# Patient Record
Sex: MALE | Race: WHITE | NOT HISPANIC OR LATINO | Employment: OTHER | URBAN - METROPOLITAN AREA
[De-identification: names, ages, dates, MRNs, and addresses within clinical notes are randomized per-mention and may not be internally consistent; named-entity substitution may affect disease eponyms.]

---

## 2017-01-02 ENCOUNTER — PATIENT MESSAGE (OUTPATIENT)
Dept: FAMILY MEDICINE | Facility: CLINIC | Age: 77
End: 2017-01-02

## 2017-01-03 RX ORDER — NITROGLYCERIN 0.4 MG/1
0.4 TABLET SUBLINGUAL EVERY 5 MIN PRN
Qty: 25 TABLET | Refills: 3 | Status: SHIPPED | OUTPATIENT
Start: 2017-01-03 | End: 2018-10-30 | Stop reason: SDUPTHER

## 2017-01-16 ENCOUNTER — PATIENT MESSAGE (OUTPATIENT)
Dept: FAMILY MEDICINE | Facility: CLINIC | Age: 77
End: 2017-01-16

## 2017-01-19 ENCOUNTER — PATIENT MESSAGE (OUTPATIENT)
Dept: OPHTHALMOLOGY | Facility: CLINIC | Age: 77
End: 2017-01-19

## 2017-01-26 ENCOUNTER — OFFICE VISIT (OUTPATIENT)
Dept: OPHTHALMOLOGY | Facility: CLINIC | Age: 77
End: 2017-01-26
Payer: MEDICARE

## 2017-01-26 DIAGNOSIS — H25.13 NUCLEAR SCLEROSIS, BILATERAL: ICD-10-CM

## 2017-01-26 DIAGNOSIS — H40.1131 PRIMARY OPEN ANGLE GLAUCOMA OF BOTH EYES, MILD STAGE: Primary | ICD-10-CM

## 2017-01-26 DIAGNOSIS — H52.7 REFRACTIVE ERROR: ICD-10-CM

## 2017-01-26 DIAGNOSIS — H04.123 DRY EYES, BILATERAL: ICD-10-CM

## 2017-01-26 PROCEDURE — 99999 PR PBB SHADOW E&M-EST. PATIENT-LVL II: CPT | Mod: PBBFAC,,, | Performed by: OPHTHALMOLOGY

## 2017-01-26 PROCEDURE — 99499 UNLISTED E&M SERVICE: CPT | Mod: S$GLB,,, | Performed by: OPHTHALMOLOGY

## 2017-01-26 PROCEDURE — 92250 FUNDUS PHOTOGRAPHY W/I&R: CPT | Mod: S$GLB,,, | Performed by: OPHTHALMOLOGY

## 2017-01-26 PROCEDURE — 92014 COMPRE OPH EXAM EST PT 1/>: CPT | Mod: S$GLB,,, | Performed by: OPHTHALMOLOGY

## 2017-01-26 PROCEDURE — 92015 DETERMINE REFRACTIVE STATE: CPT | Mod: S$GLB,,, | Performed by: OPHTHALMOLOGY

## 2017-01-26 NOTE — PROGRESS NOTES
SUBJECTIVE:   Wilberto Hayes Jr. is a 77 y.o. male   Visual acuity was not recorded.   Chief Complaint   Patient presents with    Glaucoma     dilation and IOP check today        HPI:  HPI     Glaucoma    Additional comments: dilation and IOP check today           Comments   1. Mild COAG Goal =17-18  +Fhx Glaucoma -g-dad  SLT OD 11/09 (min response)  SLT OS 8/09 (22 to 16-17)  2. Mild NSC  3. Dry eyes  Gel Plugs OU  Thera tears  O3FO   Tried Restasis (burning and blurry vision)  4. Guttata     Latanoprost QHS OU         Last edited by CLYDE Meredith on 1/26/2017  9:42 AM. (History)        Assessment /Plan :  1. Primary open angle glaucoma of both eyes, mild stage Doing well, IOP within acceptable range relative to target IOP and no evidence of progression. Continue current treatment. Reviewed importance of continued compliance with treatment and follow up.   2. Nuclear sclerosis, bilateral monitor for now   3. Dry eyes, bilateral continue artificial tears prn OU   4.      Refractive Error Trifocal RX    Return to clinic in 3-4 months  or as needed.  With IOP Check

## 2017-01-26 NOTE — MR AVS SNAPSHOT
[facility] - Ophthalmology  [address] [place] [address]  Phone: [phone]  Fax: [phone]                  Wilberto Hayes Jr.   2017 9:15 AM   Office Visit    Description:  Male : 1940   Provider:  Reggie Molina MD   Department:  [facility] - Ophthalmology           Reason for Visit     Glaucoma           Diagnoses this Visit        Comments    Primary open angle glaucoma of both eyes, mild stage    -  Primary     Nuclear sclerosis, bilateral         Dry eyes, bilateral         Refractive error                To Do List           Future Appointments        Provider Department Dept Phone    2/3/2017 9:40 AM Allison Ortiz MD [facility] [phone]    2/15/2017 11:20 AM Jonel Valle MD [facility] [phone]    2017 10:45 AM Reggie Molina MD [facility] Op[facility] [phone]    2017 1:00 PM Shawanda Gray NP [facility] [phone]      Goals (5 Years of Data)     None      Follow-Up and Disposition     Return in about 3 months (around 2017).      [facility] On Call     [facility] On Call Nurse Care Line - 24/7 Assistance  Registered nurses in the [facility] On Call Center provide clinical advisement, health education, appointment booking, and other advisory services.  Call for this free service at [phone].             Medications           Message regarding Medications     Verify the changes and/or additions to your medication regime listed below are the same as discussed with your clinician today.  If any of these changes or additions are incorrect, please notify your healthcare provider.        STOP taking these medications     lifitegrast (XIIDRA) 5 % Dpet Place 1 drop into both eyes 2 (two) times daily.           Verify that the below list of medications is an accurate representation of the medications you are currently taking.  If none reported, the list may be blank. If incorrect,  please contact your healthcare provider. Carry this list with you in case of emergency.           Current Medications     aspirin 81 mg Tab Take 1 tablet by mouth Daily.    cyanocobalamin (VITAMIN B-12) 100 MCG tablet Take 1 tablet by mouth Daily.    hydrochlorothiazide (HYDRODIURIL) 25 MG tablet TAKE 1/2 TABLET ONE TIME DAILY    latanoprost 0.005 % ophthalmic solution Place 1 drop into both eyes every evening.    losartan (COZAAR) 50 MG tablet TAKE 1 TABLET TWICE DAILY    LUBRICANT EYE DROPS, GLYC-PG, 1-0.3 % Drop     nebivolol (BYSTOLIC) 5 MG Tab Take 1 tablet (5 mg total) by mouth once daily.    OCEAN NASAL 0.65 % nasal spray     omega-3 fatty acids-vitamin E (FISH OIL) 1,000 mg Cap Take 1 capsule by mouth Daily.    PROPYLENE GLYCOL/ (SYSTANE ULTRA OPHT) Place 1 drop into both eyes. PRN    saw palmetto 500 MG capsule Take by mouth 2 (two) times daily.     nitroGLYCERIN (NITROSTAT) 0.4 MG SL tablet Place 1 tablet (0.4 mg total) under the tongue every 5 (five) minutes as needed for Chest pain (if no relief seek er trmt).           Clinical Reference Information           Allergies as of 1/26/2017     Niacin Preparations    Codeine    Fosinopril    Pravastatin    Simvastatin    Sulfa (Sulfonamide Antibiotics)      Immunizations Administered on Date of Encounter - 1/26/2017     None      Orders Placed During Today's Visit      Normal Orders This Visit    Color Fundus Photography - OU - Both Eyes

## 2017-02-15 ENCOUNTER — OFFICE VISIT (OUTPATIENT)
Dept: CARDIOLOGY | Facility: CLINIC | Age: 77
End: 2017-02-15
Payer: MEDICARE

## 2017-02-15 VITALS
HEART RATE: 57 BPM | HEIGHT: 71 IN | BODY MASS INDEX: 31.68 KG/M2 | WEIGHT: 226.31 LBS | DIASTOLIC BLOOD PRESSURE: 70 MMHG | OXYGEN SATURATION: 99 % | SYSTOLIC BLOOD PRESSURE: 128 MMHG

## 2017-02-15 DIAGNOSIS — I10 ESSENTIAL HYPERTENSION: ICD-10-CM

## 2017-02-15 DIAGNOSIS — G47.33 OSA ON CPAP: Primary | Chronic | ICD-10-CM

## 2017-02-15 DIAGNOSIS — M79.604 PAIN IN BOTH LOWER EXTREMITIES: ICD-10-CM

## 2017-02-15 DIAGNOSIS — I25.10 CORONARY ARTERY DISEASE INVOLVING NATIVE CORONARY ARTERY OF NATIVE HEART WITHOUT ANGINA PECTORIS: Chronic | ICD-10-CM

## 2017-02-15 DIAGNOSIS — E78.00 PURE HYPERCHOLESTEROLEMIA: ICD-10-CM

## 2017-02-15 DIAGNOSIS — M79.605 PAIN IN BOTH LOWER EXTREMITIES: ICD-10-CM

## 2017-02-15 PROCEDURE — 1159F MED LIST DOCD IN RCRD: CPT | Mod: S$GLB,,, | Performed by: INTERNAL MEDICINE

## 2017-02-15 PROCEDURE — 3074F SYST BP LT 130 MM HG: CPT | Mod: S$GLB,,, | Performed by: INTERNAL MEDICINE

## 2017-02-15 PROCEDURE — 99214 OFFICE O/P EST MOD 30 MIN: CPT | Mod: S$GLB,,, | Performed by: INTERNAL MEDICINE

## 2017-02-15 PROCEDURE — 1157F ADVNC CARE PLAN IN RCRD: CPT | Mod: S$GLB,,, | Performed by: INTERNAL MEDICINE

## 2017-02-15 PROCEDURE — 3078F DIAST BP <80 MM HG: CPT | Mod: S$GLB,,, | Performed by: INTERNAL MEDICINE

## 2017-02-15 PROCEDURE — 99999 PR PBB SHADOW E&M-EST. PATIENT-LVL III: CPT | Mod: PBBFAC,,, | Performed by: INTERNAL MEDICINE

## 2017-02-15 PROCEDURE — 1160F RVW MEDS BY RX/DR IN RCRD: CPT | Mod: S$GLB,,, | Performed by: INTERNAL MEDICINE

## 2017-02-15 PROCEDURE — 1126F AMNT PAIN NOTED NONE PRSNT: CPT | Mod: S$GLB,,, | Performed by: INTERNAL MEDICINE

## 2017-02-15 PROCEDURE — 99499 UNLISTED E&M SERVICE: CPT | Mod: S$GLB,,, | Performed by: INTERNAL MEDICINE

## 2017-02-15 NOTE — PROGRESS NOTES
Subjective:   Patient ID:  Wilberto Hayes Jr. is a 77 y.o. male who presents for follow-up of CAD, HTN, HLP.  Patient denies CP, angina or anginal equivalent.    Hypertension   This is a chronic problem. The current episode started more than 1 year ago. The problem has been gradually improving since onset. The problem is controlled. Pertinent negatives include no chest pain, palpitations or shortness of breath. Past treatments include angiotensin blockers. The current treatment provides moderate improvement. Compliance problems include exercise.    Coronary Artery Disease   Presents for follow-up visit. Pertinent negatives include no chest pain, chest pressure, chest tightness, dizziness, leg swelling, muscle weakness, palpitations, shortness of breath or weight gain. Risk factors include hyperlipidemia. The symptoms have been stable. Compliance with diet is variable. Compliance with exercise is variable. Compliance with medications is good.   Hyperlipidemia   This is a chronic problem. The current episode started more than 1 year ago. The problem is controlled. Recent lipid tests were reviewed and are variable. Pertinent negatives include no chest pain or shortness of breath. He is currently on no antihyperlipidemic treatment. The current treatment provides mild improvement of lipids. Compliance problems include medication side effects.        Review of Systems   Constitution: Negative. Negative for weight gain.   HENT: Negative.    Eyes: Negative.    Cardiovascular: Negative.  Negative for chest pain, leg swelling and palpitations.   Respiratory: Negative.  Negative for chest tightness and shortness of breath.    Endocrine: Negative.    Hematologic/Lymphatic: Negative.    Skin: Negative.    Musculoskeletal: Negative for muscle weakness.   Gastrointestinal: Negative.    Genitourinary: Negative.    Neurological: Negative.  Negative for dizziness.   Psychiatric/Behavioral: Negative.    Allergic/Immunologic:  Negative.      Family History   Problem Relation Age of Onset    Blindness Paternal Grandfather     Glaucoma Paternal Grandfather     Alcohol abuse Paternal Grandfather     Kidney disease Paternal Grandfather     Cancer Mother      brain    COPD Mother     Alcohol abuse Mother     Hypertension Mother     Cancer Father      lung    COPD Father     Alcohol abuse Father     Hypertension Father     Kidney disease Maternal Grandmother     Alcohol abuse Maternal Grandfather     Kidney disease Maternal Grandfather     Cancer Paternal Grandmother      Gyn     Kidney disease Paternal Grandmother     Retinal detachment Neg Hx     Macular degeneration Neg Hx     Diabetes Neg Hx     Heart disease Neg Hx     Stroke Neg Hx     Mental illness Neg Hx     Mental retardation Neg Hx      Past Medical History   Diagnosis Date    ACE-inhibitor cough     Acute coronary syndrome     Angina pectoris     Cataract     Coronary artery disease     Degenerative disc disease, lumbar     Degenerative disc disease, lumbar     Glaucoma     Hyperlipidemia     Hypertension     Kidney stone     Myocardial infarction     Peripheral vascular disease     Polyneuropathy     Sleep apnea     Tobacco dependence      resolved quit 1980    Trouble in sleeping      Current Outpatient Prescriptions on File Prior to Visit   Medication Sig Dispense Refill    aspirin 81 mg Tab Take 1 tablet by mouth Daily.      cyanocobalamin (VITAMIN B-12) 100 MCG tablet Take 1 tablet by mouth Daily.      hydrochlorothiazide (HYDRODIURIL) 25 MG tablet TAKE 1/2 TABLET ONE TIME DAILY 45 tablet 3    latanoprost 0.005 % ophthalmic solution Place 1 drop into both eyes every evening. 2.5 mL 12    losartan (COZAAR) 50 MG tablet TAKE 1 TABLET TWICE DAILY 180 tablet 1    LUBRICANT EYE DROPS, GLYC-PG, 1-0.3 % Drop       nebivolol (BYSTOLIC) 5 MG Tab Take 1 tablet (5 mg total) by mouth once daily. 30 tablet 2    nitroGLYCERIN (NITROSTAT) 0.4  MG SL tablet Place 1 tablet (0.4 mg total) under the tongue every 5 (five) minutes as needed for Chest pain (if no relief seek er trmt). 25 tablet 3    OCEAN NASAL 0.65 % nasal spray       omega-3 fatty acids-vitamin E (FISH OIL) 1,000 mg Cap Take 1 capsule by mouth Daily.      PROPYLENE GLYCOL/ (SYSTANE ULTRA OPHT) Place 1 drop into both eyes. PRN      saw palmetto 500 MG capsule Take by mouth 2 (two) times daily.        No current facility-administered medications on file prior to visit.      Review of patient's allergies indicates:   Allergen Reactions    Niacin preparations Other (See Comments)     myalgia    Codeine Other (See Comments)     unknown    Fosinopril Other (See Comments)     cough    Pravastatin Other (See Comments)      Muscle pain    Simvastatin Other (See Comments)     Muscle pain    Sulfa (sulfonamide antibiotics) Rash       Objective:     Physical Exam   Constitutional: He is oriented to person, place, and time. He appears well-developed and well-nourished.   HENT:   Head: Normocephalic and atraumatic.   Eyes: Conjunctivae are normal. Pupils are equal, round, and reactive to light.   Neck: Normal range of motion. Neck supple.   Cardiovascular: Normal rate, regular rhythm, normal heart sounds and intact distal pulses.    Pulmonary/Chest: Effort normal and breath sounds normal.   Abdominal: Soft. Bowel sounds are normal.   Neurological: He is alert and oriented to person, place, and time.   Skin: Skin is warm and dry.   Psychiatric: He has a normal mood and affect.   Nursing note and vitals reviewed.      Assessment:     1. CHELO on CPAP    2. Essential hypertension    3. Coronary artery disease involving native coronary artery of native heart without angina pectoris    4. Pure hypercholesterolemia        Plan:     CHELO on CPAP    Essential hypertension    Coronary artery disease involving native coronary artery of native heart without angina pectoris    Pure  hypercholesterolemia      Continue asa- cad  Continue losartan, hctz, bystolic-htn  Vascular studies

## 2017-02-15 NOTE — MR AVS SNAPSHOT
[facility]  [address] [address]  Phone: [phone]  Fax: [phone]                  Wilberto Hayes Jr.   2/15/2017 11:20 AM   Office Visit    Description:  Male : 1940   Provider:  Jonel Valle MD   Department:  [facility] - [facility]           Diagnoses this Visit        Comments    CHELO on CPAP    -  Primary     Essential hypertension         Coronary artery disease involving native coronary artery of native heart without angina pectoris         Pure hypercholesterolemia         Pain in both lower extremities                To Do List           Future Appointments        Provider Department Dept Phone    2017 8:40 AM Allison Ortiz MD [facility] [phone]    2017 10:45 AM Reggie Molina MD [facility] - Ophthalmology [phone]    2017 1:00 PM Shawanda Gray NP [facility] Pulmonary Services [phone]      Goals (5 Years of Data)     None      Follow-Up and Disposition     Return in about 6 months (around 8/15/2017).      [facility] On Call     [facility] On Call Nurse [facility] Line -  Assistance  Registered nurses in the [facility] On Call Center provide clinical advisement, health education, appointment booking, and other advisory services.  Call for this free service at [phone].             Medications           Message regarding Medications     Verify the changes and/or additions to your medication regime listed below are the same as discussed with your clinician today.  If any of these changes or additions are incorrect, please notify your healthcare provider.             Verify that the below list of medications is an accurate representation of the medications you are currently taking.  If none reported, the list may be blank. If incorrect, please contact your healthcare provider. Carry this list with you in case of emergency.           Current Medications     aspirin 81 mg Tab Take 1  "tablet by mouth Daily.    cyanocobalamin (VITAMIN B-12) 100 MCG tablet Take 1 tablet by mouth Daily.    hydrochlorothiazide (HYDRODIURIL) 25 MG tablet TAKE 1/2 TABLET ONE TIME DAILY    latanoprost 0.005 % ophthalmic solution Place 1 drop into both eyes every evening.    losartan (COZAAR) 50 MG tablet TAKE 1 TABLET TWICE DAILY    LUBRICANT EYE DROPS, GLYC-PG, 1-0.3 % Drop     nebivolol (BYSTOLIC) 5 MG Tab Take 1 tablet (5 mg total) by mouth once daily.    nitroGLYCERIN (NITROSTAT) 0.4 MG SL tablet Place 1 tablet (0.4 mg total) under the tongue every 5 (five) minutes as needed for Chest pain (if no relief seek er trmt).    OCEAN NASAL 0.65 % nasal spray     omega-3 fatty acids-vitamin E (FISH OIL) 1,000 mg Cap Take 1 capsule by mouth Daily.    PROPYLENE GLYCOL/ (SYSTANE ULTRA OPHT) Place 1 drop into both eyes. PRN    saw palmetto 500 MG capsule Take by mouth 2 (two) times daily.            Clinical Reference Information           Your Vitals Were     BP Pulse Height Weight SpO2 BMI    128/70 (BP Location: Left arm, Patient Position: Sitting, BP Method: Manual) 57 5' 10.5" (1.791 m) 102.7 kg (226 lb 4.8 oz) 99% 32.01 kg/m2      Blood Pressure          Most Recent Value    BP  128/70      Allergies as of 2/15/2017     Niacin Preparations    Codeine    Fosinopril    Pravastatin    Simvastatin    Sulfa (Sulfonamide Antibiotics)      Immunizations Administered on Date of Encounter - 2/15/2017     None      Orders Placed During Today's Visit     Future Labs/Procedures Expected by Expires    Cardiology Lab CONSTANCE Resting, Lower Extremities  As directed 2/15/2018      Language Assistance Services     ATTENTION: Language assistance services are available, free of charge. Please call [phone].      ATENCIÓN: Si abrahamla shalom, tiene a knutson disposición servicios gratuitos de asistencia lingüística. Llame al [phone].     CHÚ Ý: N?u b?n nói Ti?ng Vi?t, có các d?ch v? h? tr? ngôn ng? mi?n phí dành cho b?n. G?i s? " [phone].         [facility] complies with applicable Federal civil rights laws and does not discriminate on the basis of race, color, national origin, age, disability, or sex.

## 2017-02-24 ENCOUNTER — LAB VISIT (OUTPATIENT)
Dept: LAB | Facility: HOSPITAL | Age: 77
End: 2017-02-24
Attending: FAMILY MEDICINE
Payer: MEDICARE

## 2017-02-24 ENCOUNTER — OFFICE VISIT (OUTPATIENT)
Dept: FAMILY MEDICINE | Facility: CLINIC | Age: 77
End: 2017-02-24
Payer: MEDICARE

## 2017-02-24 VITALS
DIASTOLIC BLOOD PRESSURE: 70 MMHG | BODY MASS INDEX: 31.39 KG/M2 | RESPIRATION RATE: 14 BRPM | TEMPERATURE: 97 F | HEART RATE: 56 BPM | WEIGHT: 224.19 LBS | OXYGEN SATURATION: 96 % | HEIGHT: 71 IN | SYSTOLIC BLOOD PRESSURE: 132 MMHG

## 2017-02-24 DIAGNOSIS — Z12.5 PROSTATE CANCER SCREENING: ICD-10-CM

## 2017-02-24 DIAGNOSIS — I70.219 ATHEROSCLEROSIS OF NATIVE ARTERY OF EXTREMITY WITH INTERMITTENT CLAUDICATION, UNSPECIFIED EXTREMITY: ICD-10-CM

## 2017-02-24 DIAGNOSIS — I25.10 CORONARY ARTERY DISEASE INVOLVING NATIVE CORONARY ARTERY, ANGINA PRESENCE UNSPECIFIED, UNSPECIFIED WHETHER NATIVE OR TRANSPLANTED HEART: Chronic | ICD-10-CM

## 2017-02-24 DIAGNOSIS — Z00.00 ANNUAL PHYSICAL EXAM: ICD-10-CM

## 2017-02-24 DIAGNOSIS — E66.9 OBESITY (BMI 30-39.9): ICD-10-CM

## 2017-02-24 DIAGNOSIS — E78.00 PURE HYPERCHOLESTEROLEMIA: ICD-10-CM

## 2017-02-24 DIAGNOSIS — Z00.00 ANNUAL PHYSICAL EXAM: Primary | ICD-10-CM

## 2017-02-24 DIAGNOSIS — I70.0 CALCIFICATION OF AORTA: ICD-10-CM

## 2017-02-24 DIAGNOSIS — I10 ESSENTIAL HYPERTENSION: ICD-10-CM

## 2017-02-24 DIAGNOSIS — H93.12 TINNITUS OF LEFT EAR: ICD-10-CM

## 2017-02-24 DIAGNOSIS — G47.33 OSA ON CPAP: ICD-10-CM

## 2017-02-24 DIAGNOSIS — I20.9 ANGINA PECTORIS: ICD-10-CM

## 2017-02-24 LAB
ALBUMIN SERPL BCP-MCNC: 4.2 G/DL
ALP SERPL-CCNC: 71 U/L
ALT SERPL W/O P-5'-P-CCNC: 11 U/L
ANION GAP SERPL CALC-SCNC: 9 MMOL/L
AST SERPL-CCNC: 22 U/L
BILIRUB SERPL-MCNC: 0.8 MG/DL
BUN SERPL-MCNC: 15 MG/DL
CALCIUM SERPL-MCNC: 9.9 MG/DL
CHLORIDE SERPL-SCNC: 104 MMOL/L
CHOLEST/HDLC SERPL: 4.3 {RATIO}
CO2 SERPL-SCNC: 28 MMOL/L
COMPLEXED PSA SERPL-MCNC: 0.73 NG/ML
CREAT SERPL-MCNC: 1.2 MG/DL
EST. GFR  (AFRICAN AMERICAN): >60 ML/MIN/1.73 M^2
EST. GFR  (NON AFRICAN AMERICAN): 58.8 ML/MIN/1.73 M^2
GLUCOSE SERPL-MCNC: 96 MG/DL
HDL/CHOLESTEROL RATIO: 23.1 %
HDLC SERPL-MCNC: 186 MG/DL
HDLC SERPL-MCNC: 43 MG/DL
LDLC SERPL CALC-MCNC: 116 MG/DL
NONHDLC SERPL-MCNC: 143 MG/DL
POTASSIUM SERPL-SCNC: 4.7 MMOL/L
PROT SERPL-MCNC: 7.6 G/DL
SODIUM SERPL-SCNC: 141 MMOL/L
TRIGL SERPL-MCNC: 135 MG/DL

## 2017-02-24 PROCEDURE — 3075F SYST BP GE 130 - 139MM HG: CPT | Mod: S$GLB,,, | Performed by: FAMILY MEDICINE

## 2017-02-24 PROCEDURE — 99499 UNLISTED E&M SERVICE: CPT | Mod: S$GLB,,, | Performed by: FAMILY MEDICINE

## 2017-02-24 PROCEDURE — 80061 LIPID PANEL: CPT

## 2017-02-24 PROCEDURE — 3078F DIAST BP <80 MM HG: CPT | Mod: S$GLB,,, | Performed by: FAMILY MEDICINE

## 2017-02-24 PROCEDURE — 99999 PR PBB SHADOW E&M-EST. PATIENT-LVL III: CPT | Mod: PBBFAC,,, | Performed by: FAMILY MEDICINE

## 2017-02-24 PROCEDURE — 84153 ASSAY OF PSA TOTAL: CPT

## 2017-02-24 PROCEDURE — 36415 COLL VENOUS BLD VENIPUNCTURE: CPT | Mod: PO

## 2017-02-24 PROCEDURE — 80053 COMPREHEN METABOLIC PANEL: CPT

## 2017-02-24 PROCEDURE — 99397 PER PM REEVAL EST PAT 65+ YR: CPT | Mod: S$GLB,,, | Performed by: FAMILY MEDICINE

## 2017-02-24 NOTE — PROGRESS NOTES
Wilberto DUNLAP Freddy Jr. 77 y.o. White male      HPI- The patient is presenting today for Annual Exam  78yo male presents today for routine annual wellness exam. No hearing concerns are reported. He has had some tinnitus to the left ear occurring over the past few months but this has not impacted hearing. Vision has been stable. He sees Ophthalmology and is followed for glaucoma. Diet has been variable. He admits to poor eating choices at times. He is not taking any RX measures for HLD apart from otc fish oil but reports having tried statins in the past with adverse effects. Last lipid monitoring was performed in January 2016. Sleep has been stable. He uses CPAP at night and report compliance with use. No report of anxiety or depression. There have been no ER visits or hospitalizations since last assessment. Updated health maintenance is needed at this time.    Past Medical History:   Diagnosis Date    ACE-inhibitor cough     Acute coronary syndrome     Angina pectoris     Cataract     Coronary artery disease     Degenerative disc disease, lumbar     Degenerative disc disease, lumbar     Glaucoma     Hyperlipidemia     Hypertension     Kidney stone     Myocardial infarction     Peripheral vascular disease     Polyneuropathy     Sleep apnea     Tobacco dependence     resolved quit 1980    Trouble in sleeping      Past Surgical History:   Procedure Laterality Date    ABCESS DRAINAGE Left 2003    inguinal abcess/cellulitis - MRSA- 60 days of IVAB and repacking w/ Home health     ARGON TRABECULOPLASTY - OU - BOTH EYES  2009    CORONARY ARTERY BYPASS GRAFT  2002    Two-vessel    cystoscopy and stone removal via scope  2003    several between age 23 and 53    EYE SURGERY      lthotripsy  1985    VASECTOMY  1975     Family History   Problem Relation Age of Onset    Blindness Paternal Grandfather     Glaucoma Paternal Grandfather     Alcohol abuse Paternal Grandfather     Kidney disease Paternal  Grandfather     Cancer Mother      brain    COPD Mother     Alcohol abuse Mother     Hypertension Mother     Cancer Father      lung    COPD Father     Alcohol abuse Father     Hypertension Father     Kidney disease Maternal Grandmother     Alcohol abuse Maternal Grandfather     Kidney disease Maternal Grandfather     Cancer Paternal Grandmother      Gyn     Kidney disease Paternal Grandmother     Retinal detachment Neg Hx     Macular degeneration Neg Hx     Diabetes Neg Hx     Heart disease Neg Hx     Stroke Neg Hx     Mental illness Neg Hx     Mental retardation Neg Hx        Current Outpatient Prescriptions   Medication Sig Dispense Refill    aspirin 81 mg Tab Take 1 tablet by mouth Daily.      cyanocobalamin (VITAMIN B-12) 100 MCG tablet Take 1 tablet by mouth Daily.      hydrochlorothiazide (HYDRODIURIL) 25 MG tablet TAKE 1/2 TABLET ONE TIME DAILY 45 tablet 3    latanoprost 0.005 % ophthalmic solution Place 1 drop into both eyes every evening. 2.5 mL 12    losartan (COZAAR) 50 MG tablet TAKE 1 TABLET TWICE DAILY 180 tablet 1    LUBRICANT EYE DROPS, GLYC-PG, 1-0.3 % Drop       nebivolol (BYSTOLIC) 5 MG Tab Take 1 tablet (5 mg total) by mouth once daily. (Patient taking differently: Take 5 mg by mouth once daily. Take 1/2 tablet daily) 30 tablet 2    nitroGLYCERIN (NITROSTAT) 0.4 MG SL tablet Place 1 tablet (0.4 mg total) under the tongue every 5 (five) minutes as needed for Chest pain (if no relief seek er trmt). 25 tablet 3    OCEAN NASAL 0.65 % nasal spray       omega-3 fatty acids-vitamin E (FISH OIL) 1,000 mg Cap Take 1 capsule by mouth Daily.      PROPYLENE GLYCOL/ (SYSTANE ULTRA OPHT) Place 1 drop into both eyes. PRN      saw palmetto 500 MG capsule Take by mouth 2 (two) times daily.        No current facility-administered medications for this visit.      Allergies-  Review of patient's allergies indicates:   Allergen Reactions    Niacin preparations Other (See  "Comments)     myalgia    Statins-hmg-coa reductase inhibitors      Other reaction(s): Other (See Comments)  Muscle weakness    Codeine Other (See Comments) and Swelling     unknown    Fosinopril Other (See Comments)     cough    Pravastatin Other (See Comments)      Muscle pain    Simvastatin Other (See Comments)     Muscle pain    Sulfa (sulfonamide antibiotics) Rash and Hives     ROS-   CONSTITUTIONAL- No fever, chills, fatigue or weight loss  HEENT- No vision changes, ear pain, hearing loss, +tinnitus to left ear  CHEST- No cough, shortness of breath  CV- No chest pain, palpitations, edema  Abdomen- No abdominal pain, change in bowel habits, blood in stool  - No change in urination, no dysuria  Neurologic- No headaches, speech changes, vertigo, gait changes  Musculoskeletal- + joint pain, no back pain, no myalgias  Endocrine- No polydypsia, polyphagia or polyuria, no heat or cold intolerance  Hematologic- No bruising or bleeding, no lymphadenopathy  Psych- No change in mood, no concentration issues, no trouble with sleep  Integument- No rashes, no skin changes    /70  Pulse (!) 56  Temp 96.5 °F (35.8 °C) (Temporal)   Resp 14  Ht 5' 11" (1.803 m)  Wt 101.7 kg (224 lb 3.3 oz)  SpO2 96%  BMI 31.27 kg/m2    PE-  CONSTITIONAL- Well developed, obese, no acute distress  HEENT- Normal cephalic, atraumatic, PERRLA, right ear external- no abnormality, left ear external- no abnormality, right TM- normal to visualization, left TM- normal to visualization, moist mucus membranes, no oropharyngeal abnormality visualized nasal turbinates are clear  Neck- Full range of motion, no thyromegaly, no cervical lymphadenopathy  CV- Normal rate and rhythm, heart sounds normal, no murmurs, rubs or gallops  Chest- Breath sounds are normal and equal bilaterally, normal inspiratory and expiratory efforts  Abdomen- Bowel sounds are equal in all four quadrants, non tender to palpation, soft, no distention  Musculoskeletal- " Normal ROM of the extremities, no tenderness, contracture to fingers of the right hand  Neurologic- Alert, orientated x 3, cranial nerves intact  Skin- Warm and dry to touch, no rashes, no visible lesions  Psych- Mood and affect normal, Behavior normal    Assessment and Plan-  1. Annual physical exam  General health screening recommendations were reviewed with the patient in office today. Emphasized healthy eating and regular physical activity. Anticipatory guidance has been provided with regard to age and informational handouts have been given. Next well check is advised in 1 year, rtc sooner for chronic care assessment.  - Comprehensive metabolic panel; Future    2. Essential hypertension  Stable and well controlled. Continuation of current treatment is advised.    3. Pure hypercholesterolemia  Will assess updated lipid levels. Not currently taking any measures. Emphasized dietary control.  - Lipid panel; Future    4. Atherosclerosis of native artery of extremity with intermittent claudication, unspecified extremity  Continuation of current treatment plan as per Cardiology is advised.    5. CHELO on CPAP  Compliance with CPAP use is advised.    6. Angina pectoris  Stable. No current symptoms reported. Continue with current treatment plan as per Cardiology.    7. Calcification of aorta  Recommend BP and lipid control.    8. Coronary artery disease involving native coronary artery, angina presence unspecified, unspecified whether native or transplanted heart  Stable. Continue with current treatment plan per Cardiology.    9.Tinnitus of left ear  No visible abnormality on exam today. No hearing loss reported. Offered Audiology eval but he has declined. He will report back with any change in symptoms.    10. Obesity (BMI 30-39.9)  Weight loss efforts have been encouraged through diet and lifestyle measures.    11. Prostate cancer screening  - PSA, Screening; Future    RTC for next well check in 1 year, sooner if  needed.

## 2017-02-24 NOTE — MR AVS SNAPSHOT
[facility]  [address]  [facility] [address]  Phone: [phone]  Fax: [phone]                  Wilberto Hayes Jr.   2017 8:40 AM   Office Visit    Description:  Male : 1940   Provider:  Allison Ortiz MD   [facility]           Reason for Visit     Annual Exam           Diagnoses this Visit        Comments    Annual physical exam    -  Primary     Essential hypertension         Pure hypercholesterolemia         Atherosclerosis of native artery of extremity with intermittent claudication, unspecified extremity         CHELO on CPAP         Angina pectoris         Calcification of aorta         Coronary artery disease involving native coronary artery, angina presence unspecified, unspecified whether native or transplanted heart         Obesity (BMI 30-39.9)         Prostate cancer screening                To Do List           Future Appointments        Provider Department Dept Phone    2017 9:30 AM LAB[facility]ATORY, [facility]     3/10/2017 10:30 AM CARDIOVASCULAR, [facility] [phone]    2017 10:45 AM MD [facility] Op[facility] [phone]    2017 1:00 PM Shawanda Gray NP [facility] [phone]      Goals (5 Years of Data)     None      [facility]     [facility] Care Line -  Assistance  Registered nurses in the [facility] provide clinical advisement, health education, appointment booking, and other advisory services.  Call for this free service at [phone].             Medications           Message regarding Medications     Verify the changes and/or additions to your medication regime listed below are the same as discussed with your clinician today.  If any of these changes or additions are incorrect, please notify your healthcare provider.             Verify that the  below list of medications is an accurate representation of the medications you are currently taking.  If none reported, the list may be blank. If incorrect, please contact your healthcare provider. Carry this list with you in case of emergency.           Current Medications     aspirin 81 mg Tab Take 1 tablet by mouth Daily.    cyanocobalamin (VITAMIN B-12) 100 MCG tablet Take 1 tablet by mouth Daily.    hydrochlorothiazide (HYDRODIURIL) 25 MG tablet TAKE 1/2 TABLET ONE TIME DAILY    latanoprost 0.005 % ophthalmic solution Place 1 drop into both eyes every evening.    losartan (COZAAR) 50 MG tablet TAKE 1 TABLET TWICE DAILY    LUBRICANT EYE DROPS, GLYC-PG, 1-0.3 % Drop     nebivolol (BYSTOLIC) 5 MG Tab Take 1 tablet (5 mg total) by mouth once daily.    nitroGLYCERIN (NITROSTAT) 0.4 MG SL tablet Place 1 tablet (0.4 mg total) under the tongue every 5 (five) minutes as needed for Chest pain (if no relief seek er trmt).    OCEAN NASAL 0.65 % nasal spray     omega-3 fatty acids-vitamin E (FISH OIL) 1,000 mg Cap Take 1 capsule by mouth Daily.    PROPYLENE GLYCOL/ (SYSTANE ULTRA OPHT) Place 1 drop into both eyes. PRN    saw palmetto 500 MG capsule Take by mouth 2 (two) times daily.            Clinical Reference Information           Your Vitals Were     BP                   132/70           Blood Pressure          Most Recent Value    BP  132/70      Allergies as of 2/24/2017     Niacin Preparations    Statins-hmg-coa Reductase Inhibitors    Codeine    Fosinopril    Pravastatin    Simvastatin    Sulfa (Sulfonamide Antibiotics)      Immunizations Administered on Date of Encounter - 2/24/2017     None      Orders Placed During Today's Visit     Future Labs/Procedures Expected by Expires    Comprehensive metabolic panel  2/24/2017 4/25/2018    Lipid panel  2/24/2017 4/25/2018    PSA, Screening  2/24/2017 4/25/2018      Language Assistance Services     ATTENTION: Language assistance services are available, free of  charge. Please call [phone].      ATENCIÓN: Si habla español, tiene a knutson disposición servicios gratuitos de asistencia lingüística. Llame al [phone].     CHÚ Ý: N?u b?n nói Ti?ng Vi?t, có các d?ch v? h? tr? ngôn ng? mi?n phí dành cho b?n. G?i s? [phone].         [facility] complies with applicable Federal civil rights laws and does not discriminate on the basis of race, color, national origin, age, disability, or sex.

## 2017-03-10 ENCOUNTER — CLINICAL SUPPORT (OUTPATIENT)
Dept: CARDIOLOGY | Facility: CLINIC | Age: 77
End: 2017-03-10
Payer: MEDICARE

## 2017-03-10 DIAGNOSIS — M79.605 PAIN IN BOTH LOWER EXTREMITIES: ICD-10-CM

## 2017-03-10 DIAGNOSIS — M79.604 PAIN IN BOTH LOWER EXTREMITIES: ICD-10-CM

## 2017-03-10 LAB — VASCULAR ANKLE BRACHIAL INDEX (ABI) LEFT: 1.06 (ref 0.9–1.2)

## 2017-03-10 PROCEDURE — 93922 UPR/L XTREMITY ART 2 LEVELS: CPT | Mod: S$GLB,,, | Performed by: INTERNAL MEDICINE

## 2017-03-27 RX ORDER — NEBIVOLOL HYDROCHLORIDE 5 MG/1
TABLET ORAL
Qty: 30 TABLET | Refills: 2 | Status: SHIPPED | OUTPATIENT
Start: 2017-03-27 | End: 2017-06-05 | Stop reason: SDUPTHER

## 2017-05-23 ENCOUNTER — OFFICE VISIT (OUTPATIENT)
Dept: OPHTHALMOLOGY | Facility: CLINIC | Age: 77
End: 2017-05-23
Payer: MEDICARE

## 2017-05-23 DIAGNOSIS — H40.1131 PRIMARY OPEN ANGLE GLAUCOMA OF BOTH EYES, MILD STAGE: Primary | ICD-10-CM

## 2017-05-23 DIAGNOSIS — H04.123 DRY EYES, BILATERAL: ICD-10-CM

## 2017-05-23 DIAGNOSIS — H25.13 NUCLEAR SCLEROSIS, BILATERAL: ICD-10-CM

## 2017-05-23 PROCEDURE — 99499 UNLISTED E&M SERVICE: CPT | Mod: S$GLB,,, | Performed by: OPHTHALMOLOGY

## 2017-05-23 PROCEDURE — 99999 PR PBB SHADOW E&M-EST. PATIENT-LVL II: CPT | Mod: PBBFAC,,, | Performed by: OPHTHALMOLOGY

## 2017-05-23 PROCEDURE — 92012 INTRM OPH EXAM EST PATIENT: CPT | Mod: S$GLB,,, | Performed by: OPHTHALMOLOGY

## 2017-05-23 NOTE — PROGRESS NOTES
"SUBJECTIVE:   Wilberto Hayes Jr. is a 77 y.o. male   Corrected distance visual acuity was 20/30 in the right eye and 20/25 -1 in the left eye.   Chief Complaint   Patient presents with    Glaucoma     4 month IOP chk        HPI:  HPI     Glaucoma    Additional comments: 4 month IOP chk           Comments   Pt states that he is still having problems with dryness. He says he uses   Similasan. Pt states that he has some distortions of vision. Mostly in the   left eye.  He saw it in his left eye most recently. He describes it like a   lightning bolt in his vision. Pt states that he is 100% compliant with   gtts.     1. Mild COAG Goal =17-18  +Fhx Glaucoma -g-dad  SLT OD 11/09 (min response)  SLT OS 8/09 (22 to 16-17)  2. Mild NSC  3. Dry eyes  Gel Plugs OU  Thera tears  O3FO   Didn't refill Xiidra due to cost  Tried Restasis (burning and blurry vision)  4. Guttata    Latanoprost QHS OU  Homeopathic AT"s (Similasan)       Last edited by Dashawn Kuhn, Patient Care Assistant on 5/23/2017 10:56   AM. (History)        Assessment /Plan :  1. Primary open angle glaucoma of both eyes, mild stage Doing well, IOP within acceptable range relative to target IOP and no evidence of progression. Continue current treatment. Reviewed importance of continued compliance with treatment and follow up.     2. Dry eyes, bilateral  Recommend Xiidra BID OU   3. Nuclear sclerosis, bilateral not visually impairing       Return to clinic in 4 months  or as needed.  With IOP Check            "

## 2017-06-05 ENCOUNTER — PATIENT MESSAGE (OUTPATIENT)
Dept: PULMONOLOGY | Facility: CLINIC | Age: 77
End: 2017-06-05

## 2017-06-06 RX ORDER — NEBIVOLOL 5 MG/1
TABLET ORAL
Qty: 90 TABLET | Refills: 2 | Status: SHIPPED | OUTPATIENT
Start: 2017-06-06 | End: 2018-07-30 | Stop reason: SDUPTHER

## 2017-08-14 ENCOUNTER — OFFICE VISIT (OUTPATIENT)
Dept: PULMONOLOGY | Facility: CLINIC | Age: 77
End: 2017-08-14
Payer: MEDICARE

## 2017-08-14 ENCOUNTER — OFFICE VISIT (OUTPATIENT)
Dept: CARDIOLOGY | Facility: CLINIC | Age: 77
End: 2017-08-14
Payer: MEDICARE

## 2017-08-14 ENCOUNTER — PATIENT MESSAGE (OUTPATIENT)
Dept: PULMONOLOGY | Facility: CLINIC | Age: 77
End: 2017-08-14

## 2017-08-14 VITALS
RESPIRATION RATE: 18 BRPM | WEIGHT: 218.25 LBS | SYSTOLIC BLOOD PRESSURE: 120 MMHG | HEART RATE: 54 BPM | BODY MASS INDEX: 30.56 KG/M2 | DIASTOLIC BLOOD PRESSURE: 72 MMHG | HEIGHT: 71 IN | OXYGEN SATURATION: 99 %

## 2017-08-14 VITALS
WEIGHT: 217.94 LBS | HEART RATE: 66 BPM | HEIGHT: 71 IN | DIASTOLIC BLOOD PRESSURE: 64 MMHG | SYSTOLIC BLOOD PRESSURE: 122 MMHG | BODY MASS INDEX: 30.51 KG/M2

## 2017-08-14 DIAGNOSIS — G47.33 OSA ON CPAP: Primary | Chronic | ICD-10-CM

## 2017-08-14 DIAGNOSIS — E66.9 OBESITY (BMI 30-39.9): ICD-10-CM

## 2017-08-14 DIAGNOSIS — I70.219 ATHEROSCLEROSIS OF NATIVE ARTERY OF EXTREMITY WITH INTERMITTENT CLAUDICATION, UNSPECIFIED EXTREMITY: Chronic | ICD-10-CM

## 2017-08-14 DIAGNOSIS — E78.00 PURE HYPERCHOLESTEROLEMIA: ICD-10-CM

## 2017-08-14 DIAGNOSIS — I25.10 CORONARY ARTERY DISEASE INVOLVING NATIVE CORONARY ARTERY, ANGINA PRESENCE UNSPECIFIED, UNSPECIFIED WHETHER NATIVE OR TRANSPLANTED HEART: Chronic | ICD-10-CM

## 2017-08-14 DIAGNOSIS — I10 ESSENTIAL HYPERTENSION: Primary | ICD-10-CM

## 2017-08-14 PROCEDURE — 3078F DIAST BP <80 MM HG: CPT | Mod: S$GLB,,, | Performed by: INTERNAL MEDICINE

## 2017-08-14 PROCEDURE — 3078F DIAST BP <80 MM HG: CPT | Mod: S$GLB,,, | Performed by: NURSE PRACTITIONER

## 2017-08-14 PROCEDURE — 3008F BODY MASS INDEX DOCD: CPT | Mod: S$GLB,,, | Performed by: INTERNAL MEDICINE

## 2017-08-14 PROCEDURE — 3074F SYST BP LT 130 MM HG: CPT | Mod: S$GLB,,, | Performed by: NURSE PRACTITIONER

## 2017-08-14 PROCEDURE — 3008F BODY MASS INDEX DOCD: CPT | Mod: S$GLB,,, | Performed by: NURSE PRACTITIONER

## 2017-08-14 PROCEDURE — 99499 UNLISTED E&M SERVICE: CPT | Mod: S$GLB,,, | Performed by: INTERNAL MEDICINE

## 2017-08-14 PROCEDURE — 99999 PR PBB SHADOW E&M-EST. PATIENT-LVL III: CPT | Mod: PBBFAC,,, | Performed by: INTERNAL MEDICINE

## 2017-08-14 PROCEDURE — 99213 OFFICE O/P EST LOW 20 MIN: CPT | Mod: S$GLB,,, | Performed by: NURSE PRACTITIONER

## 2017-08-14 PROCEDURE — 1159F MED LIST DOCD IN RCRD: CPT | Mod: S$GLB,,, | Performed by: INTERNAL MEDICINE

## 2017-08-14 PROCEDURE — 99999 PR PBB SHADOW E&M-EST. PATIENT-LVL IV: CPT | Mod: PBBFAC,,, | Performed by: NURSE PRACTITIONER

## 2017-08-14 PROCEDURE — 1159F MED LIST DOCD IN RCRD: CPT | Mod: S$GLB,,, | Performed by: NURSE PRACTITIONER

## 2017-08-14 PROCEDURE — 99214 OFFICE O/P EST MOD 30 MIN: CPT | Mod: S$GLB,,, | Performed by: INTERNAL MEDICINE

## 2017-08-14 PROCEDURE — 3074F SYST BP LT 130 MM HG: CPT | Mod: S$GLB,,, | Performed by: INTERNAL MEDICINE

## 2017-08-14 NOTE — ASSESSMENT & PLAN NOTE
Benefits and compliant with auto CPAP use.   AHI: 2.3   [place]: 0  Old equipment from 2009.  Order for new auto CPAP machine and supplies.  Follow up in 7 weeks for compliance download after receiving new CPAP machine.  Auto CPAP 6-20    DME: [facility]

## 2017-08-14 NOTE — PROGRESS NOTES
Subjective:      Patient ID: Wilberto Hayes Jr. is a 77 y.o. male.    Chief Complaint: Sleep Apnea (yearly compliance follow up)    HPI: Wilberto Hayes Jr. is  here for follow up for CHELO and CPAP complaince assessment.   He is on Auto CPAP , unable to determine settings with present download capability.    He is compliant with CPAP use. Complaince download today reveals 100% of days with greater than 4 hours of device use.   Patient reports benefit from CPAP use and denies snoring and excessive daytime sleepiness.    [place]: 0  No complaints with CPAP use.   His present machine is 8 years old obtained in 2009.   He is concerned mainly about possible failure of old machine and having to be without CPAP and of cleanliness of an old machine.   Order for new CPAP machine with supplies placed a this visit.     Previous Report Reviewed: office notes     The following portions of the patient's history were reviewed and updated as appropriate: allergies, current medications, past family history, past medical history, past social history, past surgical history and problem list.    Review of Systems   Constitutional: Negative for fever, chills, weight loss, weight gain, activity change, appetite change, fatigue and night sweats.   HENT: Negative for postnasal drip, rhinorrhea, sinus pressure, voice change and congestion.    Eyes: Negative for redness and itching.   Respiratory: Negative for snoring, cough, sputum production, chest tightness, shortness of breath, wheezing, orthopnea, asthma nighttime symptoms, dyspnea on extertion, use of rescue inhaler and somnolence.    Cardiovascular: Negative for chest pain, palpitations and leg swelling.   Genitourinary: Negative for difficulty urinating and hematuria.   Endocrine: Negative for polydipsia, polyphagia, cold intolerance, heat intolerance and polyuria.    Musculoskeletal: Negative for arthralgias, back pain, gait problem, joint swelling and myalgias.   Skin:  "Negative.    Gastrointestinal: Negative for nausea, vomiting, abdominal pain and acid reflux.   Neurological: Negative for dizziness, weakness, light-headedness and headaches.   Hematological: Negative for adenopathy. No excessive bruising.   Psychiatric/Behavioral: Negative for sleep disturbance. The patient is not nervous/anxious.    All other systems reviewed and are negative.     Objective:     Physical Exam   Constitutional: He is oriented to person, place, and time. Vital signs are normal. He appears well-developed and well-nourished. He is active.  Non-toxic appearance. He does not appear ill. No distress.   HENT:   Head: Normocephalic and atraumatic.   Eyes: Conjunctivae are normal.   Neck: Normal range of motion. Neck supple.   Cardiovascular: Normal rate, regular rhythm, normal heart sounds and intact distal pulses.    Pulmonary/Chest: Effort normal and breath sounds normal.   Abdominal: Soft. Bowel sounds are normal.   Musculoskeletal: Normal range of motion.   Neurological: He is alert and oriented to person, place, and time. He has normal reflexes.   Skin: Skin is warm and dry.   Psychiatric: He has a normal mood and affect. His behavior is normal. Judgment and thought content normal.   Vitals reviewed.    Vitals:    08/14/17 1250   BP: 120/72   Pulse: (!) 54   Resp: 18   SpO2: 99%   Weight: 99 kg (218 lb 4.1 oz)   Height: 5' 11" (1.803 m)     body mass index is 30.44 kg/m².    Personal Diagnostic Review  CPAP download  Auto CPAP   Compliance Summary  7/15/2017 - 8/13/2017 (30 days)  Days with Device Usage 30 days  Days without Device Usage 0 days  Percent Days with Device Usage 100.0%  Cumulative Usage 9 days 19 hrs. 41 mins. 32 secs.  Maximum Usage (1 Day) 8 hrs. 47 mins.  Average Usage (All Days) 7 hrs. 51 mins. 23 secs.  Average Usage (Days Used) 7 hrs. 51 mins. 23 secs.  Minimum Usage (1 Day) 6 hrs. 18 mins. 35 secs.  Percent of Days with Usage >= 4 Hours 100.0%  Percent of Days with Usage < 4 " "Hours 0.0%  Date Range  Average AHI 2.3  Auto CPAP Summary  Auto CPAP Mean Pressure 7.8 cmH2O  Auto CPAP Peak Average Pressure 9.4 cmH2O  Average Device Pressure <= 90% of Time 9.2 cmH2O  Average Time in Large Leak Per Day 2 mins. 26 secs.  Patient benefits and is compliant    Assessment:     1. CHELO on CPAP    2. Obesity (BMI 30-39.9)        Orders Placed This Encounter   Procedures    CPAP FOR HOME USE     Needs new CPAP equipment present equipment obtained in 2009.  Sleep reports available in epic legacy documents.  Benefits and compliant  Percent of Days with Usage >= 4 Hours 100.0%    DME: [facility]     Order Specific Question:   Type:     Answer:   Auto CPAP     Order Specific Question:   Auto CPAP pressure setting range (cmH20):     Answer:   6-20 cm     Order Specific Question:   Length of need (1-99 months):     Answer:   99     Order Specific Question:   Humidification:     Answer:   Heated     Order Specific Question:   Type of mask:     Answer:   Nasal     Order Specific Question:   Headgear?     Answer:   Yes     Order Specific Question:   Tubing?     Answer:   Yes     Order Specific Question:   Humidifier chamber?     Answer:   Yes     Order Specific Question:   Chin strap?     Answer:   Yes     Order Specific Question:   Filters?     Answer:   Yes    CPAP/BIPAP SUPPLIES     90 day supply. 4 refills.     Order Specific Question:   Height:     Answer:   5' 11" (1.803 m)     Order Specific Question:   Weight:     Answer:   99 kg (218 lb 4.1 oz)     Order Specific Question:   Does patient have medical equipment at home?     Answer:   CPAP     Order Specific Question:   Type of mask:     Answer:   FFM     Comments:   Patient preference     Order Specific Question:   Tubing?     Answer:   Yes     Order Specific Question:   Humidifier chamber?     Answer:   Yes     Order Specific Question:   Chin strap?     Answer:   Yes     Order Specific Question:   Filters?     Answer:   Yes     Order Specific Question:   " Length of need (1-99 months):     Answer:   99       Plan:     Problem List Items Addressed This Visit     Obesity (BMI 30-39.9)     Encouraged calorie reduction and 30 minutes of exercise daily. Discussed impact of obesity on general health and sleep apnea.             CHELO on CPAP - Primary (Chronic)     Benefits and compliant with auto CPAP use.   AHI: 2.3   [place]: 0  Old equipment from 2009.  Order for new auto CPAP machine and supplies.  Follow up in 7 weeks for compliance download after receiving new CPAP machine.  Auto CPAP 6-20    DME: [facility]         Relevant Orders    CPAP FOR HOME USE    CPAP/BIPAP SUPPLIES      Other Visit Diagnoses    None.         (DME - [facility]). Reviewed therapeutic goals for positive airway pressure therapy CPAP  Ideal is usage 100% of nights for 6 - 8 hours per night. Minimum usage is 70% of night for at least 4 hours per night used. Pateint expressed understanding.     Return in about 7 weeks (around 10/2/2017) for CPAP compliance follow up after receiving new cpap machine. .

## 2017-08-14 NOTE — PROGRESS NOTES
Subjective:   Patient ID:  Wilberto Hayes Jr. is a 77 y.o. male who presents for follow-up of Coronary Artery Disease  Pt with atypical CP at incisional site.Pt active without sx.Stress test 2012 normal.  .     Hypertension   This is a chronic problem. The current episode started more than 1 year ago. The problem has been gradually improving since onset. The problem is controlled. Pertinent negatives include no chest pain, palpitations or shortness of breath. Past treatments include beta blockers, angiotensin blockers and diuretics. There are no compliance problems.    Hyperlipidemia   This is a chronic problem. The current episode started more than 1 year ago. Recent lipid tests were reviewed and are variable. Pertinent negatives include no chest pain or shortness of breath. He is currently on no antihyperlipidemic treatment. Compliance problems include medication side effects.    Coronary Artery Disease   Presents for follow-up visit. Pertinent negatives include no chest pain, chest pressure, chest tightness, dizziness, leg swelling, muscle weakness, palpitations, shortness of breath or weight gain. Risk factors include hyperlipidemia. The symptoms have been stable. Compliance with diet is variable. Compliance with exercise is variable. Compliance with medications is good.       Review of Systems   Constitution: Negative. Negative for weight gain.   HENT: Negative.    Eyes: Negative.    Cardiovascular: Negative.  Negative for chest pain, leg swelling and palpitations.   Respiratory: Negative.  Negative for chest tightness and shortness of breath.    Endocrine: Negative.    Hematologic/Lymphatic: Negative.    Skin: Negative.    Musculoskeletal: Negative for muscle weakness.   Gastrointestinal: Negative.    Genitourinary: Negative.    Neurological: Negative.  Negative for dizziness.   Psychiatric/Behavioral: Negative.    Allergic/Immunologic: Negative.      Family History   Problem Relation Age of Onset     Blindness Paternal Grandfather     Glaucoma Paternal Grandfather     Alcohol abuse Paternal Grandfather     Kidney disease Paternal Grandfather     Cancer Mother      brain    COPD Mother     Alcohol abuse Mother     Hypertension Mother     Cancer Father      lung    COPD Father     Alcohol abuse Father     Hypertension Father     Kidney disease Maternal Grandmother     Alcohol abuse Maternal Grandfather     Kidney disease Maternal Grandfather     Cancer Paternal Grandmother      Gyn     Kidney disease Paternal Grandmother     Retinal detachment Neg Hx     Macular degeneration Neg Hx     Diabetes Neg Hx     Heart disease Neg Hx     Stroke Neg Hx     Mental illness Neg Hx     Mental retardation Neg Hx      Past Medical History:   Diagnosis Date    ACE-inhibitor cough     Acute coronary syndrome     Angina pectoris     Cataract     Coronary artery disease     Degenerative disc disease, lumbar     Degenerative disc disease, lumbar     Glaucoma     Hyperlipidemia     Hypertension     Kidney stone     Myocardial infarction     Peripheral vascular disease     Polyneuropathy     Sleep apnea     Tobacco dependence     resolved quit 1980    Trouble in sleeping      Current Outpatient Prescriptions on File Prior to Visit   Medication Sig Dispense Refill    aspirin 81 mg Tab Take 1 tablet by mouth Daily.      cyanocobalamin (VITAMIN B-12) 100 MCG tablet Take 1 tablet by mouth Daily.      hydrochlorothiazide (HYDRODIURIL) 25 MG tablet TAKE 1/2 TABLET ONE TIME DAILY 45 tablet 3    latanoprost 0.005 % ophthalmic solution Place 1 drop into both eyes every evening. 2.5 mL 12    lifitegrast (XIIDRA) 5 % Dpet Apply 1 drop to eye 2 (two) times daily. 60 each 12    losartan (COZAAR) 50 MG tablet TAKE 1 TABLET TWICE DAILY 180 tablet 1    LUBRICANT EYE DROPS, GLYC-PG, 1-0.3 % Drop       nebivolol (BYSTOLIC) 5 MG Tab TAKE 1 TABLET (5 MG TOTAL) BY MOUTH ONCE DAILY. 90 tablet 2     nitroGLYCERIN (NITROSTAT) 0.4 MG SL tablet Place 1 tablet (0.4 mg total) under the tongue every 5 (five) minutes as needed for Chest pain (if no relief seek er trmt). 25 tablet 3    OCEAN NASAL 0.65 % nasal spray       omega-3 fatty acids-vitamin E (FISH OIL) 1,000 mg Cap Take 1 capsule by mouth Daily.      saw palmetto 500 MG capsule Take by mouth 2 (two) times daily.       [DISCONTINUED] PROPYLENE GLYCOL/ (SYSTANE ULTRA OPHT) Place 1 drop into both eyes. PRN       No current facility-administered medications on file prior to visit.      Review of patient's allergies indicates:   Allergen Reactions    Niacin preparations Other (See Comments)     myalgia    Statins-hmg-coa reductase inhibitors      Other reaction(s): Other (See Comments)  Muscle weakness    Codeine Other (See Comments) and Swelling     unknown    Fosinopril Other (See Comments)     cough    Pravastatin Other (See Comments)      Muscle pain    Simvastatin Other (See Comments)     Muscle pain    Sulfa (sulfonamide antibiotics) Rash and Hives       Objective:     Physical Exam   Constitutional: He is oriented to person, place, and time. He appears well-developed and well-nourished.   HENT:   Head: Normocephalic and atraumatic.   Eyes: Conjunctivae are normal. Pupils are equal, round, and reactive to light.   Neck: Normal range of motion. Neck supple.   Cardiovascular: Normal rate, regular rhythm, normal heart sounds and intact distal pulses.    Pulmonary/Chest: Effort normal and breath sounds normal.   Abdominal: Soft. Bowel sounds are normal.   Neurological: He is alert and oriented to person, place, and time.   Skin: Skin is warm and dry.   Psychiatric: He has a normal mood and affect.   Nursing note and vitals reviewed.      Assessment:     1. Essential hypertension    2. Pure hypercholesterolemia    3. Coronary artery disease involving native coronary artery, angina presence unspecified, unspecified whether native or transplanted  heart    4. Atherosclerosis of native artery of extremity with intermittent claudication, unspecified extremity        Plan:     Essential hypertension    Pure hypercholesterolemia    Coronary artery disease involving native coronary artery, angina presence unspecified, unspecified whether native or transplanted heart    Atherosclerosis of native artery of extremity with intermittent claudication, unspecified extremity    continue bystolic, losartan, hctz -htn  Recommended zetia, pt does not want to take, Risks and benefits explained

## 2017-08-14 NOTE — ASSESSMENT & PLAN NOTE
Encouraged calorie reduction and 30 minutes of exercise daily. Discussed impact of obesity on general health and sleep apnea.

## 2017-09-07 DIAGNOSIS — I10 ESSENTIAL HYPERTENSION: ICD-10-CM

## 2017-09-08 RX ORDER — LOSARTAN POTASSIUM 50 MG/1
TABLET ORAL
Qty: 180 TABLET | Refills: 1 | Status: SHIPPED | OUTPATIENT
Start: 2017-09-08 | End: 2018-01-30 | Stop reason: SDUPTHER

## 2017-09-08 RX ORDER — HYDROCHLOROTHIAZIDE 25 MG/1
TABLET ORAL
Qty: 45 TABLET | Refills: 3 | Status: SHIPPED | OUTPATIENT
Start: 2017-09-08 | End: 2018-07-02 | Stop reason: SDUPTHER

## 2017-09-26 ENCOUNTER — OFFICE VISIT (OUTPATIENT)
Dept: OPHTHALMOLOGY | Facility: CLINIC | Age: 77
End: 2017-09-26
Payer: MEDICARE

## 2017-09-26 DIAGNOSIS — H25.13 NUCLEAR SCLEROSIS, BILATERAL: ICD-10-CM

## 2017-09-26 DIAGNOSIS — H04.123 DRY EYES, BILATERAL: ICD-10-CM

## 2017-09-26 DIAGNOSIS — H02.59 LAXITY OF EYELID: ICD-10-CM

## 2017-09-26 DIAGNOSIS — H40.1131 PRIMARY OPEN ANGLE GLAUCOMA OF BOTH EYES, MILD STAGE: Primary | ICD-10-CM

## 2017-09-26 PROCEDURE — 99499 UNLISTED E&M SERVICE: CPT | Mod: S$GLB,,, | Performed by: OPHTHALMOLOGY

## 2017-09-26 PROCEDURE — 99999 PR PBB SHADOW E&M-EST. PATIENT-LVL II: CPT | Mod: PBBFAC,,, | Performed by: OPHTHALMOLOGY

## 2017-09-26 PROCEDURE — 92012 INTRM OPH EXAM EST PATIENT: CPT | Mod: S$GLB,,, | Performed by: OPHTHALMOLOGY

## 2017-09-26 NOTE — PROGRESS NOTES
"SUBJECTIVE:   Wilberto Hayes Jr. is a 77 y.o. male   Corrected distance visual acuity was 20/25 in the right eye and 20/30 in the left eye.   Chief Complaint   Patient presents with    Glaucoma     pt here for 4 month iop check    Dry Eye     ou are still dry        HPI:  HPI     Glaucoma    Additional comments: pt here for 4 month iop check           Dry Eye    Additional comments: ou are still dry           Comments   1. Mild COAG Goal =17-18  +Fhx Glaucoma -g-dad  SLT OD 11/09 (min response)  SLT OS 8/09 (22 to 16-17)  2. Mild NSC  3. Dry eyes  Gel Plugs OU  Thera tears  O3FO   Didn't refill Xiidra due to cost  Tried Restasis (burning and blurry vision)  4. Guttata    Latanoprost QHS OU  Homeopathic AT"s (Similasan)       Last edited by Corina San MA on 9/26/2017 10:44 AM. (History)        Assessment /Plan :  1. Primary open angle glaucoma of both eyes, mild stage Doing well, IOP within acceptable range relative to target IOP and no evidence of progression. Continue current treatment. Reviewed importance of continued compliance with treatment and follow up.     2. Nuclear sclerosis, bilateral monitor for now   3. Dry eyes, bilateral recommend artificial tears prn OU   4.      Laxity of eyelid, bilateral recommend Artificial tear ointment qhs OU, discussed possible tarsal strip in the future    Return to clinic in 4 months  or as needed.  With 24-2 HVF, Dilation and SDP's                "

## 2017-09-28 ENCOUNTER — PATIENT MESSAGE (OUTPATIENT)
Dept: OPHTHALMOLOGY | Facility: CLINIC | Age: 77
End: 2017-09-28

## 2017-10-04 ENCOUNTER — OFFICE VISIT (OUTPATIENT)
Dept: INTERNAL MEDICINE | Facility: CLINIC | Age: 77
End: 2017-10-04
Payer: MEDICARE

## 2017-10-04 ENCOUNTER — OFFICE VISIT (OUTPATIENT)
Dept: PULMONOLOGY | Facility: CLINIC | Age: 77
End: 2017-10-04
Payer: MEDICARE

## 2017-10-04 VITALS
HEIGHT: 71 IN | OXYGEN SATURATION: 98 % | TEMPERATURE: 97 F | BODY MASS INDEX: 30.53 KG/M2 | DIASTOLIC BLOOD PRESSURE: 70 MMHG | HEART RATE: 65 BPM | WEIGHT: 218.06 LBS | SYSTOLIC BLOOD PRESSURE: 120 MMHG

## 2017-10-04 VITALS
DIASTOLIC BLOOD PRESSURE: 70 MMHG | WEIGHT: 218.06 LBS | SYSTOLIC BLOOD PRESSURE: 120 MMHG | RESPIRATION RATE: 20 BRPM | HEIGHT: 71 IN | BODY MASS INDEX: 30.53 KG/M2 | HEART RATE: 54 BPM | OXYGEN SATURATION: 98 %

## 2017-10-04 DIAGNOSIS — H25.13 NUCLEAR SCLEROSIS OF BOTH EYES: ICD-10-CM

## 2017-10-04 DIAGNOSIS — I70.219 ATHEROSCLEROSIS OF NATIVE ARTERY OF EXTREMITY WITH INTERMITTENT CLAUDICATION, UNSPECIFIED EXTREMITY: Chronic | ICD-10-CM

## 2017-10-04 DIAGNOSIS — E66.9 OBESITY (BMI 30-39.9): ICD-10-CM

## 2017-10-04 DIAGNOSIS — M51.36 LUMBAR DEGENERATIVE DISC DISEASE: Chronic | ICD-10-CM

## 2017-10-04 DIAGNOSIS — Z00.00 ENCOUNTER FOR PREVENTIVE HEALTH EXAMINATION: Primary | ICD-10-CM

## 2017-10-04 DIAGNOSIS — I70.0 CALCIFICATION OF AORTA: Chronic | ICD-10-CM

## 2017-10-04 DIAGNOSIS — I25.10 CORONARY ARTERY DISEASE INVOLVING NATIVE CORONARY ARTERY WITHOUT ANGINA PECTORIS, UNSPECIFIED WHETHER NATIVE OR TRANSPLANTED HEART: Chronic | ICD-10-CM

## 2017-10-04 DIAGNOSIS — H40.1131 PRIMARY OPEN ANGLE GLAUCOMA OF BOTH EYES, MILD STAGE: Chronic | ICD-10-CM

## 2017-10-04 DIAGNOSIS — G47.33 OSA ON CPAP: Primary | ICD-10-CM

## 2017-10-04 DIAGNOSIS — E78.00 PURE HYPERCHOLESTEROLEMIA: ICD-10-CM

## 2017-10-04 DIAGNOSIS — I10 ESSENTIAL HYPERTENSION: ICD-10-CM

## 2017-10-04 DIAGNOSIS — G47.33 OSA ON CPAP: Chronic | ICD-10-CM

## 2017-10-04 PROCEDURE — 99999 PR PBB SHADOW E&M-EST. PATIENT-LVL IV: CPT | Mod: PBBFAC,,, | Performed by: NURSE PRACTITIONER

## 2017-10-04 PROCEDURE — 99213 OFFICE O/P EST LOW 20 MIN: CPT | Mod: 25,S$GLB,, | Performed by: NURSE PRACTITIONER

## 2017-10-04 PROCEDURE — G0439 PPPS, SUBSEQ VISIT: HCPCS | Mod: S$GLB,,, | Performed by: INTERNAL MEDICINE

## 2017-10-04 PROCEDURE — G0008 ADMIN INFLUENZA VIRUS VAC: HCPCS | Mod: S$GLB,,, | Performed by: NURSE PRACTITIONER

## 2017-10-04 PROCEDURE — 90662 IIV NO PRSV INCREASED AG IM: CPT | Mod: S$GLB,,, | Performed by: NURSE PRACTITIONER

## 2017-10-04 PROCEDURE — 99999 PR PBB SHADOW E&M-EST. PATIENT-LVL III: CPT | Mod: PBBFAC,,, | Performed by: INTERNAL MEDICINE

## 2017-10-04 PROCEDURE — 99499 UNLISTED E&M SERVICE: CPT | Mod: S$GLB,,, | Performed by: INTERNAL MEDICINE

## 2017-10-04 RX ORDER — ZINC GLUCONATE 50 MG
50 TABLET ORAL DAILY
COMMUNITY

## 2017-10-04 RX ORDER — CHOLECALCIFEROL (VITAMIN D3) 25 MCG
1000 TABLET ORAL DAILY
COMMUNITY
End: 2018-11-13

## 2017-10-04 NOTE — ASSESSMENT & PLAN NOTE
Benefits and compliant on Auto CPAP 6-20 cm   AHI 1.8  [place] 4   90% of time airflow on 8 cm h20 pressure.  Changed to CPAP 8 cm   Remote download in 4 weeks   Follow up in one year for compliance download

## 2017-10-04 NOTE — PATIENT INSTRUCTIONS
Counseling and Referral of Other Preventative  (Italic type indicates deductible and co-insurance are waived)    Patient Name: Wilberto Hayes  Today's Date: 10/4/2017      SERVICE LIMITATIONS RECOMMENDATION    Vaccines    · Pneumococcal (once after 65)    · Influenza (annually)    · Hepatitis B (if medium/high risk)    · Prevnar 13      Hepatitis B medium/high risk factors:       - End-stage renal disease       - Hemophiliacs who received Factor VII or         IX concentrates       - Clients of institutions for the mentally             retarded       - Persons who live in the same house as          a HepB carrier       - Homosexual men       - Illicit injectable drug abusers     Pneumococcal: Done, no repeat necessary     Influenza: Done, repeat in one year     Hepatitis B: N/A     Prevnar 13: Done, no repeat necessary    Prostate cancer screening (annually to age 75)     Prostate specific antigen (PSA) Shared decision making with Provider. Sometimes a co-pay may be required if the patient decides to have this test. The USPSTF no longer recommends prostate cancer screening routinely in medicine: not to follow    Colorectal cancer screening (to age 75)    · Fecal occult blood test (annual)  · Flexible sigmoidoscopy (5y)  · Screening colonoscopy (10y)  · Barium enema   last done 11/5/2007- N/A du e to age    Diabetes self-management training (no USPSTF recommendations)  Requires referral by treating physician for patient with diabetes or renal disease. 10 hours of initial DSMT sessions of no less than 30 minutes each in a continuous 12-month period. 2 hours of follow-up DSMT in subsequent years.  N/A    Glaucoma screening (no USPSTF recommendation)  Diabetes mellitus, family history   , age 50 or over    American, age 65 or over  Done this year, known glaucoma , seen every 4 months.    Medical nutrition therapy for diabetes or renal disease (no recommended schedule)  Requires referral by  treating physician for patient with diabetes or renal disease or kidney transplant within the past 3 years.  Can be provided in same year as diabetes self-management training (DSMT), and CMS recommends medical nutrition therapy take place after DSMT. Up to 3 hours for initial year and 2 hours in subsequent years.  N/A    Cardiovascular screening blood tests (every 5 years)  · Fasting lipid panel  Order as a panel if possible  Done this year, repeat every year    Diabetes screening tests (at least every 3 years, Medicare covers annually or at 6-month intervals for prediabetic patients)  · Fasting blood sugar (FBS) or glucose tolerance test (GTT)  Patient must be diagnosed with one of the following:       - Hypertension       - Dyslipidemia       - Obesity (BMI 30kg/m2)       - Previous elevated impaired FBS or GTT       ... or any two of the following:       - Overweight (BMI 25 but <30)       - Family history of diabetes       - Age 65 or older       - History of gestational diabetes or birth of baby weighing more than 9 pounds  Done this year, repeat every year    Abdominal aortic aneurysm screening (once)  · Sonogram   Limited to patients who meet one of the following criteria:       - Men who are 65-75 years old and have smoked more than 100 cigarette in their lifetime       - Anyone with a family history of abdominal aortic aneurysm       - Anyone recommended for screening by the USPSTF  CT scan abd done  late 1990's for kidney stones, no aneurysm noted.    HIV screening (annually for increased risk patients)  · HIV-1 and HIV-2 by EIA, or VALERIA, rapid antibody test or oral mucosa transudate  Patients must be at increased risk for HIV infection per USPSTF guidelines or pregnant. Tests covered annually for patient at increased risk or as requested by the patient. Pregnant patients may receive up to 3 tests during pregnancy.  Risks discussed, screening is not recommended    Smoking cessation counseling (up to 8  sessions per year)  Patients must be asymptomatic of tobacco-related conditions to receive as a preventative service.  Non-smoker    Subsequent annual wellness visit  At least 12 months since last AWV  Return in one year     The following information is provided to all patients.  This information is to help you find resources for any of the problems found today that may be affecting your health:                Living healthy guide: www.[facility].[place].[facility]      Understanding Diabetes: www.diabetes.org      Eating healthy: www.cdc.gov/healthyweight      CDC home safety checklist: www.cdc.gov/steadi/patient.html      Agency on Aging: www.goea.[place].[facility]      Alcoholics anonymous (AA): www.aa.org      Physical Activity: www.mamta.nih.gov/ll5wzbv      Tobacco use: www.quitwithusla.org

## 2017-10-04 NOTE — PROGRESS NOTES
Subjective:      Patient ID: Wilberto Hayes Jr. is a 77 y.o. male.    Chief Complaint: Sleep Apnea      HPI: Wilberto Hayes Jr. is here for follow up for CHELO and CPAP initial complaince assessment after receiving new CPAP equipment.   He is on Auto CPAP  of 6-20 cmH2O pressure.  He is compliant with CPAP use. Complaince download today reveals 100% of days with greater than 4 hours of device use.   Patient reports benefit from CPAP use and denies snoring and excessive daytime sleepiness. [place] 4.   No complaints with CPAP use.    Influenza vaccination given at this visit.     Previous Report Reviewed: lab reports and office notes     The following portions of the patient's history were reviewed and updated as appropriate: allergies, current medications, past family history, past medical history, past social history, past surgical history and problem list.    Review of Systems   Constitutional: Negative for fever, chills, weight loss, weight gain, activity change, appetite change, fatigue and night sweats.   HENT: Negative for postnasal drip, rhinorrhea, sinus pressure, voice change and congestion.    Eyes: Negative for redness and itching.   Respiratory: Negative for snoring, cough, sputum production, chest tightness, shortness of breath, wheezing, orthopnea, asthma nighttime symptoms, dyspnea on extertion, use of rescue inhaler and somnolence.    Cardiovascular: Negative.  Negative for chest pain, palpitations and leg swelling.   Genitourinary: Negative for difficulty urinating and hematuria.   Endocrine: Negative for cold intolerance and heat intolerance.    Musculoskeletal: Negative for arthralgias, gait problem, joint swelling and myalgias.   Skin: Negative.    Gastrointestinal: Negative for nausea, vomiting, abdominal pain and acid reflux.   Neurological: Negative for dizziness, weakness, light-headedness and headaches.   Hematological: Negative for adenopathy. No excessive bruising.   All other  "systems reviewed and are negative.     Objective:     Physical Exam   Constitutional: He is oriented to person, place, and time. Vital signs are normal. He appears well-developed and well-nourished. He is active and cooperative.   HENT:   Head: Normocephalic and atraumatic.   Right Ear: External ear normal.   Left Ear: External ear normal.   Nose: Nose normal.   Mouth/Throat: Oropharynx is clear and moist. No oropharyngeal exudate.   Eyes: Conjunctivae are normal.   Neck: Normal range of motion. Neck supple.   Cardiovascular: Normal rate, regular rhythm, normal heart sounds and intact distal pulses.    Pulmonary/Chest: Effort normal and breath sounds normal.   Abdominal: Soft. Bowel sounds are normal.   Musculoskeletal: Normal range of motion.   Neurological: He is alert and oriented to person, place, and time. He has normal reflexes.   Skin: Skin is warm and dry.   Psychiatric: He has a normal mood and affect. His behavior is normal. Judgment and thought content normal.   Vitals reviewed.    Vitals:    10/04/17 0935   BP: 120/70   Pulse: (!) 54   Resp: 20   SpO2: 98%   Weight: 98.9 kg (218 lb 0.6 oz)   Height: 5' 10.98" (1.803 m)     body mass index is 30.42 kg/m².    Personal Diagnostic Review    CPAP download  Auto CPAP 6-20 cm  Compliance Summary  9/2/2017 - 10/1/2017 (30 days)  Days with Device Usage 30 days  Days without Device Usage 0 days  Percent Days with Device Usage 100.0%  Cumulative Usage 9 days 19 hrs. 21 mins. 12 secs.  Maximum Usage (1 Day) 8 hrs. 40 mins. 19 secs.  Average Usage (All Days) 7 hrs. 50 mins. 42 secs.  Average Usage (Days Used) 7 hrs. 50 mins. 42 secs.  Minimum Usage (1 Day) 7 hrs. 4 secs.  Percent of Days with Usage >= 4 Hours 100.0%  Percent of Days with Usage < 4 Hours 0.0%  Date Range  Total Blower Time 9 days 19 hrs. 21 mins. 21 secs.  Average AHI 1.8  Auto CPAP Summary  Auto CPAP Mean Pressure 6.9 cmH2O  Auto CPAP Peak Average Pressure 7.7 cmH2O  Average Device Pressure <= 90% of " "Time 8.3 cmH2O  Average Time in Large Leak Per Day 48 secs.    Patient benefits and is compliant    Assessment:     1. CHELO on CPAP    2. Obesity (BMI 30-39.9)        Orders Placed This Encounter   Procedures    HME - OTHER     Changed in office from auto CPAP to CPAP 8 cm     Shilpi to provided remote download in 4 weeks.     Order Specific Question:   Type of Equipment:     Answer:   cpap     Order Specific Question:   Height:     Answer:   5' 10.98" (1.803 m)     Order Specific Question:   Weight:     Answer:   98.9 kg (218 lb 0.6 oz)     Order Specific Question:   Does patient have medical equipment at home?     Answer:   CPAP    Influenza - High Dose (65+) (PF) (IM)     Plan:     Problem List Items Addressed This Visit     Obesity (BMI 30-39.9)     Encouraged calorie reduction and 30 minutes of exercise daily. Discussed impact of obesity on general health.             CHELO on CPAP - Primary (Chronic)     Benefits and compliant on Auto CPAP 6-20 cm   AHI 1.8  [place] 4   90% of time airflow on 8 cm h20 pressure.  Changed to CPAP 8 cm   Remote download in 4 weeks   Follow up in one year for compliance download           Relevant Orders    HME - OTHER      Other Visit Diagnoses    None.          (DME -[facility]). Reviewed therapeutic goals for positive airway pressure therapy Auto CPAP  Ideal is usage 100% of nights for 6 - 8 hours per night. Minimum usage is 70% of night for at least 4 hours per night used. Pateint expressed understanding.     Return in about 11 months (around 9/4/2018) for CPAP compliance yearly follow up.     "

## 2017-10-05 NOTE — PROGRESS NOTES
"Wilberto Hayes presented for a  Medicare AWV and comprehensive Health Risk Assessment today. The following components were reviewed and updated:    · Medical history  · Family History  · Social history  · Allergies and Current Medications  · Health Risk Assessment  · Health Maintenance  · Care Team     ** See Completed Assessments for Annual Wellness Visit within the encounter summary.**       The following assessments were completed:  · Living Situation  · CAGE  · Depression Screening  · Timed Get Up and Go  · Whisper Test  · Cognitive Function Screening  · Nutrition Screening  · ADL Screening  · PAQ Screening    Physical Exam    PE:   /70 (BP Location: Right arm, Patient Position: Sitting, BP Method: Medium (Manual))   Pulse 65   Temp 96.6 °F (35.9 °C) (Tympanic)   Ht 5' 11" (1.803 m)   Wt 98.9 kg (218 lb 0.6 oz)   SpO2 98%   BMI 30.41 kg/m²     APPEARANCE: Patient is a 77 y.o.male /White . Awake, alert, in no distress, good historian.   HEENT: PERRLA, EOMI. No facial asymmetry.Tongue midline.   NECK: Supple. Carotids: 2+, no bruits. No thyromegaly. No cervical adenopathy.   HEART: Regular rate and rhythm with  No murmur , rubs or gallops.   CHEST: Lungs clear to auscultation.   BACK:  No spinous tenderness. No flank tenderness.   ABDOMEN: Bowel sounds normal. Not distended. Soft.   EXTREMITIES: No clubbing, cyanosis or edema. Peripheral pulses intact.Moderate varicosities.Right 5th finger chronic contracture.   NEURO:  Hearing intact.Motor: Symmetric  grasp, flexion and extension of feet. Toes downgoing. Sensory intact to monofilament testing, symmetric. Finger to nose is intact with no tremor. No spasticity or muscle fasciculations. Gait: No ataxia.   SKIN:  No suspicious lesions or ulcerations. Left great toenail fungus.      Diagnoses and health risks identified today and associated recommendations/orders:    1. Encounter for preventive health examination  Completed.    2. Coronary " artery disease involving native coronary artery without angina pectoris, unspecified whether native or transplanted heart  Stable and controlled. No angina. Continue current treatment plan as previously prescribed with your cardiologist, Dr Valle.     3. Essential hypertension  Stable and controlled. Continue current treatment plan as previously prescribed with your PCP.     4. Pure hypercholesterolemia  Stable and controlled. Continue current treatment plan as previously prescribed with your PCP.   Component      Latest Ref Rng & Units 2/24/2017   Cholesterol      120 - 199 mg/dL 186   Triglycerides      30 - 150 mg/dL 135   HDL      40 - 75 mg/dL 43   LDL Cholesterol      63.0 - 159.0 mg/dL 116.0     5. CHELO on CPAP  Stable and controlled with CPAP, just got a new machine - AutoPAP using nasal pillows. . Continue current treatment plan as previously prescribed with your pulmonologist.     6. Primary open angle glaucoma of both eyes, mild stage  Stable and controlled. Continue current treatment plan as previously prescribed with your ophthalmologist, Dr Molina.     7. Lumbar degenerative disc disease  Stable and controlled. Dr devendra ruized CT L spine as part of evaluation of leg complaints. 7/26/16: Mild multilevel lumbar degenerative disc disease with mild multilevel hypertrophic facet arthritis.  Variable central and foraminal stenosis ...Findings consistent with small right L3-L4 foraminal disc protrusion.Large inferior L1 endplate Schmorl's node. He ordered Physical Medicine for the patient. Patient decided since no significant pain or deficit  - did not need to see the consultant. Continue current treatment plan as previously prescribed with your physicians.     8. Atherosclerosis of native artery of extremity with intermittent claudication, unspecified extremity  Stable and controlled. 6/19/14 LE arterial US: scaterred plaques bilaterally with tripahsic waveforms and no significant stenosis.  Continue  current treatment plan as previously prescribed with Dr Valle.    9. Calcification of aorta  Stable and controlled. Noted on 7/26/16 CT Lumbar spine: The abdominal aorta reveals atherosclerotic calcification as does the splenic artery. Not an emergent problem. Discussed need to control BP, Lipids, glucose and not smoke- to avoid further arterial wall buildup.   Same treatments as for Coronary Artery Disease. Continue current treatment plan as previously prescribed with your physicians.     10. Obesity (BMI 30-39.9)  This problem is currently not controlled. Discussed food choices, portion size and regular exercise.   Please follow up with your PCP as planned to discuss adjustments to your treatment plan.     11. Nuclear sclerosis of both eyes  Stable , not severe enough for surgical treatment. Continue current treatment plan as previously prescribed with Dr Molina..       Provided Wilberto Freddy  with a 5-10 year written screening schedule and personal prevention plan. Recommendations were developed using the USPSTF age appropriate recommendations. Education, counseling, and referrals were provided as needed. After Visit Summary printed and given to patient which includes a list of additional screenings\tests needed.    Return in about 1 year (around 10/4/2018) for annual  HRA.    Katty Deshpande MD

## 2017-11-10 ENCOUNTER — PATIENT MESSAGE (OUTPATIENT)
Dept: PULMONOLOGY | Facility: CLINIC | Age: 77
End: 2017-11-10

## 2018-01-12 ENCOUNTER — PATIENT MESSAGE (OUTPATIENT)
Dept: CARDIOLOGY | Facility: CLINIC | Age: 78
End: 2018-01-12

## 2018-01-15 RX ORDER — LATANOPROST 50 UG/ML
1 SOLUTION/ DROPS OPHTHALMIC NIGHTLY
Qty: 2.5 ML | Refills: 12 | Status: SHIPPED | OUTPATIENT
Start: 2018-01-15 | End: 2019-02-14 | Stop reason: SDUPTHER

## 2018-01-20 ENCOUNTER — PATIENT OUTREACH (OUTPATIENT)
Dept: ADMINISTRATIVE | Facility: HOSPITAL | Age: 78
End: 2018-01-20

## 2018-01-29 ENCOUNTER — OFFICE VISIT (OUTPATIENT)
Dept: FAMILY MEDICINE | Facility: CLINIC | Age: 78
End: 2018-01-29
Payer: MEDICARE

## 2018-01-29 ENCOUNTER — LAB VISIT (OUTPATIENT)
Dept: LAB | Facility: HOSPITAL | Age: 78
End: 2018-01-29
Attending: FAMILY MEDICINE
Payer: MEDICARE

## 2018-01-29 VITALS
HEIGHT: 71 IN | SYSTOLIC BLOOD PRESSURE: 120 MMHG | HEART RATE: 62 BPM | WEIGHT: 222.56 LBS | DIASTOLIC BLOOD PRESSURE: 60 MMHG | RESPIRATION RATE: 15 BRPM | OXYGEN SATURATION: 97 % | BODY MASS INDEX: 31.16 KG/M2 | TEMPERATURE: 98 F

## 2018-01-29 DIAGNOSIS — I10 ESSENTIAL HYPERTENSION: Primary | ICD-10-CM

## 2018-01-29 DIAGNOSIS — I10 ESSENTIAL HYPERTENSION: ICD-10-CM

## 2018-01-29 DIAGNOSIS — E78.00 PURE HYPERCHOLESTEROLEMIA: ICD-10-CM

## 2018-01-29 DIAGNOSIS — E53.8 B12 DEFICIENCY: ICD-10-CM

## 2018-01-29 DIAGNOSIS — E66.9 OBESITY (BMI 30-39.9): ICD-10-CM

## 2018-01-29 DIAGNOSIS — I70.219 ATHEROSCLEROSIS OF NATIVE ARTERY OF EXTREMITY WITH INTERMITTENT CLAUDICATION, UNSPECIFIED EXTREMITY: ICD-10-CM

## 2018-01-29 DIAGNOSIS — Z12.5 SCREENING FOR PROSTATE CANCER: ICD-10-CM

## 2018-01-29 DIAGNOSIS — I70.0 CALCIFICATION OF AORTA: ICD-10-CM

## 2018-01-29 DIAGNOSIS — R53.83 FATIGUE, UNSPECIFIED TYPE: ICD-10-CM

## 2018-01-29 DIAGNOSIS — M51.36 DDD (DEGENERATIVE DISC DISEASE), LUMBAR: ICD-10-CM

## 2018-01-29 DIAGNOSIS — H40.1131 PRIMARY OPEN ANGLE GLAUCOMA OF BOTH EYES, MILD STAGE: ICD-10-CM

## 2018-01-29 DIAGNOSIS — I25.10 CORONARY ARTERY DISEASE INVOLVING NATIVE CORONARY ARTERY, ANGINA PRESENCE UNSPECIFIED, UNSPECIFIED WHETHER NATIVE OR TRANSPLANTED HEART: ICD-10-CM

## 2018-01-29 DIAGNOSIS — G47.33 OSA ON CPAP: ICD-10-CM

## 2018-01-29 LAB
ALBUMIN SERPL BCP-MCNC: 4.1 G/DL
ALP SERPL-CCNC: 85 U/L
ALT SERPL W/O P-5'-P-CCNC: 7 U/L
ANION GAP SERPL CALC-SCNC: 9 MMOL/L
AST SERPL-CCNC: 18 U/L
BASOPHILS # BLD AUTO: 0.07 K/UL
BASOPHILS NFR BLD: 1.2 %
BILIRUB SERPL-MCNC: 1.3 MG/DL
BUN SERPL-MCNC: 16 MG/DL
CALCIUM SERPL-MCNC: 9.9 MG/DL
CHLORIDE SERPL-SCNC: 104 MMOL/L
CHOLEST SERPL-MCNC: 171 MG/DL
CHOLEST/HDLC SERPL: 4.1 {RATIO}
CO2 SERPL-SCNC: 27 MMOL/L
CREAT SERPL-MCNC: 1.1 MG/DL
DIFFERENTIAL METHOD: ABNORMAL
EOSINOPHIL # BLD AUTO: 0.2 K/UL
EOSINOPHIL NFR BLD: 2.6 %
ERYTHROCYTE [DISTWIDTH] IN BLOOD BY AUTOMATED COUNT: 12.7 %
EST. GFR  (AFRICAN AMERICAN): >60 ML/MIN/1.73 M^2
EST. GFR  (NON AFRICAN AMERICAN): >60 ML/MIN/1.73 M^2
GLUCOSE SERPL-MCNC: 96 MG/DL
HCT VFR BLD AUTO: 44.3 %
HDLC SERPL-MCNC: 42 MG/DL
HDLC SERPL: 24.6 %
HGB BLD-MCNC: 15.3 G/DL
IMM GRANULOCYTES # BLD AUTO: 0.01 K/UL
IMM GRANULOCYTES NFR BLD AUTO: 0.2 %
LDLC SERPL CALC-MCNC: 101.2 MG/DL
LYMPHOCYTES # BLD AUTO: 1.1 K/UL
LYMPHOCYTES NFR BLD: 19.4 %
MCH RBC QN AUTO: 31.7 PG
MCHC RBC AUTO-ENTMCNC: 34.5 G/DL
MCV RBC AUTO: 92 FL
MONOCYTES # BLD AUTO: 0.6 K/UL
MONOCYTES NFR BLD: 10.5 %
NEUTROPHILS # BLD AUTO: 3.9 K/UL
NEUTROPHILS NFR BLD: 66.1 %
NONHDLC SERPL-MCNC: 129 MG/DL
NRBC BLD-RTO: 0 /100 WBC
PLATELET # BLD AUTO: 194 K/UL
PMV BLD AUTO: 11 FL
POTASSIUM SERPL-SCNC: 4.6 MMOL/L
PROT SERPL-MCNC: 7.7 G/DL
RBC # BLD AUTO: 4.83 M/UL
SODIUM SERPL-SCNC: 140 MMOL/L
TRIGL SERPL-MCNC: 139 MG/DL
TSH SERPL DL<=0.005 MIU/L-ACNC: 2 UIU/ML
WBC # BLD AUTO: 5.82 K/UL

## 2018-01-29 PROCEDURE — 82607 VITAMIN B-12: CPT

## 2018-01-29 PROCEDURE — 80053 COMPREHEN METABOLIC PANEL: CPT

## 2018-01-29 PROCEDURE — 84443 ASSAY THYROID STIM HORMONE: CPT

## 2018-01-29 PROCEDURE — 85025 COMPLETE CBC W/AUTO DIFF WBC: CPT

## 2018-01-29 PROCEDURE — 36415 COLL VENOUS BLD VENIPUNCTURE: CPT | Mod: PO

## 2018-01-29 PROCEDURE — 99999 PR PBB SHADOW E&M-EST. PATIENT-LVL III: CPT | Mod: PBBFAC,,, | Performed by: FAMILY MEDICINE

## 2018-01-29 PROCEDURE — 80061 LIPID PANEL: CPT

## 2018-01-29 PROCEDURE — 99214 OFFICE O/P EST MOD 30 MIN: CPT | Mod: S$GLB,,, | Performed by: FAMILY MEDICINE

## 2018-01-29 PROCEDURE — 99499 UNLISTED E&M SERVICE: CPT | Mod: S$GLB,,, | Performed by: FAMILY MEDICINE

## 2018-01-29 PROCEDURE — 84153 ASSAY OF PSA TOTAL: CPT

## 2018-01-29 NOTE — PROGRESS NOTES
Subjective:       Patient ID: Wilberto Hayes Jr. is a 78 y.o. male.    Chief Complaint: Chronic Care    HPI   Chronic Care  77yo male presents today for chronic care assessment. He is reporting no acute concerns. His blood pressure has been well controlled with the combination of HCTZ, Loisartan and Bystolic. There is no report of HA, CP or dizziness. Updated lipid assessment is needed. At his last check with Cardiology the addition of Zetia was advised but he did not pursue treatment- today he indicates he is not interested. He notes having some joint pain attributed to arthritis. No worsening of symptoms is reported. He has known DDD of the L spine. He does modify his activities but notes that he has been able to carry out his usual activity with intermittent rest. He is not interested in pursuing any PT or medication management. For sleep he alternates between Tylenol PM and Advil PM. He has been found to have mild decrease in GFR but does not have a diagnosis of CKD. He reports compliance with CPAP use and has been sleeping well.     Review of Systems   Constitutional: Negative for activity change and unexpected weight change.   HENT: Negative for congestion, hearing loss, rhinorrhea and trouble swallowing.    Eyes: Negative for discharge and visual disturbance.        +dry eyes   Respiratory: Negative for chest tightness, shortness of breath and wheezing.    Cardiovascular: Positive for leg swelling (intermittent- alleviated with HCTZ). Negative for chest pain and palpitations.   Gastrointestinal: Negative for blood in stool, constipation, diarrhea and vomiting.   Endocrine: Negative for polydipsia and polyuria.   Genitourinary: Negative for difficulty urinating, hematuria and urgency.   Musculoskeletal: Positive for arthralgias. Negative for joint swelling and neck pain.   Skin: Negative for rash.   Neurological: Positive for weakness. Negative for dizziness and headaches.   Psychiatric/Behavioral:  "Negative for confusion, dysphoric mood and sleep disturbance. The patient is not nervous/anxious.        Objective:   /60   Pulse 62   Temp 97.6 °F (36.4 °C) (Temporal)   Resp 15   Ht 5' 11" (1.803 m)   Wt 101 kg (222 lb 8.9 oz)   SpO2 97%   BMI 31.04 kg/m²   Physical Exam   Constitutional: He is oriented to person, place, and time. He appears well-developed and well-nourished. No distress.   HENT:   Head: Normocephalic and atraumatic.   Right Ear: Tympanic membrane, external ear and ear canal normal.   Left Ear: Tympanic membrane, external ear and ear canal normal.   Nose: Nose normal.   Mouth/Throat: Oropharynx is clear and moist.   Eyes: Conjunctivae and EOM are normal. Pupils are equal, round, and reactive to light.   Neck: Normal range of motion. Neck supple.   Cardiovascular: Normal rate, regular rhythm and normal heart sounds.    Pulmonary/Chest: Effort normal and breath sounds normal.   Abdominal: Soft. Bowel sounds are normal.   Musculoskeletal: He exhibits no edema.   Neurological: He is alert and oriented to person, place, and time.   Skin: Skin is warm and dry. No rash noted. He is not diaphoretic.   Psychiatric: He has a normal mood and affect. His behavior is normal.       Assessment:       1. Essential hypertension    2. Pure hypercholesterolemia    3. Calcification of aorta    4. Atherosclerosis of native artery of extremity with intermittent claudication, unspecified extremity    5. Coronary artery disease involving native coronary artery, angina presence unspecified, unspecified whether native or transplanted heart    6. CHELO on CPAP    7. DDD (degenerative disc disease), lumbar    8. Primary open angle glaucoma of both eyes, mild stage    9. Fatigue, unspecified type    10. B12 deficiency    11. Obesity (BMI 30-39.9)    12. Screening for prostate cancer        Plan:      Essential hypertension  Stable. Continue with current treatment. Target BP goal remains<140/90. Heart healthy diet " is advised. Intermittent home monitoring has been discussed.  -     Comprehensive metabolic panel; Future; Expected date: 01/29/2018    Pure hypercholesterolemia  Will proceed with lipid assessment. Discussed dietary control measures.   -     Lipid panel; Future; Expected date: 01/29/2018    Calcification of aorta  BP and lipid control remains advised.    Atherosclerosis of native artery of extremity with intermittent claudication, unspecified extremity  As above. Continue with current measures for BP.     Coronary artery disease involving native coronary artery, angina presence unspecified, unspecified whether native or transplanted heart  Stable. Keep pending assessment with Cardiology.    CHELO on CPAP  Advised compliance with CPAP use. Reviewed sleep hygiene. Current use of Advil PM is concerning- discussed implications with regard to worsening GFR status and he has acknowledged understanding. Awaiting labs.    DDD (degenerative disc disease), lumbar  Discussed longstanding back issues. Offered PT for strengthening and gait training but he has declined referral orders. He will continue with activity modification.    Primary open angle glaucoma of both eyes, mild stage  Continue with current treatment and follow-up recommendations as per Ophthalmology.    Fatigue, unspecified type  Likely multifactorial. Will assess labs as detailed below.  -     CBC auto differential; Future; Expected date: 01/29/2018  -     TSH; Future; Expected date: 01/29/2018    B12 deficiency  Taking oral B12 supplements currently- will assess levels.  -     Vitamin B12; Future; Expected date: 01/29/2018    Obesity (BMI 30-39.9)  Weight loss efforts remain encouraged through diet and lifestyle measures.    Screening for prostate cancer  -     PSA, Screening; Future; Expected date: 01/29/2018    Anticipate 6 month evaluation.

## 2018-01-30 ENCOUNTER — PATIENT MESSAGE (OUTPATIENT)
Dept: FAMILY MEDICINE | Facility: CLINIC | Age: 78
End: 2018-01-30

## 2018-01-30 LAB
COMPLEXED PSA SERPL-MCNC: 0.8 NG/ML
VIT B12 SERPL-MCNC: 541 PG/ML

## 2018-01-31 RX ORDER — LOSARTAN POTASSIUM 50 MG/1
TABLET ORAL
Qty: 180 TABLET | Refills: 1 | Status: SHIPPED | OUTPATIENT
Start: 2018-01-31 | End: 2018-07-02 | Stop reason: SDUPTHER

## 2018-02-19 ENCOUNTER — PES CALL (OUTPATIENT)
Dept: ADMINISTRATIVE | Facility: CLINIC | Age: 78
End: 2018-02-19

## 2018-02-20 ENCOUNTER — OFFICE VISIT (OUTPATIENT)
Dept: FAMILY MEDICINE | Facility: CLINIC | Age: 78
End: 2018-02-20
Payer: MEDICARE

## 2018-02-20 VITALS
TEMPERATURE: 97 F | SYSTOLIC BLOOD PRESSURE: 140 MMHG | WEIGHT: 221.31 LBS | HEART RATE: 72 BPM | BODY MASS INDEX: 30.98 KG/M2 | HEIGHT: 71 IN | OXYGEN SATURATION: 96 % | DIASTOLIC BLOOD PRESSURE: 78 MMHG

## 2018-02-20 DIAGNOSIS — I25.10 ASCVD (ARTERIOSCLEROTIC CARDIOVASCULAR DISEASE): ICD-10-CM

## 2018-02-20 DIAGNOSIS — I10 ESSENTIAL HYPERTENSION: ICD-10-CM

## 2018-02-20 DIAGNOSIS — R10.9 ABDOMINAL WALL PAIN: Primary | ICD-10-CM

## 2018-02-20 PROCEDURE — 1125F AMNT PAIN NOTED PAIN PRSNT: CPT | Mod: S$GLB,,, | Performed by: FAMILY MEDICINE

## 2018-02-20 PROCEDURE — 1159F MED LIST DOCD IN RCRD: CPT | Mod: S$GLB,,, | Performed by: FAMILY MEDICINE

## 2018-02-20 PROCEDURE — 99214 OFFICE O/P EST MOD 30 MIN: CPT | Mod: S$GLB,,, | Performed by: FAMILY MEDICINE

## 2018-02-20 PROCEDURE — 3008F BODY MASS INDEX DOCD: CPT | Mod: S$GLB,,, | Performed by: FAMILY MEDICINE

## 2018-02-20 PROCEDURE — 99499 UNLISTED E&M SERVICE: CPT | Mod: S$GLB,,, | Performed by: FAMILY MEDICINE

## 2018-02-20 PROCEDURE — 99999 PR PBB SHADOW E&M-EST. PATIENT-LVL III: CPT | Mod: PBBFAC,,, | Performed by: FAMILY MEDICINE

## 2018-02-20 NOTE — PROGRESS NOTES
"Subjective:       Patient ID: Wilberto Hayes Jr. is a 78 y.o. male.    Chief Complaint: Stomach Sensitivity (left side)      HPI Comments:       Current Outpatient Prescriptions:     aspirin 81 mg Tab, Take 1 tablet by mouth Daily., Disp: , Rfl:     cyanocobalamin (VITAMIN B-12) 100 MCG tablet, Take 1 tablet by mouth Daily., Disp: , Rfl:     hydrochlorothiazide (HYDRODIURIL) 25 MG tablet, TAKE 1/2 TABLET ONE TIME DAILY, Disp: 45 tablet, Rfl: 3    latanoprost 0.005 % ophthalmic solution, Place 1 drop into both eyes every evening., Disp: 2.5 mL, Rfl: 12    lifitegrast (XIIDRA) 5 % Dpet, Apply 1 drop to eye 2 (two) times daily., Disp: 60 each, Rfl: 12    losartan (COZAAR) 50 MG tablet, TAKE 1 TABLET TWICE DAILY, Disp: 180 tablet, Rfl: 1    LUBRICANT EYE DROPS, GLYC-PG, 1-0.3 % Drop, , Disp: , Rfl:     nebivolol (BYSTOLIC) 5 MG Tab, TAKE 1 TABLET (5 MG TOTAL) BY MOUTH ONCE DAILY. (Patient taking differently: 2.5 mg. TAKE 1 TABLET (5 MG TOTAL) BY MOUTH ONCE DAILY.), Disp: 90 tablet, Rfl: 2    nitroGLYCERIN (NITROSTAT) 0.4 MG SL tablet, Place 1 tablet (0.4 mg total) under the tongue every 5 (five) minutes as needed for Chest pain (if no relief seek er trmt)., Disp: 25 tablet, Rfl: 3    OCEAN NASAL 0.65 % nasal spray, , Disp: , Rfl:     omega-3 fatty acids-vitamin E (FISH OIL) 1,000 mg Cap, Take 1 capsule by mouth Daily., Disp: , Rfl:     saw palmetto 500 MG capsule, Take by mouth 2 (two) times daily. , Disp: , Rfl:     vitamin D 1000 units Tab, Take 1,000 Units by mouth once daily., Disp: , Rfl:     zinc gluconate 50 mg tablet, Take 50 mg by mouth once daily., Disp: , Rfl:     Is my first time seeing this gentleman who has a history of CABG, calcification of aorta found on a CT scan 2 years ago, who presents with concerns about some abdominal wall discomfort he's having: For about for 5 days he's had a small patch of "hypersensitivity" on the left side of his abdomen, approximately a few inches in " "diameter.  Locations always the same and there is no internal abdominal pain associated with it. He describes it as a very sensitive spot that is only triggered by light touch.  When he pushes harder there is no pain.      Also over approximately the same time he's had increasing gas: Belching as well as flatus.  He's been eating more beans and usual lately.  He denies any chest pain or shortness of breath.  Symptoms are relieved by Gas-X    Part of the reason for making the appointment was concern he and his wife had while watching a doctor show on TV and seeing a discussion of AAA dissection as a "silent killer", and they were concerned that his abdominal pain could be related to that.  He is a ex-smoker, and never had a AAA ultrasound, but during and lumbar scan 2 years ago for low back pain, which I reviewed with the patient and his wife, no mention was made of an aneurysm, only that there was atherosclerotic change of the aorta.  He denies any "searing" or "tearing" pain in his abdomen or back.      Review of Systems   Constitutional: Negative for activity change, appetite change and fever.   HENT: Negative for sore throat.    Respiratory: Negative for cough and shortness of breath.    Cardiovascular: Negative for chest pain.   Gastrointestinal: Negative for abdominal distention, blood in stool, constipation, nausea and vomiting.   Genitourinary: Negative for difficulty urinating.   Musculoskeletal: Negative for arthralgias and myalgias.   Neurological: Negative for dizziness and headaches.       Objective:      Vitals:    02/20/18 1423   BP: (!) 140/78   Pulse: 72   Temp: 97 °F (36.1 °C)   TempSrc: Tympanic   SpO2: 96%   Weight: 100.4 kg (221 lb 5.5 oz)   Height: 5' 11" (1.803 m)   PainSc:   2     Physical Exam   Constitutional: He is oriented to person, place, and time. He appears well-developed and well-nourished. No distress.   HENT:   Head: Normocephalic.   Mouth/Throat: No oropharyngeal exudate.   Neck: " Neck supple. No thyromegaly present.   Cardiovascular: Normal rate, regular rhythm and normal heart sounds.    No murmur heard.  Pulmonary/Chest: Effort normal and breath sounds normal. He has no wheezes. He has no rales.   Abdominal: Soft. He exhibits no distension. There is no hepatosplenomegaly. There is no tenderness. There is no rigidity, no guarding and negative Patton's sign.       Musculoskeletal: He exhibits no edema.   Lymphadenopathy:     He has no cervical adenopathy.   Neurological: He is alert and oriented to person, place, and time.   Skin: Skin is warm and dry. He is not diaphoretic.   Psychiatric: He has a normal mood and affect. His behavior is normal. Judgment and thought content normal.   Nursing note and vitals reviewed.      Assessment:       1. Abdominal wall pain    2. ASCVD (arteriosclerotic cardiovascular disease)    3. Essential hypertension        Plan:   Abdominal wall pain  Comments:  Circumscribed, hypersensitive area.  Reassured. follow-up if persists.    ASCVD (arteriosclerotic cardiovascular disease)  Comments:  No aneurysm found on CT scan from 2 years ago.  Reassured.  No further studies indicated    Essential hypertension  Comments:  Borderline high today.  Follow-up with PCP

## 2018-03-19 ENCOUNTER — OFFICE VISIT (OUTPATIENT)
Dept: OPHTHALMOLOGY | Facility: CLINIC | Age: 78
End: 2018-03-19
Payer: MEDICARE

## 2018-03-19 DIAGNOSIS — H25.13 NUCLEAR SCLEROSIS OF BOTH EYES: ICD-10-CM

## 2018-03-19 DIAGNOSIS — H04.129 DRY EYE: ICD-10-CM

## 2018-03-19 DIAGNOSIS — H10.13 ALLERGIC CONJUNCTIVITIS OF BOTH EYES: ICD-10-CM

## 2018-03-19 DIAGNOSIS — H40.1131 PRIMARY OPEN ANGLE GLAUCOMA OF BOTH EYES, MILD STAGE: Primary | ICD-10-CM

## 2018-03-19 PROCEDURE — 92014 COMPRE OPH EXAM EST PT 1/>: CPT | Mod: S$GLB,,, | Performed by: OPHTHALMOLOGY

## 2018-03-19 PROCEDURE — 92083 EXTENDED VISUAL FIELD XM: CPT | Mod: S$GLB,,, | Performed by: OPHTHALMOLOGY

## 2018-03-19 PROCEDURE — 99499 UNLISTED E&M SERVICE: CPT | Mod: S$GLB,,, | Performed by: OPHTHALMOLOGY

## 2018-03-19 PROCEDURE — 99999 PR PBB SHADOW E&M-EST. PATIENT-LVL II: CPT | Mod: PBBFAC,,, | Performed by: OPHTHALMOLOGY

## 2018-03-19 PROCEDURE — 92250 FUNDUS PHOTOGRAPHY W/I&R: CPT | Mod: S$GLB,,, | Performed by: OPHTHALMOLOGY

## 2018-03-19 NOTE — PROGRESS NOTES
"SUBJECTIVE:   Wilberto Hayes Jr. is a 78 y.o. male   Corrected distance visual acuity was 20/40 -2 in the right eye and 20/40 in the left eye.   Chief Complaint   Patient presents with    Glaucoma     4 mth hvf, dilation, sdp's. happy with current glasses.     Dry Eye     having issues with dryness        HPI:  HPI     Glaucoma    Additional comments: 4 mth hvf, dilation, sdp's. happy with current   glasses.            Dry Eye    Additional comments: having issues with dryness           Comments   1. Mild COAG Goal =17-18  +Fhx Glaucoma -g-dad  SLT OD 11/09 (min response)  SLT OS 8/09 (22 to 16-17)  2. Mild NSC  3. Dry eyes  Gel Plugs OU  Thera tears  O3FO   Didn't refill Xiidra due to cost  Tried Restasis (burning and blurry vision)  4. Guttata    Latanoprost QHS OU  Homeopathic AT"s (Similasan)       Last edited by Alise Vargas MA on 3/19/2018  1:13 PM. (History)        Assessment /Plan :  1. Primary open angle glaucoma of both eyes, mild stage Doing well, IOP within acceptable range relative to target IOP and no evidence of progression. Continue current treatment. Reviewed importance of continued compliance with treatment and follow up.     2. Nuclear sclerosis of both eyes monitor for now   3.      Dry Eyes continue artificial tears prn OU  4.      Allergic conjunctivitis, bilateral recommend Zaditor OU BID prn      Return to clinic in 4 months  or as needed.  With IOP Check              "

## 2018-05-14 ENCOUNTER — OFFICE VISIT (OUTPATIENT)
Dept: CARDIOLOGY | Facility: CLINIC | Age: 78
End: 2018-05-14
Payer: MEDICARE

## 2018-05-14 VITALS
DIASTOLIC BLOOD PRESSURE: 64 MMHG | WEIGHT: 222 LBS | BODY MASS INDEX: 31.08 KG/M2 | SYSTOLIC BLOOD PRESSURE: 128 MMHG | HEIGHT: 71 IN | HEART RATE: 60 BPM

## 2018-05-14 DIAGNOSIS — G47.33 OSA ON CPAP: Chronic | ICD-10-CM

## 2018-05-14 DIAGNOSIS — Z91.89 AT RISK FOR CORONARY ARTERY DISEASE: ICD-10-CM

## 2018-05-14 DIAGNOSIS — I25.10 CORONARY ARTERY DISEASE INVOLVING NATIVE CORONARY ARTERY OF NATIVE HEART WITHOUT ANGINA PECTORIS: Chronic | ICD-10-CM

## 2018-05-14 DIAGNOSIS — E78.00 PURE HYPERCHOLESTEROLEMIA: ICD-10-CM

## 2018-05-14 DIAGNOSIS — I10 ESSENTIAL HYPERTENSION: Primary | ICD-10-CM

## 2018-05-14 PROCEDURE — 99999 PR PBB SHADOW E&M-EST. PATIENT-LVL III: CPT | Mod: PBBFAC,,, | Performed by: INTERNAL MEDICINE

## 2018-05-14 PROCEDURE — 99499 UNLISTED E&M SERVICE: CPT | Mod: S$GLB,,, | Performed by: INTERNAL MEDICINE

## 2018-05-14 PROCEDURE — 99215 OFFICE O/P EST HI 40 MIN: CPT | Mod: S$GLB,,, | Performed by: INTERNAL MEDICINE

## 2018-05-14 PROCEDURE — 3074F SYST BP LT 130 MM HG: CPT | Mod: CPTII,S$GLB,, | Performed by: INTERNAL MEDICINE

## 2018-05-14 PROCEDURE — 3078F DIAST BP <80 MM HG: CPT | Mod: CPTII,S$GLB,, | Performed by: INTERNAL MEDICINE

## 2018-05-14 NOTE — PROGRESS NOTES
Subjective:   Patient ID:  Wilberto Hayes Jr. is a 78 y.o. male who presents for follow-up of Follow-up  Patient denies CP, angina or anginal equivalent.Pt with c/o of fatigue.  2V CABG 2002.  Hypertension   This is a chronic problem. The current episode started more than 1 year ago. The problem has been gradually improving since onset. The problem is controlled. Pertinent negatives include no chest pain, palpitations or shortness of breath. Past treatments include angiotensin blockers, beta blockers and diuretics. The current treatment provides moderate improvement. Compliance problems include exercise.    Hyperlipidemia   This is a chronic problem. The current episode started more than 1 year ago. Recent lipid tests were reviewed and are variable. Pertinent negatives include no chest pain or shortness of breath. He is currently on no antihyperlipidemic treatment. The current treatment provides mild improvement of lipids. Compliance problems include medication side effects.    Coronary Artery Disease   Presents for follow-up visit. Pertinent negatives include no chest pain, chest pressure, chest tightness, dizziness, leg swelling, muscle weakness, palpitations, shortness of breath or weight gain. Risk factors include hyperlipidemia. The symptoms have been stable. Compliance with diet is variable. Compliance with exercise is variable.       Review of Systems   Constitution: Negative. Negative for weight gain.   HENT: Negative.    Eyes: Negative.    Cardiovascular: Negative.  Negative for chest pain, leg swelling and palpitations.   Respiratory: Negative.  Negative for chest tightness and shortness of breath.    Endocrine: Negative.    Hematologic/Lymphatic: Negative.    Skin: Negative.    Musculoskeletal: Negative for muscle weakness.   Gastrointestinal: Negative.    Genitourinary: Negative.    Neurological: Negative.  Negative for dizziness.   Psychiatric/Behavioral: Negative.    Allergic/Immunologic: Negative.       Family History   Problem Relation Age of Onset    Blindness Paternal Grandfather     Glaucoma Paternal Grandfather     Alcohol abuse Paternal Grandfather     Kidney disease Paternal Grandfather     Cancer Mother         brain    COPD Mother     Alcohol abuse Mother     Hypertension Mother     Cancer Father         lung    COPD Father     Alcohol abuse Father     Hypertension Father     Kidney disease Maternal Grandmother     Alcohol abuse Maternal Grandfather     Kidney disease Maternal Grandfather     Cancer Paternal Grandmother         Gyn     Kidney disease Paternal Grandmother     Retinal detachment Neg Hx     Macular degeneration Neg Hx     Diabetes Neg Hx     Heart disease Neg Hx     Stroke Neg Hx     Mental illness Neg Hx     Mental retardation Neg Hx      Past Medical History:   Diagnosis Date    ACE-inhibitor cough     Acute coronary syndrome     Angina pectoris     Cataract     Coronary artery disease     Degenerative disc disease, lumbar     Degenerative disc disease, lumbar     Glaucoma     Hyperlipidemia     cholesterol    Hypertension     Kidney stone     last in 2001    Myocardial infarction     Obesity     Peripheral vascular disease     Polyneuropathy     Sleep apnea     Tobacco dependence     resolved quit 1980    Trouble in sleeping      Current Outpatient Prescriptions on File Prior to Visit   Medication Sig Dispense Refill    aspirin 81 mg Tab Take 1 tablet by mouth Daily.      cyanocobalamin (VITAMIN B-12) 100 MCG tablet Take 1 tablet by mouth Daily.      hydrochlorothiazide (HYDRODIURIL) 25 MG tablet TAKE 1/2 TABLET ONE TIME DAILY 45 tablet 3    latanoprost 0.005 % ophthalmic solution Place 1 drop into both eyes every evening. 2.5 mL 12    losartan (COZAAR) 50 MG tablet TAKE 1 TABLET TWICE DAILY 180 tablet 1    LUBRICANT EYE DROPS, GLYC-PG, 1-0.3 % Drop       nebivolol (BYSTOLIC) 5 MG Tab TAKE 1 TABLET (5 MG TOTAL) BY MOUTH ONCE DAILY.  (Patient taking differently: 2.5 mg. TAKE 1 TABLET (5 MG TOTAL) BY MOUTH ONCE DAILY.) 90 tablet 2    OCEAN NASAL 0.65 % nasal spray       omega-3 fatty acids-vitamin E (FISH OIL) 1,000 mg Cap Take 1 capsule by mouth Daily.      saw palmetto 500 MG capsule Take by mouth 2 (two) times daily.       vitamin D 1000 units Tab Take 1,000 Units by mouth once daily.      zinc gluconate 50 mg tablet Take 50 mg by mouth once daily.      nitroGLYCERIN (NITROSTAT) 0.4 MG SL tablet Place 1 tablet (0.4 mg total) under the tongue every 5 (five) minutes as needed for Chest pain (if no relief seek er trmt). 25 tablet 3     No current facility-administered medications on file prior to visit.      Review of patient's allergies indicates:   Allergen Reactions    Niacin preparations Other (See Comments)     myalgia    Statins-hmg-coa reductase inhibitors      Muscle weakness    Codeine Other (See Comments) and Swelling     unknown    Fosinopril Other (See Comments)     cough    Pravastatin Other (See Comments)      Muscle pain    Simvastatin Other (See Comments)     Muscle pain    Sulfa (sulfonamide antibiotics) Rash and Hives       Objective:     Physical Exam   Constitutional: He is oriented to person, place, and time. He appears well-developed and well-nourished.   HENT:   Head: Normocephalic and atraumatic.   Eyes: Conjunctivae are normal. Pupils are equal, round, and reactive to light.   Neck: Normal range of motion. Neck supple.   Cardiovascular: Normal rate, regular rhythm, normal heart sounds and intact distal pulses.    Pulmonary/Chest: Effort normal and breath sounds normal.   Abdominal: Soft. Bowel sounds are normal.   Neurological: He is alert and oriented to person, place, and time.   Skin: Skin is warm and dry.   Psychiatric: He has a normal mood and affect.   Nursing note and vitals reviewed.      Assessment:     1. Essential hypertension    2. Pure hypercholesterolemia    3. Coronary artery disease involving  native coronary artery of native heart without angina pectoris    4. CHELO on CPAP        Plan:     Essential hypertension    Pure hypercholesterolemia    Coronary artery disease involving native coronary artery of native heart without angina pectoris    CHELO on CPAP    echo and stress test  continue bystolic, losartan, hctz -htn  Continue asa- cad

## 2018-05-28 ENCOUNTER — CLINICAL SUPPORT (OUTPATIENT)
Dept: CARDIOLOGY | Facility: CLINIC | Age: 78
End: 2018-05-28
Attending: INTERNAL MEDICINE
Payer: MEDICARE

## 2018-05-28 ENCOUNTER — HOSPITAL ENCOUNTER (OUTPATIENT)
Dept: RADIOLOGY | Facility: HOSPITAL | Age: 78
Discharge: HOME | End: 2018-05-28
Attending: INTERNAL MEDICINE
Payer: MEDICARE

## 2018-05-28 ENCOUNTER — PES CALL (OUTPATIENT)
Dept: ADMINISTRATIVE | Facility: CLINIC | Age: 78
End: 2018-05-28

## 2018-05-28 DIAGNOSIS — Z91.89 AT RISK FOR CORONARY ARTERY DISEASE: ICD-10-CM

## 2018-05-28 DIAGNOSIS — I25.10 CORONARY ARTERY DISEASE INVOLVING NATIVE CORONARY ARTERY OF NATIVE HEART WITHOUT ANGINA PECTORIS: Chronic | ICD-10-CM

## 2018-05-28 LAB — DIASTOLIC DYSFUNCTION: NO

## 2018-05-28 PROCEDURE — 78452 HT MUSCLE IMAGE SPECT MULT: CPT | Mod: 26,,, | Performed by: NUCLEAR MEDICINE

## 2018-05-28 PROCEDURE — 93015 CV STRESS TEST SUPVJ I&R: CPT | Mod: S$GLB,,, | Performed by: NUCLEAR MEDICINE

## 2018-05-28 PROCEDURE — A9502 TC99M TETROFOSMIN: HCPCS | Mod: PO

## 2018-05-29 ENCOUNTER — TELEPHONE (OUTPATIENT)
Dept: CARDIOLOGY | Facility: CLINIC | Age: 78
End: 2018-05-29

## 2018-05-29 NOTE — TELEPHONE ENCOUNTER
Pt called and notified of test results and to cont with other test, then follow up as scheduled. cm

## 2018-05-29 NOTE — TELEPHONE ENCOUNTER
----- Message from Jonel Valle MD sent at 5/29/2018  5:06 AM CDT -----  Please tell pt stress test is normal

## 2018-05-29 NOTE — TELEPHONE ENCOUNTER
Called and left v/m that I was calling to give him good test results. I will call back at another time. adrianna

## 2018-06-04 ENCOUNTER — CLINICAL SUPPORT (OUTPATIENT)
Dept: CARDIOLOGY | Facility: CLINIC | Age: 78
End: 2018-06-04
Attending: INTERNAL MEDICINE
Payer: MEDICARE

## 2018-06-04 LAB
DIASTOLIC DYSFUNCTION: NO
ESTIMATED PA SYSTOLIC PRESSURE: 27.46
MITRAL VALVE REGURGITATION: NORMAL
RETIRED EF AND QEF - SEE NOTES: 60 (ref 55–65)

## 2018-06-04 PROCEDURE — 93306 TTE W/DOPPLER COMPLETE: CPT | Mod: S$GLB,,, | Performed by: INTERNAL MEDICINE

## 2018-06-05 ENCOUNTER — TELEPHONE (OUTPATIENT)
Dept: CARDIOLOGY | Facility: CLINIC | Age: 78
End: 2018-06-05

## 2018-06-06 ENCOUNTER — TELEPHONE (OUTPATIENT)
Dept: CARDIOLOGY | Facility: CLINIC | Age: 78
End: 2018-06-06

## 2018-06-06 NOTE — TELEPHONE ENCOUNTER
Phoned patient as per Dr. Valle's instructions to let patient know that his echocardiogram was within normal limits.  Patient has a follow up appointment with Dr. Valle on 10/3/18 in our [place] office.  Patient verbalizes understanding.

## 2018-07-02 DIAGNOSIS — I10 ESSENTIAL HYPERTENSION: ICD-10-CM

## 2018-07-08 RX ORDER — LOSARTAN POTASSIUM 50 MG/1
TABLET ORAL
Qty: 180 TABLET | Refills: 1 | Status: SHIPPED | OUTPATIENT
Start: 2018-07-08 | End: 2019-01-07 | Stop reason: SDUPTHER

## 2018-07-08 RX ORDER — HYDROCHLOROTHIAZIDE 25 MG/1
TABLET ORAL
Qty: 45 TABLET | Refills: 3 | Status: SHIPPED | OUTPATIENT
Start: 2018-07-08 | End: 2020-03-16 | Stop reason: SDUPTHER

## 2018-07-20 ENCOUNTER — OFFICE VISIT (OUTPATIENT)
Dept: FAMILY MEDICINE | Facility: CLINIC | Age: 78
End: 2018-07-20
Payer: MEDICARE

## 2018-07-20 ENCOUNTER — LAB VISIT (OUTPATIENT)
Dept: LAB | Facility: HOSPITAL | Age: 78
End: 2018-07-20
Attending: FAMILY MEDICINE
Payer: MEDICARE

## 2018-07-20 ENCOUNTER — PATIENT OUTREACH (OUTPATIENT)
Dept: FAMILY MEDICINE | Facility: CLINIC | Age: 78
End: 2018-07-20

## 2018-07-20 VITALS
HEIGHT: 71 IN | DIASTOLIC BLOOD PRESSURE: 70 MMHG | RESPIRATION RATE: 16 BRPM | WEIGHT: 218.94 LBS | BODY MASS INDEX: 30.65 KG/M2 | SYSTOLIC BLOOD PRESSURE: 120 MMHG | TEMPERATURE: 97 F | OXYGEN SATURATION: 98 % | HEART RATE: 62 BPM

## 2018-07-20 DIAGNOSIS — G47.33 OSA ON CPAP: ICD-10-CM

## 2018-07-20 DIAGNOSIS — I70.219 ATHEROSCLEROSIS OF NATIVE ARTERY OF EXTREMITY WITH INTERMITTENT CLAUDICATION, UNSPECIFIED EXTREMITY: ICD-10-CM

## 2018-07-20 DIAGNOSIS — I10 ESSENTIAL HYPERTENSION: ICD-10-CM

## 2018-07-20 DIAGNOSIS — I25.10 CORONARY ARTERY DISEASE INVOLVING NATIVE CORONARY ARTERY, ANGINA PRESENCE UNSPECIFIED, UNSPECIFIED WHETHER NATIVE OR TRANSPLANTED HEART: ICD-10-CM

## 2018-07-20 DIAGNOSIS — M51.36 DDD (DEGENERATIVE DISC DISEASE), LUMBAR: ICD-10-CM

## 2018-07-20 DIAGNOSIS — H25.13 NUCLEAR SCLEROSIS OF BOTH EYES: ICD-10-CM

## 2018-07-20 DIAGNOSIS — M24.541 CONTRACTURE OF JOINT OF RIGHT HAND: ICD-10-CM

## 2018-07-20 DIAGNOSIS — Z00.00 ANNUAL PHYSICAL EXAM: Primary | ICD-10-CM

## 2018-07-20 DIAGNOSIS — Z78.9 STATIN INTOLERANCE: ICD-10-CM

## 2018-07-20 DIAGNOSIS — E53.8 B12 DEFICIENCY: ICD-10-CM

## 2018-07-20 DIAGNOSIS — Z12.5 SCREENING FOR PROSTATE CANCER: ICD-10-CM

## 2018-07-20 DIAGNOSIS — M51.27 PROTRUSION OF INTERVERTEBRAL DISC OF LUMBOSACRAL REGION: ICD-10-CM

## 2018-07-20 DIAGNOSIS — I70.0 CALCIFICATION OF AORTA: ICD-10-CM

## 2018-07-20 DIAGNOSIS — E78.00 PURE HYPERCHOLESTEROLEMIA: ICD-10-CM

## 2018-07-20 DIAGNOSIS — M47.816 FACET ARTHROPATHY, LUMBAR: ICD-10-CM

## 2018-07-20 DIAGNOSIS — E66.9 OBESITY (BMI 30-39.9): ICD-10-CM

## 2018-07-20 DIAGNOSIS — H40.1131 PRIMARY OPEN ANGLE GLAUCOMA OF BOTH EYES, MILD STAGE: ICD-10-CM

## 2018-07-20 LAB
ALBUMIN SERPL BCP-MCNC: 4.1 G/DL
ALP SERPL-CCNC: 79 U/L
ALT SERPL W/O P-5'-P-CCNC: 8 U/L
ANION GAP SERPL CALC-SCNC: 7 MMOL/L
AST SERPL-CCNC: 20 U/L
BILIRUB SERPL-MCNC: 0.8 MG/DL
BUN SERPL-MCNC: 14 MG/DL
CALCIUM SERPL-MCNC: 10.3 MG/DL
CHLORIDE SERPL-SCNC: 104 MMOL/L
CO2 SERPL-SCNC: 28 MMOL/L
CREAT SERPL-MCNC: 1.2 MG/DL
EST. GFR  (AFRICAN AMERICAN): >60 ML/MIN/1.73 M^2
EST. GFR  (NON AFRICAN AMERICAN): 58.4 ML/MIN/1.73 M^2
GLUCOSE SERPL-MCNC: 89 MG/DL
POTASSIUM SERPL-SCNC: 5.2 MMOL/L
PROT SERPL-MCNC: 7.5 G/DL
SODIUM SERPL-SCNC: 139 MMOL/L

## 2018-07-20 PROCEDURE — 36415 COLL VENOUS BLD VENIPUNCTURE: CPT | Mod: PO

## 2018-07-20 PROCEDURE — 99999 PR PBB SHADOW E&M-EST. PATIENT-LVL III: CPT | Mod: PBBFAC,,, | Performed by: FAMILY MEDICINE

## 2018-07-20 PROCEDURE — 3074F SYST BP LT 130 MM HG: CPT | Mod: CPTII,S$GLB,, | Performed by: FAMILY MEDICINE

## 2018-07-20 PROCEDURE — 3078F DIAST BP <80 MM HG: CPT | Mod: CPTII,S$GLB,, | Performed by: FAMILY MEDICINE

## 2018-07-20 PROCEDURE — 99397 PER PM REEVAL EST PAT 65+ YR: CPT | Mod: S$GLB,,, | Performed by: FAMILY MEDICINE

## 2018-07-20 PROCEDURE — 80053 COMPREHEN METABOLIC PANEL: CPT

## 2018-07-20 PROCEDURE — 99499 UNLISTED E&M SERVICE: CPT | Mod: S$GLB,,, | Performed by: FAMILY MEDICINE
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
## 2018-07-20 NOTE — PATIENT INSTRUCTIONS
Prevention Guidelines, Men Ages 65 and Older  Screening tests and vaccines are an important part of managing your health. Health counseling is essential, too. Below are guidelines for these, for men ages 65 and older. Talk with your healthcare provider to make sure youre up-to-date on what you need.  Screening Who needs it How often   Abdominal aortic aneurysm Men ages 65 to 75 who have ever smoked 1 ultrasound   Alcohol misuse All men in this age group At routine exams   Blood pressure All men in this age group Every 2 years if your blood pressure is less than 120/80 mm Hg; yearly if your systolic blood pressure is 120 to 139 mm Hg, or your diastolic blood pressure reading is 80 to 89 mm Hg   Colorectal cancer All men in this age group Flexible sigmoidoscopy every 5 years, or colonoscopy every 10 years, or double-contrast barium enema every 5 years; yearly fecal occult blood test or fecal immunochemical test; or a stool DNA test as often as your healthcare provider advises; talk with your healthcare provider about which tests are best for you and when you no longer need colonoscopies (generally after age 75)   Depression All men in this age group At routine exams   Type 2 diabetes or prediabetes All adults beginning at age 45 and adults without symptoms at any age who are overweight or obese and have 1 or more other risk factors for diabetes At least every 3 years (yearly if your blood sugar has already begun to rise)   Hepatitis C Men at increased risk for infection - talk with your healthcare provider At routine exams   High cholesterol or triglycerides All men in this age group At least every 5 years   HIV Men at increased risk for infection - talk with your healthcare provider At routine exams   Lung cancer Adults ages 55 to 80 who have smoked Yearly screening in smokers with 30 pack-year history of smoking or who quit within 15 years   Obesity All men in this age group At routine exams   Prostate cancer All  men in this age group, talk to healthcare provider about risks and benefits of digital rectal exam (CONRADO) and prostate-specific antigen (PSA) screening1 At routine exams   Syphilis Men at increased risk for infection - talk with your healthcare provider At routine exams   Tuberculosis Men at increased risk for infection - talk with your healthcare provider Ask your healthcare provider   Vision All men in this age group Every 1 to 2 years; if you have a chronic health condition, ask your healthcare provider if you needs exams more often   Vaccine Who needs it How often   Chickenpox (varicella) All men in this age group who have no record of this infection or vaccine 2 doses; second dose should be given at least 4 weeks after the first dose   Hepatitis A Men at increased risk for infection - talk with your healthcare provider 2 doses given at least 6 months apart   Hepatitis B Men at increased risk for infection - talk with your healthcare provider 3 doses over 6 months; second dose should be given 1 month after the first dose; the third dose should be given at least 2 months after the second dose and at least 4 months after the first dose   Haemophilus influenzae Type B (HIB) Men at increased risk for infection - talk with your healthcare provider 1 to 3 doses   Influenza (flu) All men in this age group  Once a year   Meningococcal Men at increased risk for infection - talk with your healthcare provider 1 or more doses   Pneumococcal conjugate vaccine (PCV13) and pneumococcal polysaccharide vaccine (PPSV23) All men in this age group 1 dose of each vaccine   Tetanus/diphtheria/  pertussis (Td/Tdap) booster All men in this age group Td every 10 years, or Tdap if you will have contact with a child younger than 12 months old   Zoster All men in this age group 1 dose   Counseling Who needs it How often   Diet and exercise Men who are overweight or obese When diagnosed, and then at routine exams   Fall prevention (exercise,  vitamin D supplements) All men in this age group At routine exams   Sexually transmitted infection Men at increased risk for infection - talk with your healthcare provider At routine exams   Use of daily aspirin Men ages 45 to 79 at risk for cardiovascular health problems At routine exams   Use of tobacco and the health effects it can cause All men in this age group Every visit   [address] [place] [address] [facility]   Date Last Reviewed: 2/1/2017  © 7674-8993 [employer]. [address] [place] [address], [place] [address]. All rights reserved. This information is not intended as a substitute for professional medical care. Always follow your healthcare professional's instructions.

## 2018-07-24 ENCOUNTER — PATIENT MESSAGE (OUTPATIENT)
Dept: FAMILY MEDICINE | Facility: CLINIC | Age: 78
End: 2018-07-24

## 2018-07-24 ENCOUNTER — OFFICE VISIT (OUTPATIENT)
Dept: OPHTHALMOLOGY | Facility: CLINIC | Age: 78
End: 2018-07-24
Payer: MEDICARE

## 2018-07-24 DIAGNOSIS — H40.1131 PRIMARY OPEN ANGLE GLAUCOMA OF BOTH EYES, MILD STAGE: Primary | ICD-10-CM

## 2018-07-24 DIAGNOSIS — E87.5 SERUM POTASSIUM ELEVATED: Primary | ICD-10-CM

## 2018-07-24 DIAGNOSIS — H04.129 DRY EYE: ICD-10-CM

## 2018-07-24 DIAGNOSIS — H25.13 NUCLEAR SCLEROSIS OF BOTH EYES: ICD-10-CM

## 2018-07-24 PROCEDURE — 92012 INTRM OPH EXAM EST PATIENT: CPT | Mod: S$GLB,,, | Performed by: OPHTHALMOLOGY

## 2018-07-24 PROCEDURE — 99999 PR PBB SHADOW E&M-EST. PATIENT-LVL II: CPT | Mod: PBBFAC,,, | Performed by: OPHTHALMOLOGY

## 2018-07-24 NOTE — PROGRESS NOTES
"SUBJECTIVE:   Wilberto Hayes Jr. is a 78 y.o. male   Corrected distance visual acuity was 20/40 in the right eye and 20/30 in the left eye.   Chief Complaint   Patient presents with    Glaucoma     pt here for 4 month iop check doing well with drops         HPI:  HPI     Glaucoma    Additional comments: pt here for 4 month iop check doing well with drops              Comments   1. Mild COAG Goal =17-18  +Fhx Glaucoma -g-dad  SLT OD 11/09 (min response)  SLT OS 8/09 (22 to 16-17)  2. Mild NSC  3. Dry eyes  Gel Plugs OU  Didn't refill Xiidra due to cost  Tried Restasis (burning and blurry vision)  4. Guttata    Latanoprost QHS OU  Homeopathic AT"s (Similasan)  Thera tears  O3FO       Last edited by Corina San MA on 7/24/2018  8:50 AM. (History)        Assessment /Plan :  1. Primary open angle glaucoma of both eyes, mild stage Doing well, IOP within acceptable range relative to target IOP and no evidence of progression. Continue current treatment. Reviewed importance of continued compliance with treatment and follow up.     2. Nuclear sclerosis of both eyes    3. Dry eye      Return to clinic in 4 months  or as needed.  With IOP Check              "

## 2018-07-30 RX ORDER — NEBIVOLOL 5 MG/1
TABLET ORAL
Qty: 30 TABLET | Refills: 11 | Status: SHIPPED | OUTPATIENT
Start: 2018-07-30 | End: 2019-08-06 | Stop reason: SDUPTHER

## 2018-08-14 ENCOUNTER — PATIENT MESSAGE (OUTPATIENT)
Dept: CARDIOLOGY | Facility: CLINIC | Age: 78
End: 2018-08-14

## 2018-08-24 ENCOUNTER — LAB VISIT (OUTPATIENT)
Dept: LAB | Facility: HOSPITAL | Age: 78
End: 2018-08-24
Attending: FAMILY MEDICINE
Payer: MEDICARE

## 2018-08-24 DIAGNOSIS — E87.5 SERUM POTASSIUM ELEVATED: ICD-10-CM

## 2018-08-24 LAB — POTASSIUM SERPL-SCNC: 4.2 MMOL/L

## 2018-08-24 PROCEDURE — 84132 ASSAY OF SERUM POTASSIUM: CPT

## 2018-08-24 PROCEDURE — 36415 COLL VENOUS BLD VENIPUNCTURE: CPT | Mod: PO

## 2018-08-28 ENCOUNTER — PES CALL (OUTPATIENT)
Dept: ADMINISTRATIVE | Facility: CLINIC | Age: 78
End: 2018-08-28

## 2018-10-02 DIAGNOSIS — I25.10 CORONARY ARTERY DISEASE INVOLVING NATIVE CORONARY ARTERY OF NATIVE HEART WITHOUT ANGINA PECTORIS: Primary | ICD-10-CM

## 2018-10-03 ENCOUNTER — CLINICAL SUPPORT (OUTPATIENT)
Dept: CARDIOLOGY | Facility: CLINIC | Age: 78
End: 2018-10-03
Payer: MEDICARE

## 2018-10-03 ENCOUNTER — OFFICE VISIT (OUTPATIENT)
Dept: CARDIOLOGY | Facility: CLINIC | Age: 78
End: 2018-10-03
Payer: MEDICARE

## 2018-10-03 VITALS
WEIGHT: 218.25 LBS | HEIGHT: 71 IN | HEART RATE: 57 BPM | BODY MASS INDEX: 30.56 KG/M2 | SYSTOLIC BLOOD PRESSURE: 130 MMHG | DIASTOLIC BLOOD PRESSURE: 68 MMHG

## 2018-10-03 DIAGNOSIS — I10 ESSENTIAL HYPERTENSION: Primary | ICD-10-CM

## 2018-10-03 DIAGNOSIS — I70.211 ATHEROSCLEROSIS OF NATIVE ARTERY OF RIGHT LOWER EXTREMITY WITH INTERMITTENT CLAUDICATION: Chronic | ICD-10-CM

## 2018-10-03 DIAGNOSIS — R53.83 OTHER FATIGUE: ICD-10-CM

## 2018-10-03 DIAGNOSIS — I70.0 CALCIFICATION OF AORTA: Chronic | ICD-10-CM

## 2018-10-03 DIAGNOSIS — E78.00 PURE HYPERCHOLESTEROLEMIA: ICD-10-CM

## 2018-10-03 DIAGNOSIS — I25.10 CORONARY ARTERY DISEASE INVOLVING NATIVE CORONARY ARTERY OF NATIVE HEART WITHOUT ANGINA PECTORIS: Chronic | ICD-10-CM

## 2018-10-03 DIAGNOSIS — I25.10 CORONARY ARTERY DISEASE INVOLVING NATIVE CORONARY ARTERY OF NATIVE HEART WITHOUT ANGINA PECTORIS: ICD-10-CM

## 2018-10-03 DIAGNOSIS — G47.33 OSA ON CPAP: Chronic | ICD-10-CM

## 2018-10-03 PROCEDURE — 99214 OFFICE O/P EST MOD 30 MIN: CPT | Mod: S$PBB,,, | Performed by: INTERNAL MEDICINE

## 2018-10-03 PROCEDURE — 99213 OFFICE O/P EST LOW 20 MIN: CPT | Mod: PBBFAC | Performed by: INTERNAL MEDICINE

## 2018-10-03 PROCEDURE — 3075F SYST BP GE 130 - 139MM HG: CPT | Mod: CPTII,,, | Performed by: INTERNAL MEDICINE

## 2018-10-03 PROCEDURE — 3078F DIAST BP <80 MM HG: CPT | Mod: CPTII,,, | Performed by: INTERNAL MEDICINE

## 2018-10-03 PROCEDURE — 99999 PR PBB SHADOW E&M-EST. PATIENT-LVL III: CPT | Mod: PBBFAC,,, | Performed by: INTERNAL MEDICINE

## 2018-10-03 PROCEDURE — 1101F PT FALLS ASSESS-DOCD LE1/YR: CPT | Mod: CPTII,,, | Performed by: INTERNAL MEDICINE

## 2018-10-03 PROCEDURE — 93005 ELECTROCARDIOGRAM TRACING: CPT | Mod: PBBFAC | Performed by: INTERNAL MEDICINE

## 2018-10-03 PROCEDURE — 93010 ELECTROCARDIOGRAM REPORT: CPT | Mod: S$PBB,,, | Performed by: INTERNAL MEDICINE

## 2018-10-03 NOTE — PROGRESS NOTES
Subjective:   Patient ID:  Wilberto Hayes Jr. is a 78 y.o. male who presents for follow-up of Results   Stress test and echo normal. EKG today is unremarkable. Pts sx is fatigue.    Hypertension   This is a chronic problem. The current episode started more than 1 year ago. The problem has been gradually improving since onset. The problem is controlled. Pertinent negatives include no chest pain, palpitations or shortness of breath. Past treatments include beta blockers and angiotensin blockers. The current treatment provides moderate improvement. Compliance problems include medication side effects.    Coronary Artery Disease   Presents for follow-up visit. Pertinent negatives include no chest pain, chest pressure, chest tightness, dizziness, leg swelling, muscle weakness, palpitations, shortness of breath or weight gain. The symptoms have been stable. Compliance with diet is variable. Compliance with exercise is variable. Compliance with medications is good.       Review of Systems   Constitution: Negative. Negative for weight gain.   HENT: Negative.    Eyes: Negative.    Cardiovascular: Negative.  Negative for chest pain, leg swelling and palpitations.   Respiratory: Negative.  Negative for chest tightness and shortness of breath.    Endocrine: Negative.    Hematologic/Lymphatic: Negative.    Skin: Negative.    Musculoskeletal: Negative for muscle weakness.   Gastrointestinal: Negative.    Genitourinary: Negative.    Neurological: Negative.  Negative for dizziness.   Psychiatric/Behavioral: Negative.    Allergic/Immunologic: Negative.      Family History   Problem Relation Age of Onset    Blindness Paternal Grandfather     Glaucoma Paternal Grandfather     Alcohol abuse Paternal Grandfather     Kidney disease Paternal Grandfather     Cancer Mother         brain    COPD Mother     Alcohol abuse Mother     Hypertension Mother     Cancer Father         lung    COPD Father     Alcohol abuse Father      Hypertension Father     Kidney disease Maternal Grandmother     Alcohol abuse Maternal Grandfather     Kidney disease Maternal Grandfather     Cancer Paternal Grandmother         Gyn     Kidney disease Paternal Grandmother     Retinal detachment Neg Hx     Macular degeneration Neg Hx     Diabetes Neg Hx     Heart disease Neg Hx     Stroke Neg Hx     Mental illness Neg Hx     Mental retardation Neg Hx      Past Medical History:   Diagnosis Date    ACE-inhibitor cough     Acute coronary syndrome     Angina pectoris     Cataract     Coronary artery disease     Degenerative disc disease, lumbar     Degenerative disc disease, lumbar     Glaucoma     Hyperlipidemia     cholesterol    Hypertension     Kidney stone     last in 2001    Myocardial infarction     Obesity     Peripheral vascular disease     Polyneuropathy     Sleep apnea     Tobacco dependence     resolved quit 1980    Trouble in sleeping      Current Outpatient Medications on File Prior to Visit   Medication Sig Dispense Refill    aspirin 81 mg Tab Take 1 tablet by mouth Daily.      cyanocobalamin (VITAMIN B-12) 100 MCG tablet Take 1 tablet by mouth Daily.      hydroCHLOROthiazide (HYDRODIURIL) 25 MG tablet TAKE 1/2 TABLET ONE TIME DAILY 45 tablet 3    latanoprost 0.005 % ophthalmic solution Place 1 drop into both eyes every evening. 2.5 mL 12    losartan (COZAAR) 50 MG tablet TAKE 1 TABLET TWICE DAILY 180 tablet 1    LUBRICANT EYE DROPS, GLYC-PG, 1-0.3 % Drop       nebivolol (BYSTOLIC) 5 MG Tab TAKE 1 TABLET (5 MG TOTAL) BY MOUTH ONCE DAILY. (Patient taking differently: Take 2.5 mg by mouth once daily. TAKE 1 TABLET (5 MG TOTAL) BY MOUTH ONCE DAILY.) 30 tablet 11    nitroGLYCERIN (NITROSTAT) 0.4 MG SL tablet Place 1 tablet (0.4 mg total) under the tongue every 5 (five) minutes as needed for Chest pain (if no relief seek er trmt). 25 tablet 3    OCEAN NASAL 0.65 % nasal spray       omega-3 fatty acids-vitamin E (FISH  OIL) 1,000 mg Cap Take 1 capsule by mouth Daily.      saw palmetto 500 MG capsule Take by mouth 2 (two) times daily.       zinc gluconate 50 mg tablet Take 50 mg by mouth once daily.      vitamin D 1000 units Tab Take 1,000 Units by mouth once daily.       No current facility-administered medications on file prior to visit.      Review of patient's allergies indicates:   Allergen Reactions    Niacin preparations Other (See Comments)     myalgia    Statins-hmg-coa reductase inhibitors      Muscle weakness    Codeine Other (See Comments) and Swelling     unknown    Fosinopril Other (See Comments)     cough    Pravastatin Other (See Comments)      Muscle pain    Simvastatin Other (See Comments)     Muscle pain    Sulfa (sulfonamide antibiotics) Rash and Hives       Objective:     Physical Exam   Constitutional: He is oriented to person, place, and time. He appears well-developed and well-nourished.   HENT:   Head: Normocephalic and atraumatic.   Eyes: Conjunctivae are normal. Pupils are equal, round, and reactive to light.   Neck: Normal range of motion. Neck supple.   Cardiovascular: Normal rate, regular rhythm, normal heart sounds and intact distal pulses.   Pulmonary/Chest: Effort normal and breath sounds normal.   Abdominal: Soft. Bowel sounds are normal.   Neurological: He is alert and oriented to person, place, and time.   Skin: Skin is warm and dry.   Psychiatric: He has a normal mood and affect.   Nursing note and vitals reviewed.      Assessment:     1. Essential hypertension    2. Pure hypercholesterolemia    3. Atherosclerosis of native artery of right lower extremity with intermittent claudication    4. CHELO on CPAP    5. Coronary artery disease involving native coronary artery of native heart without angina pectoris    6. Calcification of aorta    7. Fatigue    Plan:     Essential hypertension    Pure hypercholesterolemia    Atherosclerosis of native artery of right lower extremity with  intermittent claudication    CHELO on CPAP    Coronary artery disease involving native coronary artery of native heart without angina pectoris    Calcification of aorta    continue bystolic, losartan, hctz -htn  Continue asa- cad

## 2018-10-10 ENCOUNTER — PATIENT MESSAGE (OUTPATIENT)
Dept: PULMONOLOGY | Facility: CLINIC | Age: 78
End: 2018-10-10

## 2018-10-17 ENCOUNTER — PES CALL (OUTPATIENT)
Dept: ADMINISTRATIVE | Facility: CLINIC | Age: 78
End: 2018-10-17

## 2018-10-18 ENCOUNTER — OFFICE VISIT (OUTPATIENT)
Dept: PULMONOLOGY | Facility: CLINIC | Age: 78
End: 2018-10-18
Payer: MEDICARE

## 2018-10-18 VITALS
OXYGEN SATURATION: 97 % | RESPIRATION RATE: 18 BRPM | BODY MASS INDEX: 30.63 KG/M2 | HEIGHT: 71 IN | SYSTOLIC BLOOD PRESSURE: 120 MMHG | WEIGHT: 218.81 LBS | HEART RATE: 56 BPM | DIASTOLIC BLOOD PRESSURE: 74 MMHG

## 2018-10-18 DIAGNOSIS — G47.33 OSA ON CPAP: Primary | ICD-10-CM

## 2018-10-18 DIAGNOSIS — E66.9 OBESITY (BMI 30.0-34.9): Chronic | ICD-10-CM

## 2018-10-18 PROBLEM — E66.811 OBESITY (BMI 30.0-34.9): Chronic | Status: ACTIVE | Noted: 2017-08-14

## 2018-10-18 PROCEDURE — 3078F DIAST BP <80 MM HG: CPT | Mod: CPTII,,, | Performed by: NURSE PRACTITIONER

## 2018-10-18 PROCEDURE — 1101F PT FALLS ASSESS-DOCD LE1/YR: CPT | Mod: CPTII,,, | Performed by: NURSE PRACTITIONER

## 2018-10-18 PROCEDURE — 90662 IIV NO PRSV INCREASED AG IM: CPT | Mod: PBBFAC

## 2018-10-18 PROCEDURE — 99214 OFFICE O/P EST MOD 30 MIN: CPT | Mod: PBBFAC | Performed by: NURSE PRACTITIONER

## 2018-10-18 PROCEDURE — 99213 OFFICE O/P EST LOW 20 MIN: CPT | Mod: S$PBB,,, | Performed by: NURSE PRACTITIONER

## 2018-10-18 PROCEDURE — 99999 PR PBB SHADOW E&M-EST. PATIENT-LVL IV: CPT | Mod: PBBFAC,,, | Performed by: NURSE PRACTITIONER

## 2018-10-18 PROCEDURE — 3074F SYST BP LT 130 MM HG: CPT | Mod: CPTII,,, | Performed by: NURSE PRACTITIONER

## 2018-10-18 NOTE — PROGRESS NOTES
Subjective:      Patient ID: Wilberto Hayes Jr. is a 78 y.o. male.    Chief Complaint: Sleep Apnea    HPI: Wilberto Hayes Jr. is here for follow up for CHELO and CPAP complaince assessment.   He is on CPAP of 8 cmH2O pressure.  He is compliant with CPAP use. Complaince download today reveals 100% of days with greater than 4 hours of device use.   Patient reports benefit from CPAP use and denies snoring and excessive daytime sleepiness.  Patient reports no complaints    [place] 1    Previous Report Reviewed: lab reports and office notes     Past Medical History: The following portions of the patient's history were reviewed and updated as appropriate:   He  has a past surgical history that includes Coronary artery bypass graft (2002); Argon Trabeculoplasty - OU - Both Eyes (2009); Eye surgery; Vasectomy (1975); lthotripsy (1985); cystoscopy and stone removal via scope (2003); and Abscess drainage (Left, 2003).  His family history includes Alcohol abuse in his father, maternal grandfather, mother, and paternal grandfather; Blindness in his paternal grandfather; COPD in his father and mother; Cancer in his father, mother, and paternal grandmother; Glaucoma in his paternal grandfather; Hypertension in his father and mother; Kidney disease in his maternal grandfather, maternal grandmother, paternal grandfather, and paternal grandmother.  He  reports that he quit smoking about 38 years ago. He has a 10.00 pack-year smoking history. he has never used smokeless tobacco. He reports that he drinks about 4.2 oz of alcohol per week. He reports that he does not use drugs.  He has a current medication list which includes the following prescription(s): aspirin, cyanocobalamin, hydrochlorothiazide, latanoprost, losartan, lubricant (p-glycol-glycerin), nebivolol, nitroglycerin, ocean nasal, omega-3 fatty acids-vitamin e, saw palmetto, vitamin d, and zinc gluconate.  He is allergic to niacin preparations; statins-hmg-coa  "reductase inhibitors; codeine; fosinopril; pravastatin; simvastatin; and sulfa (sulfonamide antibiotics)..    The following portions of the patient's history were reviewed and updated as appropriate: allergies, current medications, past family history, past medical history, past social history, past surgical history and problem list.    Review of Systems   Constitutional: Negative for fever, chills, weight loss, weight gain, activity change, appetite change, fatigue and night sweats.   HENT: Negative for postnasal drip, rhinorrhea, sinus pressure, voice change and congestion.    Eyes: Negative for redness and itching.   Respiratory: Negative for snoring, cough, sputum production, chest tightness, shortness of breath, wheezing, orthopnea, asthma nighttime symptoms, dyspnea on extertion, use of rescue inhaler and somnolence.    Cardiovascular: Negative.  Negative for chest pain, palpitations and leg swelling.   Genitourinary: Negative for difficulty urinating and hematuria.   Endocrine: Negative for cold intolerance and heat intolerance.    Musculoskeletal: Negative for arthralgias, gait problem, joint swelling and myalgias.   Skin: Negative.    Gastrointestinal: Negative for nausea, vomiting, abdominal pain and acid reflux.   Neurological: Negative for dizziness, weakness, light-headedness and headaches.   Hematological: Negative for adenopathy. No excessive bruising.   All other systems reviewed and are negative.     Objective:   /74   Pulse (!) 56   Resp 18   Ht 5' 11" (1.803 m)   Wt 99.2 kg (218 lb 12.9 oz)   SpO2 97%   BMI 30.52 kg/m²   Physical Exam   Constitutional: He is oriented to person, place, and time. He appears well-developed and well-nourished. He is active and cooperative.  Non-toxic appearance. He does not appear ill. No distress.   HENT:   Head: Normocephalic and atraumatic.   Right Ear: External ear normal.   Left Ear: External ear normal.   Nose: Nose normal.   Mouth/Throat: Oropharynx " is clear and moist. No oropharyngeal exudate.   Eyes: Conjunctivae are normal.   Neck: Normal range of motion. Neck supple.   Cardiovascular: Normal rate, regular rhythm, normal heart sounds and intact distal pulses.   Pulmonary/Chest: Effort normal and breath sounds normal.   Abdominal: Soft.   Musculoskeletal: He exhibits no edema.   Neurological: He is alert and oriented to person, place, and time.   Skin: Skin is warm and dry.   Psychiatric: He has a normal mood and affect. His behavior is normal. Judgment and thought content normal.   Vitals reviewed.    Personal Diagnostic Review  CPAP download  CPAP 8 cm  Compliance Summary  9/16/2018 - 10/15/2018 (30 days)  Days with Device Usage 30 days  Days without Device Usage 0 days  Percent Days with Device Usage 100.0%  Cumulative Usage 9 days 15 hrs. 58 mins. 27 secs.  Maximum Usage (1 Day) 9 hrs. 10 mins. 13 secs.  Average Usage (All Days) 7 hrs. 43 mins. 56 secs.  Average Usage (Days Used) 7 hrs. 43 mins. 56 secs.  Minimum Usage (1 Day) 6 hrs. 17 secs.  Percent of Days with Usage >= 4 Hours 100.0%  Percent of Days with Usage < 4 Hours 0.0%  Date Range  Total Blower Time 9 days 15 hrs. 58 mins. 36 secs.  CPAP Summary  Average Time in Large Leak Per Day 0 secs.  Average AHI 2.0  CPAP 8.0 cmH2O    Assessment:     1. CHELO on CPAP    2. Obesity (BMI 30.0-34.9)      Orders Placed This Encounter   Procedures    Influenza - High Dose (65+) (PF) (IM)     Plan:     Problem List Items Addressed This Visit     Obesity (BMI 30.0-34.9) (Chronic)     Continue to encourage exercise and dietary modification to obtain normal weight.            CHELO on CPAP - Primary (Chronic)     Benefits and compliant on CPAP 8 cm   AHI 2.0   [place] 1  Yearly supply order Nasal pillows mask   HME: [facility]   Follow up in one year for compliance download and supply order               (DME) - [facility]  Reviewed therapeutic goals for positive airway pressure therapy CPAP  Ideal is usage 100% of nights  for 6 - 8 hours per night. Minimum usage is 70% of night for at least 4 hours per night used. Pateint expressed understanding.     Follow-up in about 1 year (around 10/18/2019) for CPAP 1 year compliance download.

## 2018-10-18 NOTE — ASSESSMENT & PLAN NOTE
Benefits and compliant on CPAP 8 cm   AHI 2.0   [place] 1  Yearly supply order Nasal pillows mask   HME: [facility]   Follow up in one year for compliance download and supply order

## 2018-10-30 RX ORDER — NITROGLYCERIN 0.4 MG/1
0.4 TABLET SUBLINGUAL EVERY 5 MIN PRN
Qty: 25 TABLET | Refills: 3 | Status: SHIPPED | OUTPATIENT
Start: 2018-10-30 | End: 2020-01-27 | Stop reason: SDUPTHER

## 2018-11-13 ENCOUNTER — OFFICE VISIT (OUTPATIENT)
Dept: OPHTHALMOLOGY | Facility: CLINIC | Age: 78
End: 2018-11-13
Payer: MEDICARE

## 2018-11-13 ENCOUNTER — OFFICE VISIT (OUTPATIENT)
Dept: INTERNAL MEDICINE | Facility: CLINIC | Age: 78
End: 2018-11-13
Payer: MEDICARE

## 2018-11-13 VITALS
HEART RATE: 56 BPM | WEIGHT: 220.88 LBS | DIASTOLIC BLOOD PRESSURE: 70 MMHG | TEMPERATURE: 98 F | OXYGEN SATURATION: 97 % | SYSTOLIC BLOOD PRESSURE: 134 MMHG | BODY MASS INDEX: 30.92 KG/M2 | HEIGHT: 71 IN

## 2018-11-13 DIAGNOSIS — H04.123 DRY EYES, BILATERAL: ICD-10-CM

## 2018-11-13 DIAGNOSIS — H40.1131 PRIMARY OPEN ANGLE GLAUCOMA OF BOTH EYES, MILD STAGE: Chronic | ICD-10-CM

## 2018-11-13 DIAGNOSIS — I25.10 CORONARY ARTERY DISEASE INVOLVING NATIVE CORONARY ARTERY OF NATIVE HEART WITHOUT ANGINA PECTORIS: Chronic | ICD-10-CM

## 2018-11-13 DIAGNOSIS — G47.33 OSA ON CPAP: Chronic | ICD-10-CM

## 2018-11-13 DIAGNOSIS — H40.1131 PRIMARY OPEN ANGLE GLAUCOMA OF BOTH EYES, MILD STAGE: Primary | ICD-10-CM

## 2018-11-13 DIAGNOSIS — E78.00 PURE HYPERCHOLESTEROLEMIA: ICD-10-CM

## 2018-11-13 DIAGNOSIS — I70.0 CALCIFICATION OF AORTA: Chronic | ICD-10-CM

## 2018-11-13 DIAGNOSIS — Z00.00 ENCOUNTER FOR PREVENTIVE HEALTH EXAMINATION: Primary | ICD-10-CM

## 2018-11-13 DIAGNOSIS — E66.9 OBESITY (BMI 30.0-34.9): Chronic | ICD-10-CM

## 2018-11-13 DIAGNOSIS — I10 ESSENTIAL HYPERTENSION: ICD-10-CM

## 2018-11-13 DIAGNOSIS — I70.211 ATHEROSCLEROSIS OF NATIVE ARTERY OF RIGHT LOWER EXTREMITY WITH INTERMITTENT CLAUDICATION: Chronic | ICD-10-CM

## 2018-11-13 DIAGNOSIS — H25.13 NUCLEAR SCLEROSIS OF BOTH EYES: ICD-10-CM

## 2018-11-13 PROCEDURE — 92012 INTRM OPH EXAM EST PATIENT: CPT | Mod: HCNC,S$GLB,, | Performed by: OPHTHALMOLOGY

## 2018-11-13 PROCEDURE — 3075F SYST BP GE 130 - 139MM HG: CPT | Mod: CPTII,HCNC,S$GLB, | Performed by: NURSE PRACTITIONER

## 2018-11-13 PROCEDURE — 99999 PR PBB SHADOW E&M-EST. PATIENT-LVL II: CPT | Mod: PBBFAC,HCNC,, | Performed by: OPHTHALMOLOGY

## 2018-11-13 PROCEDURE — G0439 PPPS, SUBSEQ VISIT: HCPCS | Mod: HCNC,S$GLB,, | Performed by: NURSE PRACTITIONER

## 2018-11-13 PROCEDURE — 3078F DIAST BP <80 MM HG: CPT | Mod: CPTII,HCNC,S$GLB, | Performed by: NURSE PRACTITIONER

## 2018-11-13 PROCEDURE — 99999 PR PBB SHADOW E&M-EST. PATIENT-LVL IV: CPT | Mod: PBBFAC,HCNC,, | Performed by: NURSE PRACTITIONER

## 2018-11-13 NOTE — Clinical Note
Your patient was seen today for a HRA visit. He reported that his father had breast cancer with mastectomy. He states he has never told any provider before. Just wanted you to be aware I have included a copy of my visit note, please review the note and feel free to contact me with any questions. Thank you for allowing me to participate in the care of your patients. Bakari Dumont, APRILC

## 2018-11-13 NOTE — ASSESSMENT & PLAN NOTE
Followed by Dr. Valle Q6 months. Denies chest pain or SOB. Continue current treatment plan as previously prescribed with your PCP and cardiologist.

## 2018-11-13 NOTE — ASSESSMENT & PLAN NOTE
Lab Results   Component Value Date    CHOL 171 01/29/2018    CHOL 186 02/24/2017    CHOL 181 01/29/2016     Lab Results   Component Value Date    HDL 42 01/29/2018    HDL 43 02/24/2017    HDL 49 01/29/2016     Lab Results   Component Value Date    LDLCALC 101.2 01/29/2018    LDLCALC 116.0 02/24/2017    LDLCALC 110.6 01/29/2016     Lab Results   Component Value Date    TRIG 139 01/29/2018    TRIG 135 02/24/2017    TRIG 107 01/29/2016     Lab Results   Component Value Date    CHOLHDL 24.6 01/29/2018    CHOLHDL 23.1 02/24/2017    CHOLHDL 27.1 01/29/2016     Controlled. Allergy to statin. Continue current treatment plan as previously prescribed with your PCP and cardiologist.

## 2018-11-13 NOTE — PROGRESS NOTES
"Wilberto Hayes presented for a  Medicare AWV and comprehensive Health Risk Assessment today. The following components were reviewed and updated:    · Medical history  · Family History  · Social history  · Allergies and Current Medications  · Health Risk Assessment  · Health Maintenance  · Care Team     ** See Completed Assessments for Annual Wellness Visit within the encounter summary.**       The following assessments were completed:  · Living Situation  · CAGE  · Depression Screening  · Timed Get Up and Go  · Whisper Test  · Cognitive Function Screening  · Nutrition Screening  · ADL Screening  · PAQ Screening    Vitals:    11/13/18 1006   BP: 134/70   BP Location: Right arm   Pulse: (!) 56   Temp: 97.5 °F (36.4 °C)   TempSrc: Tympanic   SpO2: 97%   Weight: 100.2 kg (220 lb 14.4 oz)   Height: 5' 11" (1.803 m)     Body mass index is 30.81 kg/m².  Physical Exam   Constitutional: He is oriented to person, place, and time. He appears well-developed and well-nourished. No distress.   HENT:   Head: Normocephalic and atraumatic.   Eyes: Conjunctivae and EOM are normal. Pupils are equal, round, and reactive to light.   Cardiovascular: Normal rate and regular rhythm. Exam reveals no gallop and no friction rub.   No murmur heard.  Pulmonary/Chest: Effort normal and breath sounds normal.   Abdominal: Soft. Bowel sounds are normal. He exhibits no distension.   Neurological: He is alert and oriented to person, place, and time.   Skin: Skin is warm and dry.   Psychiatric: He has a normal mood and affect.   Vitals reviewed.        Diagnoses and health risks identified today and associated recommendations/orders:    1. Encounter for preventive health examination  Reviewed , patient up to date. He reports his father was diagnosed with breast cancer and had left breast mastectomy. He states he is unsure when his father was diagnosed, but realizes he has never told his doctor before.     Essential hypertension  Stable and " controlled  On HCTZ and losartan. Continue current treatment plan as previously prescribed with your PCP.       Calcification of aorta  7/26/16 CT Lumbar spine: The abdominal aorta reveals atherosclerotic calcification as does the splenic artery.  Stable and controlled. Continue current treatment plan as previously prescribed with your PCP.         Atherosclerosis of native artery of extremity with intermittent claudication  6/19/14 LE arterial US: scaterred plaques bilaterally with tripahsic waveforms and no significant stenosis.  Stable and controlled. Denies claudication. Continue current treatment plan as previously prescribed with your PCP and cardiologist.       CAD; hx MI; s/p CABG 2v  [id number]  Followed by Dr. Valle Q6 months. Denies chest pain or SOB. Continue current treatment plan as previously prescribed with your PCP and cardiologist.    Pure hypercholesterolemia  Lab Results   Component Value Date    CHOL 171 01/29/2018    CHOL 186 02/24/2017    CHOL 181 01/29/2016     Lab Results   Component Value Date    HDL 42 01/29/2018    HDL 43 02/24/2017    HDL 49 01/29/2016     Lab Results   Component Value Date    LDLCALC 101.2 01/29/2018    LDLCALC 116.0 02/24/2017    LDLCALC 110.6 01/29/2016     Lab Results   Component Value Date    TRIG 139 01/29/2018    TRIG 135 02/24/2017    TRIG 107 01/29/2016     Lab Results   Component Value Date    CHOLHDL 24.6 01/29/2018    CHOLHDL 23.1 02/24/2017    CHOLHDL 27.1 01/29/2016     Controlled. Allergy to statin. Continue current treatment plan as previously prescribed with your PCP and cardiologist.    Primary open angle glaucoma of both eyes, mild stage  Stable and controlled. Continue current treatment plan as previously prescribed with your Dr. Molina.     Obesity (BMI 30.0-34.9)  Stable but not controlled, diet and exercise discussed. Continue current treatment plan as previously prescribed with your PCP.      CHELO on CPAP  1/13/2009 Sleep study Moderate  CHELO  Benefits and compliant CPAP 8 cm with AHI 2.0.   Compliant with with CPAP nightly. Continue current treatment plan as previously prescribed with your pulmonologist.           Provided Wilberto with a 5-10 year written screening schedule and personal prevention plan. Recommendations were developed using the USPSTF age appropriate recommendations. Education, counseling, and referrals were provided as needed. After Visit Summary printed and given to patient which includes a list of additional screenings\tests needed.    Follow-up in about 1 year (around 11/13/2019) for annual HRA.    Tiara Dumont NP

## 2018-11-13 NOTE — PATIENT INSTRUCTIONS
Counseling and Referral of Other Preventative  (Italic type indicates deductible and co-insurance are waived)    Patient Name: Wilberto Hayes  Today's Date: 11/13/2018    Health Maintenance       Date Due Completion Date    Lipid Panel 01/29/2019 1/29/2018    TETANUS VACCINE 02/12/2025 2/12/2015        No orders of the defined types were placed in this encounter.    The following information is provided to all patients.  This information is to help you find resources for any of the problems found today that may be affecting your health:                Living healthy guide: www.[facility].[place].[facility]      Understanding Diabetes: www.diabetes.org      Eating healthy: www.cdc.gov/healthyweight      CDC home safety checklist: www.cdc.gov/steadi/patient.html      Agency on Aging: www.goea.[place].gov      Alcoholics anonymous (AA): www.aa.org      Physical Activity: www.mamta.nih.gov/wh6grji      Tobacco use: www.quitwithusla.org

## 2018-11-13 NOTE — ASSESSMENT & PLAN NOTE
Stable and controlled. Continue current treatment plan as previously prescribed with your Dr. Molina.

## 2018-11-13 NOTE — ASSESSMENT & PLAN NOTE
Stable but not controlled, diet and exercise discussed. Continue current treatment plan as previously prescribed with your PCP.

## 2018-11-13 NOTE — ASSESSMENT & PLAN NOTE
7/26/16 CT Lumbar spine: The abdominal aorta reveals atherosclerotic calcification as does the splenic artery.  Stable and controlled. Continue current treatment plan as previously prescribed with your PCP.

## 2018-11-13 NOTE — ASSESSMENT & PLAN NOTE
Stable and controlled  On HCTZ and losartan. Continue current treatment plan as previously prescribed with your PCP.

## 2018-11-13 NOTE — PROGRESS NOTES
"SUBJECTIVE:   Wilberto Hayes Jr. is a 78 y.o. male   Corrected distance visual acuity was 20/30 -2 in the right eye and 20/30 -1 in the left eye.   Chief Complaint   Patient presents with    Glaucoma     4 month IOP check, Latanoprost QHS OU        HPI:  HPI     Glaucoma      Additional comments: 4 month IOP check, Latanoprost QHS OU              Comments     Pt states he's still having dryness in the eyes but no pain or   irritation. Using his drops as directed. Vision is about the same.     1. Mild COAG Goal =17-18  +Fhx Glaucoma -g-dad  SLT OD 11/09 (min response)  SLT OS 8/09 (22 to 16-17)  2. Mild NSC  3. Dry eyes  Gel Plugs OU  Didn't refill Xiidra due to cost  Tried Restasis (burning and blurry vision)  4. Guttata    Latanoprost QHS OU  Homeopathic AT"s (Similasan)  Thera tears  O3FO          Last edited by Goyo Moreno on 11/13/2018  9:06 AM. (History)        Assessment /Plan :  1. Primary open angle glaucoma of both eyes, mild stage Doing well, IOP within acceptable range relative to target IOP and no evidence of progression. Continue current treatment. Reviewed importance of continued compliance with treatment and follow up.     2. Nuclear sclerosis of both eyes monitor for now   3. Dry eyes, bilateral  -- Condition stable, no therapeutic change required. Monitoring routinely.         Return to clinic in 3-4 months  or as needed.  With Dilation, HVF 24-2 and SDP            "

## 2018-11-13 NOTE — ASSESSMENT & PLAN NOTE
1/13/2009 Sleep study Moderate CHELO  Benefits and compliant CPAP 8 cm with AHI 2.0.   Compliant with with CPAP nightly. Continue current treatment plan as previously prescribed with your pulmonologist.

## 2018-11-13 NOTE — ASSESSMENT & PLAN NOTE
6/19/14 LE arterial US: scaterred plaques bilaterally with tripahsic waveforms and no significant stenosis.  Stable and controlled. Denies claudication. Continue current treatment plan as previously prescribed with your PCP and cardiologist.

## 2019-01-06 ENCOUNTER — PATIENT MESSAGE (OUTPATIENT)
Dept: CARDIOLOGY | Facility: CLINIC | Age: 79
End: 2019-01-06

## 2019-01-07 RX ORDER — LOSARTAN POTASSIUM 50 MG/1
50 TABLET ORAL 2 TIMES DAILY
Qty: 180 TABLET | Refills: 3 | Status: SHIPPED | OUTPATIENT
Start: 2019-01-07 | End: 2019-12-11 | Stop reason: SDUPTHER

## 2019-01-07 NOTE — TELEPHONE ENCOUNTER
I called and he did check his med lorsartan is on recall only if he uses [employer]. He has changed ins to [employer] and would like 90 supply [facility]. Rx sent for signature. adrianna

## 2019-01-14 ENCOUNTER — PATIENT MESSAGE (OUTPATIENT)
Dept: FAMILY MEDICINE | Facility: CLINIC | Age: 79
End: 2019-01-14

## 2019-01-18 ENCOUNTER — LAB VISIT (OUTPATIENT)
Dept: LAB | Facility: HOSPITAL | Age: 79
End: 2019-01-18
Attending: FAMILY MEDICINE
Payer: MEDICARE

## 2019-01-18 DIAGNOSIS — E78.00 PURE HYPERCHOLESTEROLEMIA: ICD-10-CM

## 2019-01-18 DIAGNOSIS — Z12.5 SCREENING FOR PROSTATE CANCER: ICD-10-CM

## 2019-01-18 DIAGNOSIS — Z00.00 ANNUAL PHYSICAL EXAM: ICD-10-CM

## 2019-01-18 DIAGNOSIS — I10 ESSENTIAL HYPERTENSION: ICD-10-CM

## 2019-01-18 DIAGNOSIS — E53.8 B12 DEFICIENCY: ICD-10-CM

## 2019-01-18 LAB
ALBUMIN SERPL BCP-MCNC: 4.1 G/DL
ALP SERPL-CCNC: 73 U/L
ALT SERPL W/O P-5'-P-CCNC: 13 U/L
ANION GAP SERPL CALC-SCNC: 7 MMOL/L
AST SERPL-CCNC: 27 U/L
BASOPHILS # BLD AUTO: 0.04 K/UL
BASOPHILS NFR BLD: 0.8 %
BILIRUB SERPL-MCNC: 0.8 MG/DL
BUN SERPL-MCNC: 12 MG/DL
CALCIUM SERPL-MCNC: 9.9 MG/DL
CHLORIDE SERPL-SCNC: 104 MMOL/L
CHOLEST SERPL-MCNC: 183 MG/DL
CHOLEST/HDLC SERPL: 3.9 {RATIO}
CO2 SERPL-SCNC: 30 MMOL/L
COMPLEXED PSA SERPL-MCNC: 0.75 NG/ML
CREAT SERPL-MCNC: 1.3 MG/DL
DIFFERENTIAL METHOD: ABNORMAL
EOSINOPHIL # BLD AUTO: 0.1 K/UL
EOSINOPHIL NFR BLD: 2.5 %
ERYTHROCYTE [DISTWIDTH] IN BLOOD BY AUTOMATED COUNT: 13.2 %
EST. GFR  (AFRICAN AMERICAN): >60 ML/MIN/1.73 M^2
EST. GFR  (NON AFRICAN AMERICAN): 52.6 ML/MIN/1.73 M^2
GLUCOSE SERPL-MCNC: 102 MG/DL
HCT VFR BLD AUTO: 45.2 %
HDLC SERPL-MCNC: 47 MG/DL
HDLC SERPL: 25.7 %
HGB BLD-MCNC: 15.4 G/DL
IMM GRANULOCYTES # BLD AUTO: 0.01 K/UL
IMM GRANULOCYTES NFR BLD AUTO: 0.2 %
LDLC SERPL CALC-MCNC: 110.2 MG/DL
LYMPHOCYTES # BLD AUTO: 1.3 K/UL
LYMPHOCYTES NFR BLD: 26.8 %
MCH RBC QN AUTO: 31.8 PG
MCHC RBC AUTO-ENTMCNC: 34.1 G/DL
MCV RBC AUTO: 93 FL
MONOCYTES # BLD AUTO: 0.6 K/UL
MONOCYTES NFR BLD: 11.4 %
NEUTROPHILS # BLD AUTO: 2.8 K/UL
NEUTROPHILS NFR BLD: 58.3 %
NONHDLC SERPL-MCNC: 136 MG/DL
NRBC BLD-RTO: 0 /100 WBC
PLATELET # BLD AUTO: 214 K/UL
PMV BLD AUTO: 11 FL
POTASSIUM SERPL-SCNC: 4.4 MMOL/L
PROT SERPL-MCNC: 7.6 G/DL
RBC # BLD AUTO: 4.85 M/UL
SODIUM SERPL-SCNC: 141 MMOL/L
TRIGL SERPL-MCNC: 129 MG/DL
TSH SERPL DL<=0.005 MIU/L-ACNC: 3.82 UIU/ML
VIT B12 SERPL-MCNC: 741 PG/ML
WBC # BLD AUTO: 4.82 K/UL

## 2019-01-18 PROCEDURE — 84153 ASSAY OF PSA TOTAL: CPT

## 2019-01-18 PROCEDURE — 84443 ASSAY THYROID STIM HORMONE: CPT

## 2019-01-18 PROCEDURE — 85025 COMPLETE CBC W/AUTO DIFF WBC: CPT

## 2019-01-18 PROCEDURE — 80061 LIPID PANEL: CPT

## 2019-01-18 PROCEDURE — 36415 COLL VENOUS BLD VENIPUNCTURE: CPT | Mod: PO

## 2019-01-18 PROCEDURE — 82607 VITAMIN B-12: CPT

## 2019-01-18 PROCEDURE — 80053 COMPREHEN METABOLIC PANEL: CPT

## 2019-01-28 ENCOUNTER — OFFICE VISIT (OUTPATIENT)
Dept: FAMILY MEDICINE | Facility: CLINIC | Age: 79
End: 2019-01-28
Payer: MEDICARE

## 2019-01-28 VITALS
BODY MASS INDEX: 31.65 KG/M2 | DIASTOLIC BLOOD PRESSURE: 74 MMHG | OXYGEN SATURATION: 97 % | SYSTOLIC BLOOD PRESSURE: 132 MMHG | HEART RATE: 70 BPM | WEIGHT: 226.06 LBS | TEMPERATURE: 98 F | HEIGHT: 71 IN

## 2019-01-28 DIAGNOSIS — H25.13 NUCLEAR SCLEROSIS OF BOTH EYES: ICD-10-CM

## 2019-01-28 DIAGNOSIS — M15.9 GENERALIZED OA: ICD-10-CM

## 2019-01-28 DIAGNOSIS — I10 ESSENTIAL HYPERTENSION: Primary | ICD-10-CM

## 2019-01-28 DIAGNOSIS — E78.00 PURE HYPERCHOLESTEROLEMIA: ICD-10-CM

## 2019-01-28 DIAGNOSIS — I70.219 ATHEROSCLEROSIS OF NATIVE ARTERY OF EXTREMITY WITH INTERMITTENT CLAUDICATION, UNSPECIFIED EXTREMITY: ICD-10-CM

## 2019-01-28 DIAGNOSIS — E66.9 OBESITY (BMI 30-39.9): ICD-10-CM

## 2019-01-28 DIAGNOSIS — H93.13 TINNITUS, BILATERAL: ICD-10-CM

## 2019-01-28 DIAGNOSIS — E53.8 B12 DEFICIENCY: ICD-10-CM

## 2019-01-28 DIAGNOSIS — H40.1130 PRIMARY OPEN ANGLE GLAUCOMA OF BOTH EYES, UNSPECIFIED GLAUCOMA STAGE: ICD-10-CM

## 2019-01-28 DIAGNOSIS — H04.123 DRY EYES, BILATERAL: ICD-10-CM

## 2019-01-28 DIAGNOSIS — I70.0 CALCIFICATION OF AORTA: ICD-10-CM

## 2019-01-28 DIAGNOSIS — G47.33 OSA ON CPAP: ICD-10-CM

## 2019-01-28 PROCEDURE — 3078F DIAST BP <80 MM HG: CPT | Mod: CPTII,S$GLB,, | Performed by: FAMILY MEDICINE

## 2019-01-28 PROCEDURE — 99999 PR PBB SHADOW E&M-EST. PATIENT-LVL III: ICD-10-PCS | Mod: PBBFAC,,, | Performed by: FAMILY MEDICINE

## 2019-01-28 PROCEDURE — 1101F PT FALLS ASSESS-DOCD LE1/YR: CPT | Mod: CPTII,S$GLB,, | Performed by: FAMILY MEDICINE

## 2019-01-28 PROCEDURE — 3078F PR MOST RECENT DIASTOLIC BLOOD PRESSURE < 80 MM HG: ICD-10-PCS | Mod: CPTII,S$GLB,, | Performed by: FAMILY MEDICINE

## 2019-01-28 PROCEDURE — 99214 PR OFFICE/OUTPT VISIT, EST, LEVL IV, 30-39 MIN: ICD-10-PCS | Mod: S$GLB,,, | Performed by: FAMILY MEDICINE

## 2019-01-28 PROCEDURE — 1101F PR PT FALLS ASSESS DOC 0-1 FALLS W/OUT INJ PAST YR: ICD-10-PCS | Mod: CPTII,S$GLB,, | Performed by: FAMILY MEDICINE

## 2019-01-28 PROCEDURE — 99499 UNLISTED E&M SERVICE: CPT | Mod: S$GLB,,, | Performed by: FAMILY MEDICINE

## 2019-01-28 PROCEDURE — 99214 OFFICE O/P EST MOD 30 MIN: CPT | Mod: S$GLB,,, | Performed by: FAMILY MEDICINE

## 2019-01-28 PROCEDURE — 3075F SYST BP GE 130 - 139MM HG: CPT | Mod: CPTII,S$GLB,, | Performed by: FAMILY MEDICINE

## 2019-01-28 PROCEDURE — 3075F PR MOST RECENT SYSTOLIC BLOOD PRESS GE 130-139MM HG: ICD-10-PCS | Mod: CPTII,S$GLB,, | Performed by: FAMILY MEDICINE

## 2019-01-28 PROCEDURE — 99499 RISK ADDL DX/OHS AUDIT: ICD-10-PCS | Mod: S$GLB,,, | Performed by: FAMILY MEDICINE

## 2019-01-28 PROCEDURE — 99999 PR PBB SHADOW E&M-EST. PATIENT-LVL III: CPT | Mod: PBBFAC,,, | Performed by: FAMILY MEDICINE

## 2019-01-28 NOTE — PROGRESS NOTES
Subjective:       Patient ID: Wilberto Hayes Jr. is a 79 y.o. male.    Chief Complaint: Chronic Care    HPI   Chronic Care  78yo male presents today for chronic care assessment. He has has recent lab update and is here for review of results. BP is well controlled with Bystolic, Losartan and HCTZ. He denies any CP, HA or palpitations. He has known statin intolerance. Current lipid levels are measured as follows: TC- 183, TG-129, HDL-47 and LDL-110.2. He reports maintaining a heart healthy diet. Notes joint pain persists. He is now taking Turmeric and takes Tylenol as well for pain relief. Pain control is stable. He denies any joint redness and no change in range of motion. He has had lab monitoring with stable B12 levels. PSA is also within stable range- no change in urinary habits is reported. He is being followed by Ophthalmology for glaucoma and has concomitant dry eyes. Also experiencing tinnitus but denies any hearing loss. He does not desire hearing evaluation. There have been no recent ER visits or hospitalizations.     Review of Systems   Constitutional: Negative for activity change and unexpected weight change.   HENT: Positive for tinnitus. Negative for hearing loss, rhinorrhea and trouble swallowing.    Eyes: Negative for discharge and visual disturbance.   Respiratory: Negative for chest tightness and wheezing.    Cardiovascular: Negative for chest pain and palpitations.   Gastrointestinal: Negative for blood in stool, constipation, diarrhea and vomiting.   Endocrine: Negative for polydipsia and polyuria.   Genitourinary: Negative for difficulty urinating, hematuria and urgency.   Musculoskeletal: Positive for arthralgias. Negative for joint swelling and neck pain.   Skin: Negative for rash.   Neurological: Negative for weakness and headaches.   Psychiatric/Behavioral: Negative for confusion, dysphoric mood and sleep disturbance.       Objective:   /74 (BP Location: Right arm, Patient Position:  "Sitting, BP Method: Large (Manual))   Pulse 70   Temp 97.6 °F (36.4 °C) (Tympanic)   Ht 5' 11" (1.803 m)   Wt 102.6 kg (226 lb 1.3 oz)   SpO2 97%   BMI 31.53 kg/m²   Physical Exam   Constitutional: He is oriented to person, place, and time. He appears well-developed and well-nourished. No distress.   HENT:   Head: Normocephalic and atraumatic.   Right Ear: External ear normal.   Left Ear: External ear normal.   Nose: Nose normal.   Mouth/Throat: Oropharynx is clear and moist.   Eyes: Conjunctivae and EOM are normal. Pupils are equal, round, and reactive to light.   Neck: Normal range of motion. Neck supple.   Cardiovascular: Normal rate, regular rhythm and normal heart sounds.   Pulmonary/Chest: Effort normal and breath sounds normal.   Abdominal: Soft. Bowel sounds are normal.   Musculoskeletal: He exhibits no edema.   Neurological: He is alert and oriented to person, place, and time.   Skin: Skin is warm and dry. He is not diaphoretic.   Psychiatric: He has a normal mood and affect. His behavior is normal.       Assessment:       1. Essential hypertension    2. Pure hypercholesterolemia    3. Calcification of aorta    4. Atherosclerosis of native artery of extremity with intermittent claudication, unspecified extremity    5. Generalized OA    6. CHELO on CPAP    7. B12 deficiency    8. Primary open angle glaucoma of both eyes, unspecified glaucoma stage    9. Nuclear sclerosis of both eyes    10. Dry eyes, bilateral    11. Tinnitus, bilateral    12. Obesity (BMI 30-39.9)        Plan:      Essential hypertension  Stable. Continue with current treatment. Target BP goal remains<140/90. Heart healthy diet is advised. Intermittent home monitoring has been discussed.  -     Comprehensive metabolic panel; Future; Expected date: 01/28/2019    Pure hypercholesterolemia  Have advised maintaining heart healthy diet. Continue with fish oil supplementation.   -     Comprehensive metabolic panel; Future; Expected date: " 01/28/2019    Calcification of aorta  BP and lipid control remain advised.    Atherosclerosis of native artery of extremity with intermittent claudication, unspecified extremity  BP and lipid control remain advised.    Generalized OA  Continue with current combination of Turmeric and Tylenol.     CHELO on CPAP  CPAP compliance remains advised.    B12 deficiency  Continue with supplementation- target 1000mg once daily.    Primary open angle glaucoma of both eyes, unspecified glaucoma stage  As per Ophthalmology. Advised compliance with treatment and follow-up recommendations.    Nuclear sclerosis of both eyes  As per Ophthalmology.    Dry eyes, bilateral  Continue as per Ophthalmology.    Tinnitus, bilateral  Offered Audiology evaluation- he has declined.    Obesity (BMI 30-39.9)  Weight loss efforts are encouraged through diet and lifestyle measures.      Total visit time of 25 minutes with greater than half the visit dedicated to counseling and coordinating care.

## 2019-02-01 ENCOUNTER — TELEPHONE (OUTPATIENT)
Dept: PULMONOLOGY | Facility: CLINIC | Age: 79
End: 2019-02-01

## 2019-02-01 DIAGNOSIS — G47.33 OSA ON CPAP: Primary | Chronic | ICD-10-CM

## 2019-02-01 NOTE — TELEPHONE ENCOUNTER
----- Message from Siri Elizabeth LPN sent at 1/31/2019  1:31 PM CST -----  Contact: Scarlet with [facility]   Patient's LV was 10/18/18 for CPAP comp. No orders for cpap were written at this time. [facility] is requesting a new Rx at this time.   ----- Message -----  From: Dara Melgar  Sent: 1/31/2019   1:16 PM  To: Isaac Woodard called and stated that the pt needs a new prescription for CPAP supplies.     She can be reached at [phone] fax [phone].    Thanks,  Tf

## 2019-02-01 NOTE — TELEPHONE ENCOUNTER
Orders Placed This Encounter   Procedures    CPAP/BIPAP SUPPLIES     Benefits and compliant  Yearly supply order  Nasal pillows mask  90 day supply. 4 refills.  HME: [facility]     Order Specific Question:   Type of mask:     Answer:   Nasal     Order Specific Question:   Headgear?     Answer:   Yes     Order Specific Question:   Tubing?     Answer:   Yes     Order Specific Question:   Humidifier chamber?     Answer:   Yes     Order Specific Question:   Chin strap?     Answer:   Yes     Order Specific Question:   Filters?     Answer:   Yes     Order Specific Question:   Length of need (1-99 months):     Answer:   99     1. CHELO on CPAP  CPAP/BIPAP SUPPLIES

## 2019-02-15 RX ORDER — LATANOPROST 50 UG/ML
1 SOLUTION/ DROPS OPHTHALMIC NIGHTLY
Qty: 2.5 ML | Refills: 10 | Status: SHIPPED | OUTPATIENT
Start: 2019-02-15 | End: 2020-01-20

## 2019-02-18 ENCOUNTER — PATIENT MESSAGE (OUTPATIENT)
Dept: CARDIOLOGY | Facility: CLINIC | Age: 79
End: 2019-02-18

## 2019-02-19 ENCOUNTER — TELEPHONE (OUTPATIENT)
Dept: CARDIOLOGY | Facility: CLINIC | Age: 79
End: 2019-02-19

## 2019-02-19 ENCOUNTER — PATIENT MESSAGE (OUTPATIENT)
Dept: CARDIOLOGY | Facility: CLINIC | Age: 79
End: 2019-02-19

## 2019-02-19 RX ORDER — CARVEDILOL 3.12 MG/1
3.12 TABLET ORAL 2 TIMES DAILY WITH MEALS
Qty: 60 TABLET | Refills: 5 | Status: SHIPPED | OUTPATIENT
Start: 2019-02-19 | End: 2019-04-01

## 2019-02-19 NOTE — TELEPHONE ENCOUNTER
02/20 Pt wants generic coreg. I sent him a message to call the pharmacy and see the cost before going  to get the medication. To call me if a problem . Cm     02/19 I called and left v/m that I have a med Dr Valle wants to switch to , need o talk to him first. I will call when I get back to [place] later today. ( need to know how much med he has left, do I need to do 2 rx's one local and one mail order) cm----- Message from Jonel Valle MD sent at 2/18/2019 10:08 PM CST -----  Coreg 3.25 bid  ----- Message -----  From: Mei Jenkins LPN  Sent: 2/18/2019  11:43 AM  To: Jonel Valle MD    Hi Dr. Valle, as I now am insured with [employer], my prescription for Bystolic 5 mg is $80, 30-day supply  for a tier 4 drug.  That's a little too expensive.  What would you recommend I take that is comparable to Bystolic but cheaper?  I will need a new Rx in a few weeks but please give me your recommendations first before sending in a new Rx.  Thank you.     Wilberto Hayes

## 2019-02-20 NOTE — TELEPHONE ENCOUNTER
Sent message to pt to call if there is a problem with his coreg, generic , rx sent to his local pharmacy. adrianna

## 2019-04-01 ENCOUNTER — TELEPHONE (OUTPATIENT)
Dept: CARDIOLOGY | Facility: CLINIC | Age: 79
End: 2019-04-01

## 2019-04-01 RX ORDER — NEBIVOLOL 5 MG/1
5 TABLET ORAL DAILY
COMMUNITY
End: 2019-05-27 | Stop reason: SDUPTHER

## 2019-04-01 NOTE — TELEPHONE ENCOUNTER
I called him early this am to reschedule appt for today, dr barker. He tells me he could not tolerate the coreg and is going back on the bystolic. He will just pay the 80 dollars for this rx. He will call the pharmacy and if refills are needed , then the pharmacy will contact us. He agreed to be seen in may, not having any problems. adrianna

## 2019-04-17 ENCOUNTER — PATIENT MESSAGE (OUTPATIENT)
Dept: OPHTHALMOLOGY | Facility: CLINIC | Age: 79
End: 2019-04-17

## 2019-05-15 ENCOUNTER — TELEPHONE (OUTPATIENT)
Dept: FAMILY MEDICINE | Facility: CLINIC | Age: 79
End: 2019-05-15

## 2019-05-21 ENCOUNTER — OFFICE VISIT (OUTPATIENT)
Dept: OPHTHALMOLOGY | Facility: CLINIC | Age: 79
End: 2019-05-21
Payer: MEDICARE

## 2019-05-21 DIAGNOSIS — H40.1131 PRIMARY OPEN ANGLE GLAUCOMA OF BOTH EYES, MILD STAGE: Primary | ICD-10-CM

## 2019-05-21 DIAGNOSIS — H04.123 DRY EYES, BILATERAL: ICD-10-CM

## 2019-05-21 DIAGNOSIS — H02.401 PTOSIS OF EYELID, RIGHT: ICD-10-CM

## 2019-05-21 DIAGNOSIS — H25.13 NUCLEAR SCLEROSIS OF BOTH EYES: ICD-10-CM

## 2019-05-21 PROCEDURE — 92250 FUNDUS PHOTOGRAPHY W/I&R: CPT | Mod: S$GLB,,, | Performed by: OPHTHALMOLOGY

## 2019-05-21 PROCEDURE — 92250 COLOR FUNDUS PHOTOGRAPHY - OU - BOTH EYES: ICD-10-PCS | Mod: S$GLB,,, | Performed by: OPHTHALMOLOGY

## 2019-05-21 PROCEDURE — 92014 COMPRE OPH EXAM EST PT 1/>: CPT | Mod: S$GLB,,, | Performed by: OPHTHALMOLOGY

## 2019-05-21 PROCEDURE — 92083 HUMPHREY VISUAL FIELD - OU - BOTH EYES: ICD-10-PCS | Mod: S$GLB,,, | Performed by: OPHTHALMOLOGY

## 2019-05-21 PROCEDURE — 99999 PR PBB SHADOW E&M-EST. PATIENT-LVL II: ICD-10-PCS | Mod: PBBFAC,,, | Performed by: OPHTHALMOLOGY

## 2019-05-21 PROCEDURE — 92083 EXTENDED VISUAL FIELD XM: CPT | Mod: S$GLB,,, | Performed by: OPHTHALMOLOGY

## 2019-05-21 PROCEDURE — 92014 PR EYE EXAM, EST PATIENT,COMPREHESV: ICD-10-PCS | Mod: S$GLB,,, | Performed by: OPHTHALMOLOGY

## 2019-05-21 PROCEDURE — 99999 PR PBB SHADOW E&M-EST. PATIENT-LVL II: CPT | Mod: PBBFAC,,, | Performed by: OPHTHALMOLOGY

## 2019-05-22 ENCOUNTER — LAB VISIT (OUTPATIENT)
Dept: LAB | Facility: HOSPITAL | Age: 79
End: 2019-05-22
Attending: FAMILY MEDICINE
Payer: MEDICARE

## 2019-05-22 ENCOUNTER — OFFICE VISIT (OUTPATIENT)
Dept: FAMILY MEDICINE | Facility: CLINIC | Age: 79
End: 2019-05-22
Payer: MEDICARE

## 2019-05-22 VITALS
BODY MASS INDEX: 31.18 KG/M2 | OXYGEN SATURATION: 98 % | SYSTOLIC BLOOD PRESSURE: 132 MMHG | HEIGHT: 71 IN | TEMPERATURE: 97 F | WEIGHT: 222.69 LBS | RESPIRATION RATE: 16 BRPM | DIASTOLIC BLOOD PRESSURE: 80 MMHG | HEART RATE: 66 BPM

## 2019-05-22 DIAGNOSIS — R53.83 FATIGUE, UNSPECIFIED TYPE: ICD-10-CM

## 2019-05-22 DIAGNOSIS — R73.9 HYPERGLYCEMIA: ICD-10-CM

## 2019-05-22 DIAGNOSIS — R73.9 HYPERGLYCEMIA: Primary | ICD-10-CM

## 2019-05-22 DIAGNOSIS — I10 ESSENTIAL HYPERTENSION: ICD-10-CM

## 2019-05-22 DIAGNOSIS — I70.0 CALCIFICATION OF AORTA: ICD-10-CM

## 2019-05-22 DIAGNOSIS — E53.8 B12 DEFICIENCY: ICD-10-CM

## 2019-05-22 DIAGNOSIS — M51.36 DDD (DEGENERATIVE DISC DISEASE), LUMBAR: ICD-10-CM

## 2019-05-22 DIAGNOSIS — E66.9 OBESITY (BMI 30-39.9): ICD-10-CM

## 2019-05-22 DIAGNOSIS — M25.552 BILATERAL HIP PAIN: ICD-10-CM

## 2019-05-22 DIAGNOSIS — E78.00 PURE HYPERCHOLESTEROLEMIA: ICD-10-CM

## 2019-05-22 DIAGNOSIS — M25.551 BILATERAL HIP PAIN: ICD-10-CM

## 2019-05-22 DIAGNOSIS — I70.219 ATHEROSCLEROSIS OF NATIVE ARTERY OF EXTREMITY WITH INTERMITTENT CLAUDICATION, UNSPECIFIED EXTREMITY: ICD-10-CM

## 2019-05-22 DIAGNOSIS — G47.33 OSA ON CPAP: ICD-10-CM

## 2019-05-22 LAB
ANION GAP SERPL CALC-SCNC: 11 MMOL/L (ref 8–16)
BASOPHILS # BLD AUTO: 0.04 K/UL (ref 0–0.2)
BASOPHILS NFR BLD: 0.8 % (ref 0–1.9)
BILIRUB SERPL-MCNC: NORMAL MG/DL
BLOOD URINE, POC: NORMAL
BUN SERPL-MCNC: 14 MG/DL (ref 8–23)
CALCIUM SERPL-MCNC: 10.3 MG/DL (ref 8.7–10.5)
CHLORIDE SERPL-SCNC: 105 MMOL/L (ref 95–110)
CO2 SERPL-SCNC: 23 MMOL/L (ref 23–29)
COLOR, POC UA: NORMAL
CREAT SERPL-MCNC: 1.2 MG/DL (ref 0.5–1.4)
DIFFERENTIAL METHOD: ABNORMAL
EOSINOPHIL # BLD AUTO: 0.1 K/UL (ref 0–0.5)
EOSINOPHIL NFR BLD: 1.6 % (ref 0–8)
ERYTHROCYTE [DISTWIDTH] IN BLOOD BY AUTOMATED COUNT: 13.2 % (ref 11.5–14.5)
EST. GFR  (AFRICAN AMERICAN): >60 ML/MIN/1.73 M^2
EST. GFR  (NON AFRICAN AMERICAN): 58 ML/MIN/1.73 M^2
GLUCOSE SERPL-MCNC: 101 MG/DL (ref 70–110)
GLUCOSE SERPL-MCNC: 93 MG/DL (ref 70–110)
GLUCOSE UR QL STRIP: NORMAL
HCT VFR BLD AUTO: 46.3 % (ref 40–54)
HGB BLD-MCNC: 15.6 G/DL (ref 14–18)
IMM GRANULOCYTES # BLD AUTO: 0.01 K/UL (ref 0–0.04)
IMM GRANULOCYTES NFR BLD AUTO: 0.2 % (ref 0–0.5)
KETONES UR QL STRIP: NORMAL
LEUKOCYTE ESTERASE URINE, POC: NORMAL
LYMPHOCYTES # BLD AUTO: 1.3 K/UL (ref 1–4.8)
LYMPHOCYTES NFR BLD: 25.2 % (ref 18–48)
MCH RBC QN AUTO: 32.2 PG (ref 27–31)
MCHC RBC AUTO-ENTMCNC: 33.7 G/DL (ref 32–36)
MCV RBC AUTO: 96 FL (ref 82–98)
MONOCYTES # BLD AUTO: 0.5 K/UL (ref 0.3–1)
MONOCYTES NFR BLD: 10.7 % (ref 4–15)
NEUTROPHILS # BLD AUTO: 3.1 K/UL (ref 1.8–7.7)
NEUTROPHILS NFR BLD: 61.5 % (ref 38–73)
NITRITE, POC UA: NORMAL
NRBC BLD-RTO: 0 /100 WBC
PH, POC UA: 7
PLATELET # BLD AUTO: 198 K/UL (ref 150–350)
PMV BLD AUTO: 10.9 FL (ref 9.2–12.9)
POTASSIUM SERPL-SCNC: 4.4 MMOL/L (ref 3.5–5.1)
PROTEIN, POC: NORMAL
RBC # BLD AUTO: 4.85 M/UL (ref 4.6–6.2)
SODIUM SERPL-SCNC: 139 MMOL/L (ref 136–145)
SPECIFIC GRAVITY, POC UA: 1
TSH SERPL DL<=0.005 MIU/L-ACNC: 2.44 UIU/ML (ref 0.4–4)
UROBILINOGEN, POC UA: NORMAL
WBC # BLD AUTO: 5.04 K/UL (ref 3.9–12.7)

## 2019-05-22 PROCEDURE — 99999 PR PBB SHADOW E&M-EST. PATIENT-LVL III: CPT | Mod: PBBFAC,,, | Performed by: FAMILY MEDICINE

## 2019-05-22 PROCEDURE — 82962 POCT GLUCOSE, HAND-HELD DEVICE: ICD-10-PCS | Mod: S$GLB,,, | Performed by: FAMILY MEDICINE

## 2019-05-22 PROCEDURE — 99214 OFFICE O/P EST MOD 30 MIN: CPT | Mod: 25,S$GLB,, | Performed by: FAMILY MEDICINE

## 2019-05-22 PROCEDURE — 3075F SYST BP GE 130 - 139MM HG: CPT | Mod: CPTII,S$GLB,, | Performed by: FAMILY MEDICINE

## 2019-05-22 PROCEDURE — 3079F PR MOST RECENT DIASTOLIC BLOOD PRESSURE 80-89 MM HG: ICD-10-PCS | Mod: CPTII,S$GLB,, | Performed by: FAMILY MEDICINE

## 2019-05-22 PROCEDURE — 99499 UNLISTED E&M SERVICE: CPT | Mod: S$GLB,,, | Performed by: FAMILY MEDICINE

## 2019-05-22 PROCEDURE — 99499 RISK ADDL DX/OHS AUDIT: ICD-10-PCS | Mod: S$GLB,,, | Performed by: FAMILY MEDICINE

## 2019-05-22 PROCEDURE — 80048 BASIC METABOLIC PNL TOTAL CA: CPT

## 2019-05-22 PROCEDURE — 99999 PR PBB SHADOW E&M-EST. PATIENT-LVL III: ICD-10-PCS | Mod: PBBFAC,,, | Performed by: FAMILY MEDICINE

## 2019-05-22 PROCEDURE — 3079F DIAST BP 80-89 MM HG: CPT | Mod: CPTII,S$GLB,, | Performed by: FAMILY MEDICINE

## 2019-05-22 PROCEDURE — 82962 GLUCOSE BLOOD TEST: CPT | Mod: S$GLB,,, | Performed by: FAMILY MEDICINE

## 2019-05-22 PROCEDURE — 3075F PR MOST RECENT SYSTOLIC BLOOD PRESS GE 130-139MM HG: ICD-10-PCS | Mod: CPTII,S$GLB,, | Performed by: FAMILY MEDICINE

## 2019-05-22 PROCEDURE — 1101F PR PT FALLS ASSESS DOC 0-1 FALLS W/OUT INJ PAST YR: ICD-10-PCS | Mod: CPTII,S$GLB,, | Performed by: FAMILY MEDICINE

## 2019-05-22 PROCEDURE — 85025 COMPLETE CBC W/AUTO DIFF WBC: CPT

## 2019-05-22 PROCEDURE — 83036 HEMOGLOBIN GLYCOSYLATED A1C: CPT

## 2019-05-22 PROCEDURE — 99214 PR OFFICE/OUTPT VISIT, EST, LEVL IV, 30-39 MIN: ICD-10-PCS | Mod: 25,S$GLB,, | Performed by: FAMILY MEDICINE

## 2019-05-22 PROCEDURE — 81002 URINALYSIS NONAUTO W/O SCOPE: CPT | Mod: S$GLB,,, | Performed by: FAMILY MEDICINE

## 2019-05-22 PROCEDURE — 36415 COLL VENOUS BLD VENIPUNCTURE: CPT | Mod: PO

## 2019-05-22 PROCEDURE — 84443 ASSAY THYROID STIM HORMONE: CPT

## 2019-05-22 PROCEDURE — 1101F PT FALLS ASSESS-DOCD LE1/YR: CPT | Mod: CPTII,S$GLB,, | Performed by: FAMILY MEDICINE

## 2019-05-22 PROCEDURE — 81002 POCT URINE DIPSTICK WITHOUT MICROSCOPE: ICD-10-PCS | Mod: S$GLB,,, | Performed by: FAMILY MEDICINE

## 2019-05-22 NOTE — PROGRESS NOTES
"Subjective:       Patient ID: Wilberto Hayes Jr. is a 79 y.o. male.    Chief Complaint: Fatigue    HPI   Fatigue  78yo male presents today with concern for diabetes. Reports he has been experiencing symptoms of fatigue intermittently for the past 2 1/2 months. Symptoms generally occur in the afternoon or evening. States he tries to go outside for activity but "I get so tired that I quit". He discussed his concerns with an elderly neighbor who has diabetes and he recommended the patient fast overnight and have his glucose checked in the AM. He did this last week and his fasting level was 246. There is no previous known abnormality with regard to blood glucose. His blood pressure is well controlled. There is no report of CP or palpitations. He will see Cardiology next week to discuss his current medication regimen. He has CHELO and maintains compliance with CPAP use. Last assessment with Pulmonology was in October 2018. Notes having aches and pains to the hips and legs as well as the right shoulder. Had a remote fall from the back of a truck. Apparently landed on his right hip and right shoulder. He did not seek evaluation for these injuries. He does not take any measures for pain relief. Has not had previous imaging. In 2016 he underwent CT lumbar spine per Cardiology- findings were consistent with DDD, facet arthritis and a small disc protrusion at L3-L4. He did not seek any further specialty evaluation.    Review of Systems   Constitutional: Positive for fatigue. Negative for activity change and unexpected weight change.   HENT: Negative for hearing loss, rhinorrhea and trouble swallowing.    Eyes: Positive for visual disturbance. Negative for discharge.   Respiratory: Negative for chest tightness and wheezing.    Cardiovascular: Negative for chest pain and palpitations.   Gastrointestinal: Negative for blood in stool, constipation, diarrhea and vomiting.   Endocrine: Negative for polydipsia and polyuria. " "  Genitourinary: Negative for difficulty urinating, hematuria and urgency.   Musculoskeletal: Positive for arthralgias (bilateral hips and legs, right shoulder). Negative for joint swelling and neck pain.   Skin: Negative for rash.   Neurological: Positive for weakness. Negative for headaches.   Psychiatric/Behavioral: Negative for confusion, dysphoric mood and sleep disturbance.       Objective:   /80   Pulse 66   Temp 97 °F (36.1 °C) (Temporal)   Resp 16   Ht 5' 11" (1.803 m)   Wt 101 kg (222 lb 10.6 oz)   SpO2 98%   BMI 31.06 kg/m²   Physical Exam   Constitutional: He is oriented to person, place, and time. He appears well-developed and well-nourished. No distress.   Obese, non-toxic   HENT:   Head: Normocephalic and atraumatic.   Right Ear: Tympanic membrane, external ear and ear canal normal.   Left Ear: Tympanic membrane, external ear and ear canal normal.   Nose: Nose normal.   Mouth/Throat: Oropharynx is clear and moist.   Eyes: Pupils are equal, round, and reactive to light. Conjunctivae and EOM are normal.   Neck: Normal range of motion. Neck supple.   Cardiovascular: Normal rate, regular rhythm and normal heart sounds.   Pulmonary/Chest: Effort normal and breath sounds normal.   Abdominal: Soft. Bowel sounds are normal.   Musculoskeletal: He exhibits no edema.   Neurological: He is alert and oriented to person, place, and time.   Skin: Skin is warm and dry. He is not diaphoretic.   Psychiatric: He has a normal mood and affect. His behavior is normal.       Assessment:       1. Hyperglycemia    2. Fatigue, unspecified type    3. Essential hypertension    4. Pure hypercholesterolemia    5. Calcification of aorta    6. Atherosclerosis of native artery of extremity with intermittent claudication, unspecified extremity    7. CHELO on CPAP    8. B12 deficiency    9. DDD (degenerative disc disease), lumbar    10. Bilateral hip pain    11. Obesity (BMI 30-39.9)        Plan:      Hyperglycemia  Stable " glucose of 101 (fasting) and UA without evidence of ketones or glucosuria. Spent length of time today discussing concerns and have recommended dietary modification. Will proceed with further lab evaluation. Additional recommendations will be provided pending results.  -     POCT Glucose, Hand-Held Device  -     Hemoglobin A1c; Future; Expected date: 05/22/2019    Fatigue, unspecified type  Likely multifactorial. Proceed with lab evaluation. Recommend adequate rest, hydration and nutrition. See Cardiology as scheduled given medication related concerns.   -     POCT URINE DIPSTICK WITHOUT MICROSCOPE  -     CBC auto differential; Future; Expected date: 05/22/2019  -     TSH; Future; Expected date: 05/22/2019  -     Basic metabolic panel; Future; Expected date: 05/22/2019    Essential hypertension  Stable with current measures. Target BP goal <140/90. Heart healthy diet is also recommended.    Pure hypercholesterolemia  As per Cardiology. Has known statin intolerance.    Calcification of aorta  BP and lipid control remain advised.    Atherosclerosis of native artery of extremity with intermittent claudication, unspecified extremity  As above.    CHELO on CPAP  Compliance with CPAP use remains advised.    B12 deficiency  Continue with supplementation. Most recent lab levels were stable.    DDD (degenerative disc disease), lumbar  Reviewed previous imaging and have offered trial of PT as well as updated imaging- he has declined. Tylenol for pain relief- recommend avoidance of consistent NSAID use.     Bilateral hip pain  As above.    Obesity (BMI 30-39.9)  Weight loss efforts remain encouraged through diet and lifestyle measures.    Total visit time of 25 minutes with greater than half the visit dedicated to counseling and coordinating care.  Return in July 2019 for next routine visit as planned.

## 2019-05-23 LAB
ESTIMATED AVG GLUCOSE: 88 MG/DL (ref 68–131)
HBA1C MFR BLD HPLC: 4.7 % (ref 4–5.6)

## 2019-05-27 ENCOUNTER — OFFICE VISIT (OUTPATIENT)
Dept: CARDIOLOGY | Facility: CLINIC | Age: 79
End: 2019-05-27
Payer: MEDICARE

## 2019-05-27 VITALS
BODY MASS INDEX: 31.32 KG/M2 | SYSTOLIC BLOOD PRESSURE: 130 MMHG | DIASTOLIC BLOOD PRESSURE: 70 MMHG | HEIGHT: 71 IN | HEART RATE: 66 BPM | WEIGHT: 223.75 LBS

## 2019-05-27 DIAGNOSIS — I10 ESSENTIAL HYPERTENSION: Primary | ICD-10-CM

## 2019-05-27 DIAGNOSIS — I25.10 CORONARY ARTERY DISEASE INVOLVING NATIVE CORONARY ARTERY OF NATIVE HEART WITHOUT ANGINA PECTORIS: Chronic | ICD-10-CM

## 2019-05-27 DIAGNOSIS — I70.0 CALCIFICATION OF AORTA: Chronic | ICD-10-CM

## 2019-05-27 DIAGNOSIS — G47.33 OSA ON CPAP: Chronic | ICD-10-CM

## 2019-05-27 DIAGNOSIS — R53.83 OTHER FATIGUE: ICD-10-CM

## 2019-05-27 DIAGNOSIS — E78.00 PURE HYPERCHOLESTEROLEMIA: ICD-10-CM

## 2019-05-27 PROCEDURE — 99214 PR OFFICE/OUTPT VISIT, EST, LEVL IV, 30-39 MIN: ICD-10-PCS | Mod: S$GLB,,, | Performed by: INTERNAL MEDICINE

## 2019-05-27 PROCEDURE — 3078F DIAST BP <80 MM HG: CPT | Mod: CPTII,S$GLB,, | Performed by: INTERNAL MEDICINE

## 2019-05-27 PROCEDURE — 3075F SYST BP GE 130 - 139MM HG: CPT | Mod: CPTII,S$GLB,, | Performed by: INTERNAL MEDICINE

## 2019-05-27 PROCEDURE — 3078F PR MOST RECENT DIASTOLIC BLOOD PRESSURE < 80 MM HG: ICD-10-PCS | Mod: CPTII,S$GLB,, | Performed by: INTERNAL MEDICINE

## 2019-05-27 PROCEDURE — 1101F PR PT FALLS ASSESS DOC 0-1 FALLS W/OUT INJ PAST YR: ICD-10-PCS | Mod: CPTII,S$GLB,, | Performed by: INTERNAL MEDICINE

## 2019-05-27 PROCEDURE — 99214 OFFICE O/P EST MOD 30 MIN: CPT | Mod: S$GLB,,, | Performed by: INTERNAL MEDICINE

## 2019-05-27 PROCEDURE — 99999 PR PBB SHADOW E&M-EST. PATIENT-LVL III: CPT | Mod: PBBFAC,,, | Performed by: INTERNAL MEDICINE

## 2019-05-27 PROCEDURE — 3075F PR MOST RECENT SYSTOLIC BLOOD PRESS GE 130-139MM HG: ICD-10-PCS | Mod: CPTII,S$GLB,, | Performed by: INTERNAL MEDICINE

## 2019-05-27 PROCEDURE — 1101F PT FALLS ASSESS-DOCD LE1/YR: CPT | Mod: CPTII,S$GLB,, | Performed by: INTERNAL MEDICINE

## 2019-05-27 PROCEDURE — 99999 PR PBB SHADOW E&M-EST. PATIENT-LVL III: ICD-10-PCS | Mod: PBBFAC,,, | Performed by: INTERNAL MEDICINE

## 2019-05-27 NOTE — PROGRESS NOTES
Subjective:   Patient ID:  Wilberto Hayes Jr. is a 79 y.o. male who presents for follow-up of Follow-up (6 month ); Shortness of Breath (occ); and Heartburn (occ)  Pt did not tolerate coreg and back to bystolic. Pt with fatigue.    Hypertension   This is a chronic problem. The current episode started more than 1 year ago. The problem has been gradually improving since onset. The problem is controlled. Pertinent negatives include no chest pain, palpitations or shortness of breath. Past treatments include beta blockers and angiotensin blockers. The current treatment provides moderate improvement. There are no compliance problems.    Coronary Artery Disease   Presents for follow-up visit. Pertinent negatives include no chest pain, chest pressure, chest tightness, dizziness, leg swelling, muscle weakness, palpitations, shortness of breath or weight gain. The symptoms have been stable. Compliance with diet is variable. Compliance with exercise is variable. Compliance with medications is good.       Review of Systems   Constitution: Negative. Negative for weight gain.   HENT: Negative.    Eyes: Negative.    Cardiovascular: Negative.  Negative for chest pain, leg swelling and palpitations.   Respiratory: Negative.  Negative for chest tightness and shortness of breath.    Endocrine: Negative.    Hematologic/Lymphatic: Negative.    Skin: Negative.    Musculoskeletal: Negative for muscle weakness.   Gastrointestinal: Negative.    Genitourinary: Negative.    Neurological: Negative.  Negative for dizziness.   Psychiatric/Behavioral: Negative.    Allergic/Immunologic: Negative.      Family History   Problem Relation Age of Onset    Blindness Paternal Grandfather     Glaucoma Paternal Grandfather     Alcohol abuse Paternal Grandfather     Kidney disease Paternal Grandfather     Cancer Mother         brain    COPD Mother     Alcohol abuse Mother     Hypertension Mother     Cancer Father         lung, breast with left  mastectomy    COPD Father     Alcohol abuse Father     Hypertension Father     Kidney disease Maternal Grandmother     Alcohol abuse Maternal Grandfather     Kidney disease Maternal Grandfather     Cancer Paternal Grandmother         Gyn     Kidney disease Paternal Grandmother     Retinal detachment Neg Hx     Macular degeneration Neg Hx     Diabetes Neg Hx     Heart disease Neg Hx     Stroke Neg Hx     Mental illness Neg Hx     Mental retardation Neg Hx      Past Medical History:   Diagnosis Date    ACE-inhibitor cough     Acute coronary syndrome     Angina pectoris     Cataract     Coronary artery disease     Degenerative disc disease, lumbar     Degenerative disc disease, lumbar     Glaucoma     Hyperlipidemia     cholesterol    Hypertension     Kidney stone     last in     Myocardial infarction     Obesity     Peripheral vascular disease     Polyneuropathy     Sleep apnea     Tobacco dependence     resolved quit     Trouble in sleeping      Social History     Socioeconomic History    Marital status:      Spouse name: Not on file    Number of children: Not on file    Years of education: Not on file    Highest education level: Not on file   Occupational History    Not on file   Social Needs    Financial resource strain: Not hard at all    Food insecurity:     Worry: Never true     Inability: Never true    Transportation needs:     Medical: No     Non-medical: No   Tobacco Use    Smoking status: Former Smoker     Packs/day: 1.00     Years: 10.00     Pack years: 10.00     Last attempt to quit: 1980     Years since quittin.1    Smokeless tobacco: Never Used   Substance and Sexual Activity    Alcohol use: Yes     Alcohol/week: 4.2 oz     Types: 7 Glasses of wine per week     Frequency: 4 or more times a week     Drinks per session: 1 or 2     Binge frequency: Never     Comment: nightly wine    Drug use: No    Sexual activity: Yes     Partners:  Female     Birth control/protection: Partner-Vasectomy   Lifestyle    Physical activity:     Days per week: 2 days     Minutes per session: 60 min    Stress: Not at all   Relationships    Social connections:     Talks on phone: More than three times a week     Gets together: Twice a week     Attends [employer]ism service: Not on file     Active member of club or organization: Yes     Attends meetings of clubs or organizations: More than 4 times per year     Relationship status:    Other Topics Concern    Not on file   Social History Narrative    . Lives with wife. They had 4 children. 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 Still drives. Does have a Living Will, signed at time of CABG (2002).      Current Outpatient Medications on File Prior to Visit   Medication Sig Dispense Refill    aspirin 81 mg Tab Take 1 tablet by mouth Daily.      cyanocobalamin (VITAMIN B-12) 100 MCG tablet Take 1 tablet by mouth every other day.       hydroCHLOROthiazide (HYDRODIURIL) 25 MG tablet TAKE 1/2 TABLET ONE TIME DAILY 45 tablet 3    latanoprost 0.005 % ophthalmic solution PLACE 1 DROP INTO BOTH EYES EVERY EVENING. 2.5 mL 10    losartan (COZAAR) 50 MG tablet Take 1 tablet (50 mg total) by mouth 2 (two) times daily. 180 tablet 3    LUBRICANT EYE DROPS, GLYC-PG, 1-0.3 % Drop       multivit-min/ferrous fumarate (MULTI VITAMIN ORAL)       nebivolol (BYSTOLIC) 5 MG Tab TAKE 1 TABLET (5 MG TOTAL) BY MOUTH ONCE DAILY. (Patient taking differently: Take 2.5 mg by mouth once daily. TAKE 1 TABLET (5 MG TOTAL) BY MOUTH ONCE DAILY.) 30 tablet 11    nitroGLYCERIN (NITROSTAT) 0.4 MG SL tablet Place 1 tablet (0.4 mg total) under the tongue every 5 (five) minutes as needed for Chest pain (if no relief seek er trmt). 25 tablet 3    OCEAN NASAL 0.65 % nasal spray       omega-3 fatty acids-vitamin E (FISH OIL) 1,000 mg Cap Take 1 capsule by mouth Daily.      saw palmetto 500 MG capsule Take by mouth 2  (two) times daily.       zinc gluconate 50 mg tablet Take 50 mg by mouth once daily.      [DISCONTINUED] nebivolol (BYSTOLIC) 5 MG Tab Take 5 mg by mouth once daily.       No current facility-administered medications on file prior to visit.      Review of patient's allergies indicates:   Allergen Reactions    Niacin preparations Other (See Comments)     myalgia    Statins-hmg-coa reductase inhibitors      Muscle weakness    Codeine Other (See Comments) and Swelling     unknown    Fosinopril Other (See Comments)     cough    Pravastatin Other (See Comments)      Muscle pain    Simvastatin Other (See Comments)     Muscle pain    Sulfa (sulfonamide antibiotics) Rash and Hives       Objective:     Physical Exam   Constitutional: He is oriented to person, place, and time. He appears well-developed and well-nourished.   HENT:   Head: Normocephalic and atraumatic.   Eyes: Pupils are equal, round, and reactive to light. Conjunctivae are normal.   Neck: Normal range of motion. Neck supple.   Cardiovascular: Normal rate, regular rhythm, normal heart sounds and intact distal pulses.   Pulmonary/Chest: Effort normal and breath sounds normal.   Abdominal: Soft. Bowel sounds are normal.   Neurological: He is alert and oriented to person, place, and time.   Skin: Skin is warm and dry.   Psychiatric: He has a normal mood and affect.   Nursing note and vitals reviewed.      Assessment:     1. Essential hypertension    2. Pure hypercholesterolemia    3. Coronary artery disease involving native coronary artery of native heart without angina pectoris    4. Calcification of aorta    5. CHELO on CPAP    6. Other fatigue        Plan:     Essential hypertension    Pure hypercholesterolemia    Coronary artery disease involving native coronary artery of native heart without angina pectoris    Calcification of aorta    CHELO on CPAP    Other fatigue      continue bystolic, losartan, hctz -htn  Continue asa- cad

## 2019-07-10 ENCOUNTER — PATIENT MESSAGE (OUTPATIENT)
Dept: FAMILY MEDICINE | Facility: CLINIC | Age: 79
End: 2019-07-10

## 2019-07-16 ENCOUNTER — LAB VISIT (OUTPATIENT)
Dept: LAB | Facility: HOSPITAL | Age: 79
End: 2019-07-16
Attending: FAMILY MEDICINE
Payer: MEDICARE

## 2019-07-16 DIAGNOSIS — E78.00 PURE HYPERCHOLESTEROLEMIA: ICD-10-CM

## 2019-07-16 DIAGNOSIS — I10 ESSENTIAL HYPERTENSION: ICD-10-CM

## 2019-07-16 LAB
ALBUMIN SERPL BCP-MCNC: 4.1 G/DL (ref 3.5–5.2)
ALP SERPL-CCNC: 72 U/L (ref 55–135)
ALT SERPL W/O P-5'-P-CCNC: 8 U/L (ref 10–44)
ANION GAP SERPL CALC-SCNC: 6 MMOL/L (ref 8–16)
AST SERPL-CCNC: 17 U/L (ref 10–40)
BILIRUB SERPL-MCNC: 0.9 MG/DL (ref 0.1–1)
BUN SERPL-MCNC: 19 MG/DL (ref 8–23)
CALCIUM SERPL-MCNC: 10.3 MG/DL (ref 8.7–10.5)
CHLORIDE SERPL-SCNC: 103 MMOL/L (ref 95–110)
CO2 SERPL-SCNC: 30 MMOL/L (ref 23–29)
CREAT SERPL-MCNC: 1.1 MG/DL (ref 0.5–1.4)
EST. GFR  (AFRICAN AMERICAN): >60 ML/MIN/1.73 M^2
EST. GFR  (NON AFRICAN AMERICAN): >60 ML/MIN/1.73 M^2
GLUCOSE SERPL-MCNC: 89 MG/DL (ref 70–110)
POTASSIUM SERPL-SCNC: 4.5 MMOL/L (ref 3.5–5.1)
PROT SERPL-MCNC: 7.3 G/DL (ref 6–8.4)
SODIUM SERPL-SCNC: 139 MMOL/L (ref 136–145)

## 2019-07-16 PROCEDURE — 36415 COLL VENOUS BLD VENIPUNCTURE: CPT | Mod: PO

## 2019-07-16 PROCEDURE — 80053 COMPREHEN METABOLIC PANEL: CPT

## 2019-07-22 ENCOUNTER — HOSPITAL ENCOUNTER (OUTPATIENT)
Dept: RADIOLOGY | Facility: HOSPITAL | Age: 79
Discharge: HOME | End: 2019-07-22
Attending: FAMILY MEDICINE
Payer: MEDICARE

## 2019-07-22 ENCOUNTER — OFFICE VISIT (OUTPATIENT)
Dept: FAMILY MEDICINE | Facility: CLINIC | Age: 79
End: 2019-07-22
Payer: MEDICARE

## 2019-07-22 VITALS
TEMPERATURE: 97 F | RESPIRATION RATE: 16 BRPM | HEIGHT: 71 IN | WEIGHT: 219.56 LBS | OXYGEN SATURATION: 96 % | BODY MASS INDEX: 30.74 KG/M2 | DIASTOLIC BLOOD PRESSURE: 76 MMHG | HEART RATE: 60 BPM | SYSTOLIC BLOOD PRESSURE: 120 MMHG

## 2019-07-22 DIAGNOSIS — M25.552 BILATERAL HIP PAIN: ICD-10-CM

## 2019-07-22 DIAGNOSIS — M25.551 BILATERAL HIP PAIN: ICD-10-CM

## 2019-07-22 DIAGNOSIS — M51.36 DDD (DEGENERATIVE DISC DISEASE), LUMBAR: ICD-10-CM

## 2019-07-22 DIAGNOSIS — I70.219 ATHEROSCLEROSIS OF NATIVE ARTERY OF EXTREMITY WITH INTERMITTENT CLAUDICATION, UNSPECIFIED EXTREMITY: ICD-10-CM

## 2019-07-22 DIAGNOSIS — E53.8 B12 DEFICIENCY: ICD-10-CM

## 2019-07-22 DIAGNOSIS — E66.9 OBESITY (BMI 30-39.9): ICD-10-CM

## 2019-07-22 DIAGNOSIS — I70.0 CALCIFICATION OF AORTA: ICD-10-CM

## 2019-07-22 DIAGNOSIS — G47.33 OSA ON CPAP: ICD-10-CM

## 2019-07-22 DIAGNOSIS — M47.816 LUMBAR FACET ARTHROPATHY: ICD-10-CM

## 2019-07-22 DIAGNOSIS — M62.830 LUMBAR PARASPINAL MUSCLE SPASM: ICD-10-CM

## 2019-07-22 DIAGNOSIS — I10 ESSENTIAL HYPERTENSION: Primary | ICD-10-CM

## 2019-07-22 DIAGNOSIS — E78.00 PURE HYPERCHOLESTEROLEMIA: ICD-10-CM

## 2019-07-22 PROCEDURE — 99999 PR PBB SHADOW E&M-EST. PATIENT-LVL IV: ICD-10-PCS | Mod: PBBFAC,,, | Performed by: FAMILY MEDICINE

## 2019-07-22 PROCEDURE — 73521 XR HIPS BILATERAL 2 VIEW INCL AP PELVIS: ICD-10-PCS | Mod: 26,,, | Performed by: RADIOLOGY

## 2019-07-22 PROCEDURE — 3074F PR MOST RECENT SYSTOLIC BLOOD PRESSURE < 130 MM HG: ICD-10-PCS | Mod: CPTII,S$GLB,, | Performed by: FAMILY MEDICINE

## 2019-07-22 PROCEDURE — 3078F PR MOST RECENT DIASTOLIC BLOOD PRESSURE < 80 MM HG: ICD-10-PCS | Mod: CPTII,S$GLB,, | Performed by: FAMILY MEDICINE

## 2019-07-22 PROCEDURE — 73521 X-RAY EXAM HIPS BI 2 VIEWS: CPT | Mod: TC,PO

## 2019-07-22 PROCEDURE — 1101F PR PT FALLS ASSESS DOC 0-1 FALLS W/OUT INJ PAST YR: ICD-10-PCS | Mod: CPTII,S$GLB,, | Performed by: FAMILY MEDICINE

## 2019-07-22 PROCEDURE — 99214 PR OFFICE/OUTPT VISIT, EST, LEVL IV, 30-39 MIN: ICD-10-PCS | Mod: S$GLB,,, | Performed by: FAMILY MEDICINE

## 2019-07-22 PROCEDURE — 1101F PT FALLS ASSESS-DOCD LE1/YR: CPT | Mod: CPTII,S$GLB,, | Performed by: FAMILY MEDICINE

## 2019-07-22 PROCEDURE — 99999 PR PBB SHADOW E&M-EST. PATIENT-LVL IV: CPT | Mod: PBBFAC,,, | Performed by: FAMILY MEDICINE

## 2019-07-22 PROCEDURE — 3078F DIAST BP <80 MM HG: CPT | Mod: CPTII,S$GLB,, | Performed by: FAMILY MEDICINE

## 2019-07-22 PROCEDURE — 3074F SYST BP LT 130 MM HG: CPT | Mod: CPTII,S$GLB,, | Performed by: FAMILY MEDICINE

## 2019-07-22 PROCEDURE — 72110 X-RAY EXAM L-2 SPINE 4/>VWS: CPT | Mod: TC,PO

## 2019-07-22 PROCEDURE — 72110 XR LUMBAR SPINE COMPLETE 5 VIEW: ICD-10-PCS | Mod: 26,,, | Performed by: RADIOLOGY

## 2019-07-22 PROCEDURE — 99214 OFFICE O/P EST MOD 30 MIN: CPT | Mod: S$GLB,,, | Performed by: FAMILY MEDICINE

## 2019-07-22 PROCEDURE — 72110 X-RAY EXAM L-2 SPINE 4/>VWS: CPT | Mod: 26,,, | Performed by: RADIOLOGY

## 2019-07-22 PROCEDURE — 73521 X-RAY EXAM HIPS BI 2 VIEWS: CPT | Mod: 26,,, | Performed by: RADIOLOGY

## 2019-07-22 RX ORDER — METHYLPREDNISOLONE 4 MG/1
TABLET ORAL
Qty: 1 PACKAGE | Refills: 0 | Status: SHIPPED | OUTPATIENT
Start: 2019-07-22 | End: 2019-08-12

## 2019-07-22 RX ORDER — TIZANIDINE 2 MG/1
2 TABLET ORAL NIGHTLY PRN
Qty: 14 TABLET | Refills: 0 | Status: SHIPPED | OUTPATIENT
Start: 2019-07-22 | End: 2019-08-01

## 2019-07-22 NOTE — PROGRESS NOTES
"Subjective:       Patient ID: Wilberto Hayes Jr. is a 79 y.o. male.    Chief Complaint: Chronic Care    HPI   Chronic Care  80yo male presents today for chronic care assessment. He has had recent lab assessment and is here for review. BP is well controlled. Reports cost concerns with Bystolic use and plans to discuss with Cardiology.There is no report of CP or palpitations. Has been experiencing what he describes is bilateral hip pain and requests Medrol pat which has afforded benefit in the past. Has tried Tylenol with no improvement. No saddle anesthesia. No recent falls or injuries. He maintains compliance with CPAP use. No recent ER visits or hospitalizations.     Review of Systems   Constitutional: Negative for activity change and unexpected weight change.   HENT: Negative for hearing loss, rhinorrhea and trouble swallowing.    Eyes: Negative for discharge and visual disturbance.   Respiratory: Negative for chest tightness and wheezing.    Cardiovascular: Negative for chest pain and palpitations.   Gastrointestinal: Negative for blood in stool, constipation, diarrhea and vomiting.   Endocrine: Negative for polydipsia and polyuria.   Genitourinary: Negative for difficulty urinating, hematuria and urgency.   Musculoskeletal: Positive for arthralgias. Negative for joint swelling and neck pain.   Neurological: Positive for weakness. Negative for headaches.   Psychiatric/Behavioral: Negative for confusion and dysphoric mood.       Objective:   /76   Pulse 60   Temp 97.2 °F (36.2 °C) (Temporal)   Resp 16   Ht 5' 11" (1.803 m)   Wt 99.6 kg (219 lb 9.3 oz)   SpO2 96%   BMI 30.62 kg/m²   Physical Exam   Constitutional: He is oriented to person, place, and time. He appears well-developed and well-nourished. No distress.   Obese, non-toxic   HENT:   Head: Normocephalic and atraumatic.   Right Ear: Tympanic membrane, external ear and ear canal normal.   Left Ear: Tympanic membrane, external ear and ear " canal normal.   Nose: Nose normal.   Mouth/Throat: Oropharynx is clear and moist.   Eyes: Pupils are equal, round, and reactive to light. Conjunctivae and EOM are normal.   Neck: Normal range of motion. Neck supple.   Cardiovascular: Normal rate, regular rhythm and normal heart sounds.   Pulmonary/Chest: Effort normal and breath sounds normal.   Abdominal: Soft. Bowel sounds are normal.   Musculoskeletal: He exhibits no edema.        Lumbar back: He exhibits tenderness and pain. He exhibits normal range of motion.   Neurological: He is alert and oriented to person, place, and time.   Skin: Skin is warm and dry. He is not diaphoretic.   Psychiatric: He has a normal mood and affect. His behavior is normal.       Assessment:       1. Essential hypertension    2. Pure hypercholesterolemia    3. Bilateral hip pain    4. DDD (degenerative disc disease), lumbar    5. Lumbar facet arthropathy    6. Lumbar paraspinal muscle spasm    7. Calcification of aorta    8. Atherosclerosis of native artery of extremity with intermittent claudication, unspecified extremity    9. CHELO on CPAP    10. B12 deficiency    11. Obesity (BMI 30-39.9)        Plan:      Essential hypertension  Stable. Continue with current treatment. Target BP goal remains<140/90. Heart healthy diet is advised. Intermittent home monitoring has been discussed.  -     Comprehensive metabolic panel; Future; Expected date: 07/22/2019    Pure hypercholesterolemia  -     Lipid panel; Future; Expected date: 07/22/2019  -     TSH; Future; Expected date: 07/22/2019    Bilateral hip pain  -     X-Ray Hips Bilateral 2 View Incl AP Pelvis; Future; Expected date: 07/22/2019  -     methylPREDNISolone (MEDROL DOSEPACK) 4 mg tablet; use as directed  Dispense: 1 Package; Refill: 0    DDD (degenerative disc disease), lumbar  -     X-Ray Lumbar Spine Complete 5 View; Future; Expected date: 07/22/2019  -     methylPREDNISolone (MEDROL DOSEPACK) 4 mg tablet; use as directed  Dispense:  1 Package; Refill: 0    Lumbar facet arthropathy  -     X-Ray Lumbar Spine Complete 5 View; Future; Expected date: 07/22/2019  -     methylPREDNISolone (MEDROL DOSEPACK) 4 mg tablet; use as directed  Dispense: 1 Package; Refill: 0    Lumbar paraspinal muscle spasm  -     tiZANidine (ZANAFLEX) 2 MG tablet; Take 1 tablet (2 mg total) by mouth nightly as needed.  Dispense: 14 tablet; Refill: 0    Calcification of aorta  BP and lipid control remain advised.    Atherosclerosis of native artery of extremity with intermittent claudication, unspecified extremity  BP and lipid control remain advised.    CHELO on CPAP  Compliance with CPAP use remains advised.    B12 deficiency  -     Vitamin B12; Future; Expected date: 07/22/2019    Obesity (BMI 30-39.9)  Weight loss efforts are encouraged through diet and lifestyle measures.

## 2019-08-06 ENCOUNTER — PATIENT MESSAGE (OUTPATIENT)
Dept: CARDIOLOGY | Facility: CLINIC | Age: 79
End: 2019-08-06

## 2019-08-06 DIAGNOSIS — I25.10 CORONARY ARTERY DISEASE INVOLVING NATIVE CORONARY ARTERY OF NATIVE HEART WITHOUT ANGINA PECTORIS: ICD-10-CM

## 2019-08-06 DIAGNOSIS — I10 ESSENTIAL HYPERTENSION: Primary | ICD-10-CM

## 2019-08-06 RX ORDER — NEBIVOLOL 5 MG/1
TABLET ORAL
Qty: 30 TABLET | Refills: 11 | Status: SHIPPED | OUTPATIENT
Start: 2019-08-06 | End: 2019-12-09

## 2019-08-06 NOTE — TELEPHONE ENCOUNTER
Spoke with pharmacist he okayed breaking in half.Rx will cost patient 80.00.Message sent to patient.

## 2019-08-06 NOTE — TELEPHONE ENCOUNTER
Received request to refill Rx Bystolic.Checking with patient how he takes Rx.Noted 5 mg daily but patient takes 2.5 daily.Left message to contact office.

## 2019-08-06 NOTE — TELEPHONE ENCOUNTER
The patient sent message that he wants 5 mg tablets, its more cost effective and Dr Valle okayed him cutting in half.Will confirm with Dr Valle.

## 2019-09-15 ENCOUNTER — OFFICE VISIT (OUTPATIENT)
Dept: URGENT CARE | Facility: CLINIC | Age: 79
End: 2019-09-15
Payer: MEDICARE

## 2019-09-15 VITALS
SYSTOLIC BLOOD PRESSURE: 132 MMHG | BODY MASS INDEX: 30.54 KG/M2 | DIASTOLIC BLOOD PRESSURE: 68 MMHG | OXYGEN SATURATION: 98 % | HEART RATE: 58 BPM | WEIGHT: 218.13 LBS | HEIGHT: 71 IN | RESPIRATION RATE: 14 BRPM | TEMPERATURE: 99 F
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
## 2019-09-15 DIAGNOSIS — I10 ESSENTIAL HYPERTENSION: ICD-10-CM

## 2019-09-15 DIAGNOSIS — L03.114 CELLULITIS OF LEFT UPPER EXTREMITY: Primary | ICD-10-CM

## 2019-09-15 PROCEDURE — 1101F PT FALLS ASSESS-DOCD LE1/YR: CPT | Mod: CPTII,S$GLB,, | Performed by: NURSE PRACTITIONER

## 2019-09-15 PROCEDURE — 3078F PR MOST RECENT DIASTOLIC BLOOD PRESSURE < 80 MM HG: ICD-10-PCS | Mod: CPTII,S$GLB,, | Performed by: NURSE PRACTITIONER

## 2019-09-15 PROCEDURE — 3078F DIAST BP <80 MM HG: CPT | Mod: CPTII,S$GLB,, | Performed by: NURSE PRACTITIONER

## 2019-09-15 PROCEDURE — 3075F PR MOST RECENT SYSTOLIC BLOOD PRESS GE 130-139MM HG: ICD-10-PCS | Mod: CPTII,S$GLB,, | Performed by: NURSE PRACTITIONER

## 2019-09-15 PROCEDURE — 99999 PR PBB SHADOW E&M-EST. PATIENT-LVL V: CPT | Mod: PBBFAC,,, | Performed by: NURSE PRACTITIONER

## 2019-09-15 PROCEDURE — 99214 OFFICE O/P EST MOD 30 MIN: CPT | Mod: S$GLB,,, | Performed by: NURSE PRACTITIONER

## 2019-09-15 PROCEDURE — 99214 PR OFFICE/OUTPT VISIT, EST, LEVL IV, 30-39 MIN: ICD-10-PCS | Mod: S$GLB,,, | Performed by: NURSE PRACTITIONER

## 2019-09-15 PROCEDURE — 99999 PR PBB SHADOW E&M-EST. PATIENT-LVL V: ICD-10-PCS | Mod: PBBFAC,,, | Performed by: NURSE PRACTITIONER

## 2019-09-15 PROCEDURE — 3075F SYST BP GE 130 - 139MM HG: CPT | Mod: CPTII,S$GLB,, | Performed by: NURSE PRACTITIONER

## 2019-09-15 PROCEDURE — 1101F PR PT FALLS ASSESS DOC 0-1 FALLS W/OUT INJ PAST YR: ICD-10-PCS | Mod: CPTII,S$GLB,, | Performed by: NURSE PRACTITIONER

## 2019-09-15 RX ORDER — PREDNISONE 20 MG/1
20 TABLET ORAL 2 TIMES DAILY
Qty: 10 TABLET | Refills: 0 | Status: SHIPPED | OUTPATIENT
Start: 2019-09-15 | End: 2019-09-18

## 2019-09-15 RX ORDER — CEPHALEXIN 500 MG/1
500 CAPSULE ORAL 2 TIMES DAILY
Qty: 20 CAPSULE | Refills: 0 | Status: SHIPPED | OUTPATIENT
Start: 2019-09-15 | End: 2019-09-25

## 2019-09-15 NOTE — PATIENT INSTRUCTIONS
PLAN:   Advise use of cool compresses and elevate  Advise increase oral fluids  Meds: Keflex & prednisone / no refills  Advise follow up with PCP in 2-3 days  Advise go to ER if symptoms worsen or fail to improve with treatment.  AVS provided and reviewed with patient including supportive care, follow up, and red flag symptoms.   Patient verbalizes understanding and agrees with treatment plan. Discharged from Urgent Care in stable condition.

## 2019-09-15 NOTE — PROGRESS NOTES
"CHIEF COMPLAINT/REASON FOR VISIT: Reports red and soft tissue swelling to left hand and forearm    HISTORY OF PRESENT ILLNESS:  79 year-old male complains of redness, swelling and itching to left hand and forearm  onset 1-2 days ago.  Patient admits working in yard, trimming bushes,  pulling weeds & Wasp sting to left wrist.  Admits tried Benadryl with slight relief.  Denies seek emergency room treatment.  Patient states "woke up with left hand and arm red, hot and swollen".  Patient requesting steroids.  Discussed with patient need for prednisone and antibiotic, possible cellulitis.  Patient denies chest pain, shortness of breath, blurred vision, dizziness, weakness, nausea, vomiting, diarrhea and no body aches.       Past Medical History:   Diagnosis Date    ACE-inhibitor cough     Acute coronary syndrome     Angina pectoris     Cataract     Coronary artery disease     Degenerative disc disease, lumbar     Degenerative disc disease, lumbar     Glaucoma     Hyperlipidemia     cholesterol    Hypertension     Kidney stone     last in 2001    Myocardial infarction     Obesity     Peripheral vascular disease     Polyneuropathy     Sleep apnea     Tobacco dependence     resolved quit 1980    Trouble in sleeping        Past Surgical History:   Procedure Laterality Date    ABCESS DRAINAGE Left 2003    inguinal abcess/cellulitis - MRSA- 60 days of IVAB and repacking w/ Home health     CORONARY ARTERY BYPASS GRAFT  2002    Two-vessel    cystoscopy and stone removal via scope  2003    several between age 23 and 53    EYE SURGERY      lthotripsy  1985    SLT - OU - BOTH EYES  2009    TONSILLECTOMY      at age 4    VASECTOMY  1975            Family History   Problem Relation Age of Onset    Blindness Paternal Grandfather     Glaucoma Paternal Grandfather     Alcohol abuse Paternal Grandfather     Kidney disease Paternal Grandfather     Cancer Mother         brain    COPD Mother     Alcohol " abuse Mother     Hypertension Mother     Cancer Father         lung, breast with left mastectomy    COPD Father     Alcohol abuse Father     Hypertension Father     Kidney disease Maternal Grandmother     Alcohol abuse Maternal Grandfather     Kidney disease Maternal Grandfather     Cancer Paternal Grandmother         Gyn     Kidney disease Paternal Grandmother     Retinal detachment Neg Hx     Macular degeneration Neg Hx     Diabetes Neg Hx     Heart disease Neg Hx     Stroke Neg Hx     Mental illness Neg Hx     Mental retardation Neg Hx             Social History     Socioeconomic History    Marital status:      Spouse name: Not on file    Number of children: Not on file    Years of education: Not on file    Highest education level: Not on file   Occupational History    Not on file   Social Needs    Financial resource strain: Not hard at all    Food insecurity:     Worry: Never true     Inability: Never true    Transportation needs:     Medical: No     Non-medical: No   Tobacco Use    Smoking status: Former Smoker     Packs/day: 1.00     Years: 10.00     Pack years: 10.00     Last attempt to quit: 1980     Years since quittin.4    Smokeless tobacco: Never Used   Substance and Sexual Activity    Alcohol use: Yes     Alcohol/week: 4.2 oz     Types: 7 Glasses of wine per week     Frequency: 4 or more times a week     Drinks per session: 1 or 2     Binge frequency: Never     Comment: nightly wine    Drug use: No    Sexual activity: Yes     Partners: Female     Birth control/protection: Partner-Vasectomy   Lifestyle    Physical activity:     Days per week: 2 days     Minutes per session: 60 min    Stress: Not at all   Relationships    Social connections:     Talks on phone: More than three times a week     Gets together: Twice a week     Attends Pentecostal service: Not on file     Active member of club or organization: Yes     Attends meetings of clubs or organizations:  More than 4 times per year     Relationship status:    Other Topics Concern    Not on file   Social History Narrative    . Lives with wife. They had 4 children. 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 Still drives. Does have a Living Will, signed at time of CABG (2002).        ROS:  GENERAL: No fever, chills, fatigability or weight loss.  SKIN: Reports red skin left hand and arm, wasp sting .  HEENT: No headaches or recent head trauma.  Denies ear pain, discharge or vertigo. No loss of smell, no epistaxis or postnasal drip. No hoarseness or change in voice.   CHEST: Denies cyanosis, wheezing, cough and sputum production.  CARDIOVASCULAR: Denies chest pain, shortness of breath  MUSCULOSKELETAL: redness and swelling  NEUROLOGIC: No history of seizures, paralysis, alteration of gait or coordination.  PSYCHIATRIC: Denies mood swings, depression or suicidal thoughts.    PE:   APPEARANCE: Well nourished, well developed, in mild distress.   V/S: Reviewed.  SKIN:  Left hand and forearm with large red area, hot to touch, minimal tenderness on palpation, with soft tissue swelling, pulses palpable, red and swollen area marked with skin marker       HEENT:  Turbinates pink, no oral soft tissue swelling, pink pharynx, TM's clear bilateral.  No facial soft tissue swelling  CHEST: Lungs clear to auscultation.  No wheezing  CARDIOVASCULAR: Regular rate and rhythm.  MUSCULOSKELETAL:  Left arm with full range of motion  NEUROLOGIC: No sensory deficits. Gait & Posture: Normal. No cerebellar signs.  MENTAL STATUS: Patient alert, oriented x 3 & conversant.    PLAN:   Advise use of cool compresses and elevate  Advise increase oral fluids  Meds: Keflex & prednisone / no refills  Advise follow up with PCP in 2-3 days  Advise go to ER if symptoms worsen or fail to improve with treatment.  AVS provided and reviewed with patient including supportive care, follow up, and red flag symptoms.   Patient  verbalizes understanding and agrees with treatment plan. Discharged from Urgent Care in stable condition.    DIAGNOSIS:  Cellulitis   Insect bite  Hypertension

## 2019-09-18 ENCOUNTER — OFFICE VISIT (OUTPATIENT)
Dept: FAMILY MEDICINE | Facility: CLINIC | Age: 79
End: 2019-09-18
Payer: MEDICARE

## 2019-09-18 VITALS
HEART RATE: 61 BPM | DIASTOLIC BLOOD PRESSURE: 80 MMHG | OXYGEN SATURATION: 97 % | WEIGHT: 219.94 LBS | BODY MASS INDEX: 30.79 KG/M2 | SYSTOLIC BLOOD PRESSURE: 138 MMHG | TEMPERATURE: 97 F | RESPIRATION RATE: 16 BRPM | HEIGHT: 71 IN

## 2019-09-18 DIAGNOSIS — I70.219 ATHEROSCLEROSIS OF NATIVE ARTERY OF EXTREMITY WITH INTERMITTENT CLAUDICATION, UNSPECIFIED EXTREMITY: ICD-10-CM

## 2019-09-18 DIAGNOSIS — E66.9 OBESITY (BMI 30-39.9): ICD-10-CM

## 2019-09-18 DIAGNOSIS — I10 ESSENTIAL HYPERTENSION: ICD-10-CM

## 2019-09-18 DIAGNOSIS — I70.0 CALCIFICATION OF AORTA: ICD-10-CM
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
## 2019-09-18 DIAGNOSIS — L03.114 CELLULITIS OF LEFT ARM: Primary | ICD-10-CM

## 2019-09-18 PROCEDURE — 99499 RISK ADDL DX/OHS AUDIT: ICD-10-PCS | Mod: S$GLB,,, | Performed by: FAMILY MEDICINE

## 2019-09-18 PROCEDURE — 3075F SYST BP GE 130 - 139MM HG: CPT | Mod: CPTII,S$GLB,, | Performed by: FAMILY MEDICINE

## 2019-09-18 PROCEDURE — 3075F PR MOST RECENT SYSTOLIC BLOOD PRESS GE 130-139MM HG: ICD-10-PCS | Mod: CPTII,S$GLB,, | Performed by: FAMILY MEDICINE

## 2019-09-18 PROCEDURE — 99999 PR PBB SHADOW E&M-EST. PATIENT-LVL III: CPT | Mod: PBBFAC,,, | Performed by: FAMILY MEDICINE

## 2019-09-18 PROCEDURE — 99999 PR PBB SHADOW E&M-EST. PATIENT-LVL III: ICD-10-PCS | Mod: PBBFAC,,, | Performed by: FAMILY MEDICINE

## 2019-09-18 PROCEDURE — 99213 OFFICE O/P EST LOW 20 MIN: CPT | Mod: S$GLB,,, | Performed by: FAMILY MEDICINE

## 2019-09-18 PROCEDURE — 99213 PR OFFICE/OUTPT VISIT, EST, LEVL III, 20-29 MIN: ICD-10-PCS | Mod: S$GLB,,, | Performed by: FAMILY MEDICINE

## 2019-09-18 PROCEDURE — 3079F DIAST BP 80-89 MM HG: CPT | Mod: CPTII,S$GLB,, | Performed by: FAMILY MEDICINE

## 2019-09-18 PROCEDURE — 99499 UNLISTED E&M SERVICE: CPT | Mod: S$GLB,,, | Performed by: FAMILY MEDICINE

## 2019-09-18 PROCEDURE — 3079F PR MOST RECENT DIASTOLIC BLOOD PRESSURE 80-89 MM HG: ICD-10-PCS | Mod: CPTII,S$GLB,, | Performed by: FAMILY MEDICINE

## 2019-09-18 PROCEDURE — 1101F PR PT FALLS ASSESS DOC 0-1 FALLS W/OUT INJ PAST YR: ICD-10-PCS | Mod: CPTII,S$GLB,, | Performed by: FAMILY MEDICINE

## 2019-09-18 PROCEDURE — 1101F PT FALLS ASSESS-DOCD LE1/YR: CPT | Mod: CPTII,S$GLB,, | Performed by: FAMILY MEDICINE

## 2019-09-18 NOTE — PROGRESS NOTES
"Subjective:       Patient ID: Wilberto Hayes Jr. is a 79 y.o. male.    Chief Complaint: Follow-up    HPI   Follow-up  80yo male presents today for follow-up after an UC visit on 9/15/19. He had been working outdoors at home on 9/14/19 and was trimming some bushes when he was stung multiple times by some bees. He put some meat tenderizer on his stings and notes that this did not lead to improvement. He started to experience swelling to the extremity and sought evaluation. Keflex and Prednisone RX were provided by [facility]. Patient reports increase in resting HR and BP with Prednisone use and requests to discontinue. The redness to the extremity has improved. He has FROM to the wrist and elbow. No residual warmth is reported and swelling has lessened. He denies any respiratory symptoms- no lip or tongue swelling noted. Tetanus immunization is up to date.    Review of Systems   Constitutional: Negative for activity change and unexpected weight change.   HENT: Negative for hearing loss, rhinorrhea and trouble swallowing.    Eyes: Negative for discharge and visual disturbance.   Respiratory: Negative for chest tightness and wheezing.    Cardiovascular: Negative for chest pain and palpitations.   Gastrointestinal: Negative for blood in stool, constipation, diarrhea and vomiting.   Endocrine: Negative for polydipsia and polyuria.   Genitourinary: Negative for difficulty urinating, hematuria and urgency.   Musculoskeletal: Positive for arthralgias. Negative for joint swelling and neck pain.   Skin: Negative for color change and rash.   Neurological: Negative for weakness, numbness and headaches.   Psychiatric/Behavioral: Negative for confusion, dysphoric mood and sleep disturbance.       Objective:   /80   Pulse 61   Temp 96.9 °F (36.1 °C) (Temporal)   Resp 16   Ht 5' 11" (1.803 m)   Wt 99.7 kg (219 lb 14.5 oz)   SpO2 97%   BMI 30.67 kg/m²   Physical Exam   Constitutional: He appears well-developed and " well-nourished. No distress.   Obese, non-toxic   HENT:   Head: Normocephalic and atraumatic.   Right Ear: External ear normal.   Left Ear: External ear normal.   Nose: Nose normal.   Mouth/Throat: Oropharynx is clear and moist.   Eyes: Pupils are equal, round, and reactive to light. Conjunctivae and EOM are normal.   Neck: Normal range of motion. Neck supple.   Cardiovascular: Normal rate, regular rhythm and normal heart sounds.   Pulses:       Radial pulses are 2+ on the left side.   Pulmonary/Chest: Effort normal and breath sounds normal.   Skin: Skin is warm and dry. He is not diaphoretic.        Psychiatric: He has a normal mood and affect.       Assessment:       1. Cellulitis of left arm    2. Insect bite, initial encounter    3. Essential hypertension    4. Atherosclerosis of native artery of extremity with intermittent claudication, unspecified extremity    5. Calcification of aorta    6. Obesity (BMI 30-39.9)        Plan:      Cellulitis of left arm  Resolving. Advised continuation of Keflex until course of 7 days has been completed. Recommend probiotic use as well. Reviewed w/s and reasons to seek immediate reassessment should these symptoms occur. Ok to d/c Prednisone.   Insect bite, initial encounter  As above.  Essential hypertension  Stable BP. As above, d/c Prednisone. Home monitoring is advised- report back with change in symptoms.  Atherosclerosis of native artery of extremity with intermittent claudication, unspecified extremity  BP and lipid control remain advised.  Calcification of aorta  BP and lipid control remain advised.  Obesity (BMI 30-39.9)  Weight loss efforts are encouraged.    UC notes reviewed.  RTC prn for the above.  Next chronic care visit as previously advised.

## 2019-09-24 ENCOUNTER — OFFICE VISIT (OUTPATIENT)
Dept: OPHTHALMOLOGY | Facility: CLINIC | Age: 79
End: 2019-09-24
Payer: MEDICARE

## 2019-09-24 ENCOUNTER — PATIENT MESSAGE (OUTPATIENT)
Dept: OPHTHALMOLOGY | Facility: CLINIC | Age: 79
End: 2019-09-24

## 2019-09-24 DIAGNOSIS — H40.1131 PRIMARY OPEN ANGLE GLAUCOMA OF BOTH EYES, MILD STAGE: Primary | ICD-10-CM

## 2019-09-24 DIAGNOSIS — H25.13 NUCLEAR SCLEROSIS OF BOTH EYES: ICD-10-CM

## 2019-09-24 DIAGNOSIS — H02.401 PTOSIS OF EYELID, RIGHT: ICD-10-CM

## 2019-09-24 DIAGNOSIS — H04.123 DRY EYES, BILATERAL: ICD-10-CM

## 2019-09-24 PROCEDURE — 92012 INTRM OPH EXAM EST PATIENT: CPT | Mod: S$GLB,,, | Performed by: OPHTHALMOLOGY

## 2019-09-24 PROCEDURE — 99999 PR PBB SHADOW E&M-EST. PATIENT-LVL II: ICD-10-PCS | Mod: PBBFAC,,, | Performed by: OPHTHALMOLOGY

## 2019-09-24 PROCEDURE — 99999 PR PBB SHADOW E&M-EST. PATIENT-LVL II: CPT | Mod: PBBFAC,,, | Performed by: OPHTHALMOLOGY

## 2019-09-24 PROCEDURE — 92012 PR EYE EXAM, EST PATIENT,INTERMED: ICD-10-PCS | Mod: S$GLB,,, | Performed by: OPHTHALMOLOGY

## 2019-09-24 RX ORDER — KETOROLAC TROMETHAMINE 5 MG/ML
SOLUTION OPHTHALMIC
Qty: 1 BOTTLE | Refills: 0 | Status: SHIPPED | OUTPATIENT
Start: 2019-09-24 | End: 2020-01-24 | Stop reason: ALTCHOICE

## 2019-09-24 NOTE — PROGRESS NOTES
"SUBJECTIVE:   Wilberto Hayes Jr. is a 79 y.o. male   Corrected distance visual acuity was 20/25 in the right eye and 20/30 in the left eye.   Chief Complaint   Patient presents with    Glaucoma        HPI:  HPI     Pt here for 4m IOP chk. No pain, but pt states that his eyes remain very   dry no matter what he tries. VA stable. 100% compliant with gtts.     1. Mild COAG Goal =17-18  +Fhx Glaucoma -g-dad  SLT OD 11/09 (min response)  SLT OS 8/09 (22 to 16-17)  2. Mild NSC  3. Dry eyes (no dry mouth)  Gel Plugs OU  Didn't refill Xiidra due to cost  Tried Restasis (burning and blurry vision)  4. Guttata  5. Ptosis OD    Latanoprost QHS OU    Homeopathic AT"s (Similasan)  Thera tears, Soothe XP  O3FO    Last edited by Dashawn Kuhn, Patient Care Assistant on 9/24/2019  9:19   AM. (History)        Assessment /Plan :  1. Primary open angle glaucoma of both eyes, mild stage IOP not within acceptable range relative to target IOP with risk of irreversible visual loss. Better IOP control is recommended. Discussed options, risks, and benefits of additional medication, SLT laser, and/or incisional glaucoma surgery. Reviewed importance of continued compliance with treatment and follow up.     Patient chooses schedule SLT  OU     2. Nuclear sclerosis of both eyes monitor for now   3. Dry eyes, bilateral add Xiidra OU BID   4. Ptosis of eyelid, right monitor for now     Return for the SLT OU            "

## 2019-09-25 ENCOUNTER — PATIENT MESSAGE (OUTPATIENT)
Dept: OPHTHALMOLOGY | Facility: CLINIC | Age: 79
End: 2019-09-25

## 2019-09-25 ENCOUNTER — TELEPHONE (OUTPATIENT)
Dept: OPHTHALMOLOGY | Facility: CLINIC | Age: 79
End: 2019-09-25

## 2019-09-25 NOTE — TELEPHONE ENCOUNTER
Left message for patient:  I spoke with Dr. Molina and since you reported that you were on the Prednisone, he would like to wait on laser and have you come into the office to check your pressure in about 2 months and he will re-evaluate if you still need the laser procedure.  Some patient are steroid responder which cause the eye pressure to be elevated  Please call with any questions and to schedule appointment

## 2019-10-24 ENCOUNTER — IMMUNIZATION (OUTPATIENT)
Dept: PHARMACY | Facility: CLINIC | Age: 79
End: 2019-10-24
Payer: MEDICARE

## 2019-10-24 ENCOUNTER — OFFICE VISIT (OUTPATIENT)
Dept: PULMONOLOGY | Facility: CLINIC | Age: 79
End: 2019-10-24
Payer: MEDICARE

## 2019-10-24 VITALS
DIASTOLIC BLOOD PRESSURE: 60 MMHG | OXYGEN SATURATION: 95 % | BODY MASS INDEX: 31.04 KG/M2 | RESPIRATION RATE: 18 BRPM | HEART RATE: 95 BPM | SYSTOLIC BLOOD PRESSURE: 124 MMHG | WEIGHT: 221.69 LBS | HEIGHT: 71 IN

## 2019-10-24 DIAGNOSIS — E66.9 OBESITY (BMI 30.0-34.9): Chronic | ICD-10-CM

## 2019-10-24 DIAGNOSIS — G47.33 OSA ON CPAP: Primary | Chronic | ICD-10-CM

## 2019-10-24 PROCEDURE — 3074F PR MOST RECENT SYSTOLIC BLOOD PRESSURE < 130 MM HG: ICD-10-PCS | Mod: CPTII,S$GLB,, | Performed by: NURSE PRACTITIONER

## 2019-10-24 PROCEDURE — 99214 PR OFFICE/OUTPT VISIT, EST, LEVL IV, 30-39 MIN: ICD-10-PCS | Mod: 25,S$GLB,, | Performed by: NURSE PRACTITIONER

## 2019-10-24 PROCEDURE — 1101F PT FALLS ASSESS-DOCD LE1/YR: CPT | Mod: CPTII,S$GLB,, | Performed by: NURSE PRACTITIONER

## 2019-10-24 PROCEDURE — 1101F PR PT FALLS ASSESS DOC 0-1 FALLS W/OUT INJ PAST YR: ICD-10-PCS | Mod: CPTII,S$GLB,, | Performed by: NURSE PRACTITIONER

## 2019-10-24 PROCEDURE — 3078F DIAST BP <80 MM HG: CPT | Mod: CPTII,S$GLB,, | Performed by: NURSE PRACTITIONER

## 2019-10-24 PROCEDURE — 99999 PR PBB SHADOW E&M-EST. PATIENT-LVL IV: CPT | Mod: PBBFAC,,, | Performed by: NURSE PRACTITIONER

## 2019-10-24 PROCEDURE — 99214 OFFICE O/P EST MOD 30 MIN: CPT | Mod: 25,S$GLB,, | Performed by: NURSE PRACTITIONER

## 2019-10-24 PROCEDURE — 3078F PR MOST RECENT DIASTOLIC BLOOD PRESSURE < 80 MM HG: ICD-10-PCS | Mod: CPTII,S$GLB,, | Performed by: NURSE PRACTITIONER

## 2019-10-24 PROCEDURE — 3074F SYST BP LT 130 MM HG: CPT | Mod: CPTII,S$GLB,, | Performed by: NURSE PRACTITIONER

## 2019-10-24 PROCEDURE — 99999 PR PBB SHADOW E&M-EST. PATIENT-LVL IV: ICD-10-PCS | Mod: PBBFAC,,, | Performed by: NURSE PRACTITIONER

## 2019-10-24 NOTE — ASSESSMENT & PLAN NOTE
Benefits and compliant on CPAP 8 cm   AHI 2.0   [place] 4  Yearly supply order Nasal pillows mask   HME: [facility]   Follow up in one year for compliance download and supply order

## 2019-10-24 NOTE — PROGRESS NOTES
Subjective:      Patient ID: Wilberto Hayes Jr. is a 79 y.o. male.    Chief Complaint: Sleep Apnea    HPI: Wilberto Hayes Jr. is here for follow up for CHELO with yearly CPAP complaince assessment.   He is on CPAP of 8 cmH2O pressure which is optimally controlling sleep apnea with apneic index (AHI) 2.0 events an hour.   He is compliant with CPAP use. Complaince download today reveals 96.7% of days with greater than 4 hours of device use.   Patient reports benefit from CPAP use and denies snoring and excessive daytime sleepiness.  Patient reports no complaints. Nasal pillow mask is tolerated and preferred, has a nasal wisp does not like head gear.   [place] 4    Previous Report Reviewed: lab reports and office notes     Past Medical History: The following portions of the patient's history were reviewed and updated as appropriate:   He  has a past surgical history that includes Coronary artery bypass graft (2002); Argon Trabeculoplasty - OU - Both Eyes (2009); Eye surgery; Vasectomy (1975); lthotripsy (1985); cystoscopy and stone removal via scope (2003); Abscess drainage (Left, 2003); and Tonsillectomy.  His family history includes Alcohol abuse in his father, maternal grandfather, mother, and paternal grandfather; Blindness in his paternal grandfather; COPD in his father and mother; Cancer in his father, mother, and paternal grandmother; Glaucoma in his paternal grandfather; Hypertension in his father and mother; Kidney disease in his maternal grandfather, maternal grandmother, paternal grandfather, and paternal grandmother.  He  reports that he quit smoking about 39 years ago. He has a 10.00 pack-year smoking history. He has never used smokeless tobacco. He reports that he drinks about 7.0 standard drinks of alcohol per week. He reports that he does not use drugs.  He has a current medication list which includes the following prescription(s): aspirin, cyanocobalamin, hydrochlorothiazide, ketorolac 0.5%,  "latanoprost, lifitegrast, losartan, lubricant (p-glycol-glycerin), multivit-min/ferrous fumarate, nebivolol, nitroglycerin, ocean nasal, omega-3 fatty acids-vitamin e, saw palmetto, and zinc gluconate.  He is allergic to niacin preparations; statins-hmg-coa reductase inhibitors; codeine; fosinopril; pravastatin; simvastatin; and sulfa (sulfonamide antibiotics)..    The following portions of the patient's history were reviewed and updated as appropriate: allergies, current medications, past family history, past medical history, past social history, past surgical history and problem list.    Review of Systems   Constitutional: Negative for fever, chills, weight loss, weight gain, activity change, appetite change, fatigue and night sweats.   HENT: Negative for postnasal drip, rhinorrhea, sinus pressure, voice change and congestion.    Eyes: Negative for redness and itching.   Respiratory: Negative for snoring, cough, sputum production, chest tightness, shortness of breath, wheezing, orthopnea, asthma nighttime symptoms, dyspnea on extertion, use of rescue inhaler and somnolence.    Cardiovascular: Negative.  Negative for chest pain, palpitations and leg swelling.   Genitourinary: Negative for difficulty urinating and hematuria.   Endocrine: Negative for cold intolerance and heat intolerance.    Musculoskeletal: Negative for arthralgias, gait problem, joint swelling and myalgias.   Skin: Negative.    Gastrointestinal: Negative for nausea, vomiting, abdominal pain and acid reflux.   Neurological: Negative for dizziness, weakness, light-headedness and headaches.   Hematological: Negative for adenopathy. No excessive bruising.   All other systems reviewed and are negative.     Objective:   /60   Pulse 95   Resp 18   Ht 5' 11" (1.803 m)   Wt 100.5 kg (221 lb 10.8 oz)   SpO2 95%   BMI 30.92 kg/m²   Physical Exam   Constitutional: He is oriented to person, place, and time. He appears well-developed and " well-nourished. He is active and cooperative.  Non-toxic appearance. He does not appear ill. No distress.   HENT:   Head: Normocephalic and atraumatic.   Right Ear: External ear normal.   Left Ear: External ear normal.   Nose: Nose normal.   Mouth/Throat: Oropharynx is clear and moist. No oropharyngeal exudate.   Eyes: Conjunctivae are normal.   Neck: Normal range of motion. Neck supple.   Cardiovascular: Normal rate, regular rhythm, normal heart sounds and intact distal pulses.   Pulmonary/Chest: Effort normal and breath sounds normal.   Abdominal: Soft.   Musculoskeletal: He exhibits no edema.   Neurological: He is alert and oriented to person, place, and time.   Skin: Skin is warm and dry.   Psychiatric: He has a normal mood and affect. His behavior is normal. Judgment and thought content normal.   Vitals reviewed.    Personal Diagnostic Review  CPAP download  CPAP 8 cm  Compliance Summary  9/24/2019 - 10/23/2019 (30 days)  Days with Device Usage 30 days  Days without Device Usage 0 days  Percent Days with Device Usage 100.0%  Cumulative Usage 10 days 3 hrs. 11 mins. 44 secs.  Maximum Usage (1 Day) 9 hrs. 25 mins. 4 secs.  Average Usage (All Days) 8 hrs. 6 mins. 23 secs.  Average Usage (Days Used) 8 hrs. 6 mins. 23 secs.  Minimum Usage (1 Day) 3 hrs. 40 mins. 3 secs.  Percent of Days with Usage >= 4 Hours 96.7%  Percent of Days with Usage < 4 Hours 3.3%  Date Range  Total Blower Time 10 days 3 hrs. 24 mins. 1 secs.  CPAP Summary  Average Time in Large Leak Per Day 0 secs.  Average AHI 2.0  CPAP 8.0 cmH2O    Assessment:     1. CHELO on CPAP    2. Obesity (BMI 30.0-34.9)        Orders Placed This Encounter   Procedures    CPAP/BIPAP SUPPLIES     Benefits and compliant  90 day supply. 4 refills.  Yearly supply order  HME: [facility]     Order Specific Question:   Type of mask:     Answer:   Nasal     Order Specific Question:   Headgear?     Answer:   Yes     Order Specific Question:   Tubing?     Answer:   Yes     Order  Specific Question:   Humidifier chamber?     Answer:   Yes     Order Specific Question:   Chin strap?     Answer:   Yes     Order Specific Question:   Filters?     Answer:   Yes     Order Specific Question:   Cushions?     Answer:   Yes     Order Specific Question:   Length of need (1-99 months):     Answer:   99     Plan:     Problem List Items Addressed This Visit     CHELO on CPAP - Primary (Chronic)     Benefits and compliant on CPAP 8 cm   AHI 2.0   [place] 4  Yearly supply order Nasal pillows mask   HME: [facility]   Follow up in one year for compliance download and supply order          Relevant Orders    CPAP/BIPAP SUPPLIES    Obesity (BMI 30.0-34.9) (Chronic)     Encouraged calorie reduction and 30 minutes of exercise daily. Discussed impact of obesity on general health.  No regular exercise program, busy with activity in yard 2-3 days a week.                  TIME SPENT WITH PATIENT: Time spent:25 minutes in face to face  discussion concerning diagnosis, prognosis, review of lab and test results, benefits of treatment as well as management of disease, counseling of patient and coordination of care between various health  care providers . Greater than half the time spent was used for coordination of care and counseling of patient.      (DME) - [facility]  Reviewed therapeutic goals for positive airway pressure therapy CPAP  Ideal is usage 100% of nights for 6 - 8 hours per night. Minimum usage is 70% of night for at least 4 hours per night used.     Follow up in about 1 year (around 10/24/2020) for CPAP 1 year compliance download.

## 2019-11-14 ENCOUNTER — HOSPITAL ENCOUNTER (OUTPATIENT)
Dept: RADIOLOGY | Facility: HOSPITAL | Age: 79
Discharge: HOME | End: 2019-11-14
Attending: FAMILY MEDICINE
Payer: MEDICARE

## 2019-11-14 ENCOUNTER — OFFICE VISIT (OUTPATIENT)
Dept: FAMILY MEDICINE | Facility: CLINIC | Age: 79
End: 2019-11-14
Payer: MEDICARE

## 2019-11-14 VITALS
WEIGHT: 223.69 LBS | SYSTOLIC BLOOD PRESSURE: 130 MMHG | RESPIRATION RATE: 17 BRPM | HEART RATE: 60 BPM | TEMPERATURE: 97 F | DIASTOLIC BLOOD PRESSURE: 70 MMHG | HEIGHT: 71 IN | BODY MASS INDEX: 31.31 KG/M2 | OXYGEN SATURATION: 96 %

## 2019-11-14 DIAGNOSIS — H40.1130 PRIMARY OPEN ANGLE GLAUCOMA OF BOTH EYES, UNSPECIFIED GLAUCOMA STAGE: ICD-10-CM

## 2019-11-14 DIAGNOSIS — R05.9 COUGH: ICD-10-CM

## 2019-11-14 DIAGNOSIS — J01.00 ACUTE MAXILLARY SINUSITIS, RECURRENCE NOT SPECIFIED: Primary | ICD-10-CM

## 2019-11-14 DIAGNOSIS — E66.9 OBESITY (BMI 30-39.9): ICD-10-CM

## 2019-11-14 DIAGNOSIS — I70.219 ATHEROSCLEROSIS OF NATIVE ARTERY OF EXTREMITY WITH INTERMITTENT CLAUDICATION, UNSPECIFIED EXTREMITY: ICD-10-CM

## 2019-11-14 DIAGNOSIS — I70.0 CALCIFICATION OF AORTA: ICD-10-CM

## 2019-11-14 DIAGNOSIS — J20.9 ACUTE BRONCHITIS, UNSPECIFIED ORGANISM: ICD-10-CM

## 2019-11-14 DIAGNOSIS — R53.83 FATIGUE, UNSPECIFIED TYPE: ICD-10-CM

## 2019-11-14 LAB
CTP QC/QA: YES
FLUAV AG NPH QL: NEGATIVE
FLUBV AG NPH QL: NEGATIVE

## 2019-11-14 PROCEDURE — 71046 XR CHEST PA AND LATERAL: ICD-10-PCS | Mod: 26,,, | Performed by: RADIOLOGY

## 2019-11-14 PROCEDURE — 3075F PR MOST RECENT SYSTOLIC BLOOD PRESS GE 130-139MM HG: ICD-10-PCS | Mod: CPTII,S$GLB,, | Performed by: FAMILY MEDICINE

## 2019-11-14 PROCEDURE — 87804 POCT INFLUENZA A/B: ICD-10-PCS | Mod: 59,QW,S$GLB, | Performed by: FAMILY MEDICINE

## 2019-11-14 PROCEDURE — 1101F PR PT FALLS ASSESS DOC 0-1 FALLS W/OUT INJ PAST YR: ICD-10-PCS | Mod: CPTII,S$GLB,, | Performed by: FAMILY MEDICINE

## 2019-11-14 PROCEDURE — 87804 INFLUENZA ASSAY W/OPTIC: CPT | Mod: 59,QW,S$GLB, | Performed by: FAMILY MEDICINE

## 2019-11-14 PROCEDURE — 3078F PR MOST RECENT DIASTOLIC BLOOD PRESSURE < 80 MM HG: ICD-10-PCS | Mod: CPTII,S$GLB,, | Performed by: FAMILY MEDICINE

## 2019-11-14 PROCEDURE — 3078F DIAST BP <80 MM HG: CPT | Mod: CPTII,S$GLB,, | Performed by: FAMILY MEDICINE

## 2019-11-14 PROCEDURE — 99214 PR OFFICE/OUTPT VISIT, EST, LEVL IV, 30-39 MIN: ICD-10-PCS | Mod: 25,S$GLB,, | Performed by: FAMILY MEDICINE

## 2019-11-14 PROCEDURE — 3075F SYST BP GE 130 - 139MM HG: CPT | Mod: CPTII,S$GLB,, | Performed by: FAMILY MEDICINE

## 2019-11-14 PROCEDURE — 1101F PT FALLS ASSESS-DOCD LE1/YR: CPT | Mod: CPTII,S$GLB,, | Performed by: FAMILY MEDICINE

## 2019-11-14 PROCEDURE — 99999 PR PBB SHADOW E&M-EST. PATIENT-LVL IV: ICD-10-PCS | Mod: PBBFAC,,, | Performed by: FAMILY MEDICINE

## 2019-11-14 PROCEDURE — 99214 OFFICE O/P EST MOD 30 MIN: CPT | Mod: 25,S$GLB,, | Performed by: FAMILY MEDICINE

## 2019-11-14 PROCEDURE — 99999 PR PBB SHADOW E&M-EST. PATIENT-LVL IV: CPT | Mod: PBBFAC,,, | Performed by: FAMILY MEDICINE

## 2019-11-14 PROCEDURE — 71046 X-RAY EXAM CHEST 2 VIEWS: CPT | Mod: TC,PO

## 2019-11-14 PROCEDURE — 71046 X-RAY EXAM CHEST 2 VIEWS: CPT | Mod: 26,,, | Performed by: RADIOLOGY

## 2019-11-14 RX ORDER — DOXYCYCLINE 100 MG/1
100 CAPSULE ORAL EVERY 12 HOURS
Qty: 20 CAPSULE | Refills: 0 | Status: SHIPPED | OUTPATIENT
Start: 2019-11-14 | End: 2020-01-24 | Stop reason: ALTCHOICE

## 2019-11-14 NOTE — PROGRESS NOTES
"Subjective:       Patient ID: Wilberto Hayes Jr. is a 79 y.o. male.    Chief Complaint: Cough    HPI   Cough  80yo male presents today with report of cough and congestion for the past 2 weeks. Symptoms began 3 days after having the flu vaccine. Notes developing a low grade temp of 99. He has tried Coricidin HBP products without significant relief. He has been around a friend with diagnosis of PNA. Is also concerned he may have flu. Notes having some intermittent shortness of breath. Denies any CP or palpitations. No known underlying pulmonary disease. Denies any AR symptoms at baseline. Spouse accompanies him today- she is well and without similar symptoms.     Review of Systems   Constitutional: Negative for activity change and unexpected weight change.   HENT: Positive for congestion. Negative for hearing loss, rhinorrhea, sore throat and trouble swallowing.    Eyes: Negative for pain, discharge and visual disturbance.   Respiratory: Positive for cough and shortness of breath. Negative for chest tightness and wheezing.    Cardiovascular: Negative for chest pain and palpitations.   Gastrointestinal: Negative for blood in stool, constipation, diarrhea and vomiting.   Endocrine: Negative for polydipsia and polyuria.   Genitourinary: Negative for difficulty urinating, hematuria and urgency.   Musculoskeletal: Negative for arthralgias, joint swelling and neck pain.   Skin: Negative for rash.   Neurological: Negative for dizziness, weakness and headaches.   Psychiatric/Behavioral: Negative for confusion and dysphoric mood.       Objective:   /70   Pulse 60   Temp 97.4 °F (36.3 °C) (Temporal)   Resp 17   Ht 5' 11" (1.803 m)   Wt 101.5 kg (223 lb 10.5 oz)   SpO2 96%   BMI 31.19 kg/m²   Physical Exam   Constitutional: He is oriented to person, place, and time. He appears well-developed and well-nourished. No distress.   Obese, mildly ill appearing, non-toxic   HENT:   Head: Normocephalic and atraumatic. "   Right Ear: Tympanic membrane, external ear and ear canal normal.   Left Ear: Tympanic membrane, external ear and ear canal normal.   Nose: Mucosal edema present. No rhinorrhea.   Mouth/Throat: Uvula is midline. Posterior oropharyngeal erythema present. No oropharyngeal exudate.   Eyes: Pupils are equal, round, and reactive to light. Conjunctivae and EOM are normal.   Neck: Normal range of motion. Neck supple.   Cardiovascular: Normal rate, regular rhythm and normal heart sounds.   Pulmonary/Chest: Effort normal and breath sounds normal. No respiratory distress. He has no wheezes.   Abdominal: Soft. Bowel sounds are normal.   Musculoskeletal: He exhibits no edema.   Lymphadenopathy:     He has no cervical adenopathy.   Neurological: He is alert and oriented to person, place, and time.   Skin: Skin is warm and dry. He is not diaphoretic.   Psychiatric: He has a normal mood and affect. His behavior is normal.       Assessment:       1. Acute maxillary sinusitis, recurrence not specified    2. Acute bronchitis, unspecified organism    3. Cough    4. Fatigue, unspecified type    5. Calcification of aorta    6. Atherosclerosis of native artery of extremity with intermittent claudication, unspecified extremity    7. Primary open angle glaucoma of both eyes, unspecified glaucoma stage    8. Obesity (BMI 30-39.9)        Plan:      Acute maxillary sinusitis, recurrence not specified  Recommend nasal saline rinses. Negative CXR- will proceed with Doxycycline given duration of symptoms. Previous issues with increase in IOP with steroid use- will defer at this time. Consider Mucinex for cough relief (plain) with hydration efforts. If symptoms worsen or fail to improve he is asked to return for reassessment.   -     doxycycline (VIBRAMYCIN) 100 MG Cap; Take 1 capsule (100 mg total) by mouth every 12 (twelve) hours.  Dispense: 20 capsule; Refill: 0    Acute bronchitis, unspecified organism  Proceed as above.     Cough  No acute  infiltrate noted on imaging.  -     X-Ray Chest PA And Lateral; Future; Expected date: 11/14/2019    Fatigue, unspecified type  Negative rapid flu. Proceed as above.  -     POCT Influenza A/B    Calcification of aorta  BP and lipid control remain advised.     Atherosclerosis of native artery of extremity with intermittent claudication, unspecified extremity  As per Cardiology.    Primary open angle glaucoma of both eyes, unspecified glaucoma stage  As per Ophthalmology.    Obesity (BMI 30-39.9)  Weight loss efforts are encouraged.

## 2019-11-26 ENCOUNTER — OFFICE VISIT (OUTPATIENT)
Dept: OPHTHALMOLOGY | Facility: CLINIC | Age: 79
End: 2019-11-26
Payer: MEDICARE

## 2019-11-26 DIAGNOSIS — H25.13 NUCLEAR SCLEROSIS OF BOTH EYES: ICD-10-CM

## 2019-11-26 DIAGNOSIS — H02.401 PTOSIS OF EYELID, RIGHT: ICD-10-CM

## 2019-11-26 DIAGNOSIS — H04.123 DRY EYES, BILATERAL: ICD-10-CM

## 2019-11-26 DIAGNOSIS — H40.1131 PRIMARY OPEN ANGLE GLAUCOMA OF BOTH EYES, MILD STAGE: Primary | ICD-10-CM

## 2019-11-26 PROCEDURE — 99999 PR PBB SHADOW E&M-EST. PATIENT-LVL II: ICD-10-PCS | Mod: PBBFAC,,, | Performed by: OPHTHALMOLOGY

## 2019-11-26 PROCEDURE — 99499 RISK ADDL DX/OHS AUDIT: ICD-10-PCS | Mod: S$GLB,,, | Performed by: OPHTHALMOLOGY

## 2019-11-26 PROCEDURE — 92012 PR EYE EXAM, EST PATIENT,INTERMED: ICD-10-PCS | Mod: S$GLB,,, | Performed by: OPHTHALMOLOGY

## 2019-11-26 PROCEDURE — 99499 UNLISTED E&M SERVICE: CPT | Mod: S$GLB,,, | Performed by: OPHTHALMOLOGY

## 2019-11-26 PROCEDURE — 92012 INTRM OPH EXAM EST PATIENT: CPT | Mod: S$GLB,,, | Performed by: OPHTHALMOLOGY

## 2019-11-26 PROCEDURE — 99999 PR PBB SHADOW E&M-EST. PATIENT-LVL II: CPT | Mod: PBBFAC,,, | Performed by: OPHTHALMOLOGY

## 2019-11-26 NOTE — PROGRESS NOTES
"SUBJECTIVE:   Wilberto Hayes Jr. is a 79 y.o. male   Corrected distance visual acuity was 20/30 in the right eye and 20/30 -2 in the left eye.   Chief Complaint   Patient presents with    Glaucoma        HPI:  HPI     Pt here for IOP chk. Pt states he was on prednisone 40 mg a couple of   days before his last eye appt. He is here for a f/u IOP chk in case the   prednisone had his pressure high. Pt is also returning his Xiidra. He says   that his eyes are intolerant of the Xiidra as they are of Restasis. 100%   compliant with gtts.     1. Mild COAG Goal =17-18  +Fhx Glaucoma -g-dad  SLT OD 11/09 (min response)  SLT OS 8/09 (22 to 16-17)  2. Mild NSC  3. Dry eyes (no dry mouth)  Gel Plugs OU  Didn't refill Xiidra due to cost  Tried Restasis (burning and blurry vision)  4. Guttata  5. Ptosis OD    Latanoprost QHS OU    Homeopathic AT"s (Similasan)  Thera tears, Soothe XP  O3FO    Last edited by Dashawn Kuhn, Patient Care Assistant on 11/26/2019  9:22   AM. (History)        Assessment /Plan :  1. Primary open angle glaucoma of both eyes, mild stage IOP not within acceptable range relative to target IOP with risk of irreversible visual loss. Better IOP control is recommended. Discussed options, risks, and benefits of additional medication, SLT laser, and/or incisional glaucoma surgery. Reviewed importance of continued compliance with treatment and follow up.     Patient chooses schedule SLT  OU     2. Nuclear sclerosis of both eyes Patient does reports mild visual decline from incipient cataractous changes, but not sufficient to affect activities of daily living. I recommend monitoring visual status and follow up when visual symptoms worsen.     3. Dry eyes, bilateral cont soothe qid ou    4. Ptosis of eyelid, right  -- Condition stable, no therapeutic change required. Monitoring routinely.                   "

## 2019-12-06 DIAGNOSIS — I25.10 CORONARY ARTERY DISEASE INVOLVING NATIVE CORONARY ARTERY OF NATIVE HEART WITHOUT ANGINA PECTORIS: Primary | Chronic | ICD-10-CM

## 2019-12-09 ENCOUNTER — OFFICE VISIT (OUTPATIENT)
Dept: CARDIOLOGY | Facility: CLINIC | Age: 79
End: 2019-12-09
Payer: MEDICARE

## 2019-12-09 ENCOUNTER — CLINICAL SUPPORT (OUTPATIENT)
Dept: CARDIOLOGY | Facility: CLINIC | Age: 79
End: 2019-12-09
Payer: MEDICARE

## 2019-12-09 VITALS
BODY MASS INDEX: 31.2 KG/M2 | WEIGHT: 222.88 LBS | DIASTOLIC BLOOD PRESSURE: 62 MMHG | HEIGHT: 71 IN | SYSTOLIC BLOOD PRESSURE: 130 MMHG | HEART RATE: 61 BPM

## 2019-12-09 DIAGNOSIS — E78.00 PURE HYPERCHOLESTEROLEMIA: ICD-10-CM

## 2019-12-09 DIAGNOSIS — I70.0 CALCIFICATION OF AORTA: Chronic | ICD-10-CM

## 2019-12-09 DIAGNOSIS — I10 ESSENTIAL HYPERTENSION: Primary | ICD-10-CM

## 2019-12-09 DIAGNOSIS — I25.10 CORONARY ARTERY DISEASE INVOLVING NATIVE CORONARY ARTERY OF NATIVE HEART WITHOUT ANGINA PECTORIS: Chronic | ICD-10-CM

## 2019-12-09 DIAGNOSIS — G47.33 OSA ON CPAP: Chronic | ICD-10-CM

## 2019-12-09 PROCEDURE — 99999 PR PBB SHADOW E&M-EST. PATIENT-LVL III: CPT | Mod: PBBFAC,,, | Performed by: INTERNAL MEDICINE

## 2019-12-09 PROCEDURE — 99214 OFFICE O/P EST MOD 30 MIN: CPT | Mod: 25,S$GLB,, | Performed by: INTERNAL MEDICINE

## 2019-12-09 PROCEDURE — 1159F MED LIST DOCD IN RCRD: CPT | Mod: S$GLB,,, | Performed by: INTERNAL MEDICINE

## 2019-12-09 PROCEDURE — 99214 PR OFFICE/OUTPT VISIT, EST, LEVL IV, 30-39 MIN: ICD-10-PCS | Mod: 25,S$GLB,, | Performed by: INTERNAL MEDICINE

## 2019-12-09 PROCEDURE — 93000 EKG 12-LEAD: ICD-10-PCS | Mod: S$GLB,,, | Performed by: INTERNAL MEDICINE

## 2019-12-09 PROCEDURE — 1126F AMNT PAIN NOTED NONE PRSNT: CPT | Mod: S$GLB,,, | Performed by: INTERNAL MEDICINE

## 2019-12-09 PROCEDURE — 99999 PR PBB SHADOW E&M-EST. PATIENT-LVL III: ICD-10-PCS | Mod: PBBFAC,,, | Performed by: INTERNAL MEDICINE

## 2019-12-09 PROCEDURE — 1159F PR MEDICATION LIST DOCUMENTED IN MEDICAL RECORD: ICD-10-PCS | Mod: S$GLB,,, | Performed by: INTERNAL MEDICINE

## 2019-12-09 PROCEDURE — 1101F PR PT FALLS ASSESS DOC 0-1 FALLS W/OUT INJ PAST YR: ICD-10-PCS | Mod: CPTII,S$GLB,, | Performed by: INTERNAL MEDICINE

## 2019-12-09 PROCEDURE — 93000 ELECTROCARDIOGRAM COMPLETE: CPT | Mod: S$GLB,,, | Performed by: INTERNAL MEDICINE

## 2019-12-09 PROCEDURE — 3078F PR MOST RECENT DIASTOLIC BLOOD PRESSURE < 80 MM HG: ICD-10-PCS | Mod: CPTII,S$GLB,, | Performed by: INTERNAL MEDICINE

## 2019-12-09 PROCEDURE — 3075F SYST BP GE 130 - 139MM HG: CPT | Mod: CPTII,S$GLB,, | Performed by: INTERNAL MEDICINE

## 2019-12-09 PROCEDURE — 3075F PR MOST RECENT SYSTOLIC BLOOD PRESS GE 130-139MM HG: ICD-10-PCS | Mod: CPTII,S$GLB,, | Performed by: INTERNAL MEDICINE

## 2019-12-09 PROCEDURE — 3078F DIAST BP <80 MM HG: CPT | Mod: CPTII,S$GLB,, | Performed by: INTERNAL MEDICINE

## 2019-12-09 PROCEDURE — 1101F PT FALLS ASSESS-DOCD LE1/YR: CPT | Mod: CPTII,S$GLB,, | Performed by: INTERNAL MEDICINE

## 2019-12-09 PROCEDURE — 1126F PR PAIN SEVERITY QUANTIFIED, NO PAIN PRESENT: ICD-10-PCS | Mod: S$GLB,,, | Performed by: INTERNAL MEDICINE

## 2019-12-09 RX ORDER — METOPROLOL SUCCINATE 50 MG/1
50 TABLET, EXTENDED RELEASE ORAL DAILY
Qty: 30 TABLET | Refills: 11 | Status: SHIPPED | OUTPATIENT
Start: 2019-12-09 | End: 2020-02-25

## 2019-12-09 NOTE — PROGRESS NOTES
Subjective:   Patient ID:  Wilberto Hayes Jr. is a 79 y.o. male who presents for follow-up of Essential hypertension (6 month f/u)  Patient denies CP, angina or anginal equivalent. Bystolic costing too much. NMT and echo nml last year    Hypertension   This is a chronic problem. The current episode started more than 1 year ago. The problem has been gradually improving since onset. The problem is controlled. Pertinent negatives include no chest pain, palpitations or shortness of breath. Past treatments include beta blockers, angiotensin blockers and diuretics. The current treatment provides moderate improvement. There are no compliance problems.    Coronary Artery Disease   Presents for follow-up visit. Pertinent negatives include no chest pain, chest pressure, chest tightness, dizziness, leg swelling, muscle weakness, palpitations, shortness of breath or weight gain. Risk factors include hyperlipidemia. The symptoms have been stable. Compliance with diet is variable. Compliance with exercise is variable. Compliance with medications is good.   Hyperlipidemia   This is a chronic problem. The current episode started more than 1 year ago. The problem is controlled. Recent lipid tests were reviewed and are variable. Pertinent negatives include no chest pain or shortness of breath. He is currently on no antihyperlipidemic treatment. The current treatment provides mild improvement of lipids. Compliance problems include medication side effects.        Review of Systems   Constitution: Negative. Negative for weight gain.   HENT: Negative.    Eyes: Negative.    Cardiovascular: Negative.  Negative for chest pain, leg swelling and palpitations.   Respiratory: Negative.  Negative for chest tightness and shortness of breath.    Endocrine: Negative.    Hematologic/Lymphatic: Negative.    Skin: Negative.    Musculoskeletal: Negative for muscle weakness.   Gastrointestinal: Negative.    Genitourinary: Negative.    Neurological:  Negative.  Negative for dizziness.   Psychiatric/Behavioral: Negative.    Allergic/Immunologic: Negative.      Family History   Problem Relation Age of Onset    Blindness Paternal Grandfather     Glaucoma Paternal Grandfather     Alcohol abuse Paternal Grandfather     Kidney disease Paternal Grandfather     Cancer Mother         brain    COPD Mother     Alcohol abuse Mother     Hypertension Mother     Cancer Father         lung, breast with left mastectomy    COPD Father     Alcohol abuse Father     Hypertension Father     Kidney disease Maternal Grandmother     Alcohol abuse Maternal Grandfather     Kidney disease Maternal Grandfather     Cancer Paternal Grandmother         Gyn     Kidney disease Paternal Grandmother     Retinal detachment Neg Hx     Macular degeneration Neg Hx     Diabetes Neg Hx     Heart disease Neg Hx     Stroke Neg Hx     Mental illness Neg Hx     Mental retardation Neg Hx      Past Medical History:   Diagnosis Date    ACE-inhibitor cough     Acute coronary syndrome     Angina pectoris     Cataract     Coronary artery disease     Degenerative disc disease, lumbar     Degenerative disc disease, lumbar     Glaucoma     Hyperlipidemia     cholesterol    Hypertension     Kidney stone     last in 2001    Myocardial infarction     Obesity     Peripheral vascular disease     Polyneuropathy     Sleep apnea     Tobacco dependence     resolved quit 1980    Trouble in sleeping      Social History     Socioeconomic History    Marital status:      Spouse name: Not on file    Number of children: Not on file    Years of education: Not on file    Highest education level: Not on file   Occupational History    Not on file   Social Needs    Financial resource strain: Not hard at all    Food insecurity:     Worry: Never true     Inability: Never true    Transportation needs:     Medical: No     Non-medical: No   Tobacco Use    Smoking status: Former Smoker      Packs/day: 1.00     Years: 10.00     Pack years: 10.00     Last attempt to quit: 1980     Years since quittin.7    Smokeless tobacco: Never Used   Substance and Sexual Activity    Alcohol use: Yes     Alcohol/week: 7.0 standard drinks     Types: 7 Glasses of wine per week     Frequency: 4 or more times a week     Drinks per session: 1 or 2     Binge frequency: Never     Comment: nightly wine    Drug use: No    Sexual activity: Yes     Partners: Female     Birth control/protection: Partner-Vasectomy   Lifestyle    Physical activity:     Days per week: 2 days     Minutes per session: 60 min    Stress: Not at all   Relationships    Social connections:     Talks on phone: More than three times a week     Gets together: Twice a week     Attends Yazidism service: Not on file     Active member of club or organization: Yes     Attends meetings of clubs or organizations: More than 4 times per year     Relationship status:    Other Topics Concern    Not on file   Social History Narrative    . Lives with wife. They had 4 children. Retired  at [employer] and then [employer] work. Still drives. Does have a Living Will, signed at time of CABG ().      Current Outpatient Medications on File Prior to Visit   Medication Sig Dispense Refill    aspirin 81 mg Tab Take 1 tablet by mouth Daily.      cyanocobalamin (VITAMIN B-12) 100 MCG tablet Take 1 tablet by mouth every other day.       hydroCHLOROthiazide (HYDRODIURIL) 25 MG tablet TAKE 1/2 TABLET ONE TIME DAILY 45 tablet 3    latanoprost 0.005 % ophthalmic solution PLACE 1 DROP INTO BOTH EYES EVERY EVENING. 2.5 mL 10    losartan (COZAAR) 50 MG tablet Take 1 tablet (50 mg total) by mouth 2 (two) times daily. 180 tablet 3    LUBRICANT EYE DROPS, GLYC-PG, 1-0.3 % Drop       multivit-min/ferrous fumarate (MULTI VITAMIN ORAL)       nebivolol (BYSTOLIC) 5 MG Tab TAKE 1 TABLET (5 MG TOTAL) BY MOUTH ONCE DAILY. 30 tablet 11     nitroGLYCERIN (NITROSTAT) 0.4 MG SL tablet Place 1 tablet (0.4 mg total) under the tongue every 5 (five) minutes as needed for Chest pain (if no relief seek er trmt). 25 tablet 3    OCEAN NASAL 0.65 % nasal spray       omega-3 fatty acids-vitamin E (FISH OIL) 1,000 mg Cap Take 1 capsule by mouth Daily.      saw palmetto 500 MG capsule Take by mouth 2 (two) times daily.       zinc gluconate 50 mg tablet Take 50 mg by mouth once daily.      doxycycline (VIBRAMYCIN) 100 MG Cap Take 1 capsule (100 mg total) by mouth every 12 (twelve) hours. (Patient not taking: Reported on 11/26/2019) 20 capsule 0    ketorolac 0.5% (ACULAR) 0.5 % Drop Put one eyedrop in both eye four times a day. Start first drop morning of laser and stop after five days. (Patient not taking: Reported on 11/26/2019) 1 Bottle 0    lifitegrast (XIIDRA) 5 % Dpet Place 1 drop into both eyes 2 (two) times daily. (Patient not taking: Reported on 11/26/2019) 60 each 12     No current facility-administered medications on file prior to visit.      Review of patient's allergies indicates:   Allergen Reactions    Niacin preparations Other (See Comments)     myalgia    Statins-hmg-coa reductase inhibitors      Muscle weakness    Codeine Other (See Comments) and Swelling     unknown    Fosinopril Other (See Comments)     cough    Pravastatin Other (See Comments)      Muscle pain    Simvastatin Other (See Comments)     Muscle pain    Sulfa (sulfonamide antibiotics) Rash and Hives       Objective:     Physical Exam   Constitutional: He is oriented to person, place, and time. He appears well-developed and well-nourished.   HENT:   Head: Normocephalic and atraumatic.   Eyes: Pupils are equal, round, and reactive to light. Conjunctivae are normal.   Neck: Normal range of motion. Neck supple.   Cardiovascular: Normal rate, regular rhythm, normal heart sounds and intact distal pulses.   Pulmonary/Chest: Effort normal and breath sounds normal.   Abdominal:  Soft. Bowel sounds are normal.   Neurological: He is alert and oriented to person, place, and time.   Skin: Skin is warm and dry.   Psychiatric: He has a normal mood and affect.   Nursing note and vitals reviewed.      Assessment:     1. Essential hypertension    2. Pure hypercholesterolemia    3. Coronary artery disease involving native coronary artery of native heart without angina pectoris    4. Calcification of aorta    5. CHELO on CPAP        Plan:     Essential hypertension    Pure hypercholesterolemia    Coronary artery disease involving native coronary artery of native heart without angina pectoris    Calcification of aorta    CHELO on CPAP      continue  losartan, hctz , b blocker -htn  Continue asa- cad  Change bystolic to metoprolol-cost  Check lipids -consider praluent

## 2019-12-11 RX ORDER — LOSARTAN POTASSIUM 50 MG/1
TABLET ORAL
Qty: 180 TABLET | Refills: 3 | Status: SHIPPED | OUTPATIENT
Start: 2019-12-11 | End: 2020-12-02 | Stop reason: SDUPTHER

## 2020-01-03 ENCOUNTER — PATIENT MESSAGE (OUTPATIENT)
Dept: OPHTHALMOLOGY | Facility: CLINIC | Age: 80
End: 2020-01-03

## 2020-01-08 ENCOUNTER — OUTSIDE PLACE OF SERVICE (OUTPATIENT)
Dept: ADMINISTRATIVE | Facility: OTHER | Age: 80
End: 2020-01-08
Payer: MEDICARE

## 2020-01-08 PROCEDURE — 65855 TRABECULOPLASTY LASER SURG: CPT | Mod: 50,,, | Performed by: OPHTHALMOLOGY

## 2020-01-08 PROCEDURE — 65855 PR TRABECULOPLASTY LASER SURGERY: ICD-10-PCS | Mod: 50,,, | Performed by: OPHTHALMOLOGY

## 2020-01-09 ENCOUNTER — PATIENT MESSAGE (OUTPATIENT)
Dept: FAMILY MEDICINE | Facility: CLINIC | Age: 80
End: 2020-01-09

## 2020-01-20 ENCOUNTER — LAB VISIT (OUTPATIENT)
Dept: LAB | Facility: HOSPITAL | Age: 80
End: 2020-01-20
Attending: FAMILY MEDICINE
Payer: MEDICARE

## 2020-01-20 ENCOUNTER — PATIENT MESSAGE (OUTPATIENT)
Dept: OPHTHALMOLOGY | Facility: CLINIC | Age: 80
End: 2020-01-20

## 2020-01-20 DIAGNOSIS — E53.8 B12 DEFICIENCY: ICD-10-CM

## 2020-01-20 DIAGNOSIS — E78.00 PURE HYPERCHOLESTEROLEMIA: ICD-10-CM

## 2020-01-20 DIAGNOSIS — I10 ESSENTIAL HYPERTENSION: ICD-10-CM

## 2020-01-20 LAB
ALBUMIN SERPL BCP-MCNC: 4.2 G/DL (ref 3.5–5.2)
ALP SERPL-CCNC: 74 U/L (ref 55–135)
ALT SERPL W/O P-5'-P-CCNC: 10 U/L (ref 10–44)
ANION GAP SERPL CALC-SCNC: 9 MMOL/L (ref 8–16)
AST SERPL-CCNC: 18 U/L (ref 10–40)
BILIRUB SERPL-MCNC: 0.7 MG/DL (ref 0.1–1)
BUN SERPL-MCNC: 15 MG/DL (ref 8–23)
CALCIUM SERPL-MCNC: 10 MG/DL (ref 8.7–10.5)
CHLORIDE SERPL-SCNC: 107 MMOL/L (ref 95–110)
CHOLEST SERPL-MCNC: 171 MG/DL (ref 120–199)
CHOLEST/HDLC SERPL: 4.1 {RATIO} (ref 2–5)
CO2 SERPL-SCNC: 29 MMOL/L (ref 23–29)
CREAT SERPL-MCNC: 1.3 MG/DL (ref 0.5–1.4)
EST. GFR  (AFRICAN AMERICAN): >60 ML/MIN/1.73 M^2
EST. GFR  (NON AFRICAN AMERICAN): 52.3 ML/MIN/1.73 M^2
GLUCOSE SERPL-MCNC: 93 MG/DL (ref 70–110)
HDLC SERPL-MCNC: 42 MG/DL (ref 40–75)
HDLC SERPL: 24.6 % (ref 20–50)
LDLC SERPL CALC-MCNC: 105 MG/DL (ref 63–159)
NONHDLC SERPL-MCNC: 129 MG/DL
POTASSIUM SERPL-SCNC: 4.4 MMOL/L (ref 3.5–5.1)
PROT SERPL-MCNC: 7.4 G/DL (ref 6–8.4)
SODIUM SERPL-SCNC: 145 MMOL/L (ref 136–145)
TRIGL SERPL-MCNC: 120 MG/DL (ref 30–150)
TSH SERPL DL<=0.005 MIU/L-ACNC: 3.96 UIU/ML (ref 0.4–4)

## 2020-01-20 PROCEDURE — 82607 VITAMIN B-12: CPT

## 2020-01-20 PROCEDURE — 36415 COLL VENOUS BLD VENIPUNCTURE: CPT | Mod: PO

## 2020-01-20 PROCEDURE — 80053 COMPREHEN METABOLIC PANEL: CPT

## 2020-01-20 PROCEDURE — 84443 ASSAY THYROID STIM HORMONE: CPT

## 2020-01-20 PROCEDURE — 80061 LIPID PANEL: CPT

## 2020-01-20 RX ORDER — LATANOPROST 50 UG/ML
1 SOLUTION/ DROPS OPHTHALMIC NIGHTLY
Qty: 2.5 ML | Refills: 10 | Status: SHIPPED | OUTPATIENT
Start: 2020-01-20 | End: 2020-12-29

## 2020-01-21 LAB — VIT B12 SERPL-MCNC: 586 PG/ML (ref 210–950)

## 2020-01-24 ENCOUNTER — OFFICE VISIT (OUTPATIENT)
Dept: FAMILY MEDICINE | Facility: CLINIC | Age: 80
End: 2020-01-24
Payer: MEDICARE

## 2020-01-24 VITALS
OXYGEN SATURATION: 96 % | SYSTOLIC BLOOD PRESSURE: 130 MMHG | TEMPERATURE: 98 F | DIASTOLIC BLOOD PRESSURE: 72 MMHG | BODY MASS INDEX: 31.45 KG/M2 | HEIGHT: 71 IN | WEIGHT: 224.63 LBS | HEART RATE: 71 BPM

## 2020-01-24 DIAGNOSIS — E53.8 B12 DEFICIENCY: ICD-10-CM

## 2020-01-24 DIAGNOSIS — I10 ESSENTIAL HYPERTENSION: ICD-10-CM

## 2020-01-24 DIAGNOSIS — Z00.00 ANNUAL PHYSICAL EXAM: Primary | ICD-10-CM

## 2020-01-24 DIAGNOSIS — I70.219 ATHEROSCLEROSIS OF NATIVE ARTERY OF EXTREMITY WITH INTERMITTENT CLAUDICATION, UNSPECIFIED EXTREMITY: ICD-10-CM

## 2020-01-24 DIAGNOSIS — E66.9 OBESITY (BMI 30-39.9): ICD-10-CM

## 2020-01-24 DIAGNOSIS — H40.1130 PRIMARY OPEN ANGLE GLAUCOMA OF BOTH EYES, UNSPECIFIED GLAUCOMA STAGE: ICD-10-CM

## 2020-01-24 DIAGNOSIS — E78.2 MIXED HYPERLIPIDEMIA: ICD-10-CM

## 2020-01-24 DIAGNOSIS — I70.0 CALCIFICATION OF AORTA: ICD-10-CM

## 2020-01-24 DIAGNOSIS — G47.33 OSA ON CPAP: ICD-10-CM

## 2020-01-24 PROCEDURE — 99999 PR PBB SHADOW E&M-EST. PATIENT-LVL III: CPT | Mod: PBBFAC,,, | Performed by: FAMILY MEDICINE

## 2020-01-24 PROCEDURE — 3075F SYST BP GE 130 - 139MM HG: CPT | Mod: CPTII,S$GLB,, | Performed by: FAMILY MEDICINE

## 2020-01-24 PROCEDURE — 99999 PR PBB SHADOW E&M-EST. PATIENT-LVL III: ICD-10-PCS | Mod: PBBFAC,,, | Performed by: FAMILY MEDICINE

## 2020-01-24 PROCEDURE — 99214 OFFICE O/P EST MOD 30 MIN: CPT | Mod: S$GLB,,, | Performed by: FAMILY MEDICINE

## 2020-01-24 PROCEDURE — 3078F DIAST BP <80 MM HG: CPT | Mod: CPTII,S$GLB,, | Performed by: FAMILY MEDICINE

## 2020-01-24 PROCEDURE — 99214 PR OFFICE/OUTPT VISIT, EST, LEVL IV, 30-39 MIN: ICD-10-PCS | Mod: S$GLB,,, | Performed by: FAMILY MEDICINE

## 2020-01-24 PROCEDURE — 3075F PR MOST RECENT SYSTOLIC BLOOD PRESS GE 130-139MM HG: ICD-10-PCS | Mod: CPTII,S$GLB,, | Performed by: FAMILY MEDICINE

## 2020-01-24 PROCEDURE — 3078F PR MOST RECENT DIASTOLIC BLOOD PRESSURE < 80 MM HG: ICD-10-PCS | Mod: CPTII,S$GLB,, | Performed by: FAMILY MEDICINE

## 2020-01-24 NOTE — PROGRESS NOTES
Subjective:       Patient ID: Wilberto Hayes Jr. is a 80 y.o. male.    Chief Complaint: Annual Exam    HPI   Annual Exam  79yo male presents today for annual examination. He is reporting stable vision and hearing. He recently underwent surgery for glaucoma with some improvement noted. He denies any known hearing loss but admits to tinnitus (left greater than right). Diet is described as generally healthy. He primarily eats home cooked meals and limits eating out. He is not currently exercising. He maintains activity by performing yard work. There is no exertional intolerance. Sleep has been stable. He estimates averaging 7-8 hours nightly. There is no report of anxiety or depression. No recent ER visits or hospitalizations.     Past Medical History:   Diagnosis Date    ACE-inhibitor cough     Acute coronary syndrome     Angina pectoris     Cataract     Coronary artery disease     Degenerative disc disease, lumbar     Degenerative disc disease, lumbar     Glaucoma     Hyperlipidemia     cholesterol    Hypertension     Kidney stone     last in 2001    Myocardial infarction     Obesity     Peripheral vascular disease     Polyneuropathy     Sleep apnea     Tobacco dependence     resolved quit 1980    Trouble in sleeping      Past Surgical History:   Procedure Laterality Date    ABCESS DRAINAGE Left 2003    inguinal abcess/cellulitis - MRSA- 60 days of IVAB and repacking w/ Home health     CORONARY ARTERY BYPASS GRAFT  2002    Two-vessel    cystoscopy and stone removal via scope  2003    several between age 23 and 53    EYE SURGERY      lthotripsy  1985    SLT - OU - BOTH EYES  2009    TONSILLECTOMY      at age 4    VASECTOMY  1975     Family History   Problem Relation Age of Onset    Blindness Paternal Grandfather     Glaucoma Paternal Grandfather     Alcohol abuse Paternal Grandfather     Kidney disease Paternal Grandfather     Cancer Mother         brain    COPD Mother     Alcohol  "abuse Mother     Hypertension Mother     Cancer Father         lung, breast with left mastectomy    COPD Father     Alcohol abuse Father     Hypertension Father     Kidney disease Maternal Grandmother     Alcohol abuse Maternal Grandfather     Kidney disease Maternal Grandfather     Cancer Paternal Grandmother         Gyn     Kidney disease Paternal Grandmother     Retinal detachment Neg Hx     Macular degeneration Neg Hx     Diabetes Neg Hx     Heart disease Neg Hx     Stroke Neg Hx     Mental illness Neg Hx     Mental retardation Neg Hx      Review of Systems   Constitutional: Negative for activity change and unexpected weight change.   HENT: Negative for congestion, hearing loss, rhinorrhea and trouble swallowing.    Eyes: Positive for visual disturbance. Negative for discharge.   Respiratory: Negative for cough, chest tightness and wheezing.    Cardiovascular: Negative for chest pain and palpitations.   Gastrointestinal: Negative for blood in stool, constipation, diarrhea and vomiting.   Endocrine: Negative for polydipsia and polyuria.   Genitourinary: Negative for difficulty urinating, hematuria and urgency.   Musculoskeletal: Positive for arthralgias. Negative for joint swelling and neck pain.   Skin: Negative for rash.   Neurological: Positive for weakness. Negative for headaches.   Psychiatric/Behavioral: Negative for confusion, dysphoric mood and sleep disturbance. The patient is not nervous/anxious.        Objective:   /72 (BP Location: Right arm, Patient Position: Sitting, BP Method: Large (Manual))   Pulse 71   Temp 97.6 °F (36.4 °C) (Tympanic)   Ht 5' 11" (1.803 m)   Wt 101.9 kg (224 lb 10.4 oz)   SpO2 96%   BMI 31.33 kg/m²   Physical Exam   Constitutional: He is oriented to person, place, and time. He appears well-developed and well-nourished. No distress.   Obese, non-toxic   HENT:   Head: Normocephalic and atraumatic.   Right Ear: Tympanic membrane, external ear and ear " canal normal.   Left Ear: Tympanic membrane, external ear and ear canal normal.   Nose: Nose normal.   Mouth/Throat: Oropharynx is clear and moist.   Eyes: Pupils are equal, round, and reactive to light. Conjunctivae and EOM are normal.   Neck: Normal range of motion. Neck supple.   Cardiovascular: Normal rate, regular rhythm and normal heart sounds.   Pulmonary/Chest: Effort normal and breath sounds normal.   Abdominal: Soft. Bowel sounds are normal.   Musculoskeletal: He exhibits no edema.   Neurological: He is alert and oriented to person, place, and time.   Skin: Skin is warm and dry. No rash noted. He is not diaphoretic.   Psychiatric: He has a normal mood and affect. His behavior is normal.       Assessment:       1. Annual physical exam    2. Essential hypertension    3. Mixed hyperlipidemia    4. Calcification of aorta    5. Atherosclerosis of native artery of extremity with intermittent claudication, unspecified extremity    6. Primary open angle glaucoma of both eyes, unspecified glaucoma stage    7. CHELO on CPAP    8. B12 deficiency    9. Obesity (BMI 30-39.9)        Plan:      Annual physical exam  General health screening recommendations were reviewed with the patient in office today. Encouraged efforts at healthy eating and maintaining physical activity. Anticipatory guidance has been provided with regard to age-informational handouts have been given.   -     Comprehensive metabolic panel; Future; Expected date: 01/24/2020    Essential hypertension  Stable. Continue with current treatment. Target BP goal remains<140/90. Heart healthy diet is advised. Intermittent home monitoring has been discussed.  -     Comprehensive metabolic panel; Future; Expected date: 01/24/2020    Mixed hyperlipidemia  -     Comprehensive metabolic panel; Future; Expected date: 01/24/2020    Calcification of aorta  BP and lipid control remain advised.     Atherosclerosis of native artery of extremity with intermittent  claudication, unspecified extremity  As above.     Primary open angle glaucoma of both eyes, unspecified glaucoma stage  As per Ophthalmology.    CHELO on CPAP  Compliance with CPAP use and further follow-up as per Pulmonology is advised.     B12 deficiency  Discussed supplementation.    Obesity (BMI 30-39.9)  Weight loss efforts are encouraged through diet and lifestyle measures.

## 2020-01-24 NOTE — PATIENT INSTRUCTIONS
Prevention Guidelines, Men Ages 65 and Older  Screening tests and vaccines are an important part of managing your health. Health counseling is essential, too. Below are guidelines for these, for men ages 65 and older. Talk with your healthcare provider to make sure youre up-to-date on what you need.  Screening Who needs it How often   Abdominal aortic aneurysm Men ages 65 to 75 who have ever smoked 1 ultrasound   Alcohol misuse All men in this age group At routine exams   Blood pressure All men in this age group Every 2 years if your blood pressure is less than 120/80 mm Hg; yearly if your systolic blood pressure is 120 to 139 mm Hg, or your diastolic blood pressure reading is 80 to 89 mm Hg   Colorectal cancer All men in this age group Flexible sigmoidoscopy every 5 years, or colonoscopy every 10 years, or double-contrast barium enema every 5 years; yearly fecal occult blood test or fecal immunochemical test; or a stool DNA test as often as your healthcare provider advises; talk with your healthcare provider about which tests are best for you and when you no longer need colonoscopies (generally after age 75)   Depression All men in this age group At routine exams   Type 2 diabetes or prediabetes All adults beginning at age 45 and adults without symptoms at any age who are overweight or obese and have 1 or more other risk factors for diabetes At least every 3 years (yearly if your blood sugar has already begun to rise)   Hepatitis C Men at increased risk for infection - talk with your healthcare provider At routine exams   High cholesterol or triglycerides All men in this age group At least every 5 years   HIV Men at increased risk for infection - talk with your healthcare provider At routine exams   Lung cancer Adults ages 55 to 80 who have smoked Yearly screening in smokers with 30 pack-year history of smoking or who quit within 15 years   Obesity All men in this age group At routine exams   Prostate cancer All  men in this age group, talk to healthcare provider about risks and benefits of digital rectal exam (CONRADO) and prostate-specific antigen (PSA) screening1 At routine exams   Syphilis Men at increased risk for infection - talk with your healthcare provider At routine exams   Tuberculosis Men at increased risk for infection - talk with your healthcare provider Ask your healthcare provider   Vision All men in this age group Every 1 to 2 years; if you have a chronic health condition, ask your healthcare provider if you needs exams more often   Vaccine Who needs it How often   Chickenpox (varicella) All men in this age group who have no record of this infection or vaccine 2 doses; second dose should be given at least 4 weeks after the first dose   Hepatitis A Men at increased risk for infection - talk with your healthcare provider 2 doses given at least 6 months apart   Hepatitis B Men at increased risk for infection - talk with your healthcare provider 3 doses over 6 months; second dose should be given 1 month after the first dose; the third dose should be given at least 2 months after the second dose and at least 4 months after the first dose   Haemophilus influenzae Type B (HIB) Men at increased risk for infection - talk with your healthcare provider 1 to 3 doses   Influenza (flu) All men in this age group  Once a year   Meningococcal Men at increased risk for infection - talk with your healthcare provider 1 or more doses   Pneumococcal conjugate vaccine (PCV13) and pneumococcal polysaccharide vaccine (PPSV23) All men in this age group 1 dose of each vaccine   Tetanus/diphtheria/  pertussis (Td/Tdap) booster All men in this age group Td every 10 years, or Tdap if you will have contact with a child younger than 12 months old   Zoster All men in this age group 1 dose   Counseling Who needs it How often   Diet and exercise Men who are overweight or obese When diagnosed, and then at routine exams   Fall prevention (exercise,  vitamin D supplements) All men in this age group At routine exams   Sexually transmitted infection Men at increased risk for infection - talk with your healthcare provider At routine exams   Use of daily aspirin Men ages 45 to 79 at risk for cardiovascular health problems At routine exams   Use of tobacco and the health effects it can cause All men in this age group Every visit   [address] [place] [address] [facility]   Date Last Reviewed: 2/1/2017  © 3313-6850 [employer] [address] [place] [address], [place] [address]. All rights reserved. This information is not intended as a substitute for professional medical care. Always follow your healthcare professional's instructions.

## 2020-01-27 RX ORDER — NITROGLYCERIN 0.4 MG/1
0.4 TABLET SUBLINGUAL EVERY 5 MIN PRN
Qty: 25 TABLET | Refills: 3 | Status: SHIPPED | OUTPATIENT
Start: 2020-01-27 | End: 2021-03-01 | Stop reason: SDUPTHER

## 2020-01-28 ENCOUNTER — OFFICE VISIT (OUTPATIENT)
Dept: FAMILY MEDICINE | Facility: CLINIC | Age: 80
End: 2020-01-28
Payer: MEDICARE

## 2020-01-28 ENCOUNTER — HOSPITAL ENCOUNTER (OUTPATIENT)
Dept: RADIOLOGY | Facility: HOSPITAL | Age: 80
Discharge: HOME | End: 2020-01-28
Attending: FAMILY MEDICINE
Payer: MEDICARE

## 2020-01-28 VITALS
RESPIRATION RATE: 16 BRPM | SYSTOLIC BLOOD PRESSURE: 128 MMHG | BODY MASS INDEX: 31.25 KG/M2 | HEART RATE: 72 BPM | WEIGHT: 223.19 LBS | HEIGHT: 71 IN | OXYGEN SATURATION: 95 % | TEMPERATURE: 98 F | DIASTOLIC BLOOD PRESSURE: 70 MMHG

## 2020-01-28 DIAGNOSIS — R10.9 FLANK PAIN: ICD-10-CM

## 2020-01-28 DIAGNOSIS — I70.219 ATHEROSCLEROSIS OF NATIVE ARTERY OF EXTREMITY WITH INTERMITTENT CLAUDICATION, UNSPECIFIED EXTREMITY: ICD-10-CM

## 2020-01-28 DIAGNOSIS — Z87.442 HISTORY OF NEPHROLITHIASIS: ICD-10-CM

## 2020-01-28 DIAGNOSIS — I70.0 CALCIFICATION OF AORTA: ICD-10-CM

## 2020-01-28 DIAGNOSIS — R35.0 FREQUENT URINATION: ICD-10-CM

## 2020-01-28 DIAGNOSIS — N39.0 URINARY TRACT INFECTION WITHOUT HEMATURIA, SITE UNSPECIFIED: Primary | ICD-10-CM

## 2020-01-28 DIAGNOSIS — E66.9 OBESITY (BMI 30-39.9): ICD-10-CM

## 2020-01-28 LAB
BACTERIA #/AREA URNS AUTO: ABNORMAL /HPF
BILIRUB SERPL-MCNC: NORMAL MG/DL
BILIRUB UR QL STRIP: NEGATIVE
BLOOD URINE, POC: 250
CLARITY UR REFRACT.AUTO: CLEAR
COLOR UR AUTO: YELLOW
COLOR, POC UA: YELLOW
GLUCOSE UR QL STRIP: NEGATIVE
GLUCOSE UR QL STRIP: NORMAL
HGB UR QL STRIP: ABNORMAL
KETONES UR QL STRIP: NEGATIVE
KETONES UR QL STRIP: NORMAL
LEUKOCYTE ESTERASE UR QL STRIP: NEGATIVE
LEUKOCYTE ESTERASE URINE, POC: NORMAL
MICROSCOPIC COMMENT: ABNORMAL
NITRITE UR QL STRIP: NEGATIVE
NITRITE, POC UA: NORMAL
PH UR STRIP: 7 [PH] (ref 5–8)
PH, POC UA: 7
PROT UR QL STRIP: NEGATIVE
PROTEIN, POC: NORMAL
RBC #/AREA URNS AUTO: >100 /HPF (ref 0–4)
SP GR UR STRIP: 1.01 (ref 1–1.03)
SPECIFIC GRAVITY, POC UA: 1
URN SPEC COLLECT METH UR: ABNORMAL
UROBILINOGEN, POC UA: NORMAL

## 2020-01-28 PROCEDURE — 99214 OFFICE O/P EST MOD 30 MIN: CPT | Mod: 25,S$GLB,, | Performed by: FAMILY MEDICINE

## 2020-01-28 PROCEDURE — 1126F PR PAIN SEVERITY QUANTIFIED, NO PAIN PRESENT: ICD-10-PCS | Mod: S$GLB,,, | Performed by: FAMILY MEDICINE

## 2020-01-28 PROCEDURE — 3078F DIAST BP <80 MM HG: CPT | Mod: CPTII,S$GLB,, | Performed by: FAMILY MEDICINE

## 2020-01-28 PROCEDURE — 81001 URINALYSIS AUTO W/SCOPE: CPT

## 2020-01-28 PROCEDURE — 1159F PR MEDICATION LIST DOCUMENTED IN MEDICAL RECORD: ICD-10-PCS | Mod: S$GLB,,, | Performed by: FAMILY MEDICINE

## 2020-01-28 PROCEDURE — 3074F PR MOST RECENT SYSTOLIC BLOOD PRESSURE < 130 MM HG: ICD-10-PCS | Mod: CPTII,S$GLB,, | Performed by: FAMILY MEDICINE

## 2020-01-28 PROCEDURE — 81002 URINALYSIS NONAUTO W/O SCOPE: CPT | Mod: S$GLB,,, | Performed by: FAMILY MEDICINE

## 2020-01-28 PROCEDURE — 99999 PR PBB SHADOW E&M-EST. PATIENT-LVL IV: CPT | Mod: PBBFAC,,, | Performed by: FAMILY MEDICINE

## 2020-01-28 PROCEDURE — 1126F AMNT PAIN NOTED NONE PRSNT: CPT | Mod: S$GLB,,, | Performed by: FAMILY MEDICINE

## 2020-01-28 PROCEDURE — 74018 RADEX ABDOMEN 1 VIEW: CPT | Mod: 26,,, | Performed by: RADIOLOGY

## 2020-01-28 PROCEDURE — 3074F SYST BP LT 130 MM HG: CPT | Mod: CPTII,S$GLB,, | Performed by: FAMILY MEDICINE

## 2020-01-28 PROCEDURE — 3078F PR MOST RECENT DIASTOLIC BLOOD PRESSURE < 80 MM HG: ICD-10-PCS | Mod: CPTII,S$GLB,, | Performed by: FAMILY MEDICINE

## 2020-01-28 PROCEDURE — 1101F PR PT FALLS ASSESS DOC 0-1 FALLS W/OUT INJ PAST YR: ICD-10-PCS | Mod: CPTII,S$GLB,, | Performed by: FAMILY MEDICINE

## 2020-01-28 PROCEDURE — 81002 POCT URINE DIPSTICK WITHOUT MICROSCOPE: ICD-10-PCS | Mod: S$GLB,,, | Performed by: FAMILY MEDICINE

## 2020-01-28 PROCEDURE — 99214 PR OFFICE/OUTPT VISIT, EST, LEVL IV, 30-39 MIN: ICD-10-PCS | Mod: 25,S$GLB,, | Performed by: FAMILY MEDICINE

## 2020-01-28 PROCEDURE — 74018 XR ABDOMEN AP 1 VIEW: ICD-10-PCS | Mod: 26,,, | Performed by: RADIOLOGY

## 2020-01-28 PROCEDURE — 74018 RADEX ABDOMEN 1 VIEW: CPT | Mod: TC,PO

## 2020-01-28 PROCEDURE — 87086 URINE CULTURE/COLONY COUNT: CPT

## 2020-01-28 PROCEDURE — 1159F MED LIST DOCD IN RCRD: CPT | Mod: S$GLB,,, | Performed by: FAMILY MEDICINE

## 2020-01-28 PROCEDURE — 99999 PR PBB SHADOW E&M-EST. PATIENT-LVL IV: ICD-10-PCS | Mod: PBBFAC,,, | Performed by: FAMILY MEDICINE

## 2020-01-28 PROCEDURE — 1101F PT FALLS ASSESS-DOCD LE1/YR: CPT | Mod: CPTII,S$GLB,, | Performed by: FAMILY MEDICINE

## 2020-01-28 RX ORDER — CIPROFLOXACIN 500 MG/1
500 TABLET ORAL EVERY 12 HOURS
Qty: 14 TABLET | Refills: 0 | Status: SHIPPED | OUTPATIENT
Start: 2020-01-28 | End: 2020-04-28 | Stop reason: ALTCHOICE

## 2020-01-28 NOTE — PROGRESS NOTES
"Subjective:       Patient ID: Wilberto Hayes Jr. is a 80 y.o. male.    Chief Complaint: Painful urination    Dysuria    This is a new problem. The current episode started in the past 7 days. The problem occurs intermittently. The problem has been unchanged. The quality of the pain is described as aching and burning. The pain is at a severity of 4/10. The pain is mild. There has been no fever. Associated symptoms include frequency. Pertinent negatives include no behavior changes, chills, discharge, flank pain, hematuria, hesitancy, nausea, sweats, urgency, vomiting, weight loss, constipation, rash or withholding. He has tried antibiotics, NSAIDs, home medications, increased fluids and sitz baths for the symptoms. The treatment provided mild relief. His past medical history is significant for catheterization, hypertension and kidney stones. There is no history of diabetes insipidus, diabetes mellitus, genitourinary reflux, recurrent UTIs, a single kidney, STD, urinary stasis or a urological procedure.   Patient notes having acute onset or urgency and burning with voiding after his office visit last week (1/24/2020). In the past he has taken AZO for symptom relief with resolution- he has tried this with no significant change. He had some left over Doxycycline and has taken a few doses. There has been no improvement with use. He describes "a pressure, a burning and an aching" when voiding. Bowel habits have been stable. He notes having remote nephrolithiasis. Has had prostatitis in the past- current symptoms are unlike those in the past.     Review of Systems   Constitutional: Negative for chills, fatigue, fever and weight loss.   Eyes: Negative for visual disturbance.   Respiratory: Negative for cough and shortness of breath.    Cardiovascular: Negative for chest pain and palpitations.   Gastrointestinal: Negative for abdominal pain, constipation, nausea and vomiting.   Genitourinary: Positive for dysuria and " "frequency. Negative for flank pain, hematuria, hesitancy and urgency.   Musculoskeletal: Negative for arthralgias and myalgias.   Skin: Negative for rash.   Neurological: Negative for dizziness and weakness.   Psychiatric/Behavioral: Negative for dysphoric mood and sleep disturbance.       Objective:   /70   Pulse 72   Temp 97.9 °F (36.6 °C) (Temporal)   Resp 16   Ht 5' 11" (1.803 m)   Wt 101.2 kg (223 lb 3.5 oz)   SpO2 95%   BMI 31.13 kg/m²   Physical Exam   Constitutional: He is oriented to person, place, and time. He appears well-developed and well-nourished. No distress.   Obese, non-toxic   HENT:   Head: Normocephalic and atraumatic.   Right Ear: Tympanic membrane, external ear and ear canal normal.   Left Ear: Tympanic membrane, external ear and ear canal normal.   Nose: Nose normal.   Mouth/Throat: Oropharynx is clear and moist.   Eyes: Pupils are equal, round, and reactive to light. Conjunctivae and EOM are normal.   Neck: Normal range of motion. Neck supple.   Cardiovascular: Normal rate, regular rhythm and normal heart sounds.   Pulmonary/Chest: Effort normal and breath sounds normal.   Abdominal: Soft. Bowel sounds are normal.   Musculoskeletal: He exhibits no edema.   Neurological: He is alert and oriented to person, place, and time.   Skin: Skin is warm and dry. He is not diaphoretic.   Psychiatric: He has a normal mood and affect. His behavior is normal.       Assessment:       1. Urinary tract infection without hematuria, site unspecified    2. Frequent urination    3. Calcification of aorta    4. Atherosclerosis of native artery of extremity with intermittent claudication, unspecified extremity    5. Obesity (BMI 30-39.9)    6. History of nephrolithiasis        Plan:      Urinary tract infection without hematuria, site unspecified  -     ciprofloxacin HCl (CIPRO) 500 MG tablet; Take 1 tablet (500 mg total) by mouth every 12 (twelve) hours.  Dispense: 14 tablet; Refill: 0  -     CBC auto " differential; Future; Expected date: 01/28/2020  -     Basic metabolic panel; Future; Expected date: 01/28/2020    Frequent urination  -     POCT URINE DIPSTICK WITHOUT MICROSCOPE  -     Urine culture; Future; Expected date: 01/28/2020  -     Urinalysis; Future; Expected date: 01/28/2020    Calcification of aorta  BP and lipid control remain advised.    Atherosclerosis of native artery of extremity with intermittent claudication, unspecified extremity  As above.    Obesity (BMI 30-39.9)  Weight loss efforts remain encouraged.    History of nephrolithiasis  -     X-Ray Abdomen AP 1 View; Future; Expected date: 01/28/2020    Advised against use of antibiotics in the future. He is aware that ABX are prescribed for a specific duration and need to be completed unless otherwise advised per a physician. Recent use of Doxy and AZO may cause current testing to be inaccurate. We have reviewed w/s. He will report back through My Chart with any change in symptoms.

## 2020-01-29 ENCOUNTER — PATIENT MESSAGE (OUTPATIENT)
Dept: FAMILY MEDICINE | Facility: CLINIC | Age: 80
End: 2020-01-29

## 2020-01-29 ENCOUNTER — HOSPITAL ENCOUNTER (OUTPATIENT)
Dept: RADIOLOGY | Facility: HOSPITAL | Age: 80
Discharge: HOME | End: 2020-01-29
Attending: FAMILY MEDICINE
Payer: MEDICARE

## 2020-01-29 DIAGNOSIS — R10.9 FLANK PAIN: ICD-10-CM

## 2020-01-29 LAB — BACTERIA UR CULT: NO GROWTH

## 2020-01-29 PROCEDURE — 74176 CT ABD & PELVIS W/O CONTRAST: CPT | Mod: TC

## 2020-01-30 ENCOUNTER — PATIENT MESSAGE (OUTPATIENT)
Dept: FAMILY MEDICINE | Facility: CLINIC | Age: 80
End: 2020-01-30

## 2020-01-30 ENCOUNTER — TELEPHONE (OUTPATIENT)
Dept: FAMILY MEDICINE | Facility: CLINIC | Age: 80
End: 2020-01-30

## 2020-01-30 NOTE — TELEPHONE ENCOUNTER
----- Message from Elizabeth Kasper sent at 1/30/2020  7:44 AM CST -----  Contact: pt  Type:  Patient Returning Call    Who Called:pt  Who Left Message for Patient:nurse  Does the patient know what this is regarding?:test results  Would the patient rather a call back or a response via [facility]? Call back   Best Call Back Number:[phone]  Additional Information: #    Thank you

## 2020-01-30 NOTE — TELEPHONE ENCOUNTER
Pt stated no symptoms except some pressure, but no urgency to urinate. Pt wants to see urology but has appt to discuss results Monday. Pt scheduled appt himself to discuss. Pt is drinking plenty of water.

## 2020-01-30 NOTE — TELEPHONE ENCOUNTER
----- Message from Elizabeth Kasper sent at 1/30/2020  7:44 AM CST -----  Contact: pt  Type:  Patient Returning Call    Who Called:pt  Who Left Message for Patient:nurse  Does the patient know what this is regarding?:test results  Would the patient rather a call back or a response via [facility]? Call back   Best Call Back Number:[phone]  Additional Information: #    Thank you

## 2020-02-03 ENCOUNTER — OFFICE VISIT (OUTPATIENT)
Dept: FAMILY MEDICINE | Facility: CLINIC | Age: 80
End: 2020-02-03
Payer: MEDICARE

## 2020-02-03 VITALS
TEMPERATURE: 97 F | HEART RATE: 60 BPM | WEIGHT: 226.5 LBS | HEIGHT: 71 IN | OXYGEN SATURATION: 98 % | DIASTOLIC BLOOD PRESSURE: 70 MMHG | SYSTOLIC BLOOD PRESSURE: 130 MMHG | BODY MASS INDEX: 31.71 KG/M2 | RESPIRATION RATE: 16 BRPM

## 2020-02-03 DIAGNOSIS — R35.0 FREQUENT URINATION: ICD-10-CM

## 2020-02-03 DIAGNOSIS — I70.0 CALCIFICATION OF AORTA: ICD-10-CM

## 2020-02-03 DIAGNOSIS — I70.219 ATHEROSCLEROSIS OF NATIVE ARTERY OF EXTREMITY WITH INTERMITTENT CLAUDICATION, UNSPECIFIED EXTREMITY: ICD-10-CM

## 2020-02-03 DIAGNOSIS — K57.30 DIVERTICULOSIS OF COLON: ICD-10-CM

## 2020-02-03 DIAGNOSIS — N20.1 CALCULUS OF URETER: Primary | ICD-10-CM

## 2020-02-03 DIAGNOSIS — N39.0 URINARY TRACT INFECTION WITHOUT HEMATURIA, SITE UNSPECIFIED: ICD-10-CM

## 2020-02-03 DIAGNOSIS — K80.20 CHOLELITHIASIS WITHOUT CHOLECYSTITIS: ICD-10-CM

## 2020-02-03 DIAGNOSIS — E66.9 OBESITY (BMI 30-39.9): ICD-10-CM

## 2020-02-03 DIAGNOSIS — K76.89 HEPATIC CYST: ICD-10-CM

## 2020-02-03 PROCEDURE — 3075F SYST BP GE 130 - 139MM HG: CPT | Mod: CPTII,S$GLB,, | Performed by: FAMILY MEDICINE

## 2020-02-03 PROCEDURE — 1101F PT FALLS ASSESS-DOCD LE1/YR: CPT | Mod: CPTII,S$GLB,, | Performed by: FAMILY MEDICINE

## 2020-02-03 PROCEDURE — 99999 PR PBB SHADOW E&M-EST. PATIENT-LVL IV: CPT | Mod: PBBFAC,,, | Performed by: FAMILY MEDICINE

## 2020-02-03 PROCEDURE — 99999 PR PBB SHADOW E&M-EST. PATIENT-LVL IV: ICD-10-PCS | Mod: PBBFAC,,, | Performed by: FAMILY MEDICINE

## 2020-02-03 PROCEDURE — 3078F DIAST BP <80 MM HG: CPT | Mod: CPTII,S$GLB,, | Performed by: FAMILY MEDICINE

## 2020-02-03 PROCEDURE — 1159F PR MEDICATION LIST DOCUMENTED IN MEDICAL RECORD: ICD-10-PCS | Mod: S$GLB,,, | Performed by: FAMILY MEDICINE

## 2020-02-03 PROCEDURE — 1126F PR PAIN SEVERITY QUANTIFIED, NO PAIN PRESENT: ICD-10-PCS | Mod: S$GLB,,, | Performed by: FAMILY MEDICINE

## 2020-02-03 PROCEDURE — 3075F PR MOST RECENT SYSTOLIC BLOOD PRESS GE 130-139MM HG: ICD-10-PCS | Mod: CPTII,S$GLB,, | Performed by: FAMILY MEDICINE

## 2020-02-03 PROCEDURE — 3078F PR MOST RECENT DIASTOLIC BLOOD PRESSURE < 80 MM HG: ICD-10-PCS | Mod: CPTII,S$GLB,, | Performed by: FAMILY MEDICINE

## 2020-02-03 PROCEDURE — 1159F MED LIST DOCD IN RCRD: CPT | Mod: S$GLB,,, | Performed by: FAMILY MEDICINE

## 2020-02-03 PROCEDURE — 1101F PR PT FALLS ASSESS DOC 0-1 FALLS W/OUT INJ PAST YR: ICD-10-PCS | Mod: CPTII,S$GLB,, | Performed by: FAMILY MEDICINE

## 2020-02-03 PROCEDURE — 99214 PR OFFICE/OUTPT VISIT, EST, LEVL IV, 30-39 MIN: ICD-10-PCS | Mod: S$GLB,,, | Performed by: FAMILY MEDICINE

## 2020-02-03 PROCEDURE — 1126F AMNT PAIN NOTED NONE PRSNT: CPT | Mod: S$GLB,,, | Performed by: FAMILY MEDICINE

## 2020-02-03 PROCEDURE — 99214 OFFICE O/P EST MOD 30 MIN: CPT | Mod: S$GLB,,, | Performed by: FAMILY MEDICINE

## 2020-02-03 NOTE — PROGRESS NOTES
"Subjective:       Patient ID: Wilberto Hayes Jr. is a 80 y.o. male.    Chief Complaint: Follow-up    HPI   Follow-up  79yo male presents today for follow-up. He was seen last week with symptoms concerning for UTI. Imaging was obtained and found to be consistent with a 4mm non-obstructive calculus in the left ureterovesical junction. Imaging findings also demonstrated a simple 1.3cm right hepatic cyst, cholelithiasis without cholecystitis and diverticulosis without diverticulitis. Patient is completing a course of Cipro currently. He denies any dysuria, frequency, cloudy appearing urine, hematuria or malodorous urine. He is requesting referral orders to Dr. Arredondo with [facility]. He reports remote history of nephrolithiasis. No flank pain currently.     Review of Systems   Constitutional: Negative for activity change and unexpected weight change.   HENT: Negative for hearing loss, rhinorrhea and trouble swallowing.    Eyes: Negative for discharge and visual disturbance.   Respiratory: Negative for chest tightness and wheezing.    Cardiovascular: Negative for chest pain and palpitations.   Gastrointestinal: Negative for blood in stool, constipation, diarrhea and vomiting.   Endocrine: Positive for polyuria. Negative for polydipsia.   Genitourinary: Positive for urgency. Negative for difficulty urinating and hematuria.   Musculoskeletal: Positive for arthralgias. Negative for joint swelling and neck pain.   Neurological: Negative for weakness and headaches.   Psychiatric/Behavioral: Negative for confusion and dysphoric mood.       Objective:   /70   Pulse 60   Temp 96.9 °F (36.1 °C) (Temporal)   Resp 16   Ht 5' 11" (1.803 m)   Wt 102.7 kg (226 lb 8.4 oz)   SpO2 98%   BMI 31.59 kg/m²   Physical Exam   Constitutional: He is oriented to person, place, and time. He appears well-developed and well-nourished. No distress.   Obese, non-toxic   HENT:   Head: Normocephalic and atraumatic.   Right Ear: Tympanic " membrane, external ear and ear canal normal.   Left Ear: Tympanic membrane, external ear and ear canal normal.   Nose: Nose normal.   Mouth/Throat: Oropharynx is clear and moist.   Eyes: Pupils are equal, round, and reactive to light. Conjunctivae and EOM are normal.   Neck: Normal range of motion. Neck supple.   Cardiovascular: Normal rate, regular rhythm and normal heart sounds.   Pulmonary/Chest: Effort normal and breath sounds normal.   Abdominal: Soft. Bowel sounds are normal.   Musculoskeletal: He exhibits no edema.   Neurological: He is alert and oriented to person, place, and time.   Skin: Skin is warm and dry. He is not diaphoretic.   Psychiatric: He has a normal mood and affect.       Assessment:       1. Calculus of ureter    2. Frequent urination    3. Urinary tract infection without hematuria, site unspecified    4. Cholelithiasis without cholecystitis    5. Hepatic cyst    6. Diverticulosis of colon    7. Calcification of aorta    8. Atherosclerosis of native artery of extremity with intermittent claudication, unspecified extremity    9. Obesity (BMI 30-39.9)        Plan:      Calculus of ureter  Advised straining urine given imaging findings. Will provide referral orders to the provider of his choice. Reviewed w/s and reasons to seek immediate assessment should symptoms evolve.  -     Ambulatory Referral to Urology    Frequent urination  As above. We have discussed pursuing prostate examination as well.  -     Ambulatory Referral to Urology    Urinary tract infection without hematuria, site unspecified  Negative urine culture. Completion of Cipro course is advised.     Cholelithiasis without cholecystitis  -     US Abdomen Complete; Future; Expected date: 02/03/2020    Hepatic cyst  -     US Abdomen Complete; Future; Expected date: 02/03/2020    Diverticulosis of colon  Noted. Discussed maintaining high fiber diet in an effort to lessen potential for diverticulitis.    Calcification of aorta  BP and  lipid control remain advised.     Atherosclerosis of native artery of extremity with intermittent claudication, unspecified extremity  As above.    Obesity (BMI 30-39.9)  Weight loss efforts remain encouraged.    Spent time today reviewing imaging findings. Patient had a number of questions with regard to further workup- these were discussed in great detail. We will arrange for further imaging as above. Additional recommendations will be provided pending results. In the interim he will be referred for Urology eval- he has already scheduled an appt with Dr. Arredondo on 2/19/2020. Have asked that records of the upcoming visit be forwarded for my review.     Total visit time of 25 minutes with greater than half the visit dedicated to counseling and coordinating care.

## 2020-02-05 ENCOUNTER — PATIENT MESSAGE (OUTPATIENT)
Dept: FAMILY MEDICINE | Facility: CLINIC | Age: 80
End: 2020-02-05

## 2020-02-10 ENCOUNTER — PATIENT MESSAGE (OUTPATIENT)
Dept: FAMILY MEDICINE | Facility: CLINIC | Age: 80
End: 2020-02-10

## 2020-02-11 ENCOUNTER — OFFICE VISIT (OUTPATIENT)
Dept: OPHTHALMOLOGY | Facility: CLINIC | Age: 80
End: 2020-02-11
Payer: MEDICARE

## 2020-02-11 ENCOUNTER — PATIENT MESSAGE (OUTPATIENT)
Dept: CARDIOLOGY | Facility: CLINIC | Age: 80
End: 2020-02-11

## 2020-02-11 DIAGNOSIS — H40.1131 PRIMARY OPEN ANGLE GLAUCOMA OF BOTH EYES, MILD STAGE: Primary | ICD-10-CM

## 2020-02-11 DIAGNOSIS — H04.123 DRY EYES, BILATERAL: ICD-10-CM

## 2020-02-11 DIAGNOSIS — H25.13 NUCLEAR SCLEROSIS OF BOTH EYES: ICD-10-CM

## 2020-02-11 PROCEDURE — 99999 PR PBB SHADOW E&M-EST. PATIENT-LVL II: ICD-10-PCS | Mod: PBBFAC,,, | Performed by: OPHTHALMOLOGY

## 2020-02-11 PROCEDURE — 92012 INTRM OPH EXAM EST PATIENT: CPT | Mod: S$GLB,,, | Performed by: OPHTHALMOLOGY

## 2020-02-11 PROCEDURE — 99999 PR PBB SHADOW E&M-EST. PATIENT-LVL II: CPT | Mod: PBBFAC,,, | Performed by: OPHTHALMOLOGY

## 2020-02-11 PROCEDURE — 92012 PR EYE EXAM, EST PATIENT,INTERMED: ICD-10-PCS | Mod: S$GLB,,, | Performed by: OPHTHALMOLOGY

## 2020-02-11 NOTE — PROGRESS NOTES
"SUBJECTIVE  Wilberto Hayes Jr. is 80 y.o. male  Corrected distance visual acuity was 20/30 -1 in the right eye and 20/30 -1 in the left eye.   Chief Complaint   Patient presents with    Glaucoma          HPI     S/P SLT OU per pt doing great no complaints doing well with drops       1. Mild COAG Goal =17-18  +Fhx Glaucoma -g-dad  SLT OD 11/09 (min response) + 1/8/20 (22.5-)  SLT OS 8/09 (22 to 16-17) + 1/8/20 (24.5-)  2. Mild NSC  3. Dry eyes (no dry mouth)  Gel Plugs OU  Didn't refill Xiidra due to cost  Tried Restasis (burning and blurry vision)  4. Guttata  5. Ptosis OD    Latanoprost QHS OU    Homeopathic AT"s (Similasan)  Thera tears, Soothe XP  O3FO    Last edited by Corina San MA on 2/11/2020  8:14 AM. (History)         Assessment /Plan :  1. Primary open angle glaucoma of both eyes, mild stage nice response to the SLT OU  Doing well, IOP within acceptable range relative to target IOP and no evidence of progression. Continue current treatment. Reviewed importance of continued compliance with treatment and follow up.     2. Nuclear sclerosis of both eyes monitor for now   3. Dry eyes, bilateral increase the use of the artificial tears OU     RTC as scheduled in May or prn        "

## 2020-02-20 ENCOUNTER — PATIENT MESSAGE (OUTPATIENT)
Dept: OPHTHALMOLOGY | Facility: CLINIC | Age: 80
End: 2020-02-20

## 2020-02-23 ENCOUNTER — PATIENT MESSAGE (OUTPATIENT)
Dept: CARDIOLOGY | Facility: CLINIC | Age: 80
End: 2020-02-23

## 2020-02-25 RX ORDER — METOPROLOL SUCCINATE 25 MG/1
25 TABLET, EXTENDED RELEASE ORAL DAILY
COMMUNITY
End: 2020-02-25

## 2020-02-27 RX ORDER — METOPROLOL SUCCINATE 25 MG/1
25 TABLET, EXTENDED RELEASE ORAL DAILY
Qty: 90 TABLET | Refills: 3 | Status: SHIPPED | OUTPATIENT
Start: 2020-02-27 | End: 2020-04-15 | Stop reason: SINTOL

## 2020-02-28 ENCOUNTER — TELEPHONE (OUTPATIENT)
Dept: RADIOLOGY | Facility: HOSPITAL | Age: 80
End: 2020-02-28

## 2020-03-02 ENCOUNTER — APPOINTMENT (OUTPATIENT)
Dept: RADIOLOGY | Facility: HOSPITAL | Age: 80
End: 2020-03-02
Attending: FAMILY MEDICINE
Payer: MEDICARE

## 2020-03-02 DIAGNOSIS — K76.89 HEPATIC CYST: ICD-10-CM

## 2020-03-02 DIAGNOSIS — K80.20 CHOLELITHIASIS WITHOUT CHOLECYSTITIS: ICD-10-CM

## 2020-03-02 PROCEDURE — 76700 US EXAM ABDOM COMPLETE: CPT | Mod: TC,PO

## 2020-03-02 PROCEDURE — 76700 US ABDOMEN COMPLETE: ICD-10-PCS | Mod: 26,,, | Performed by: RADIOLOGY

## 2020-03-02 PROCEDURE — 76700 US EXAM ABDOM COMPLETE: CPT | Mod: 26,,, | Performed by: RADIOLOGY

## 2020-03-16 DIAGNOSIS — I10 ESSENTIAL HYPERTENSION: ICD-10-CM

## 2020-03-17 RX ORDER — HYDROCHLOROTHIAZIDE 25 MG/1
12.5 TABLET ORAL DAILY
Qty: 45 TABLET | Refills: 3 | Status: SHIPPED | OUTPATIENT
Start: 2020-03-17 | End: 2021-05-13

## 2020-03-19 ENCOUNTER — PATIENT MESSAGE (OUTPATIENT)
Dept: CARDIOLOGY | Facility: CLINIC | Age: 80
End: 2020-03-19

## 2020-03-19 ENCOUNTER — PATIENT MESSAGE (OUTPATIENT)
Dept: FAMILY MEDICINE | Facility: CLINIC | Age: 80
End: 2020-03-19

## 2020-04-01 ENCOUNTER — PATIENT MESSAGE (OUTPATIENT)
Dept: CARDIOLOGY | Facility: CLINIC | Age: 80
End: 2020-04-01

## 2020-04-13 ENCOUNTER — PATIENT MESSAGE (OUTPATIENT)
Dept: CARDIOLOGY | Facility: CLINIC | Age: 80
End: 2020-04-13

## 2020-04-14 ENCOUNTER — PATIENT MESSAGE (OUTPATIENT)
Dept: CARDIOLOGY | Facility: CLINIC | Age: 80
End: 2020-04-14

## 2020-04-15 ENCOUNTER — OFFICE VISIT (OUTPATIENT)
Dept: CARDIOLOGY | Facility: CLINIC | Age: 80
End: 2020-04-15
Payer: MEDICARE

## 2020-04-15 DIAGNOSIS — I25.10 CORONARY ARTERY DISEASE INVOLVING NATIVE CORONARY ARTERY OF NATIVE HEART WITHOUT ANGINA PECTORIS: Chronic | ICD-10-CM

## 2020-04-15 DIAGNOSIS — E78.00 PURE HYPERCHOLESTEROLEMIA: ICD-10-CM

## 2020-04-15 DIAGNOSIS — I10 ESSENTIAL HYPERTENSION: Primary | ICD-10-CM

## 2020-04-15 PROCEDURE — 1159F MED LIST DOCD IN RCRD: CPT | Mod: 95,,, | Performed by: INTERNAL MEDICINE

## 2020-04-15 PROCEDURE — 99442 PR PHYSICIAN TELEPHONE EVALUATION 11-20 MIN: ICD-10-PCS | Mod: 95,,, | Performed by: INTERNAL MEDICINE

## 2020-04-15 PROCEDURE — 99442 PR PHYSICIAN TELEPHONE EVALUATION 11-20 MIN: CPT | Mod: 95,,, | Performed by: INTERNAL MEDICINE

## 2020-04-15 PROCEDURE — 1101F PR PT FALLS ASSESS DOC 0-1 FALLS W/OUT INJ PAST YR: ICD-10-PCS | Mod: CPTII,95,, | Performed by: INTERNAL MEDICINE

## 2020-04-15 PROCEDURE — 1101F PT FALLS ASSESS-DOCD LE1/YR: CPT | Mod: CPTII,95,, | Performed by: INTERNAL MEDICINE

## 2020-04-15 PROCEDURE — 1159F PR MEDICATION LIST DOCUMENTED IN MEDICAL RECORD: ICD-10-PCS | Mod: 95,,, | Performed by: INTERNAL MEDICINE

## 2020-04-15 RX ORDER — NEBIVOLOL 5 MG/1
5 TABLET ORAL DAILY
Qty: 30 TABLET | Refills: 11 | Status: SHIPPED | OUTPATIENT
Start: 2020-04-15 | End: 2020-08-18 | Stop reason: SDUPTHER

## 2020-04-15 NOTE — PROGRESS NOTES
Subjective:   Patient ID:  Wilberto Hayes Jr. is a 80 y.o. male who presents for follow-up of No chief complaint on file.  The patient location is: home  The chief complaint leading to consultation is: HTN/CAD  Visit type: audio only  Total time spent with patient: 15 minutes  Each patient to whom he or she provides medical services by telemedicine is:  (1) informed of the relationship between the physician and patient and the respective role of any other health care provider with respect to management of the patient; and (2) notified that he or she may decline to receive medical services by telemedicine and may withdraw from such care at any time.    Notes:   Problems with metoprolol- SOB, fatigue.Patient denies CP, angina or anginal equivalent.  Pt did not have sx with bystolic but stopped because of cost  Hypertension   This is a chronic problem. The current episode started more than 1 year ago. The problem has been gradually improving since onset. The problem is controlled. Pertinent negatives include no chest pain, palpitations or shortness of breath. Past treatments include beta blockers, angiotensin blockers and diuretics. The current treatment provides moderate improvement. There are no compliance problems.    Hyperlipidemia   This is a chronic problem. The current episode started more than 1 year ago. Recent lipid tests were reviewed and are variable. Pertinent negatives include no chest pain or shortness of breath. He is currently on no antihyperlipidemic treatment. Compliance problems include medication side effects.    Coronary Artery Disease   Presents for follow-up visit. Pertinent negatives include no chest pain, chest pressure, chest tightness, dizziness, leg swelling, muscle weakness, palpitations, shortness of breath or weight gain. Risk factors include hyperlipidemia. The symptoms have been stable. Compliance with diet is variable. Compliance with exercise is variable. Compliance with  medications is good.       Review of Systems   Constitution: Negative. Negative for weight gain.   HENT: Negative.    Eyes: Negative.    Cardiovascular: Negative.  Negative for chest pain, leg swelling and palpitations.   Respiratory: Negative.  Negative for chest tightness and shortness of breath.    Endocrine: Negative.    Hematologic/Lymphatic: Negative.    Skin: Negative.    Musculoskeletal: Negative for muscle weakness.   Gastrointestinal: Negative.    Genitourinary: Negative.    Neurological: Negative.  Negative for dizziness.   Psychiatric/Behavioral: Negative.    Allergic/Immunologic: Negative.      Family History   Problem Relation Age of Onset    Blindness Paternal Grandfather     Glaucoma Paternal Grandfather     Alcohol abuse Paternal Grandfather     Kidney disease Paternal Grandfather     Cancer Mother         brain    COPD Mother     Alcohol abuse Mother     Hypertension Mother     Cancer Father         lung, breast with left mastectomy    COPD Father     Alcohol abuse Father     Hypertension Father     Kidney disease Maternal Grandmother     Alcohol abuse Maternal Grandfather     Kidney disease Maternal Grandfather     Cancer Paternal Grandmother         Gyn     Kidney disease Paternal Grandmother     Retinal detachment Neg Hx     Macular degeneration Neg Hx     Diabetes Neg Hx     Heart disease Neg Hx     Stroke Neg Hx     Mental illness Neg Hx     Mental retardation Neg Hx      Past Medical History:   Diagnosis Date    ACE-inhibitor cough     Acute coronary syndrome     Angina pectoris     Cataract     Coronary artery disease     Degenerative disc disease, lumbar     Degenerative disc disease, lumbar     Glaucoma     Hyperlipidemia     cholesterol    Hypertension     Kidney stone     last in 2001    Myocardial infarction     Obesity     Peripheral vascular disease     Polyneuropathy     Sleep apnea     Tobacco dependence     resolved quit 1980    Trouble in  sleeping      Social History     Socioeconomic History    Marital status:      Spouse name: Not on file    Number of children: Not on file    Years of education: Not on file    Highest education level: Not on file   Occupational History    Not on file   Social Needs    Financial resource strain: Not hard at all    Food insecurity:     Worry: Never true     Inability: Never true    Transportation needs:     Medical: No     Non-medical: No   Tobacco Use    Smoking status: Former Smoker     Packs/day: 1.00     Years: 10.00     Pack years: 10.00     Last attempt to quit: 1980     Years since quittin.0    Smokeless tobacco: Never Used   Substance and Sexual Activity    Alcohol use: Yes     Alcohol/week: 7.0 standard drinks     Types: 7 Glasses of wine per week     Frequency: 4 or more times a week     Drinks per session: 1 or 2     Binge frequency: Never     Comment: nightly wine    Drug use: No    Sexual activity: Yes     Partners: Female     Birth control/protection: Partner-Vasectomy   Lifestyle    Physical activity:     Days per week: 2 days     Minutes per session: 60 min    Stress: Not at all   Relationships    Social connections:     Talks on phone: More than three times a week     Gets together: Twice a week     Attends Sikhism service: Not on file     Active member of club or organization: Yes     Attends meetings of clubs or organizations: More than 4 times per year     Relationship status:    Other Topics Concern    Not on file   Social History Narrative    . Lives with wife. They had 4 children. 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 Still drives. Does have a Living Will, signed at time of CABG ().      Current Outpatient Medications on File Prior to Visit   Medication Sig Dispense Refill    aspirin 81 mg Tab Take 1 tablet by mouth Daily.      ciprofloxacin HCl (CIPRO) 500 MG tablet Take 1 tablet (500 mg total) by mouth every 12  (twelve) hours. 14 tablet 0    cyanocobalamin (VITAMIN B-12) 100 MCG tablet Take 1 tablet by mouth every other day.       hydroCHLOROthiazide (HYDRODIURIL) 25 MG tablet Take 0.5 tablets (12.5 mg total) by mouth once daily. 45 tablet 3    latanoprost 0.005 % ophthalmic solution PLACE 1 DROP INTO BOTH EYES EVERY EVENING. 2.5 mL 10    losartan (COZAAR) 50 MG tablet TAKE 1 TABLET BY MOUTH TWICE A  tablet 3    LUBRICANT EYE DROPS, GLYC-PG, 1-0.3 % Drop       metoprolol succinate (TOPROL-XL) 25 MG 24 hr tablet Take 1 tablet (25 mg total) by mouth once daily. 90 tablet 3    multivit-min/ferrous fumarate (MULTI VITAMIN ORAL)       nitroGLYCERIN (NITROSTAT) 0.4 MG SL tablet Place 1 tablet (0.4 mg total) under the tongue every 5 (five) minutes as needed for Chest pain (if no relief seek er trmt). 25 tablet 3    OCEAN NASAL 0.65 % nasal spray       omega-3 fatty acids-vitamin E (FISH OIL) 1,000 mg Cap Take 1 capsule by mouth Daily.      saw palmetto 500 MG capsule Take by mouth 2 (two) times daily.       zinc gluconate 50 mg tablet Take 50 mg by mouth once daily.       No current facility-administered medications on file prior to visit.      Review of patient's allergies indicates:   Allergen Reactions    Niacin preparations Other (See Comments)     myalgia    Statins-hmg-coa reductase inhibitors      Muscle weakness    Codeine Other (See Comments) and Swelling     unknown    Fosinopril Other (See Comments)     cough    Pravastatin Other (See Comments)      Muscle pain    Simvastatin Other (See Comments)     Muscle pain    Sulfa (sulfonamide antibiotics) Rash and Hives       Objective:     Physical Exam    Assessment:     1. Essential hypertension    2. Pure hypercholesterolemia    3. Coronary artery disease involving native coronary artery of native heart without angina pectoris        Plan:     Essential hypertension    Pure hypercholesterolemia    Coronary artery disease involving native coronary  artery of native heart without angina pectoris      continue  losartan, hctz , b blocker -htn  Continue asa- cad  Stop metoprolol restart bystolic ( does not mind cost) -htn  Check lipids -

## 2020-04-28 ENCOUNTER — HOSPITAL ENCOUNTER (OUTPATIENT)
Dept: RADIOLOGY | Facility: HOSPITAL | Age: 80
Discharge: HOME | End: 2020-04-28
Attending: FAMILY MEDICINE
Payer: MEDICARE

## 2020-04-28 ENCOUNTER — OFFICE VISIT (OUTPATIENT)
Dept: FAMILY MEDICINE | Facility: CLINIC | Age: 80
End: 2020-04-28
Payer: MEDICARE

## 2020-04-28 VITALS
HEART RATE: 66 BPM | WEIGHT: 218.69 LBS | DIASTOLIC BLOOD PRESSURE: 70 MMHG | RESPIRATION RATE: 16 BRPM | HEIGHT: 71 IN | SYSTOLIC BLOOD PRESSURE: 138 MMHG | TEMPERATURE: 97 F | OXYGEN SATURATION: 98 % | BODY MASS INDEX: 30.62 KG/M2
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
## 2020-04-28 DIAGNOSIS — M25.572 LEFT ANKLE PAIN, UNSPECIFIED CHRONICITY: ICD-10-CM

## 2020-04-28 DIAGNOSIS — K80.20 CHOLELITHIASIS WITHOUT CHOLECYSTITIS: ICD-10-CM

## 2020-04-28 DIAGNOSIS — M79.672 LEFT FOOT PAIN: ICD-10-CM

## 2020-04-28 DIAGNOSIS — I83.93 VARICOSE VEINS OF BOTH LOWER EXTREMITIES, UNSPECIFIED WHETHER COMPLICATED: ICD-10-CM

## 2020-04-28 DIAGNOSIS — M79.672 LEFT FOOT PAIN: Primary | ICD-10-CM

## 2020-04-28 DIAGNOSIS — E66.9 OBESITY (BMI 30-39.9): ICD-10-CM

## 2020-04-28 DIAGNOSIS — I70.219 ATHEROSCLEROSIS OF NATIVE ARTERY OF EXTREMITY WITH INTERMITTENT CLAUDICATION, UNSPECIFIED EXTREMITY: ICD-10-CM

## 2020-04-28 DIAGNOSIS — I70.0 CALCIFICATION OF AORTA: ICD-10-CM

## 2020-04-28 DIAGNOSIS — K76.89 HEPATIC CYST: ICD-10-CM

## 2020-04-28 DIAGNOSIS — L25.9 CONTACT DERMATITIS, UNSPECIFIED CONTACT DERMATITIS TYPE, UNSPECIFIED TRIGGER: ICD-10-CM

## 2020-04-28 PROCEDURE — 3078F DIAST BP <80 MM HG: CPT | Mod: CPTII,S$GLB,, | Performed by: FAMILY MEDICINE

## 2020-04-28 PROCEDURE — 1159F PR MEDICATION LIST DOCUMENTED IN MEDICAL RECORD: ICD-10-PCS | Mod: S$GLB,,, | Performed by: FAMILY MEDICINE

## 2020-04-28 PROCEDURE — 99214 OFFICE O/P EST MOD 30 MIN: CPT | Mod: S$GLB,,, | Performed by: FAMILY MEDICINE

## 2020-04-28 PROCEDURE — 99499 UNLISTED E&M SERVICE: CPT | Mod: S$GLB,,, | Performed by: FAMILY MEDICINE

## 2020-04-28 PROCEDURE — 3288F FALL RISK ASSESSMENT DOCD: CPT | Mod: CPTII,S$GLB,, | Performed by: FAMILY MEDICINE

## 2020-04-28 PROCEDURE — 1125F PR PAIN SEVERITY QUANTIFIED, PAIN PRESENT: ICD-10-PCS | Mod: S$GLB,,, | Performed by: FAMILY MEDICINE

## 2020-04-28 PROCEDURE — 3075F SYST BP GE 130 - 139MM HG: CPT | Mod: CPTII,S$GLB,, | Performed by: FAMILY MEDICINE

## 2020-04-28 PROCEDURE — 1100F PR PT FALLS ASSESS DOC 2+ FALLS/FALL W/INJURY/YR: ICD-10-PCS | Mod: CPTII,S$GLB,, | Performed by: FAMILY MEDICINE

## 2020-04-28 PROCEDURE — 73610 X-RAY EXAM OF ANKLE: CPT | Mod: 26,LT,, | Performed by: RADIOLOGY

## 2020-04-28 PROCEDURE — 1125F AMNT PAIN NOTED PAIN PRSNT: CPT | Mod: S$GLB,,, | Performed by: FAMILY MEDICINE

## 2020-04-28 PROCEDURE — 1100F PTFALLS ASSESS-DOCD GE2>/YR: CPT | Mod: CPTII,S$GLB,, | Performed by: FAMILY MEDICINE

## 2020-04-28 PROCEDURE — 3075F PR MOST RECENT SYSTOLIC BLOOD PRESS GE 130-139MM HG: ICD-10-PCS | Mod: CPTII,S$GLB,, | Performed by: FAMILY MEDICINE

## 2020-04-28 PROCEDURE — 3078F PR MOST RECENT DIASTOLIC BLOOD PRESSURE < 80 MM HG: ICD-10-PCS | Mod: CPTII,S$GLB,, | Performed by: FAMILY MEDICINE

## 2020-04-28 PROCEDURE — 73610 X-RAY EXAM OF ANKLE: CPT | Mod: TC,PO,LT

## 2020-04-28 PROCEDURE — 99214 PR OFFICE/OUTPT VISIT, EST, LEVL IV, 30-39 MIN: ICD-10-PCS | Mod: S$GLB,,, | Performed by: FAMILY MEDICINE

## 2020-04-28 PROCEDURE — 99999 PR PBB SHADOW E&M-EST. PATIENT-LVL IV: CPT | Mod: PBBFAC,,, | Performed by: FAMILY MEDICINE

## 2020-04-28 PROCEDURE — 3288F PR FALLS RISK ASSESSMENT DOCUMENTED: ICD-10-PCS | Mod: CPTII,S$GLB,, | Performed by: FAMILY MEDICINE

## 2020-04-28 PROCEDURE — 73630 XR FOOT COMPLETE 3 VIEW LEFT: ICD-10-PCS | Mod: 26,LT,, | Performed by: RADIOLOGY

## 2020-04-28 PROCEDURE — 1159F MED LIST DOCD IN RCRD: CPT | Mod: S$GLB,,, | Performed by: FAMILY MEDICINE

## 2020-04-28 PROCEDURE — 73630 X-RAY EXAM OF FOOT: CPT | Mod: TC,PO,LT

## 2020-04-28 PROCEDURE — 99499 RISK ADDL DX/OHS AUDIT: ICD-10-PCS | Mod: S$GLB,,, | Performed by: FAMILY MEDICINE

## 2020-04-28 PROCEDURE — 73610 XR ANKLE COMPLETE 3 VIEW LEFT: ICD-10-PCS | Mod: 26,LT,, | Performed by: RADIOLOGY

## 2020-04-28 PROCEDURE — 99999 PR PBB SHADOW E&M-EST. PATIENT-LVL IV: ICD-10-PCS | Mod: PBBFAC,,, | Performed by: FAMILY MEDICINE

## 2020-04-28 PROCEDURE — 73630 X-RAY EXAM OF FOOT: CPT | Mod: 26,LT,, | Performed by: RADIOLOGY

## 2020-04-28 RX ORDER — TRIAMCINOLONE ACETONIDE 1 MG/G
CREAM TOPICAL 2 TIMES DAILY
Qty: 45 G | Refills: 0 | Status: SHIPPED | OUTPATIENT
Start: 2020-04-28 | End: 2022-08-10 | Stop reason: ALTCHOICE

## 2020-04-28 NOTE — PROGRESS NOTES
"Subjective:       Patient ID: Wilberto Hayes Jr. is a 80 y.o. male.    Chief Complaint: Fall    HPI   Fall  81yo male presents today with report of a fall at home on 4/15/2020. Notes that he was getting out of the shower and stubbed his right great toe. His left foot was still in the shower and he slipped and fell on the shower floor. He is unable to recall exactly what happened but thinks he may have sprained his ankle. He is able to ambulate. Denies any loss of consciousness but he did hit his head on a cabinet. Denies any bruising or headache. He has been propping the extremity and soaking in epsom salt. States "it actually looks 100% better than a week ago". He is not taking otc analgesics for pain relief. Rates his pain at 2/10.  Approximately two months ago he had a vein rupture to the right foot and called EMS to attend to the wound. He is concerned that he may have an infection to the area. There is no weeping or drainage from the area but he notes having "a white bump". There is no tenderness. He has been applying ELIA twice daily with no change.     Review of Systems   Constitutional: Negative for activity change and unexpected weight change.   HENT: Negative for hearing loss, rhinorrhea and trouble swallowing.    Eyes: Positive for visual disturbance. Negative for discharge.   Respiratory: Negative for chest tightness and wheezing.    Cardiovascular: Negative for chest pain and palpitations.   Gastrointestinal: Negative for blood in stool, constipation, diarrhea and vomiting.   Endocrine: Negative for polydipsia and polyuria.   Genitourinary: Negative for difficulty urinating, hematuria and urgency.   Musculoskeletal: Positive for arthralgias and joint swelling. Negative for neck pain.   Neurological: Positive for weakness. Negative for headaches.   Psychiatric/Behavioral: Negative for confusion and dysphoric mood.       Objective:   /70   Pulse 66   Temp 97.4 °F (36.3 °C) (Temporal)   Resp 16 " "  Ht 5' 11" (1.803 m)   Wt 99.2 kg (218 lb 11.1 oz)   SpO2 98%   BMI 30.50 kg/m²   Physical Exam   Constitutional: He is oriented to person, place, and time. He appears well-developed and well-nourished. No distress.   HENT:   Head: Normocephalic and atraumatic.   Right Ear: External ear normal.   Left Ear: External ear normal.   Nose: Nose normal.   Mouth/Throat: Oropharynx is clear and moist.   Eyes: Pupils are equal, round, and reactive to light. Conjunctivae and EOM are normal.   Neck: Normal range of motion. Neck supple.   Cardiovascular: Normal rate, regular rhythm and normal heart sounds.   Pulmonary/Chest: Effort normal and breath sounds normal.   Abdominal: Soft. Bowel sounds are normal.   Musculoskeletal:        Left ankle: He exhibits normal range of motion and no swelling. Tenderness. Lateral malleolus and medial malleolus tenderness found.        Right foot: There is normal range of motion, no tenderness, no bony tenderness and no swelling.        Left foot: There is tenderness. There is normal range of motion and no crepitus.        Feet:    Neurological: He is alert and oriented to person, place, and time.   Skin: Skin is warm and dry. He is not diaphoretic. There is erythema.        Psychiatric: He has a normal mood and affect. His behavior is normal.       Assessment:       1. Left foot pain    2. Left ankle pain, unspecified chronicity    3. Contact dermatitis, unspecified contact dermatitis type, unspecified trigger    4. Varicose veins of both lower extremities, unspecified whether complicated    5. Atherosclerosis of native artery of extremity with intermittent claudication, unspecified extremity    6. Calcification of aorta    7. Cholelithiasis without cholecystitis    8. Hepatic cyst    9. Obesity (BMI 30-39.9)    10. Fall, initial encounter        Plan:      Left foot pain  No acute fracture noted. Recommend propping the extremity while at rest. Tylenol for pain relief. Further assessment " per Podiatry.   -     X-Ray Foot Complete 3 view Left; Future; Expected date: 04/28/2020  -     Ambulatory referral/consult to Podiatry; Future; Expected date: 05/05/2020    Left ankle pain, unspecified chronicity  As above.   -     X-Ray Ankle Complete 3 View Left; Future; Expected date: 04/28/2020  -     Ambulatory referral/consult to Podiatry; Future; Expected date: 05/05/2020    Contact dermatitis, unspecified contact dermatitis type, unspecified trigger  -     triamcinolone acetonide 0.1% (KENALOG) 0.1 % cream; Apply topically 2 (two) times daily.  Dispense: 45 g; Refill: 0    Varicose veins of both lower extremities, unspecified whether complicated  Patient would benefit from Vascular consultation in the near future. Consider compression stocking use.     Atherosclerosis of native artery of extremity with intermittent claudication, unspecified extremity  As per Cardiology.    Calcification of aorta  BP and lipid control remains advised.     Cholelithiasis without cholecystitis  Asymptomatic. Patient has elected to defer Gen Surg evaluation given current COVID-19 concerns.    Hepatic cyst  Will plan to re-evaluate 6-12 months post most recent ultrasound.   -     US Abdomen Limited; Future; Expected date: 04/28/2020    Obesity (BMI 30-39.9)  Weight loss efforts remain encouraged through diet and lifestyle measures.    Fall, initial encounter  -     X-Ray Foot Complete 3 view Left; Future; Expected date: 04/28/2020  -     X-Ray Ankle Complete 3 View Left; Future; Expected date: 04/28/2020  -     Ambulatory referral/consult to Podiatry; Future; Expected date: 05/05/2020

## 2020-07-17 ENCOUNTER — LAB VISIT (OUTPATIENT)
Dept: LAB | Facility: HOSPITAL | Age: 80
End: 2020-07-17
Attending: FAMILY MEDICINE
Payer: MEDICARE

## 2020-07-17 DIAGNOSIS — Z00.00 ANNUAL PHYSICAL EXAM: ICD-10-CM

## 2020-07-17 DIAGNOSIS — I10 ESSENTIAL HYPERTENSION: ICD-10-CM

## 2020-07-17 DIAGNOSIS — E78.2 MIXED HYPERLIPIDEMIA: ICD-10-CM

## 2020-07-17 PROCEDURE — 36415 COLL VENOUS BLD VENIPUNCTURE: CPT | Mod: PO

## 2020-07-17 PROCEDURE — 80053 COMPREHEN METABOLIC PANEL: CPT

## 2020-07-18 LAB
ALBUMIN SERPL BCP-MCNC: 4.3 G/DL (ref 3.5–5.2)
ALP SERPL-CCNC: 68 U/L (ref 55–135)
ALT SERPL W/O P-5'-P-CCNC: 9 U/L (ref 10–44)
ANION GAP SERPL CALC-SCNC: 6 MMOL/L (ref 8–16)
AST SERPL-CCNC: 17 U/L (ref 10–40)
BILIRUB SERPL-MCNC: 0.7 MG/DL (ref 0.1–1)
BUN SERPL-MCNC: 17 MG/DL (ref 8–23)
CALCIUM SERPL-MCNC: 9.9 MG/DL (ref 8.7–10.5)
CHLORIDE SERPL-SCNC: 104 MMOL/L (ref 95–110)
CO2 SERPL-SCNC: 29 MMOL/L (ref 23–29)
CREAT SERPL-MCNC: 1.2 MG/DL (ref 0.5–1.4)
EST. GFR  (AFRICAN AMERICAN): >60 ML/MIN/1.73 M^2
EST. GFR  (NON AFRICAN AMERICAN): 57.6 ML/MIN/1.73 M^2
GLUCOSE SERPL-MCNC: 93 MG/DL (ref 70–110)
POTASSIUM SERPL-SCNC: 4.5 MMOL/L (ref 3.5–5.1)
PROT SERPL-MCNC: 7.4 G/DL (ref 6–8.4)
SODIUM SERPL-SCNC: 139 MMOL/L (ref 136–145)

## 2020-07-20 ENCOUNTER — OFFICE VISIT (OUTPATIENT)
Dept: CARDIOLOGY | Facility: CLINIC | Age: 80
End: 2020-07-20
Payer: MEDICARE

## 2020-07-20 VITALS
HEIGHT: 71 IN | OXYGEN SATURATION: 99 % | BODY MASS INDEX: 29.94 KG/M2 | SYSTOLIC BLOOD PRESSURE: 138 MMHG | HEART RATE: 62 BPM | DIASTOLIC BLOOD PRESSURE: 70 MMHG | WEIGHT: 213.88 LBS

## 2020-07-20 DIAGNOSIS — E78.00 PURE HYPERCHOLESTEROLEMIA: ICD-10-CM

## 2020-07-20 DIAGNOSIS — I10 ESSENTIAL HYPERTENSION: Primary | ICD-10-CM

## 2020-07-20 DIAGNOSIS — G47.33 OSA ON CPAP: Chronic | ICD-10-CM

## 2020-07-20 DIAGNOSIS — I25.10 CORONARY ARTERY DISEASE INVOLVING NATIVE CORONARY ARTERY OF NATIVE HEART WITHOUT ANGINA PECTORIS: Chronic | ICD-10-CM

## 2020-07-20 DIAGNOSIS — I70.0 CALCIFICATION OF AORTA: Chronic | ICD-10-CM

## 2020-07-20 DIAGNOSIS — I70.211 ATHEROSCLEROSIS OF NATIVE ARTERY OF RIGHT LOWER EXTREMITY WITH INTERMITTENT CLAUDICATION: Chronic | ICD-10-CM

## 2020-07-20 PROCEDURE — 99999 PR PBB SHADOW E&M-EST. PATIENT-LVL IV: CPT | Mod: PBBFAC,,, | Performed by: INTERNAL MEDICINE

## 2020-07-20 PROCEDURE — 3288F PR FALLS RISK ASSESSMENT DOCUMENTED: ICD-10-PCS | Mod: CPTII,S$GLB,, | Performed by: INTERNAL MEDICINE

## 2020-07-20 PROCEDURE — 3078F DIAST BP <80 MM HG: CPT | Mod: CPTII,S$GLB,, | Performed by: INTERNAL MEDICINE

## 2020-07-20 PROCEDURE — 99999 PR PBB SHADOW E&M-EST. PATIENT-LVL IV: ICD-10-PCS | Mod: PBBFAC,,, | Performed by: INTERNAL MEDICINE

## 2020-07-20 PROCEDURE — 3075F SYST BP GE 130 - 139MM HG: CPT | Mod: CPTII,S$GLB,, | Performed by: INTERNAL MEDICINE

## 2020-07-20 PROCEDURE — 1159F MED LIST DOCD IN RCRD: CPT | Mod: S$GLB,,, | Performed by: INTERNAL MEDICINE

## 2020-07-20 PROCEDURE — 99215 OFFICE O/P EST HI 40 MIN: CPT | Mod: S$GLB,,, | Performed by: INTERNAL MEDICINE

## 2020-07-20 PROCEDURE — 1159F PR MEDICATION LIST DOCUMENTED IN MEDICAL RECORD: ICD-10-PCS | Mod: S$GLB,,, | Performed by: INTERNAL MEDICINE

## 2020-07-20 PROCEDURE — 1100F PTFALLS ASSESS-DOCD GE2>/YR: CPT | Mod: CPTII,S$GLB,, | Performed by: INTERNAL MEDICINE

## 2020-07-20 PROCEDURE — 1126F AMNT PAIN NOTED NONE PRSNT: CPT | Mod: S$GLB,,, | Performed by: INTERNAL MEDICINE

## 2020-07-20 PROCEDURE — 99215 PR OFFICE/OUTPT VISIT, EST, LEVL V, 40-54 MIN: ICD-10-PCS | Mod: S$GLB,,, | Performed by: INTERNAL MEDICINE

## 2020-07-20 PROCEDURE — 1100F PR PT FALLS ASSESS DOC 2+ FALLS/FALL W/INJURY/YR: ICD-10-PCS | Mod: CPTII,S$GLB,, | Performed by: INTERNAL MEDICINE

## 2020-07-20 PROCEDURE — 3078F PR MOST RECENT DIASTOLIC BLOOD PRESSURE < 80 MM HG: ICD-10-PCS | Mod: CPTII,S$GLB,, | Performed by: INTERNAL MEDICINE

## 2020-07-20 PROCEDURE — 1126F PR PAIN SEVERITY QUANTIFIED, NO PAIN PRESENT: ICD-10-PCS | Mod: S$GLB,,, | Performed by: INTERNAL MEDICINE

## 2020-07-20 PROCEDURE — 3288F FALL RISK ASSESSMENT DOCD: CPT | Mod: CPTII,S$GLB,, | Performed by: INTERNAL MEDICINE

## 2020-07-20 PROCEDURE — 3075F PR MOST RECENT SYSTOLIC BLOOD PRESS GE 130-139MM HG: ICD-10-PCS | Mod: CPTII,S$GLB,, | Performed by: INTERNAL MEDICINE

## 2020-07-20 NOTE — PROGRESS NOTES
Subjective:   Patient ID:  Wiblerto Hayes Jr. is a 80 y.o. male who presents for follow-up of Essential hypertension  Fatigue resolved with changing to bystolic  Pt with 1 episode CP when cutting gras.  Hypertension  This is a chronic problem. The current episode started more than 1 year ago. The problem has been gradually improving since onset. The problem is controlled. Pertinent negatives include no chest pain, palpitations or shortness of breath. Past treatments include beta blockers, angiotensin blockers and diuretics. The current treatment provides moderate improvement. Compliance problems include medication side effects.  Hypertensive end-organ damage includes CAD/MI.   Hyperlipidemia  This is a chronic problem. The current episode started more than 1 year ago. Pertinent negatives include no chest pain or shortness of breath. He is currently on no antihyperlipidemic treatment. The current treatment provides mild improvement of lipids. Compliance problems include medication side effects.    Coronary Artery Disease  Presents for follow-up visit. Pertinent negatives include no chest pain, chest pressure, chest tightness, dizziness, leg swelling, muscle weakness, palpitations, shortness of breath or weight gain. Risk factors include hyperlipidemia. The symptoms have been stable. Compliance with diet is variable. Compliance with exercise is variable. Compliance with medications is good.       Review of Systems   Constitution: Negative. Negative for weight gain.   HENT: Negative.    Eyes: Negative.    Cardiovascular: Negative.  Negative for chest pain, leg swelling and palpitations.   Respiratory: Negative.  Negative for chest tightness and shortness of breath.    Endocrine: Negative.    Hematologic/Lymphatic: Negative.    Skin: Negative.    Musculoskeletal: Negative for muscle weakness.   Gastrointestinal: Negative.    Genitourinary: Negative.    Neurological: Negative.  Negative for dizziness.    Psychiatric/Behavioral: Negative.    Allergic/Immunologic: Negative.      Family History   Problem Relation Age of Onset    Blindness Paternal Grandfather     Glaucoma Paternal Grandfather     Alcohol abuse Paternal Grandfather     Kidney disease Paternal Grandfather     Cancer Mother         brain    COPD Mother     Alcohol abuse Mother     Hypertension Mother     Cancer Father         lung, breast with left mastectomy    COPD Father     Alcohol abuse Father     Hypertension Father     Kidney disease Maternal Grandmother     Alcohol abuse Maternal Grandfather     Kidney disease Maternal Grandfather     Cancer Paternal Grandmother         Gyn     Kidney disease Paternal Grandmother     Retinal detachment Neg Hx     Macular degeneration Neg Hx     Diabetes Neg Hx     Heart disease Neg Hx     Stroke Neg Hx     Mental illness Neg Hx     Mental retardation Neg Hx      Past Medical History:   Diagnosis Date    ACE-inhibitor cough     Acute coronary syndrome     Angina pectoris     Cataract     Coronary artery disease     Degenerative disc disease, lumbar     Degenerative disc disease, lumbar     Glaucoma     Hyperlipidemia     cholesterol    Hypertension     Kidney stone     last in 2001    Myocardial infarction     Obesity     Peripheral vascular disease     Polyneuropathy     Sleep apnea     Tobacco dependence     resolved quit 1980    Trouble in sleeping      Social History     Socioeconomic History    Marital status:      Spouse name: Not on file    Number of children: Not on file    Years of education: Not on file    Highest education level: Not on file   Occupational History    Not on file   Social Needs    Financial resource strain: Not hard at all    Food insecurity     Worry: Never true     Inability: Never true    Transportation needs     Medical: No     Non-medical: No   Tobacco Use    Smoking status: Former Smoker     Packs/day: 1.00     Years: 10.00      Pack years: 10.00     Quit date: 1980     Years since quittin.3    Smokeless tobacco: Never Used   Substance and Sexual Activity    Alcohol use: Yes     Alcohol/week: 7.0 standard drinks     Types: 7 Glasses of wine per week     Frequency: 4 or more times a week     Drinks per session: 1 or 2     Binge frequency: Never     Comment: nightly wine    Drug use: No    Sexual activity: Yes     Partners: Female     Birth control/protection: Partner-Vasectomy   Lifestyle    Physical activity     Days per week: 2 days     Minutes per session: 60 min    Stress: Not at all   Relationships    Social connections     Talks on phone: More than three times a week     Gets together: Twice a week     Attends Mandaen service: Not on file     Active member of club or organization: Yes     Attends meetings of clubs or organizations: More than 4 times per year     Relationship status:    Other Topics Concern    Not on file   Social History Narrative    . Lives with wife. They had 4 children. Retired  at [employer] and then [employer] work. Still drives. Does have a Living Will, signed at time of CABG ().      Current Outpatient Medications on File Prior to Visit   Medication Sig Dispense Refill    aspirin 81 mg Tab Take 1 tablet by mouth Daily.      cyanocobalamin (VITAMIN B-12) 100 MCG tablet Take 1 tablet by mouth every other day.       hydroCHLOROthiazide (HYDRODIURIL) 25 MG tablet Take 0.5 tablets (12.5 mg total) by mouth once daily. 45 tablet 3    latanoprost 0.005 % ophthalmic solution PLACE 1 DROP INTO BOTH EYES EVERY EVENING. 2.5 mL 10    losartan (COZAAR) 50 MG tablet TAKE 1 TABLET BY MOUTH TWICE A  tablet 3    LUBRICANT EYE DROPS, GLYC-PG, 1-0.3 % Drop       multivit-min/ferrous fumarate (MULTI VITAMIN ORAL)       nebivoloL (BYSTOLIC) 5 MG Tab Take 1 tablet (5 mg total) by mouth once daily. 1/2 tablet q day 30 tablet 11    nitroGLYCERIN (NITROSTAT) 0.4 MG SL  tablet Place 1 tablet (0.4 mg total) under the tongue every 5 (five) minutes as needed for Chest pain (if no relief seek er trmt). 25 tablet 3    OCEAN NASAL 0.65 % nasal spray       omega-3 fatty acids-vitamin E (FISH OIL) 1,000 mg Cap Take 1 capsule by mouth Daily.      saw palmetto 500 MG capsule Take by mouth 2 (two) times daily.       triamcinolone acetonide 0.1% (KENALOG) 0.1 % cream Apply topically 2 (two) times daily. 45 g 0    zinc gluconate 50 mg tablet Take 50 mg by mouth once daily.       No current facility-administered medications on file prior to visit.      Review of patient's allergies indicates:   Allergen Reactions    Niacin preparations Other (See Comments)     myalgia    Statins-hmg-coa reductase inhibitors      Muscle weakness    Codeine Other (See Comments) and Swelling     unknown    Fosinopril Other (See Comments)     cough    Pravastatin Other (See Comments)      Muscle pain    Simvastatin Other (See Comments)     Muscle pain    Sulfa (sulfonamide antibiotics) Rash and Hives       Objective:     Physical Exam   Constitutional: He is oriented to person, place, and time. He appears well-developed and well-nourished.   HENT:   Head: Normocephalic and atraumatic.   Eyes: Pupils are equal, round, and reactive to light. Conjunctivae are normal.   Neck: Normal range of motion. Neck supple.   Cardiovascular: Normal rate, regular rhythm, normal heart sounds and intact distal pulses.   Pulmonary/Chest: Effort normal and breath sounds normal.   Abdominal: Soft. Bowel sounds are normal.   Neurological: He is alert and oriented to person, place, and time.   Skin: Skin is warm and dry.   Psychiatric: He has a normal mood and affect.   Nursing note and vitals reviewed.      Assessment:     1. Essential hypertension    2. Pure hypercholesterolemia    3. Coronary artery disease involving native coronary artery of native heart without angina pectoris    4. Atherosclerosis of native artery of  right lower extremity with intermittent claudication    5. CHELO on CPAP    6. Calcification of aorta        Plan:     Essential hypertension    Pure hypercholesterolemia    Coronary artery disease involving native coronary artery of native heart without angina pectoris    Atherosclerosis of native artery of right lower extremity with intermittent claudication    CHLEO on CPAP    Calcification of aorta      continue  losartan, hctz , b blocker -htn  Continue asa- cad  Check lipids   Stress test

## 2020-07-29 ENCOUNTER — OFFICE VISIT (OUTPATIENT)
Dept: FAMILY MEDICINE | Facility: CLINIC | Age: 80
End: 2020-07-29
Payer: MEDICARE

## 2020-07-29 VITALS
OXYGEN SATURATION: 98 % | HEIGHT: 71 IN | SYSTOLIC BLOOD PRESSURE: 120 MMHG | DIASTOLIC BLOOD PRESSURE: 70 MMHG | WEIGHT: 217.63 LBS | HEART RATE: 67 BPM | RESPIRATION RATE: 16 BRPM | BODY MASS INDEX: 30.47 KG/M2 | TEMPERATURE: 98 F

## 2020-07-29 DIAGNOSIS — G47.33 OSA ON CPAP: ICD-10-CM

## 2020-07-29 DIAGNOSIS — I70.0 CALCIFICATION OF AORTA: ICD-10-CM

## 2020-07-29 DIAGNOSIS — I10 ESSENTIAL HYPERTENSION: Primary | ICD-10-CM

## 2020-07-29 DIAGNOSIS — N20.0 RECURRENT NEPHROLITHIASIS: ICD-10-CM

## 2020-07-29 DIAGNOSIS — E53.8 B12 DEFICIENCY: ICD-10-CM

## 2020-07-29 DIAGNOSIS — N40.1 BENIGN LOCALIZED PROSTATIC HYPERPLASIA WITH LOWER URINARY TRACT SYMPTOMS (LUTS): ICD-10-CM

## 2020-07-29 DIAGNOSIS — I70.219 ATHEROSCLEROSIS OF NATIVE ARTERY OF EXTREMITY WITH INTERMITTENT CLAUDICATION, UNSPECIFIED EXTREMITY: ICD-10-CM

## 2020-07-29 DIAGNOSIS — E78.2 MIXED HYPERLIPIDEMIA: ICD-10-CM

## 2020-07-29 DIAGNOSIS — E66.9 OBESITY (BMI 30-39.9): ICD-10-CM

## 2020-07-29 DIAGNOSIS — M25.50 ARTHRALGIA, UNSPECIFIED JOINT: ICD-10-CM

## 2020-07-29 DIAGNOSIS — Z11.59 NEED FOR HEPATITIS C SCREENING TEST: ICD-10-CM

## 2020-07-29 PROCEDURE — 1100F PR PT FALLS ASSESS DOC 2+ FALLS/FALL W/INJURY/YR: ICD-10-PCS | Mod: CPTII,S$GLB,, | Performed by: FAMILY MEDICINE

## 2020-07-29 PROCEDURE — 1159F PR MEDICATION LIST DOCUMENTED IN MEDICAL RECORD: ICD-10-PCS | Mod: S$GLB,,, | Performed by: FAMILY MEDICINE

## 2020-07-29 PROCEDURE — 99214 PR OFFICE/OUTPT VISIT, EST, LEVL IV, 30-39 MIN: ICD-10-PCS | Mod: S$GLB,,, | Performed by: FAMILY MEDICINE

## 2020-07-29 PROCEDURE — 99214 OFFICE O/P EST MOD 30 MIN: CPT | Mod: S$GLB,,, | Performed by: FAMILY MEDICINE

## 2020-07-29 PROCEDURE — 3074F SYST BP LT 130 MM HG: CPT | Mod: CPTII,S$GLB,, | Performed by: FAMILY MEDICINE

## 2020-07-29 PROCEDURE — 1159F MED LIST DOCD IN RCRD: CPT | Mod: S$GLB,,, | Performed by: FAMILY MEDICINE

## 2020-07-29 PROCEDURE — 3288F FALL RISK ASSESSMENT DOCD: CPT | Mod: CPTII,S$GLB,, | Performed by: FAMILY MEDICINE

## 2020-07-29 PROCEDURE — 1100F PTFALLS ASSESS-DOCD GE2>/YR: CPT | Mod: CPTII,S$GLB,, | Performed by: FAMILY MEDICINE

## 2020-07-29 PROCEDURE — 3078F PR MOST RECENT DIASTOLIC BLOOD PRESSURE < 80 MM HG: ICD-10-PCS | Mod: CPTII,S$GLB,, | Performed by: FAMILY MEDICINE

## 2020-07-29 PROCEDURE — 1126F PR PAIN SEVERITY QUANTIFIED, NO PAIN PRESENT: ICD-10-PCS | Mod: S$GLB,,, | Performed by: FAMILY MEDICINE

## 2020-07-29 PROCEDURE — 99999 PR PBB SHADOW E&M-EST. PATIENT-LVL V: CPT | Mod: PBBFAC,,, | Performed by: FAMILY MEDICINE

## 2020-07-29 PROCEDURE — 1126F AMNT PAIN NOTED NONE PRSNT: CPT | Mod: S$GLB,,, | Performed by: FAMILY MEDICINE

## 2020-07-29 PROCEDURE — 3078F DIAST BP <80 MM HG: CPT | Mod: CPTII,S$GLB,, | Performed by: FAMILY MEDICINE

## 2020-07-29 PROCEDURE — 3074F PR MOST RECENT SYSTOLIC BLOOD PRESSURE < 130 MM HG: ICD-10-PCS | Mod: CPTII,S$GLB,, | Performed by: FAMILY MEDICINE

## 2020-07-29 PROCEDURE — 99999 PR PBB SHADOW E&M-EST. PATIENT-LVL V: ICD-10-PCS | Mod: PBBFAC,,, | Performed by: FAMILY MEDICINE

## 2020-07-29 PROCEDURE — 3288F PR FALLS RISK ASSESSMENT DOCUMENTED: ICD-10-PCS | Mod: CPTII,S$GLB,, | Performed by: FAMILY MEDICINE

## 2020-07-29 NOTE — PROGRESS NOTES
Subjective:       Patient ID: Wilberto Hayes Jr. is a 80 y.o. male.    Chief Complaint: Chronic Care    HPI   Chronic Care  79yo male presents today for chronic care assessment. He has had recent lab update with stable findings. BP has been well controlled. He denies any HA, palpitations or ADORNO. He has noted some chest pain after working in his yard. Symptoms are alleviated with NTG use. He has been scheduled for a stress test in October 2020 and will follow-up with Dr. Valle to follow. He has been taking B12 supplements- last levels were obtained in January 2020. He has changed the timing of administration of his supplements and vitamins of late and this has helped him to feel better. He has also been taking Tylenol arthritis for pain relief- notes pain to the shoulders and hips. He is no longer taking any Advil PM as he felt this was contributing to daytime fatigue. He notes overall improvement in fatigue since changing to Bystolic. He was last evaluated by Urology in June 2020 and was provided with an RX for Flomax at that time. He has not yet started treatment. He denies any symptoms of concern for infection. There have been no recent ER visits or hospitalizations.     Review of Systems   Constitutional: Negative for activity change and unexpected weight change.   HENT: Negative for hearing loss, rhinorrhea and trouble swallowing.    Eyes: Negative for discharge and visual disturbance.   Respiratory: Negative for chest tightness and wheezing.    Cardiovascular: Negative for chest pain and palpitations.   Gastrointestinal: Negative for blood in stool, constipation, diarrhea and vomiting.   Endocrine: Negative for polydipsia and polyuria.   Genitourinary: Negative for difficulty urinating, hematuria and urgency.   Musculoskeletal: Positive for arthralgias and joint swelling. Negative for neck pain.   Neurological: Positive for weakness. Negative for headaches.   Psychiatric/Behavioral: Negative for confusion  "and dysphoric mood.       Objective:   /70   Pulse 67   Temp 98.1 °F (36.7 °C) (Temporal)   Resp 16   Ht 5' 11" (1.803 m)   Wt 98.7 kg (217 lb 9.5 oz)   SpO2 98%   BMI 30.35 kg/m²   Physical Exam  Constitutional:       General: He is not in acute distress.     Appearance: He is well-developed. He is not diaphoretic.   HENT:      Head: Normocephalic and atraumatic.      Right Ear: Tympanic membrane, ear canal and external ear normal.      Left Ear: Tympanic membrane, ear canal and external ear normal.      Nose: Nose normal.      Mouth/Throat:      Mouth: Mucous membranes are moist.   Eyes:      Extraocular Movements: Extraocular movements intact.      Conjunctiva/sclera: Conjunctivae normal.      Pupils: Pupils are equal, round, and reactive to light.   Neck:      Musculoskeletal: Normal range of motion and neck supple.   Cardiovascular:      Rate and Rhythm: Normal rate and regular rhythm.      Heart sounds: Normal heart sounds.   Pulmonary:      Effort: Pulmonary effort is normal.      Breath sounds: Normal breath sounds.   Abdominal:      General: Bowel sounds are normal.      Palpations: Abdomen is soft.   Musculoskeletal:         General: No swelling.   Skin:     General: Skin is warm and dry.   Neurological:      General: No focal deficit present.      Mental Status: He is alert and oriented to person, place, and time.   Psychiatric:         Mood and Affect: Mood normal.         Behavior: Behavior normal.         Assessment:       1. Essential hypertension    2. Mixed hyperlipidemia    3. B12 deficiency    4. CHELO on CPAP    5. Calcification of aorta    6. Atherosclerosis of native artery of extremity with intermittent claudication, unspecified extremity    7. Recurrent nephrolithiasis    8. Benign localized prostatic hyperplasia with lower urinary tract symptoms (LUTS)    9. Arthralgia, unspecified joint    10. Obesity (BMI 30-39.9)    11. Need for hepatitis C screening test        Plan:    "   Essential hypertension  Stable. Continue with current treatment. Target BP goal remains<140/90. Heart healthy diet is advised. Intermittent home monitoring has been discussed.  -     Comprehensive metabolic panel; Future; Expected date: 07/29/2020    Mixed hyperlipidemia  -     Lipid Panel; Future; Expected date: 07/29/2020  -     TSH; Future; Expected date: 07/29/2020    B12 deficiency  -     CBC auto differential; Future; Expected date: 07/29/2020  -     Vitamin B12; Future; Expected date: 07/29/2020    CHELO on CPAP  Compliance with CPAP use remains advised.    Calcification of aorta  BP and lipid control remain advised.    Atherosclerosis of native artery of extremity with intermittent claudication, unspecified extremity  As per Cardiology.    Recurrent nephrolithiasis  As per Urology.    Benign localized prostatic hyperplasia with lower urinary tract symptoms (LUTS)  As above.    Arthralgia, unspecified joint  Continue with prn use of Tylenol arthritis.    Obesity (BMI 30-39.9)  Weight loss efforts remain encouraged through diet and lifestyle measures.    Need for hepatitis C screening test  -     Hepatitis C Antibody; Future; Expected date: 07/29/2020    Seasonal flu vaccine is recommended in the fall.    Total visit time of 25 minutes with greater than half the time dedicated to counseling and coordinating care.

## 2020-08-04 ENCOUNTER — OFFICE VISIT (OUTPATIENT)
Dept: OPHTHALMOLOGY | Facility: CLINIC | Age: 80
End: 2020-08-04
Payer: MEDICARE

## 2020-08-04 DIAGNOSIS — H04.123 DRY EYES, BILATERAL: ICD-10-CM

## 2020-08-04 DIAGNOSIS — H25.13 NUCLEAR SCLEROSIS OF BOTH EYES: ICD-10-CM

## 2020-08-04 DIAGNOSIS — H52.7 REFRACTIVE ERROR: ICD-10-CM

## 2020-08-04 DIAGNOSIS — H01.00A BLEPHARITIS OF UPPER AND LOWER EYELIDS OF BOTH EYES, UNSPECIFIED TYPE: ICD-10-CM

## 2020-08-04 DIAGNOSIS — H40.1131 PRIMARY OPEN ANGLE GLAUCOMA OF BOTH EYES, MILD STAGE: Primary | ICD-10-CM

## 2020-08-04 DIAGNOSIS — H26.9 CORTICAL CATARACT OF LEFT EYE: ICD-10-CM

## 2020-08-04 DIAGNOSIS — H02.401 PTOSIS OF EYELID, RIGHT: ICD-10-CM

## 2020-08-04 DIAGNOSIS — H01.00B BLEPHARITIS OF UPPER AND LOWER EYELIDS OF BOTH EYES, UNSPECIFIED TYPE: ICD-10-CM

## 2020-08-04 PROCEDURE — 92015 PR REFRACTION: ICD-10-PCS | Mod: S$GLB,,, | Performed by: OPHTHALMOLOGY

## 2020-08-04 PROCEDURE — 92133 POSTERIOR SEGMENT OCT OPTIC NERVE(OCULAR COHERENCE TOMOGRAPHY) - OU - BOTH EYES: ICD-10-PCS | Mod: S$GLB,,, | Performed by: OPHTHALMOLOGY

## 2020-08-04 PROCEDURE — 99999 PR PBB SHADOW E&M-EST. PATIENT-LVL III: ICD-10-PCS | Mod: PBBFAC,,, | Performed by: OPHTHALMOLOGY

## 2020-08-04 PROCEDURE — 92014 PR EYE EXAM, EST PATIENT,COMPREHESV: ICD-10-PCS | Mod: S$GLB,,, | Performed by: OPHTHALMOLOGY

## 2020-08-04 PROCEDURE — 92015 DETERMINE REFRACTIVE STATE: CPT | Mod: S$GLB,,, | Performed by: OPHTHALMOLOGY

## 2020-08-04 PROCEDURE — 92014 COMPRE OPH EXAM EST PT 1/>: CPT | Mod: S$GLB,,, | Performed by: OPHTHALMOLOGY

## 2020-08-04 PROCEDURE — 92083 EXTENDED VISUAL FIELD XM: CPT | Mod: S$GLB,,, | Performed by: OPHTHALMOLOGY

## 2020-08-04 PROCEDURE — 99999 PR PBB SHADOW E&M-EST. PATIENT-LVL III: CPT | Mod: PBBFAC,,, | Performed by: OPHTHALMOLOGY

## 2020-08-04 PROCEDURE — 92133 CPTRZD OPH DX IMG PST SGM ON: CPT | Mod: S$GLB,,, | Performed by: OPHTHALMOLOGY

## 2020-08-04 PROCEDURE — 92083 HUMPHREY VISUAL FIELD - OU - BOTH EYES: ICD-10-PCS | Mod: S$GLB,,, | Performed by: OPHTHALMOLOGY

## 2020-08-04 RX ORDER — KETOROLAC TROMETHAMINE 10 MG/1
10 TABLET, FILM COATED ORAL
COMMUNITY
Start: 2020-06-11 | End: 2021-08-08

## 2020-08-04 NOTE — PROGRESS NOTES
"SUBJECTIVE  Wilberto Hayes Jr. is 80 y.o. male  Corrected distance visual acuity was 20/40 -2 in the right eye and 20/50 in the left eye.   Chief Complaint   Patient presents with    Glaucoma          HPI     Pt here for HVF GOCT chk. No pain, but pt states he has been suffering   with dry eye sxs. He says the Systane Ultra helps some, but not entirely.   Pt states he could use a new glasses Rx. 100% compliant with gtts.     1. Mild COAG Goal =17-18  +Fhx Glaucoma -g-dad  SLT OD 11/09 (min response) + 1/8/20 (22.5-14.5)  SLT OS 8/09 (22 to 16-17) + 1/8/20 (24.5-14)  2. Mild NSC  3. Dry eyes (no dry mouth)  Gel Plugs OU  Didn't refill Xiidra due to cost  Tried Restasis (burning and blurry vision)  4. Guttata  5. Ptosis OD    Latanoprost QHS OU    Homeopathic AT"s (Similasan)  Thera tears, Soothe XP  O3FO    Last edited by Dashawn Kuhn, Patient Care Assistant on 8/4/2020  9:19   AM. (History)         Assessment /Plan :  1. Primary open angle glaucoma of both eyes, mild stage Doing well, IOP within acceptable range relative to target IOP and no evidence of progression. Continue current treatment. Reviewed importance of continued compliance with treatment and follow up.     2. Nuclear sclerosis of both eyes monitor for now   3. Cortical cataract of left eye monitor for now   4. Dry eyes, bilateral continue the use of the artificial tears OU prn   5. Blepharitis of upper and lower eyelids of both eyes, unspecified type Findings and symptoms consistent with blepharitis. The blepharitis instruction sheet was reviewed with the pt, recommending:Warm compresses, Gentle Lid Scrubs and Olin 3 Fish Oils 9889-8199 mg po bid     6. Ptosis of eyelid, right monitor for now   7.      Refractive Error Trifocal rx    Return to clinic in 4 months  or as needed.  With IOP Check          "

## 2020-08-14 ENCOUNTER — TELEPHONE (OUTPATIENT)
Dept: RADIOLOGY | Facility: HOSPITAL | Age: 80
End: 2020-08-14

## 2020-08-17 ENCOUNTER — HOSPITAL ENCOUNTER (OUTPATIENT)
Dept: RADIOLOGY | Facility: HOSPITAL | Age: 80
Discharge: HOME | End: 2020-08-17
Attending: FAMILY MEDICINE
Payer: MEDICARE

## 2020-08-17 DIAGNOSIS — K76.89 HEPATIC CYST: ICD-10-CM

## 2020-08-17 PROCEDURE — 76705 ECHO EXAM OF ABDOMEN: CPT | Mod: 26,,, | Performed by: RADIOLOGY

## 2020-08-17 PROCEDURE — 76705 ECHO EXAM OF ABDOMEN: CPT | Mod: TC

## 2020-08-17 PROCEDURE — 76705 US ABDOMEN LIMITED: ICD-10-PCS | Mod: 26,,, | Performed by: RADIOLOGY

## 2020-09-18 ENCOUNTER — PATIENT MESSAGE (OUTPATIENT)
Dept: CARDIOLOGY | Facility: CLINIC | Age: 80
End: 2020-09-18

## 2020-09-22 ENCOUNTER — PATIENT MESSAGE (OUTPATIENT)
Dept: CARDIOLOGY | Facility: CLINIC | Age: 80
End: 2020-09-22

## 2020-09-30 ENCOUNTER — OFFICE VISIT (OUTPATIENT)
Dept: PULMONOLOGY | Facility: CLINIC | Age: 80
End: 2020-09-30
Payer: MEDICARE

## 2020-09-30 ENCOUNTER — IMMUNIZATION (OUTPATIENT)
Dept: PHARMACY | Facility: CLINIC | Age: 80
End: 2020-09-30
Payer: MEDICARE

## 2020-09-30 VITALS
SYSTOLIC BLOOD PRESSURE: 118 MMHG | HEART RATE: 55 BPM | DIASTOLIC BLOOD PRESSURE: 60 MMHG | BODY MASS INDEX: 30.71 KG/M2 | WEIGHT: 219.38 LBS | OXYGEN SATURATION: 95 % | HEIGHT: 71 IN | RESPIRATION RATE: 20 BRPM

## 2020-09-30 DIAGNOSIS — G47.33 OSA ON CPAP: Primary | Chronic | ICD-10-CM

## 2020-09-30 DIAGNOSIS — I25.10 CORONARY ARTERY DISEASE INVOLVING NATIVE CORONARY ARTERY OF NATIVE HEART WITHOUT ANGINA PECTORIS: Chronic | ICD-10-CM

## 2020-09-30 DIAGNOSIS — I10 ESSENTIAL HYPERTENSION: ICD-10-CM

## 2020-09-30 DIAGNOSIS — I70.211 ATHEROSCLEROSIS OF NATIVE ARTERY OF RIGHT LOWER EXTREMITY WITH INTERMITTENT CLAUDICATION: Chronic | ICD-10-CM

## 2020-09-30 DIAGNOSIS — E66.9 OBESITY (BMI 30.0-34.9): Chronic | ICD-10-CM

## 2020-09-30 PROCEDURE — 3078F DIAST BP <80 MM HG: CPT | Mod: CPTII,S$GLB,, | Performed by: NURSE PRACTITIONER

## 2020-09-30 PROCEDURE — 3074F PR MOST RECENT SYSTOLIC BLOOD PRESSURE < 130 MM HG: ICD-10-PCS | Mod: CPTII,S$GLB,, | Performed by: NURSE PRACTITIONER

## 2020-09-30 PROCEDURE — 1159F PR MEDICATION LIST DOCUMENTED IN MEDICAL RECORD: ICD-10-PCS | Mod: S$GLB,,, | Performed by: NURSE PRACTITIONER

## 2020-09-30 PROCEDURE — 99999 PR PBB SHADOW E&M-EST. PATIENT-LVL IV: ICD-10-PCS | Mod: PBBFAC,,, | Performed by: NURSE PRACTITIONER

## 2020-09-30 PROCEDURE — 1159F MED LIST DOCD IN RCRD: CPT | Mod: S$GLB,,, | Performed by: NURSE PRACTITIONER

## 2020-09-30 PROCEDURE — 3078F PR MOST RECENT DIASTOLIC BLOOD PRESSURE < 80 MM HG: ICD-10-PCS | Mod: CPTII,S$GLB,, | Performed by: NURSE PRACTITIONER

## 2020-09-30 PROCEDURE — 1101F PR PT FALLS ASSESS DOC 0-1 FALLS W/OUT INJ PAST YR: ICD-10-PCS | Mod: CPTII,S$GLB,, | Performed by: NURSE PRACTITIONER

## 2020-09-30 PROCEDURE — 99214 PR OFFICE/OUTPT VISIT, EST, LEVL IV, 30-39 MIN: ICD-10-PCS | Mod: S$GLB,,, | Performed by: NURSE PRACTITIONER

## 2020-09-30 PROCEDURE — 1101F PT FALLS ASSESS-DOCD LE1/YR: CPT | Mod: CPTII,S$GLB,, | Performed by: NURSE PRACTITIONER

## 2020-09-30 PROCEDURE — 99214 OFFICE O/P EST MOD 30 MIN: CPT | Mod: S$GLB,,, | Performed by: NURSE PRACTITIONER

## 2020-09-30 PROCEDURE — 99999 PR PBB SHADOW E&M-EST. PATIENT-LVL IV: CPT | Mod: PBBFAC,,, | Performed by: NURSE PRACTITIONER

## 2020-09-30 PROCEDURE — 3074F SYST BP LT 130 MM HG: CPT | Mod: CPTII,S$GLB,, | Performed by: NURSE PRACTITIONER

## 2020-09-30 NOTE — PROGRESS NOTES
"  Subjective:      Patient ID: Wilberto Hayes Jr. is a 80 y.o. male.    Chief Complaint: Sleep Apnea    HPI: Wilberto Hayes Jr. is here for follow up for CHELO with yearly CPAP complaince assessment.   He is on CPAP of 8 cmH2O pressure which is optimally controlling sleep apnea with apneic index (AHI) 1.4 events an hour.   He is compliant with CPAP use. Complaince download today reveals 96.7% of days with greater than 4 hours of device use.   Patient reports benefit from CPAP use.  Patient reports no complaints. Nasal pillow mask is tolerated and preferred. He does not like the "smallness" of head gear. But not ready to change.    [place] [facility]    Previous Report Reviewed: lab reports and office notes     Past Medical History: The following portions of the patient's history were reviewed and updated as appropriate:   He  has a past surgical history that includes Coronary artery bypass graft (2002); Argon Trabeculoplasty - OU - Both Eyes (2009); Eye surgery; Vasectomy (1975); lthotripsy (1985); cystoscopy and stone removal via scope (2003); Abscess drainage (Left, 2003); and Tonsillectomy.  His family history includes Alcohol abuse in his father, maternal grandfather, mother, and paternal grandfather; Blindness in his paternal grandfather; COPD in his father and mother; Cancer in his father, mother, and paternal grandmother; Glaucoma in his paternal grandfather; Hypertension in his father and mother; Kidney disease in his maternal grandfather, maternal grandmother, paternal grandfather, and paternal grandmother.  He  reports that he quit smoking about 40 years ago. He has a 10.00 pack-year smoking history. He has never used smokeless tobacco. He reports current alcohol use of about 7.0 standard drinks of alcohol per week. He reports that he does not use drugs.  He has a current medication list which includes the following prescription(s): aspirin, cyanocobalamin, hydrochlorothiazide, ketorolac, latanoprost, " "losartan, lubricant (p-glycol-glycerin), multivit-min/ferrous fumarate, nebivolol, nitroglycerin, ocean nasal, omega-3 fatty acids-vitamin e, saw palmetto, triamcinolone acetonide 0.1%, and zinc gluconate.  He is allergic to niacin preparations; statins-hmg-coa reductase inhibitors; codeine; fosinopril; pravastatin; simvastatin; and sulfa (sulfonamide antibiotics).    The following portions of the patient's history were reviewed and updated as appropriate: allergies, current medications, past family history, past medical history, past social history, past surgical history and problem list.    Review of Systems   Constitutional: Negative for fever, chills, weight loss, weight gain, activity change, appetite change, fatigue and night sweats.   HENT: Negative for postnasal drip, rhinorrhea, sinus pressure, voice change and congestion.    Eyes: Negative for redness and itching.   Respiratory: Negative for snoring, cough, sputum production, chest tightness, shortness of breath, wheezing, orthopnea, asthma nighttime symptoms, dyspnea on extertion, use of rescue inhaler and somnolence.    Cardiovascular: Negative.  Negative for chest pain, palpitations and leg swelling.   Genitourinary: Negative for difficulty urinating and hematuria.   Endocrine: Negative for cold intolerance and heat intolerance.    Musculoskeletal: Negative for arthralgias, gait problem, joint swelling and myalgias.   Skin: Negative.    Gastrointestinal: Negative for nausea, vomiting, abdominal pain and acid reflux.   Neurological: Negative for dizziness, weakness, light-headedness and headaches.   Hematological: Negative for adenopathy. No excessive bruising.   All other systems reviewed and are negative.     Objective:   /60   Pulse (!) 55   Resp 20   Ht 5' 11" (1.803 m)   Wt 99.5 kg (219 lb 5.7 oz)   SpO2 95%   BMI 30.59 kg/m²   Physical Exam  Vitals signs reviewed.   Constitutional:       General: He is not in acute distress.     " Appearance: He is well-developed. He is not ill-appearing or toxic-appearing.   HENT:      Head: Normocephalic and atraumatic.      Right Ear: External ear normal.      Left Ear: External ear normal.      Nose: Nose normal.      Mouth/Throat:      Pharynx: No oropharyngeal exudate.   Eyes:      Conjunctiva/sclera: Conjunctivae normal.   Neck:      Musculoskeletal: Normal range of motion and neck supple.   Cardiovascular:      Rate and Rhythm: Normal rate and regular rhythm.      Heart sounds: Normal heart sounds.   Pulmonary:      Effort: Pulmonary effort is normal.      Breath sounds: Normal breath sounds.   Abdominal:      Palpations: Abdomen is soft.   Skin:     General: Skin is warm and dry.   Neurological:      Mental Status: He is alert and oriented to person, place, and time.   Psychiatric:         Behavior: Behavior normal. Behavior is cooperative.         Thought Content: Thought content normal.         Judgment: Judgment normal.       Personal Diagnostic Review  CPAP download  CPAP 8 cm  Compliance Summary  8/25/2020 - 9/23/2020 (30 days)  Days with Device Usage 29 days  Days without Device Usage 1 day  Percent Days with Device Usage 96.7%  Cumulative Usage 9 days 18 hrs. 47 mins. 3 secs.  Maximum Usage (1 Day) 9 hrs. 53 mins. 15 secs.  Average Usage (All Days) 7 hrs. 49 mins. 34 secs.  Average Usage (Days Used) 8 hrs. 5 mins. 45 secs.  Minimum Usage (1 Day) 5 hrs. 53 mins. 30 secs.  Percent of Days with Usage >= 4 Hours 96.7%  Percent of Days with Usage < 4 Hours 3.3%  Date Range  Total Blower Time 9 days 18 hrs. 47 mins. 13 secs.  CPAP Summary  Average Time in Large Leak Per Day 0 secs.  Average AHI 1.4  CPAP 8.0 cmH2O    Assessment:     1. CHELO on CPAP    2. Obesity (BMI 30.0-34.9)    3. Essential hypertension    4. 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 Coronary artery disease involving native coronary artery of native heart without angina pectoris         Orders Placed This Encounter   Procedures    CPAP/BIPAP SUPPLIES     Benefits and compliant  90 day supply. 4 refills  HME: [facility]     Order Specific Question:   Type of mask:     Answer:   Nasal     Order Specific Question:   Headgear?     Answer:   Yes     Order Specific Question:   Tubing?     Answer:   Yes     Order Specific Question:   Humidifier chamber?     Answer:   Yes     Order Specific Question:   Chin strap?     Answer:   Yes     Order Specific Question:   Filters?     Answer:   Yes     Order Specific Question:   Cushions?     Answer:   Yes     Order Specific Question:   Length of need (1-99 months):     Answer:   99     Plan:     Problem List Items Addressed This Visit     CHELO on CPAP - Primary (Chronic)     Benefits and compliant on CPAP 8 cm   AHI 1.4  [place] 4  Yearly supply order Nasal pillows mask   HME: [facility]   Follow up in one year for compliance download and supply order          Relevant Orders    CPAP/BIPAP SUPPLIES    Obesity (BMI 30.0-34.9) (Chronic)     Encouraged calorie reduction and 30 minutes of exercise daily. Discussed impact of obesity on general health.             Essential hypertension     Controlled, managed by primary care provider          CAD; hx MI; s/p CABG 2v  [id number] (Chronic)     Managed by cardiology, Dr. Valle          Atherosclerosis of native artery of extremity with intermittent claudication (Chronic)     Seen imaging, follow heart healthy diet, managed by cardiology               TIME SPENT WITH PATIENT: Time spent:25 minutes in face to face  discussion concerning diagnosis, prognosis, review of lab and test results, benefits of treatment as well as management of disease, counseling of patient and coordination of care between various health  care providers . Greater than half the time spent was used for coordination of care and counseling of patient.      (DME) - [facility]  Reviewed therapeutic goals for positive airway pressure therapy CPAP  Ideal is  usage 100% of nights for 6 - 8 hours per night. Minimum usage is 70% of night for at least 4 hours per night used.     Follow up in about 1 year (around 9/30/2021) for CPAP 1 year compliance download.

## 2020-09-30 NOTE — ASSESSMENT & PLAN NOTE
Benefits and compliant on CPAP 8 cm   AHI 1.4  [place] 4  Yearly supply order Nasal pillows mask   HME: [facility]   Follow up in one year for compliance download and supply order

## 2020-10-13 ENCOUNTER — PATIENT MESSAGE (OUTPATIENT)
Dept: FAMILY MEDICINE | Facility: CLINIC | Age: 80
End: 2020-10-13

## 2020-10-13 ENCOUNTER — PES CALL (OUTPATIENT)
Dept: ADMINISTRATIVE | Facility: CLINIC | Age: 80
End: 2020-10-13

## 2020-10-27 ENCOUNTER — HOSPITAL ENCOUNTER (OUTPATIENT)
Dept: PULMONOLOGY | Facility: HOSPITAL | Age: 80
Discharge: HOME | End: 2020-10-27
Attending: INTERNAL MEDICINE
Payer: MEDICARE

## 2020-10-27 ENCOUNTER — HOSPITAL ENCOUNTER (OUTPATIENT)
Dept: RADIOLOGY | Facility: HOSPITAL | Age: 80
Discharge: HOME | End: 2020-10-27
Attending: INTERNAL MEDICINE
Payer: MEDICARE

## 2020-10-27 VITALS
WEIGHT: 219 LBS | BODY MASS INDEX: 30.66 KG/M2 | HEART RATE: 58 BPM | SYSTOLIC BLOOD PRESSURE: 148 MMHG | HEIGHT: 71 IN | DIASTOLIC BLOOD PRESSURE: 75 MMHG

## 2020-10-27 DIAGNOSIS — I25.10 CORONARY ARTERY DISEASE INVOLVING NATIVE CORONARY ARTERY OF NATIVE HEART WITHOUT ANGINA PECTORIS: Chronic | ICD-10-CM

## 2020-10-27 LAB
CV STRESS BASE HR: 55 BPM
DIASTOLIC BLOOD PRESSURE: 75 MMHG
NUC REST DIASTOLIC VOLUME INDEX: 120
NUC REST EJECTION FRACTION: 55
NUC REST SYSTOLIC VOLUME INDEX: 54
NUC STRESS DIASTOLIC VOLUME INDEX: 141
NUC STRESS EJECTION FRACTION: 64 %
NUC STRESS SYSTOLIC VOLUME INDEX: 51
OHS CV CPX 85 PERCENT MAX PREDICTED HEART RATE MALE: 121
OHS CV CPX MAX PREDICTED HEART RATE: 142
OHS CV CPX PATIENT IS FEMALE: 0
OHS CV CPX PATIENT IS MALE: 1
OHS CV CPX PEAK DIASTOLIC BLOOD PRESSURE: 75 MMHG
OHS CV CPX PEAK HEAR RATE: 84 BPM
OHS CV CPX PEAK RATE PRESSURE PRODUCT: NORMAL
OHS CV CPX PEAK SYSTOLIC BLOOD PRESSURE: 148 MMHG
OHS CV CPX PERCENT MAX PREDICTED HEART RATE ACHIEVED: 59
OHS CV CPX RATE PRESSURE PRODUCT PRESENTING: 8140
SYSTOLIC BLOOD PRESSURE: 148 MMHG

## 2020-10-27 PROCEDURE — 63600175 PHARM REV CODE 636 W HCPCS: Performed by: INTERNAL MEDICINE

## 2020-10-27 PROCEDURE — A9502 TC99M TETROFOSMIN: HCPCS

## 2020-10-27 RX ORDER — REGADENOSON 0.08 MG/ML
0.4 INJECTION, SOLUTION INTRAVENOUS ONCE
Status: COMPLETED | OUTPATIENT
Start: 2020-10-27 | End: 2020-10-27

## 2020-10-27 RX ADMIN — REGADENOSON 0.4 MG: 0.08 INJECTION, SOLUTION INTRAVENOUS at 11:10

## 2020-10-28 ENCOUNTER — TELEPHONE (OUTPATIENT)
Dept: CARDIOLOGY | Facility: HOSPITAL | Age: 80
End: 2020-10-28

## 2020-10-28 NOTE — TELEPHONE ENCOUNTER
----- Message from Jonel Valle MD sent at 10/27/2020  9:20 PM CDT -----  Stress test is nml  If any questions, can schedule virtual visit soon

## 2020-10-28 NOTE — TELEPHONE ENCOUNTER
Attempted to reach patient with test results which were normal and no answer.LM for patient to contact office if he had any questions after given normal test results or a virtual visit can be scheduled.

## 2020-11-04 ENCOUNTER — PES CALL (OUTPATIENT)
Dept: ADMINISTRATIVE | Facility: CLINIC | Age: 80
End: 2020-11-04

## 2020-11-16 ENCOUNTER — OFFICE VISIT (OUTPATIENT)
Dept: CARDIOLOGY | Facility: CLINIC | Age: 80
End: 2020-11-16
Payer: MEDICARE

## 2020-11-16 VITALS
BODY MASS INDEX: 30.76 KG/M2 | OXYGEN SATURATION: 99 % | DIASTOLIC BLOOD PRESSURE: 82 MMHG | SYSTOLIC BLOOD PRESSURE: 136 MMHG | HEIGHT: 71 IN | HEART RATE: 58 BPM | WEIGHT: 219.69 LBS

## 2020-11-16 DIAGNOSIS — I70.211 ATHEROSCLEROSIS OF NATIVE ARTERY OF RIGHT LOWER EXTREMITY WITH INTERMITTENT CLAUDICATION: Chronic | ICD-10-CM

## 2020-11-16 DIAGNOSIS — G47.33 OSA ON CPAP: Chronic | ICD-10-CM

## 2020-11-16 DIAGNOSIS — E78.00 PURE HYPERCHOLESTEROLEMIA: ICD-10-CM

## 2020-11-16 DIAGNOSIS — I25.10 CORONARY ARTERY DISEASE INVOLVING NATIVE CORONARY ARTERY OF NATIVE HEART WITHOUT ANGINA PECTORIS: Chronic | ICD-10-CM

## 2020-11-16 DIAGNOSIS — I10 ESSENTIAL HYPERTENSION: Primary | ICD-10-CM

## 2020-11-16 DIAGNOSIS — I70.0 CALCIFICATION OF AORTA: Chronic | ICD-10-CM

## 2020-11-16 PROCEDURE — 99214 OFFICE O/P EST MOD 30 MIN: CPT | Mod: S$GLB,,, | Performed by: INTERNAL MEDICINE

## 2020-11-16 PROCEDURE — 1126F AMNT PAIN NOTED NONE PRSNT: CPT | Mod: S$GLB,,, | Performed by: INTERNAL MEDICINE

## 2020-11-16 PROCEDURE — 1159F PR MEDICATION LIST DOCUMENTED IN MEDICAL RECORD: ICD-10-PCS | Mod: S$GLB,,, | Performed by: INTERNAL MEDICINE

## 2020-11-16 PROCEDURE — 1101F PR PT FALLS ASSESS DOC 0-1 FALLS W/OUT INJ PAST YR: ICD-10-PCS | Mod: CPTII,S$GLB,, | Performed by: INTERNAL MEDICINE

## 2020-11-16 PROCEDURE — 3288F PR FALLS RISK ASSESSMENT DOCUMENTED: ICD-10-PCS | Mod: CPTII,S$GLB,, | Performed by: INTERNAL MEDICINE

## 2020-11-16 PROCEDURE — 3288F FALL RISK ASSESSMENT DOCD: CPT | Mod: CPTII,S$GLB,, | Performed by: INTERNAL MEDICINE

## 2020-11-16 PROCEDURE — 1159F MED LIST DOCD IN RCRD: CPT | Mod: S$GLB,,, | Performed by: INTERNAL MEDICINE

## 2020-11-16 PROCEDURE — 99999 PR PBB SHADOW E&M-EST. PATIENT-LVL V: ICD-10-PCS | Mod: PBBFAC,,, | Performed by: INTERNAL MEDICINE

## 2020-11-16 PROCEDURE — 3079F PR MOST RECENT DIASTOLIC BLOOD PRESSURE 80-89 MM HG: ICD-10-PCS | Mod: CPTII,S$GLB,, | Performed by: INTERNAL MEDICINE

## 2020-11-16 PROCEDURE — 99499 RISK ADDL DX/OHS AUDIT: ICD-10-PCS | Mod: S$GLB,,, | Performed by: INTERNAL MEDICINE

## 2020-11-16 PROCEDURE — 99499 UNLISTED E&M SERVICE: CPT | Mod: S$GLB,,, | Performed by: INTERNAL MEDICINE

## 2020-11-16 PROCEDURE — 1101F PT FALLS ASSESS-DOCD LE1/YR: CPT | Mod: CPTII,S$GLB,, | Performed by: INTERNAL MEDICINE

## 2020-11-16 PROCEDURE — 1126F PR PAIN SEVERITY QUANTIFIED, NO PAIN PRESENT: ICD-10-PCS | Mod: S$GLB,,, | Performed by: INTERNAL MEDICINE

## 2020-11-16 PROCEDURE — 3079F DIAST BP 80-89 MM HG: CPT | Mod: CPTII,S$GLB,, | Performed by: INTERNAL MEDICINE

## 2020-11-16 PROCEDURE — 99999 PR PBB SHADOW E&M-EST. PATIENT-LVL V: CPT | Mod: PBBFAC,,, | Performed by: INTERNAL MEDICINE

## 2020-11-16 PROCEDURE — 3075F SYST BP GE 130 - 139MM HG: CPT | Mod: CPTII,S$GLB,, | Performed by: INTERNAL MEDICINE

## 2020-11-16 PROCEDURE — 99214 PR OFFICE/OUTPT VISIT, EST, LEVL IV, 30-39 MIN: ICD-10-PCS | Mod: S$GLB,,, | Performed by: INTERNAL MEDICINE

## 2020-11-16 PROCEDURE — 3075F PR MOST RECENT SYSTOLIC BLOOD PRESS GE 130-139MM HG: ICD-10-PCS | Mod: CPTII,S$GLB,, | Performed by: INTERNAL MEDICINE

## 2020-11-16 NOTE — PROGRESS NOTES
Subjective:   Patient ID:  Wilberto Hayes Jr. is a 80 y.o. male who presents for follow-up of Essential hypertension  NMT normal. Patient denies CP, angina or anginal equivalent.  BP at home 120s/80s  Pt does not want to take cholesterol meds. Risks and benefits explained  Hypertension  This is a chronic problem. The current episode started more than 1 year ago. The problem has been gradually improving since onset. The problem is controlled. Pertinent negatives include no chest pain, palpitations or shortness of breath. Past treatments include beta blockers, angiotensin blockers and diuretics. The current treatment provides moderate improvement. There are no compliance problems.    Coronary Artery Disease  Presents for follow-up visit. Pertinent negatives include no chest pain, chest pressure, chest tightness, dizziness, leg swelling, muscle weakness, palpitations, shortness of breath or weight gain. The symptoms have been stable. Compliance with diet is variable. Compliance with exercise is variable. Compliance with medications is good.       Review of Systems   Constitution: Negative. Negative for weight gain.   HENT: Negative.    Eyes: Negative.    Cardiovascular: Negative.  Negative for chest pain, leg swelling and palpitations.   Respiratory: Negative.  Negative for chest tightness and shortness of breath.    Endocrine: Negative.    Hematologic/Lymphatic: Negative.    Skin: Negative.    Musculoskeletal: Negative for muscle weakness.   Gastrointestinal: Negative.    Genitourinary: Negative.    Neurological: Negative.  Negative for dizziness.   Psychiatric/Behavioral: Negative.    Allergic/Immunologic: Negative.      Family History   Problem Relation Age of Onset    Blindness Paternal Grandfather     Glaucoma Paternal Grandfather     Alcohol abuse Paternal Grandfather     Kidney disease Paternal Grandfather     Cancer Mother         brain    COPD Mother     Alcohol abuse Mother     Hypertension Mother      Cancer Father         lung, breast with left mastectomy    COPD Father     Alcohol abuse Father     Hypertension Father     Kidney disease Maternal Grandmother     Alcohol abuse Maternal Grandfather     Kidney disease Maternal Grandfather     Cancer Paternal Grandmother         Gyn     Kidney disease Paternal Grandmother     Retinal detachment Neg Hx     Macular degeneration Neg Hx     Diabetes Neg Hx     Heart disease Neg Hx     Stroke Neg Hx     Mental illness Neg Hx     Mental retardation Neg Hx      Past Medical History:   Diagnosis Date    ACE-inhibitor cough     Acute coronary syndrome     Angina pectoris     Cataract     Coronary artery disease     Degenerative disc disease, lumbar     Degenerative disc disease, lumbar     Glaucoma     Hyperlipidemia     cholesterol    Hypertension     Kidney stone     last in     Myocardial infarction     Obesity     Peripheral vascular disease     Polyneuropathy     Sleep apnea     Tobacco dependence     resolved quit     Trouble in sleeping      Social History     Socioeconomic History    Marital status:      Spouse name: Not on file    Number of children: Not on file    Years of education: Not on file    Highest education level: Not on file   Occupational History    Not on file   Social Needs    Financial resource strain: Not hard at all    Food insecurity     Worry: Never true     Inability: Never true    Transportation needs     Medical: No     Non-medical: No   Tobacco Use    Smoking status: Former Smoker     Packs/day: 1.00     Years: 10.00     Pack years: 10.00     Quit date: 1980     Years since quittin.6    Smokeless tobacco: Never Used   Substance and Sexual Activity    Alcohol use: Yes     Alcohol/week: 7.0 standard drinks     Types: 7 Glasses of wine per week     Frequency: 4 or more times a week     Drinks per session: 1 or 2     Binge frequency: Never     Comment: nightly wine    Drug  use: No    Sexual activity: Yes     Partners: Female     Birth control/protection: Partner-Vasectomy   Lifestyle    Physical activity     Days per week: 2 days     Minutes per session: 60 min    Stress: Not at all   Relationships    Social connections     Talks on phone: Three times a week     Gets together: Once a week     Attends Orthodoxy service: Not on file     Active member of club or organization: Yes     Attends meetings of clubs or organizations: 1 to 4 times per year     Relationship status:    Other Topics Concern    Not on file   Social History Narrative    . Lives with wife. They had 4 children. Retired  at [employer] and then [employer]. Still drives. Does have a Living Will, signed at time of CABG (2002).      Current Outpatient Medications on File Prior to Visit   Medication Sig Dispense Refill    aspirin 81 mg Tab Take 1 tablet by mouth Daily.      cyanocobalamin (VITAMIN B-12) 100 MCG tablet Take 1 tablet by mouth every other day.       hydroCHLOROthiazide (HYDRODIURIL) 25 MG tablet Take 0.5 tablets (12.5 mg total) by mouth once daily. 45 tablet 3    ketorolac (TORADOL) 10 mg tablet Take 10 mg by mouth.      latanoprost 0.005 % ophthalmic solution PLACE 1 DROP INTO BOTH EYES EVERY EVENING. 2.5 mL 10    losartan (COZAAR) 50 MG tablet TAKE 1 TABLET BY MOUTH TWICE A  tablet 3    LUBRICANT EYE DROPS, GLYC-PG, 1-0.3 % Drop       nebivoloL (BYSTOLIC) 5 MG Tab Take 1 tablet (5 mg total) by mouth once daily. 30 tablet 11    nitroGLYCERIN (NITROSTAT) 0.4 MG SL tablet Place 1 tablet (0.4 mg total) under the tongue every 5 (five) minutes as needed for Chest pain (if no relief seek er trmt). 25 tablet 3    OCEAN NASAL 0.65 % nasal spray       omega-3 fatty acids-vitamin E (FISH OIL) 1,000 mg Cap Take 1 capsule by mouth Daily.      saw palmetto 500 MG capsule Take by mouth 2 (two) times daily.       zinc gluconate 50 mg tablet Take 50 mg by mouth once  daily.      multivit-min/ferrous fumarate (MULTI VITAMIN ORAL)       triamcinolone acetonide 0.1% (KENALOG) 0.1 % cream Apply topically 2 (two) times daily. (Patient not taking: Reported on 8/4/2020) 45 g 0     No current facility-administered medications on file prior to visit.      Review of patient's allergies indicates:   Allergen Reactions    Niacin preparations Other (See Comments)     myalgia    Statins-hmg-coa reductase inhibitors      Muscle weakness    Codeine Other (See Comments) and Swelling     unknown    Fosinopril Other (See Comments)     cough    Pravastatin Other (See Comments)      Muscle pain    Simvastatin Other (See Comments)     Muscle pain    Sulfa (sulfonamide antibiotics) Rash and Hives       Objective:     Physical Exam   Constitutional: He is oriented to person, place, and time. He appears well-developed and well-nourished.   HENT:   Head: Normocephalic and atraumatic.   Eyes: Pupils are equal, round, and reactive to light. Conjunctivae are normal.   Neck: Normal range of motion. Neck supple.   Cardiovascular: Normal rate, regular rhythm, normal heart sounds and intact distal pulses.   Pulmonary/Chest: Effort normal and breath sounds normal.   Abdominal: Soft. Bowel sounds are normal.   Neurological: He is alert and oriented to person, place, and time.   Skin: Skin is warm and dry.   Psychiatric: He has a normal mood and affect.   Nursing note and vitals reviewed.      Assessment:     1. Essential hypertension    2. Pure hypercholesterolemia    3. Coronary artery disease involving native coronary artery of native heart without angina pectoris    4. 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 Calcification of aorta    6. CHELO on CPAP        Plan:     Essential hypertension    Pure hypercholesterolemia    Coronary artery disease involving native coronary artery of native heart without angina pectoris    Atherosclerosis of native  artery of right lower extremity with intermittent claudication    Calcification of aorta    CHELO on CPAP      continue  losartan, hctz , b blocker -htn  Continue asa- cad  Check lipids

## 2021-01-19 ENCOUNTER — PATIENT MESSAGE (OUTPATIENT)
Dept: FAMILY MEDICINE | Facility: CLINIC | Age: 81
End: 2021-01-19

## 2021-01-19 ENCOUNTER — HOSPITAL ENCOUNTER (OUTPATIENT)
Dept: RADIOLOGY | Facility: HOSPITAL | Age: 81
Discharge: HOME | End: 2021-01-19
Attending: FAMILY MEDICINE
Payer: MEDICARE

## 2021-01-19 ENCOUNTER — OFFICE VISIT (OUTPATIENT)
Dept: FAMILY MEDICINE | Facility: CLINIC | Age: 81
End: 2021-01-19
Payer: MEDICARE

## 2021-01-19 VITALS
WEIGHT: 219 LBS | RESPIRATION RATE: 16 BRPM | BODY MASS INDEX: 30.66 KG/M2 | DIASTOLIC BLOOD PRESSURE: 80 MMHG | SYSTOLIC BLOOD PRESSURE: 126 MMHG | HEIGHT: 71 IN | HEART RATE: 89 BPM | TEMPERATURE: 98 F | OXYGEN SATURATION: 97 %

## 2021-01-19 DIAGNOSIS — G47.33 OSA ON CPAP: ICD-10-CM

## 2021-01-19 DIAGNOSIS — I25.10 CORONARY ARTERY DISEASE INVOLVING NATIVE CORONARY ARTERY OF NATIVE HEART WITHOUT ANGINA PECTORIS: ICD-10-CM

## 2021-01-19 DIAGNOSIS — R05.9 COUGH: ICD-10-CM

## 2021-01-19 DIAGNOSIS — E53.8 B12 DEFICIENCY: ICD-10-CM

## 2021-01-19 DIAGNOSIS — I70.219 ATHEROSCLEROSIS OF NATIVE ARTERY OF EXTREMITY WITH INTERMITTENT CLAUDICATION, UNSPECIFIED EXTREMITY: ICD-10-CM

## 2021-01-19 DIAGNOSIS — I10 ESSENTIAL HYPERTENSION: ICD-10-CM

## 2021-01-19 DIAGNOSIS — E78.2 MIXED HYPERLIPIDEMIA: ICD-10-CM

## 2021-01-19 DIAGNOSIS — T46.6X5A STATIN MYOPATHY: ICD-10-CM

## 2021-01-19 DIAGNOSIS — Z11.59 NEED FOR HEPATITIS C SCREENING TEST: ICD-10-CM

## 2021-01-19 DIAGNOSIS — G72.0 STATIN MYOPATHY: ICD-10-CM

## 2021-01-19 DIAGNOSIS — Z00.00 ANNUAL PHYSICAL EXAM: Primary | ICD-10-CM

## 2021-01-19 PROCEDURE — 1126F PR PAIN SEVERITY QUANTIFIED, NO PAIN PRESENT: ICD-10-PCS | Mod: S$GLB,,, | Performed by: FAMILY MEDICINE

## 2021-01-19 PROCEDURE — 1126F AMNT PAIN NOTED NONE PRSNT: CPT | Mod: S$GLB,,, | Performed by: FAMILY MEDICINE

## 2021-01-19 PROCEDURE — 99999 PR PBB SHADOW E&M-EST. PATIENT-LVL V: CPT | Mod: PBBFAC,,, | Performed by: FAMILY MEDICINE

## 2021-01-19 PROCEDURE — 3079F PR MOST RECENT DIASTOLIC BLOOD PRESSURE 80-89 MM HG: ICD-10-PCS | Mod: CPTII,S$GLB,, | Performed by: FAMILY MEDICINE

## 2021-01-19 PROCEDURE — 71046 XR CHEST PA AND LATERAL: ICD-10-PCS | Mod: 26,,, | Performed by: RADIOLOGY

## 2021-01-19 PROCEDURE — 99214 OFFICE O/P EST MOD 30 MIN: CPT | Mod: S$GLB,,, | Performed by: FAMILY MEDICINE

## 2021-01-19 PROCEDURE — 3079F DIAST BP 80-89 MM HG: CPT | Mod: CPTII,S$GLB,, | Performed by: FAMILY MEDICINE

## 2021-01-19 PROCEDURE — 1101F PR PT FALLS ASSESS DOC 0-1 FALLS W/OUT INJ PAST YR: ICD-10-PCS | Mod: CPTII,S$GLB,, | Performed by: FAMILY MEDICINE

## 2021-01-19 PROCEDURE — 99214 PR OFFICE/OUTPT VISIT, EST, LEVL IV, 30-39 MIN: ICD-10-PCS | Mod: S$GLB,,, | Performed by: FAMILY MEDICINE

## 2021-01-19 PROCEDURE — U0003 INFECTIOUS AGENT DETECTION BY NUCLEIC ACID (DNA OR RNA); SEVERE ACUTE RESPIRATORY SYNDROME CORONAVIRUS 2 (SARS-COV-2) (CORONAVIRUS DISEASE [COVID-19]), AMPLIFIED PROBE TECHNIQUE, MAKING USE OF HIGH THROUGHPUT TECHNOLOGIES AS DESCRIBED BY CMS-2020-01-R: HCPCS

## 2021-01-19 PROCEDURE — 99499 UNLISTED E&M SERVICE: CPT | Mod: S$GLB,,, | Performed by: FAMILY MEDICINE

## 2021-01-19 PROCEDURE — 3288F FALL RISK ASSESSMENT DOCD: CPT | Mod: CPTII,S$GLB,, | Performed by: FAMILY MEDICINE

## 2021-01-19 PROCEDURE — 99999 PR PBB SHADOW E&M-EST. PATIENT-LVL V: ICD-10-PCS | Mod: PBBFAC,,, | Performed by: FAMILY MEDICINE

## 2021-01-19 PROCEDURE — 1101F PT FALLS ASSESS-DOCD LE1/YR: CPT | Mod: CPTII,S$GLB,, | Performed by: FAMILY MEDICINE

## 2021-01-19 PROCEDURE — 3288F PR FALLS RISK ASSESSMENT DOCUMENTED: ICD-10-PCS | Mod: CPTII,S$GLB,, | Performed by: FAMILY MEDICINE

## 2021-01-19 PROCEDURE — 99499 RISK ADDL DX/OHS AUDIT: ICD-10-PCS | Mod: S$GLB,,, | Performed by: FAMILY MEDICINE

## 2021-01-19 PROCEDURE — 3074F SYST BP LT 130 MM HG: CPT | Mod: CPTII,S$GLB,, | Performed by: FAMILY MEDICINE

## 2021-01-19 PROCEDURE — 3074F PR MOST RECENT SYSTOLIC BLOOD PRESSURE < 130 MM HG: ICD-10-PCS | Mod: CPTII,S$GLB,, | Performed by: FAMILY MEDICINE

## 2021-01-19 PROCEDURE — 71046 X-RAY EXAM CHEST 2 VIEWS: CPT | Mod: TC,PO

## 2021-01-19 PROCEDURE — 71046 X-RAY EXAM CHEST 2 VIEWS: CPT | Mod: 26,,, | Performed by: RADIOLOGY

## 2021-01-21 ENCOUNTER — LAB VISIT (OUTPATIENT)
Dept: LAB | Facility: HOSPITAL | Age: 81
End: 2021-01-21
Attending: FAMILY MEDICINE
Payer: MEDICARE

## 2021-01-21 DIAGNOSIS — Z11.59 NEED FOR HEPATITIS C SCREENING TEST: ICD-10-CM

## 2021-01-21 DIAGNOSIS — E53.8 B12 DEFICIENCY: ICD-10-CM

## 2021-01-21 DIAGNOSIS — I25.10 CORONARY ARTERY DISEASE INVOLVING NATIVE CORONARY ARTERY OF NATIVE HEART WITHOUT ANGINA PECTORIS: ICD-10-CM

## 2021-01-21 DIAGNOSIS — I10 ESSENTIAL HYPERTENSION: ICD-10-CM

## 2021-01-21 LAB
BASOPHILS # BLD AUTO: 0.04 K/UL (ref 0–0.2)
BASOPHILS NFR BLD: 0.9 % (ref 0–1.9)
DIFFERENTIAL METHOD: ABNORMAL
EOSINOPHIL # BLD AUTO: 0.2 K/UL (ref 0–0.5)
EOSINOPHIL NFR BLD: 3.6 % (ref 0–8)
ERYTHROCYTE [DISTWIDTH] IN BLOOD BY AUTOMATED COUNT: 12.9 % (ref 11.5–14.5)
HCT VFR BLD AUTO: 43.1 % (ref 40–54)
HGB BLD-MCNC: 14.5 G/DL (ref 14–18)
IMM GRANULOCYTES # BLD AUTO: 0.01 K/UL (ref 0–0.04)
IMM GRANULOCYTES NFR BLD AUTO: 0.2 % (ref 0–0.5)
LYMPHOCYTES # BLD AUTO: 1 K/UL (ref 1–4.8)
LYMPHOCYTES NFR BLD: 20.9 % (ref 18–48)
MCH RBC QN AUTO: 32.1 PG (ref 27–31)
MCHC RBC AUTO-ENTMCNC: 33.6 G/DL (ref 32–36)
MCV RBC AUTO: 95 FL (ref 82–98)
MONOCYTES # BLD AUTO: 0.7 K/UL (ref 0.3–1)
MONOCYTES NFR BLD: 15.1 % (ref 4–15)
NEUTROPHILS # BLD AUTO: 2.8 K/UL (ref 1.8–7.7)
NEUTROPHILS NFR BLD: 59.3 % (ref 38–73)
NRBC BLD-RTO: 0 /100 WBC
PLATELET # BLD AUTO: 243 K/UL (ref 150–350)
PMV BLD AUTO: 10.8 FL (ref 9.2–12.9)
RBC # BLD AUTO: 4.52 M/UL (ref 4.6–6.2)
SARS-COV-2 RNA RESP QL NAA+PROBE: NOT DETECTED
WBC # BLD AUTO: 4.69 K/UL (ref 3.9–12.7)

## 2021-01-21 PROCEDURE — 84443 ASSAY THYROID STIM HORMONE: CPT

## 2021-01-21 PROCEDURE — 80061 LIPID PANEL: CPT

## 2021-01-21 PROCEDURE — 80053 COMPREHEN METABOLIC PANEL: CPT

## 2021-01-21 PROCEDURE — 36415 COLL VENOUS BLD VENIPUNCTURE: CPT | Mod: PO

## 2021-01-21 PROCEDURE — 87522 HEPATITIS C REVRS TRNSCRPJ: CPT

## 2021-01-21 PROCEDURE — 82607 VITAMIN B-12: CPT

## 2021-01-21 PROCEDURE — 85025 COMPLETE CBC W/AUTO DIFF WBC: CPT

## 2021-01-22 LAB
ALBUMIN SERPL BCP-MCNC: 4 G/DL (ref 3.5–5.2)
ALP SERPL-CCNC: 89 U/L (ref 55–135)
ALT SERPL W/O P-5'-P-CCNC: 5 U/L (ref 10–44)
ANION GAP SERPL CALC-SCNC: 13 MMOL/L (ref 8–16)
AST SERPL-CCNC: 19 U/L (ref 10–40)
BILIRUB SERPL-MCNC: 0.6 MG/DL (ref 0.1–1)
BUN SERPL-MCNC: 16 MG/DL (ref 8–23)
CALCIUM SERPL-MCNC: 9.8 MG/DL (ref 8.7–10.5)
CHLORIDE SERPL-SCNC: 104 MMOL/L (ref 95–110)
CHOLEST SERPL-MCNC: 141 MG/DL (ref 120–199)
CHOLEST/HDLC SERPL: 4.3 {RATIO} (ref 2–5)
CO2 SERPL-SCNC: 24 MMOL/L (ref 23–29)
CREAT SERPL-MCNC: 1.2 MG/DL (ref 0.5–1.4)
EST. GFR  (AFRICAN AMERICAN): >60 ML/MIN/1.73 M^2
EST. GFR  (NON AFRICAN AMERICAN): 57.2 ML/MIN/1.73 M^2
GLUCOSE SERPL-MCNC: 97 MG/DL (ref 70–110)
HDLC SERPL-MCNC: 33 MG/DL (ref 40–75)
HDLC SERPL: 23.4 % (ref 20–50)
LDLC SERPL CALC-MCNC: 85.8 MG/DL (ref 63–159)
NONHDLC SERPL-MCNC: 108 MG/DL
POTASSIUM SERPL-SCNC: 4.1 MMOL/L (ref 3.5–5.1)
PROT SERPL-MCNC: 7.5 G/DL (ref 6–8.4)
SODIUM SERPL-SCNC: 141 MMOL/L (ref 136–145)
TRIGL SERPL-MCNC: 111 MG/DL (ref 30–150)
TSH SERPL DL<=0.005 MIU/L-ACNC: 2.31 UIU/ML (ref 0.4–4)
VIT B12 SERPL-MCNC: 813 PG/ML (ref 210–950)

## 2021-01-25 LAB
HCV RNA SERPL NAA+PROBE-LOG IU: <1.08 LOG (10) IU/ML
HCV RNA SERPL QL NAA+PROBE: NOT DETECTED IU/ML
HCV RNA SPEC NAA+PROBE-ACNC: <12 IU/ML

## 2021-02-01 ENCOUNTER — PATIENT OUTREACH (OUTPATIENT)
Dept: ADMINISTRATIVE | Facility: OTHER | Age: 81
End: 2021-02-01

## 2021-02-02 ENCOUNTER — OFFICE VISIT (OUTPATIENT)
Dept: OPHTHALMOLOGY | Facility: CLINIC | Age: 81
End: 2021-02-02
Payer: MEDICARE

## 2021-02-02 DIAGNOSIS — H25.13 NUCLEAR SCLEROSIS OF BOTH EYES: ICD-10-CM

## 2021-02-02 DIAGNOSIS — H26.9 CORTICAL CATARACT OF LEFT EYE: ICD-10-CM

## 2021-02-02 DIAGNOSIS — H40.1131 PRIMARY OPEN ANGLE GLAUCOMA OF BOTH EYES, MILD STAGE: Primary | ICD-10-CM

## 2021-02-02 PROCEDURE — 92012 PR EYE EXAM, EST PATIENT,INTERMED: ICD-10-PCS | Mod: S$GLB,,, | Performed by: OPHTHALMOLOGY

## 2021-02-02 PROCEDURE — 92012 INTRM OPH EXAM EST PATIENT: CPT | Mod: S$GLB,,, | Performed by: OPHTHALMOLOGY

## 2021-02-02 PROCEDURE — 99999 PR PBB SHADOW E&M-EST. PATIENT-LVL III: ICD-10-PCS | Mod: PBBFAC,,, | Performed by: OPHTHALMOLOGY

## 2021-02-02 PROCEDURE — 99999 PR PBB SHADOW E&M-EST. PATIENT-LVL III: CPT | Mod: PBBFAC,,, | Performed by: OPHTHALMOLOGY

## 2021-03-05 ENCOUNTER — IMMUNIZATION (OUTPATIENT)
Dept: INTERNAL MEDICINE | Facility: CLINIC | Age: 81
End: 2021-03-05
Payer: MEDICARE

## 2021-03-05 DIAGNOSIS — Z23 NEED FOR VACCINATION: Primary | ICD-10-CM

## 2021-03-05 PROCEDURE — 91300 COVID-19, MRNA, LNP-S, PF, 30 MCG/0.3 ML DOSE VACCINE: CPT | Mod: PBBFAC | Performed by: FAMILY MEDICINE

## 2021-03-26 ENCOUNTER — IMMUNIZATION (OUTPATIENT)
Dept: INTERNAL MEDICINE | Facility: CLINIC | Age: 81
End: 2021-03-26

## 2021-03-26 DIAGNOSIS — Z23 NEED FOR VACCINATION: Primary | ICD-10-CM

## 2021-03-26 PROCEDURE — 91300 COVID-19, MRNA, LNP-S, PF, 30 MCG/0.3 ML DOSE VACCINE: ICD-10-PCS | Mod: S$GLB,,, | Performed by: FAMILY MEDICINE

## 2021-03-26 PROCEDURE — 91300 COVID-19, MRNA, LNP-S, PF, 30 MCG/0.3 ML DOSE VACCINE: CPT | Mod: S$GLB,,, | Performed by: FAMILY MEDICINE

## 2021-03-26 PROCEDURE — 0002A COVID-19, MRNA, LNP-S, PF, 30 MCG/0.3 ML DOSE VACCINE: ICD-10-PCS | Mod: CV19,S$GLB,, | Performed by: FAMILY MEDICINE

## 2021-03-26 PROCEDURE — 0002A COVID-19, MRNA, LNP-S, PF, 30 MCG/0.3 ML DOSE VACCINE: CPT | Mod: CV19,S$GLB,, | Performed by: FAMILY MEDICINE

## 2021-05-17 ENCOUNTER — OFFICE VISIT (OUTPATIENT)
Dept: CARDIOLOGY | Facility: CLINIC | Age: 81
End: 2021-05-17
Payer: MEDICARE

## 2021-05-17 VITALS
DIASTOLIC BLOOD PRESSURE: 66 MMHG | OXYGEN SATURATION: 99 % | HEIGHT: 71 IN | WEIGHT: 223.31 LBS | SYSTOLIC BLOOD PRESSURE: 132 MMHG | HEART RATE: 56 BPM | RESPIRATION RATE: 16 BRPM | BODY MASS INDEX: 31.26 KG/M2

## 2021-05-17 DIAGNOSIS — E78.00 PURE HYPERCHOLESTEROLEMIA: ICD-10-CM

## 2021-05-17 DIAGNOSIS — G47.33 OSA ON CPAP: Chronic | ICD-10-CM

## 2021-05-17 DIAGNOSIS — I10 ESSENTIAL HYPERTENSION: Primary | ICD-10-CM

## 2021-05-17 DIAGNOSIS — I70.0 CALCIFICATION OF AORTA: Chronic | ICD-10-CM

## 2021-05-17 DIAGNOSIS — I70.211 ATHEROSCLEROSIS OF NATIVE ARTERY OF RIGHT LOWER EXTREMITY WITH INTERMITTENT CLAUDICATION: Chronic | ICD-10-CM

## 2021-05-17 DIAGNOSIS — I25.10 CORONARY ARTERY DISEASE INVOLVING NATIVE CORONARY ARTERY OF NATIVE HEART WITHOUT ANGINA PECTORIS: Chronic | ICD-10-CM

## 2021-05-17 PROCEDURE — 1126F PR PAIN SEVERITY QUANTIFIED, NO PAIN PRESENT: ICD-10-PCS | Mod: S$GLB,,, | Performed by: INTERNAL MEDICINE

## 2021-05-17 PROCEDURE — 99214 OFFICE O/P EST MOD 30 MIN: CPT | Mod: S$GLB,,, | Performed by: INTERNAL MEDICINE

## 2021-05-17 PROCEDURE — 3288F PR FALLS RISK ASSESSMENT DOCUMENTED: ICD-10-PCS | Mod: CPTII,S$GLB,, | Performed by: INTERNAL MEDICINE

## 2021-05-17 PROCEDURE — 99499 UNLISTED E&M SERVICE: CPT | Mod: S$GLB,,, | Performed by: INTERNAL MEDICINE

## 2021-05-17 PROCEDURE — 99999 PR PBB SHADOW E&M-EST. PATIENT-LVL IV: CPT | Mod: PBBFAC,,, | Performed by: INTERNAL MEDICINE

## 2021-05-17 PROCEDURE — 1159F MED LIST DOCD IN RCRD: CPT | Mod: S$GLB,,, | Performed by: INTERNAL MEDICINE

## 2021-05-17 PROCEDURE — 1101F PR PT FALLS ASSESS DOC 0-1 FALLS W/OUT INJ PAST YR: ICD-10-PCS | Mod: CPTII,S$GLB,, | Performed by: INTERNAL MEDICINE

## 2021-05-17 PROCEDURE — 3078F PR MOST RECENT DIASTOLIC BLOOD PRESSURE < 80 MM HG: ICD-10-PCS | Mod: CPTII,S$GLB,, | Performed by: INTERNAL MEDICINE

## 2021-05-17 PROCEDURE — 1101F PT FALLS ASSESS-DOCD LE1/YR: CPT | Mod: CPTII,S$GLB,, | Performed by: INTERNAL MEDICINE

## 2021-05-17 PROCEDURE — 1126F AMNT PAIN NOTED NONE PRSNT: CPT | Mod: S$GLB,,, | Performed by: INTERNAL MEDICINE

## 2021-05-17 PROCEDURE — 3075F SYST BP GE 130 - 139MM HG: CPT | Mod: CPTII,S$GLB,, | Performed by: INTERNAL MEDICINE

## 2021-05-17 PROCEDURE — 3078F DIAST BP <80 MM HG: CPT | Mod: CPTII,S$GLB,, | Performed by: INTERNAL MEDICINE

## 2021-05-17 PROCEDURE — 99499 RISK ADDL DX/OHS AUDIT: ICD-10-PCS | Mod: S$GLB,,, | Performed by: INTERNAL MEDICINE

## 2021-05-17 PROCEDURE — 3288F FALL RISK ASSESSMENT DOCD: CPT | Mod: CPTII,S$GLB,, | Performed by: INTERNAL MEDICINE

## 2021-05-17 PROCEDURE — 1159F PR MEDICATION LIST DOCUMENTED IN MEDICAL RECORD: ICD-10-PCS | Mod: S$GLB,,, | Performed by: INTERNAL MEDICINE

## 2021-05-17 PROCEDURE — 99214 PR OFFICE/OUTPT VISIT, EST, LEVL IV, 30-39 MIN: ICD-10-PCS | Mod: S$GLB,,, | Performed by: INTERNAL MEDICINE

## 2021-05-17 PROCEDURE — 99999 PR PBB SHADOW E&M-EST. PATIENT-LVL IV: ICD-10-PCS | Mod: PBBFAC,,, | Performed by: INTERNAL MEDICINE

## 2021-05-17 PROCEDURE — 3075F PR MOST RECENT SYSTOLIC BLOOD PRESS GE 130-139MM HG: ICD-10-PCS | Mod: CPTII,S$GLB,, | Performed by: INTERNAL MEDICINE

## 2021-05-17 RX ORDER — DILTIAZEM HYDROCHLORIDE 30 MG/1
30 TABLET, FILM COATED ORAL EVERY 12 HOURS
Qty: 60 TABLET | Refills: 11 | Status: SHIPPED | OUTPATIENT
Start: 2021-05-17 | End: 2021-08-08

## 2021-05-17 RX ORDER — UBIDECARENONE 30 MG
100 CAPSULE ORAL DAILY
COMMUNITY

## 2021-06-08 ENCOUNTER — PATIENT MESSAGE (OUTPATIENT)
Dept: CARDIOLOGY | Facility: CLINIC | Age: 81
End: 2021-06-08

## 2021-07-06 ENCOUNTER — OFFICE VISIT (OUTPATIENT)
Dept: OPHTHALMOLOGY | Facility: CLINIC | Age: 81
End: 2021-07-06
Payer: MEDICARE

## 2021-07-06 DIAGNOSIS — H04.123 DRY EYES, BILATERAL: ICD-10-CM

## 2021-07-06 DIAGNOSIS — H02.401 PTOSIS OF EYELID, RIGHT: ICD-10-CM

## 2021-07-06 DIAGNOSIS — H25.13 NUCLEAR SCLEROSIS OF BOTH EYES: ICD-10-CM

## 2021-07-06 DIAGNOSIS — H40.1131 PRIMARY OPEN ANGLE GLAUCOMA OF BOTH EYES, MILD STAGE: Primary | ICD-10-CM

## 2021-07-06 DIAGNOSIS — H26.9 CORTICAL CATARACT OF LEFT EYE: ICD-10-CM

## 2021-07-06 PROCEDURE — 1159F MED LIST DOCD IN RCRD: CPT | Mod: S$GLB,,, | Performed by: OPHTHALMOLOGY

## 2021-07-06 PROCEDURE — 99214 PR OFFICE/OUTPT VISIT, EST, LEVL IV, 30-39 MIN: ICD-10-PCS | Mod: S$GLB,,, | Performed by: OPHTHALMOLOGY

## 2021-07-06 PROCEDURE — 99999 PR PBB SHADOW E&M-EST. PATIENT-LVL III: ICD-10-PCS | Mod: PBBFAC,,, | Performed by: OPHTHALMOLOGY

## 2021-07-06 PROCEDURE — 99499 UNLISTED E&M SERVICE: CPT | Mod: S$GLB,,, | Performed by: OPHTHALMOLOGY

## 2021-07-06 PROCEDURE — 99214 OFFICE O/P EST MOD 30 MIN: CPT | Mod: S$GLB,,, | Performed by: OPHTHALMOLOGY

## 2021-07-06 PROCEDURE — 99499 RISK ADDL DX/OHS AUDIT: ICD-10-PCS | Mod: S$GLB,,, | Performed by: OPHTHALMOLOGY

## 2021-07-06 PROCEDURE — 99999 PR PBB SHADOW E&M-EST. PATIENT-LVL III: CPT | Mod: PBBFAC,,, | Performed by: OPHTHALMOLOGY

## 2021-07-06 PROCEDURE — 1159F PR MEDICATION LIST DOCUMENTED IN MEDICAL RECORD: ICD-10-PCS | Mod: S$GLB,,, | Performed by: OPHTHALMOLOGY

## 2021-07-12 ENCOUNTER — OFFICE VISIT (OUTPATIENT)
Dept: OTOLARYNGOLOGY | Facility: CLINIC | Age: 81
End: 2021-07-12
Payer: MEDICARE

## 2021-07-12 VITALS
DIASTOLIC BLOOD PRESSURE: 55 MMHG | SYSTOLIC BLOOD PRESSURE: 137 MMHG | WEIGHT: 222.69 LBS | BODY MASS INDEX: 31.06 KG/M2 | HEART RATE: 56 BPM | TEMPERATURE: 98 F

## 2021-07-12 DIAGNOSIS — H93.13 TINNITUS, BILATERAL: Primary | ICD-10-CM

## 2021-07-12 PROCEDURE — 1159F MED LIST DOCD IN RCRD: CPT | Mod: S$GLB,,, | Performed by: ORTHOPAEDIC SURGERY

## 2021-07-12 PROCEDURE — 1159F PR MEDICATION LIST DOCUMENTED IN MEDICAL RECORD: ICD-10-PCS | Mod: S$GLB,,, | Performed by: ORTHOPAEDIC SURGERY

## 2021-07-12 PROCEDURE — 99999 PR PBB SHADOW E&M-EST. PATIENT-LVL IV: CPT | Mod: PBBFAC,,, | Performed by: ORTHOPAEDIC SURGERY

## 2021-07-12 PROCEDURE — 1126F AMNT PAIN NOTED NONE PRSNT: CPT | Mod: S$GLB,,, | Performed by: ORTHOPAEDIC SURGERY

## 2021-07-12 PROCEDURE — 99203 OFFICE O/P NEW LOW 30 MIN: CPT | Mod: S$GLB,,, | Performed by: ORTHOPAEDIC SURGERY

## 2021-07-12 PROCEDURE — 99203 PR OFFICE/OUTPT VISIT, NEW, LEVL III, 30-44 MIN: ICD-10-PCS | Mod: S$GLB,,, | Performed by: ORTHOPAEDIC SURGERY

## 2021-07-12 PROCEDURE — 1126F PR PAIN SEVERITY QUANTIFIED, NO PAIN PRESENT: ICD-10-PCS | Mod: S$GLB,,, | Performed by: ORTHOPAEDIC SURGERY

## 2021-07-12 PROCEDURE — 99999 PR PBB SHADOW E&M-EST. PATIENT-LVL IV: ICD-10-PCS | Mod: PBBFAC,,, | Performed by: ORTHOPAEDIC SURGERY

## 2021-07-13 ENCOUNTER — PATIENT MESSAGE (OUTPATIENT)
Dept: PULMONOLOGY | Facility: CLINIC | Age: 81
End: 2021-07-13

## 2021-07-14 ENCOUNTER — CLINICAL SUPPORT (OUTPATIENT)
Dept: AUDIOLOGY | Facility: CLINIC | Age: 81
End: 2021-07-14
Payer: MEDICARE

## 2021-07-14 DIAGNOSIS — H90.3 SENSORINEURAL HEARING LOSS, BILATERAL: Primary | ICD-10-CM

## 2021-07-14 DIAGNOSIS — H93.13 TINNITUS, BILATERAL: ICD-10-CM

## 2021-07-14 PROCEDURE — 92557 COMPREHENSIVE HEARING TEST: CPT | Mod: S$GLB,,, | Performed by: AUDIOLOGIST-HEARING AID FITTER

## 2021-07-14 PROCEDURE — 92567 TYMPANOMETRY: CPT | Mod: S$GLB,,, | Performed by: AUDIOLOGIST-HEARING AID FITTER

## 2021-07-14 PROCEDURE — 92557 PR COMPREHENSIVE HEARING TEST: ICD-10-PCS | Mod: S$GLB,,, | Performed by: AUDIOLOGIST-HEARING AID FITTER

## 2021-07-14 PROCEDURE — 92567 PR TYMPA2METRY: ICD-10-PCS | Mod: S$GLB,,, | Performed by: AUDIOLOGIST-HEARING AID FITTER

## 2021-07-15 ENCOUNTER — PATIENT MESSAGE (OUTPATIENT)
Dept: AUDIOLOGY | Facility: CLINIC | Age: 81
End: 2021-07-15

## 2021-08-08 ENCOUNTER — OFFICE VISIT (OUTPATIENT)
Dept: URGENT CARE | Facility: CLINIC | Age: 81
End: 2021-08-08
Payer: MEDICARE

## 2021-08-08 VITALS
BODY MASS INDEX: 30.86 KG/M2 | TEMPERATURE: 102 F | SYSTOLIC BLOOD PRESSURE: 143 MMHG | OXYGEN SATURATION: 98 % | HEART RATE: 72 BPM | DIASTOLIC BLOOD PRESSURE: 71 MMHG | WEIGHT: 221.25 LBS

## 2021-08-08 DIAGNOSIS — R68.89 FLU-LIKE SYMPTOMS: ICD-10-CM

## 2021-08-08 DIAGNOSIS — I10 ESSENTIAL HYPERTENSION: ICD-10-CM

## 2021-08-08 DIAGNOSIS — U07.1 COVID-19 VIRUS DETECTED: ICD-10-CM

## 2021-08-08 DIAGNOSIS — R50.9 FEVER AND CHILLS: ICD-10-CM

## 2021-08-08 DIAGNOSIS — R05.9 COUGH: Primary | ICD-10-CM

## 2021-08-08 LAB
CTP QC/QA: YES
SARS-COV-2 RDRP RESP QL NAA+PROBE: POSITIVE

## 2021-08-08 PROCEDURE — 3077F SYST BP >= 140 MM HG: CPT | Mod: CPTII,S$GLB,, | Performed by: NURSE PRACTITIONER

## 2021-08-08 PROCEDURE — 3288F FALL RISK ASSESSMENT DOCD: CPT | Mod: CPTII,S$GLB,, | Performed by: NURSE PRACTITIONER

## 2021-08-08 PROCEDURE — 99214 PR OFFICE/OUTPT VISIT, EST, LEVL IV, 30-39 MIN: ICD-10-PCS | Mod: S$GLB,,, | Performed by: NURSE PRACTITIONER

## 2021-08-08 PROCEDURE — 1125F PR PAIN SEVERITY QUANTIFIED, PAIN PRESENT: ICD-10-PCS | Mod: CPTII,S$GLB,, | Performed by: NURSE PRACTITIONER

## 2021-08-08 PROCEDURE — 99999 PR PBB SHADOW E&M-EST. PATIENT-LVL V: ICD-10-PCS | Mod: PBBFAC,,, | Performed by: NURSE PRACTITIONER

## 2021-08-08 PROCEDURE — 1159F PR MEDICATION LIST DOCUMENTED IN MEDICAL RECORD: ICD-10-PCS | Mod: CPTII,S$GLB,, | Performed by: NURSE PRACTITIONER

## 2021-08-08 PROCEDURE — U0002 COVID-19 LAB TEST NON-CDC: HCPCS | Mod: QW,S$GLB,, | Performed by: NURSE PRACTITIONER

## 2021-08-08 PROCEDURE — 3077F PR MOST RECENT SYSTOLIC BLOOD PRESSURE >= 140 MM HG: ICD-10-PCS | Mod: CPTII,S$GLB,, | Performed by: NURSE PRACTITIONER

## 2021-08-08 PROCEDURE — 1159F MED LIST DOCD IN RCRD: CPT | Mod: CPTII,S$GLB,, | Performed by: NURSE PRACTITIONER

## 2021-08-08 PROCEDURE — U0002: ICD-10-PCS | Mod: QW,S$GLB,, | Performed by: NURSE PRACTITIONER

## 2021-08-08 PROCEDURE — 1160F RVW MEDS BY RX/DR IN RCRD: CPT | Mod: CPTII,S$GLB,, | Performed by: NURSE PRACTITIONER

## 2021-08-08 PROCEDURE — 3288F PR FALLS RISK ASSESSMENT DOCUMENTED: ICD-10-PCS | Mod: CPTII,S$GLB,, | Performed by: NURSE PRACTITIONER

## 2021-08-08 PROCEDURE — 3078F DIAST BP <80 MM HG: CPT | Mod: CPTII,S$GLB,, | Performed by: NURSE PRACTITIONER

## 2021-08-08 PROCEDURE — 99999 PR PBB SHADOW E&M-EST. PATIENT-LVL V: CPT | Mod: PBBFAC,,, | Performed by: NURSE PRACTITIONER

## 2021-08-08 PROCEDURE — 99214 OFFICE O/P EST MOD 30 MIN: CPT | Mod: S$GLB,,, | Performed by: NURSE PRACTITIONER

## 2021-08-08 PROCEDURE — 1101F PR PT FALLS ASSESS DOC 0-1 FALLS W/OUT INJ PAST YR: ICD-10-PCS | Mod: CPTII,S$GLB,, | Performed by: NURSE PRACTITIONER

## 2021-08-08 PROCEDURE — 1125F AMNT PAIN NOTED PAIN PRSNT: CPT | Mod: CPTII,S$GLB,, | Performed by: NURSE PRACTITIONER

## 2021-08-08 PROCEDURE — 3078F PR MOST RECENT DIASTOLIC BLOOD PRESSURE < 80 MM HG: ICD-10-PCS | Mod: CPTII,S$GLB,, | Performed by: NURSE PRACTITIONER

## 2021-08-08 PROCEDURE — 1101F PT FALLS ASSESS-DOCD LE1/YR: CPT | Mod: CPTII,S$GLB,, | Performed by: NURSE PRACTITIONER

## 2021-08-08 PROCEDURE — 1160F PR REVIEW ALL MEDS BY PRESCRIBER/CLIN PHARMACIST DOCUMENTED: ICD-10-PCS | Mod: CPTII,S$GLB,, | Performed by: NURSE PRACTITIONER

## 2021-08-08 RX ORDER — FLUTICASONE PROPIONATE 50 MCG
2 SPRAY, SUSPENSION (ML) NASAL DAILY
Qty: 16 G | Refills: 1 | Status: SHIPPED | OUTPATIENT
Start: 2021-08-08 | End: 2023-06-08

## 2021-08-08 RX ORDER — PROMETHAZINE HYDROCHLORIDE AND DEXTROMETHORPHAN HYDROBROMIDE 6.25; 15 MG/5ML; MG/5ML
5 SYRUP ORAL
Qty: 180 ML | Refills: 0 | Status: SHIPPED | OUTPATIENT
Start: 2021-08-08 | End: 2023-06-08

## 2021-08-19 ENCOUNTER — PATIENT MESSAGE (OUTPATIENT)
Dept: PULMONOLOGY | Facility: CLINIC | Age: 81
End: 2021-08-19

## 2021-08-19 DIAGNOSIS — R53.83 OTHER FATIGUE: Primary | ICD-10-CM

## 2021-08-19 DIAGNOSIS — Z86.16 HISTORY OF COVID-19: ICD-10-CM

## 2021-08-19 DIAGNOSIS — G47.33 OSA ON CPAP: Primary | ICD-10-CM

## 2021-08-23 ENCOUNTER — PATIENT MESSAGE (OUTPATIENT)
Dept: PULMONOLOGY | Facility: CLINIC | Age: 81
End: 2021-08-23

## 2021-09-29 ENCOUNTER — HOSPITAL ENCOUNTER (OUTPATIENT)
Dept: RADIOLOGY | Facility: HOSPITAL | Age: 81
Discharge: HOME | End: 2021-09-29
Attending: NURSE PRACTITIONER
Payer: MEDICARE

## 2021-09-29 ENCOUNTER — OFFICE VISIT (OUTPATIENT)
Dept: PULMONOLOGY | Facility: CLINIC | Age: 81
End: 2021-09-29
Payer: MEDICARE

## 2021-09-29 VITALS
HEIGHT: 71 IN | BODY MASS INDEX: 30.27 KG/M2 | OXYGEN SATURATION: 98 % | SYSTOLIC BLOOD PRESSURE: 108 MMHG | DIASTOLIC BLOOD PRESSURE: 62 MMHG | HEART RATE: 57 BPM | WEIGHT: 216.25 LBS | RESPIRATION RATE: 20 BRPM

## 2021-09-29 DIAGNOSIS — G47.33 OSA ON CPAP: Primary | ICD-10-CM

## 2021-09-29 DIAGNOSIS — I10 ESSENTIAL HYPERTENSION: ICD-10-CM

## 2021-09-29 DIAGNOSIS — R53.83 OTHER FATIGUE: ICD-10-CM

## 2021-09-29 DIAGNOSIS — Z86.16 HISTORY OF COVID-19: ICD-10-CM

## 2021-09-29 DIAGNOSIS — E66.9 OBESITY (BMI 30.0-34.9): Chronic | ICD-10-CM

## 2021-09-29 PROCEDURE — 3074F SYST BP LT 130 MM HG: CPT | Mod: CPTII,S$GLB,, | Performed by: NURSE PRACTITIONER

## 2021-09-29 PROCEDURE — 1101F PR PT FALLS ASSESS DOC 0-1 FALLS W/OUT INJ PAST YR: ICD-10-PCS | Mod: CPTII,S$GLB,, | Performed by: NURSE PRACTITIONER

## 2021-09-29 PROCEDURE — 3078F DIAST BP <80 MM HG: CPT | Mod: CPTII,S$GLB,, | Performed by: NURSE PRACTITIONER

## 2021-09-29 PROCEDURE — 3288F FALL RISK ASSESSMENT DOCD: CPT | Mod: CPTII,S$GLB,, | Performed by: NURSE PRACTITIONER

## 2021-09-29 PROCEDURE — 1101F PT FALLS ASSESS-DOCD LE1/YR: CPT | Mod: CPTII,S$GLB,, | Performed by: NURSE PRACTITIONER

## 2021-09-29 PROCEDURE — 3074F PR MOST RECENT SYSTOLIC BLOOD PRESSURE < 130 MM HG: ICD-10-PCS | Mod: CPTII,S$GLB,, | Performed by: NURSE PRACTITIONER

## 2021-09-29 PROCEDURE — 1160F PR REVIEW ALL MEDS BY PRESCRIBER/CLIN PHARMACIST DOCUMENTED: ICD-10-PCS | Mod: CPTII,S$GLB,, | Performed by: NURSE PRACTITIONER

## 2021-09-29 PROCEDURE — 1160F RVW MEDS BY RX/DR IN RCRD: CPT | Mod: CPTII,S$GLB,, | Performed by: NURSE PRACTITIONER

## 2021-09-29 PROCEDURE — 99213 OFFICE O/P EST LOW 20 MIN: CPT | Mod: S$GLB,,, | Performed by: NURSE PRACTITIONER

## 2021-09-29 PROCEDURE — 71046 X-RAY EXAM CHEST 2 VIEWS: CPT | Mod: 26,,, | Performed by: RADIOLOGY

## 2021-09-29 PROCEDURE — 1159F MED LIST DOCD IN RCRD: CPT | Mod: CPTII,S$GLB,, | Performed by: NURSE PRACTITIONER

## 2021-09-29 PROCEDURE — 1159F PR MEDICATION LIST DOCUMENTED IN MEDICAL RECORD: ICD-10-PCS | Mod: CPTII,S$GLB,, | Performed by: NURSE PRACTITIONER

## 2021-09-29 PROCEDURE — 71046 XR CHEST PA AND LATERAL: ICD-10-PCS | Mod: 26,,, | Performed by: RADIOLOGY

## 2021-09-29 PROCEDURE — 3078F PR MOST RECENT DIASTOLIC BLOOD PRESSURE < 80 MM HG: ICD-10-PCS | Mod: CPTII,S$GLB,, | Performed by: NURSE PRACTITIONER

## 2021-09-29 PROCEDURE — 99999 PR PBB SHADOW E&M-EST. PATIENT-LVL IV: CPT | Mod: PBBFAC,,, | Performed by: NURSE PRACTITIONER

## 2021-09-29 PROCEDURE — 71046 X-RAY EXAM CHEST 2 VIEWS: CPT | Mod: TC

## 2021-09-29 PROCEDURE — 3288F PR FALLS RISK ASSESSMENT DOCUMENTED: ICD-10-PCS | Mod: CPTII,S$GLB,, | Performed by: NURSE PRACTITIONER

## 2021-09-29 PROCEDURE — 99213 PR OFFICE/OUTPT VISIT, EST, LEVL III, 20-29 MIN: ICD-10-PCS | Mod: S$GLB,,, | Performed by: NURSE PRACTITIONER

## 2021-09-29 PROCEDURE — 99999 PR PBB SHADOW E&M-EST. PATIENT-LVL IV: ICD-10-PCS | Mod: PBBFAC,,, | Performed by: NURSE PRACTITIONER

## 2021-10-22 ENCOUNTER — PES CALL (OUTPATIENT)
Dept: ADMINISTRATIVE | Facility: CLINIC | Age: 81
End: 2021-10-22

## 2021-10-22 DIAGNOSIS — I10 ESSENTIAL HYPERTENSION: Primary | ICD-10-CM

## 2021-10-25 RX ORDER — NEBIVOLOL 5 MG/1
5 TABLET ORAL DAILY
Qty: 30 TABLET | Refills: 11 | Status: SHIPPED | OUTPATIENT
Start: 2021-10-25 | End: 2022-12-17 | Stop reason: SDUPTHER

## 2021-11-10 ENCOUNTER — OFFICE VISIT (OUTPATIENT)
Dept: CARDIOLOGY | Facility: CLINIC | Age: 81
End: 2021-11-10
Payer: MEDICARE

## 2021-11-10 VITALS
HEART RATE: 54 BPM | BODY MASS INDEX: 30.96 KG/M2 | HEIGHT: 71 IN | WEIGHT: 221.13 LBS | OXYGEN SATURATION: 95 % | SYSTOLIC BLOOD PRESSURE: 128 MMHG | DIASTOLIC BLOOD PRESSURE: 72 MMHG

## 2021-11-10 DIAGNOSIS — E78.00 PURE HYPERCHOLESTEROLEMIA: ICD-10-CM

## 2021-11-10 DIAGNOSIS — I10 ESSENTIAL HYPERTENSION: Primary | ICD-10-CM

## 2021-11-10 DIAGNOSIS — G47.33 OSA ON CPAP: Chronic | ICD-10-CM

## 2021-11-10 DIAGNOSIS — I70.0 CALCIFICATION OF AORTA: Chronic | ICD-10-CM

## 2021-11-10 DIAGNOSIS — I25.10 CORONARY ARTERY DISEASE INVOLVING NATIVE CORONARY ARTERY OF NATIVE HEART WITHOUT ANGINA PECTORIS: Chronic | ICD-10-CM

## 2021-11-10 PROCEDURE — 99499 UNLISTED E&M SERVICE: CPT | Mod: S$GLB,,, | Performed by: INTERNAL MEDICINE

## 2021-11-10 PROCEDURE — 99999 PR PBB SHADOW E&M-EST. PATIENT-LVL IV: ICD-10-PCS | Mod: PBBFAC,,, | Performed by: INTERNAL MEDICINE

## 2021-11-10 PROCEDURE — 99499 RISK ADDL DX/OHS AUDIT: ICD-10-PCS | Mod: S$GLB,,, | Performed by: INTERNAL MEDICINE

## 2021-11-10 PROCEDURE — 3074F SYST BP LT 130 MM HG: CPT | Mod: CPTII,S$GLB,, | Performed by: INTERNAL MEDICINE

## 2021-11-10 PROCEDURE — 99214 PR OFFICE/OUTPT VISIT, EST, LEVL IV, 30-39 MIN: ICD-10-PCS | Mod: S$GLB,,, | Performed by: INTERNAL MEDICINE

## 2021-11-10 PROCEDURE — 99214 OFFICE O/P EST MOD 30 MIN: CPT | Mod: S$GLB,,, | Performed by: INTERNAL MEDICINE

## 2021-11-10 PROCEDURE — 3074F PR MOST RECENT SYSTOLIC BLOOD PRESSURE < 130 MM HG: ICD-10-PCS | Mod: CPTII,S$GLB,, | Performed by: INTERNAL MEDICINE

## 2021-11-10 PROCEDURE — 3078F PR MOST RECENT DIASTOLIC BLOOD PRESSURE < 80 MM HG: ICD-10-PCS | Mod: CPTII,S$GLB,, | Performed by: INTERNAL MEDICINE

## 2021-11-10 PROCEDURE — 99999 PR PBB SHADOW E&M-EST. PATIENT-LVL IV: CPT | Mod: PBBFAC,,, | Performed by: INTERNAL MEDICINE

## 2021-11-10 PROCEDURE — 3078F DIAST BP <80 MM HG: CPT | Mod: CPTII,S$GLB,, | Performed by: INTERNAL MEDICINE

## 2021-11-15 ENCOUNTER — PATIENT OUTREACH (OUTPATIENT)
Dept: ADMINISTRATIVE | Facility: OTHER | Age: 81
End: 2021-11-15
Payer: MEDICARE

## 2021-11-16 ENCOUNTER — OFFICE VISIT (OUTPATIENT)
Dept: OPHTHALMOLOGY | Facility: CLINIC | Age: 81
End: 2021-11-16
Payer: MEDICARE

## 2021-11-16 DIAGNOSIS — H18.513 FUCHS' CORNEAL DYSTROPHY OF BOTH EYES: ICD-10-CM

## 2021-11-16 DIAGNOSIS — H25.13 NUCLEAR SCLEROSIS OF BOTH EYES: ICD-10-CM

## 2021-11-16 DIAGNOSIS — H40.1131 PRIMARY OPEN ANGLE GLAUCOMA OF BOTH EYES, MILD STAGE: Primary | ICD-10-CM

## 2021-11-16 DIAGNOSIS — H04.123 DRY EYES, BILATERAL: ICD-10-CM

## 2021-11-16 DIAGNOSIS — H26.9 CORTICAL CATARACT OF LEFT EYE: ICD-10-CM

## 2021-11-16 PROCEDURE — 92083 EXTENDED VISUAL FIELD XM: CPT | Mod: S$GLB,,, | Performed by: OPHTHALMOLOGY

## 2021-11-16 PROCEDURE — 1160F RVW MEDS BY RX/DR IN RCRD: CPT | Mod: CPTII,S$GLB,, | Performed by: OPHTHALMOLOGY

## 2021-11-16 PROCEDURE — 92133 CPTRZD OPH DX IMG PST SGM ON: CPT | Mod: S$GLB,,, | Performed by: OPHTHALMOLOGY

## 2021-11-16 PROCEDURE — 92133 POSTERIOR SEGMENT OCT OPTIC NERVE(OCULAR COHERENCE TOMOGRAPHY) - OU - BOTH EYES: ICD-10-PCS | Mod: S$GLB,,, | Performed by: OPHTHALMOLOGY

## 2021-11-16 PROCEDURE — 2023F PR DILATED RETINAL EXAM W/O EVID OF RETINOPATHY: ICD-10-PCS | Mod: CPTII,S$GLB,, | Performed by: OPHTHALMOLOGY

## 2021-11-16 PROCEDURE — 1159F PR MEDICATION LIST DOCUMENTED IN MEDICAL RECORD: ICD-10-PCS | Mod: CPTII,S$GLB,, | Performed by: OPHTHALMOLOGY

## 2021-11-16 PROCEDURE — 92014 COMPRE OPH EXAM EST PT 1/>: CPT | Mod: S$GLB,,, | Performed by: OPHTHALMOLOGY

## 2021-11-16 PROCEDURE — 92083 HUMPHREY VISUAL FIELD - OU - BOTH EYES: ICD-10-PCS | Mod: S$GLB,,, | Performed by: OPHTHALMOLOGY

## 2021-11-16 PROCEDURE — 92014 PR EYE EXAM, EST PATIENT,COMPREHESV: ICD-10-PCS | Mod: S$GLB,,, | Performed by: OPHTHALMOLOGY

## 2021-11-16 PROCEDURE — 1160F PR REVIEW ALL MEDS BY PRESCRIBER/CLIN PHARMACIST DOCUMENTED: ICD-10-PCS | Mod: CPTII,S$GLB,, | Performed by: OPHTHALMOLOGY

## 2021-11-16 PROCEDURE — 2023F DILAT RTA XM W/O RTNOPTHY: CPT | Mod: CPTII,S$GLB,, | Performed by: OPHTHALMOLOGY

## 2021-11-16 PROCEDURE — 1159F MED LIST DOCD IN RCRD: CPT | Mod: CPTII,S$GLB,, | Performed by: OPHTHALMOLOGY

## 2021-11-16 PROCEDURE — 99999 PR PBB SHADOW E&M-EST. PATIENT-LVL III: ICD-10-PCS | Mod: PBBFAC,,, | Performed by: OPHTHALMOLOGY

## 2021-11-16 PROCEDURE — 99999 PR PBB SHADOW E&M-EST. PATIENT-LVL III: CPT | Mod: PBBFAC,,, | Performed by: OPHTHALMOLOGY

## 2021-12-14 ENCOUNTER — IMMUNIZATION (OUTPATIENT)
Dept: FAMILY MEDICINE | Facility: CLINIC | Age: 81
End: 2021-12-14
Payer: MEDICARE

## 2021-12-14 DIAGNOSIS — Z23 NEED FOR VACCINATION: Primary | ICD-10-CM

## 2021-12-14 PROCEDURE — 0004A COVID-19, MRNA, LNP-S, PF, 30 MCG/0.3 ML DOSE VACCINE: CPT | Mod: PBBFAC | Performed by: FAMILY MEDICINE

## 2022-01-10 ENCOUNTER — LAB VISIT (OUTPATIENT)
Dept: LAB | Facility: HOSPITAL | Age: 82
End: 2022-01-10
Attending: FAMILY MEDICINE
Payer: MEDICARE

## 2022-01-10 ENCOUNTER — OFFICE VISIT (OUTPATIENT)
Dept: FAMILY MEDICINE | Facility: CLINIC | Age: 82
End: 2022-01-10
Payer: MEDICARE

## 2022-01-10 VITALS
BODY MASS INDEX: 31.11 KG/M2 | HEIGHT: 71 IN | OXYGEN SATURATION: 95 % | SYSTOLIC BLOOD PRESSURE: 118 MMHG | DIASTOLIC BLOOD PRESSURE: 76 MMHG | WEIGHT: 222.25 LBS | TEMPERATURE: 98 F | HEART RATE: 65 BPM | RESPIRATION RATE: 16 BRPM

## 2022-01-10 DIAGNOSIS — N20.0 NEPHROLITHIASIS: ICD-10-CM

## 2022-01-10 DIAGNOSIS — I10 ESSENTIAL HYPERTENSION: ICD-10-CM

## 2022-01-10 DIAGNOSIS — E78.00 PURE HYPERCHOLESTEROLEMIA: ICD-10-CM

## 2022-01-10 DIAGNOSIS — Z00.00 ANNUAL PHYSICAL EXAM: Primary | ICD-10-CM

## 2022-01-10 DIAGNOSIS — I25.10 CORONARY ARTERY DISEASE INVOLVING NATIVE CORONARY ARTERY OF NATIVE HEART WITHOUT ANGINA PECTORIS: ICD-10-CM

## 2022-01-10 DIAGNOSIS — H04.123 DRY EYES, BILATERAL: ICD-10-CM

## 2022-01-10 DIAGNOSIS — T46.6X5A STATIN MYOPATHY: ICD-10-CM

## 2022-01-10 DIAGNOSIS — G47.33 OSA ON CPAP: ICD-10-CM

## 2022-01-10 DIAGNOSIS — Z12.5 SCREENING FOR PROSTATE CANCER: ICD-10-CM

## 2022-01-10 DIAGNOSIS — G72.0 STATIN MYOPATHY: ICD-10-CM

## 2022-01-10 DIAGNOSIS — Z00.00 ANNUAL PHYSICAL EXAM: ICD-10-CM

## 2022-01-10 LAB
ALBUMIN SERPL BCP-MCNC: 4.3 G/DL (ref 3.5–5.2)
ALP SERPL-CCNC: 81 U/L (ref 55–135)
ALT SERPL W/O P-5'-P-CCNC: 9 U/L (ref 10–44)
ANION GAP SERPL CALC-SCNC: 7 MMOL/L (ref 8–16)
AST SERPL-CCNC: 19 U/L (ref 10–40)
BASOPHILS # BLD AUTO: 0.03 K/UL (ref 0–0.2)
BASOPHILS NFR BLD: 0.7 % (ref 0–1.9)
BILIRUB SERPL-MCNC: 1 MG/DL (ref 0.1–1)
BUN SERPL-MCNC: 15 MG/DL (ref 8–23)
CALCIUM SERPL-MCNC: 10.5 MG/DL (ref 8.7–10.5)
CHLORIDE SERPL-SCNC: 103 MMOL/L (ref 95–110)
CHOLEST SERPL-MCNC: 173 MG/DL (ref 120–199)
CHOLEST/HDLC SERPL: 4.1 {RATIO} (ref 2–5)
CO2 SERPL-SCNC: 29 MMOL/L (ref 23–29)
COMPLEXED PSA SERPL-MCNC: 0.96 NG/ML (ref 0–4)
CREAT SERPL-MCNC: 1.3 MG/DL (ref 0.5–1.4)
DIFFERENTIAL METHOD: ABNORMAL
EOSINOPHIL # BLD AUTO: 0.1 K/UL (ref 0–0.5)
EOSINOPHIL NFR BLD: 2.4 % (ref 0–8)
ERYTHROCYTE [DISTWIDTH] IN BLOOD BY AUTOMATED COUNT: 12.8 % (ref 11.5–14.5)
EST. GFR  (AFRICAN AMERICAN): 60 ML/MIN/1.73 M^2
EST. GFR  (NON AFRICAN AMERICAN): 51.9 ML/MIN/1.73 M^2
GLUCOSE SERPL-MCNC: 99 MG/DL (ref 70–110)
HCT VFR BLD AUTO: 45.1 % (ref 40–54)
HDLC SERPL-MCNC: 42 MG/DL (ref 40–75)
HDLC SERPL: 24.3 % (ref 20–50)
HGB BLD-MCNC: 15.2 G/DL (ref 14–18)
IMM GRANULOCYTES # BLD AUTO: 0 K/UL (ref 0–0.04)
IMM GRANULOCYTES NFR BLD AUTO: 0 % (ref 0–0.5)
LDLC SERPL CALC-MCNC: 104 MG/DL (ref 63–159)
LYMPHOCYTES # BLD AUTO: 1.2 K/UL (ref 1–4.8)
LYMPHOCYTES NFR BLD: 28 % (ref 18–48)
MCH RBC QN AUTO: 31.4 PG (ref 27–31)
MCHC RBC AUTO-ENTMCNC: 33.7 G/DL (ref 32–36)
MCV RBC AUTO: 93 FL (ref 82–98)
MONOCYTES # BLD AUTO: 0.5 K/UL (ref 0.3–1)
MONOCYTES NFR BLD: 10.8 % (ref 4–15)
NEUTROPHILS # BLD AUTO: 2.5 K/UL (ref 1.8–7.7)
NEUTROPHILS NFR BLD: 58.1 % (ref 38–73)
NONHDLC SERPL-MCNC: 131 MG/DL
NRBC BLD-RTO: 0 /100 WBC
PLATELET # BLD AUTO: 192 K/UL (ref 150–450)
PMV BLD AUTO: 10.7 FL (ref 9.2–12.9)
POTASSIUM SERPL-SCNC: 5.2 MMOL/L (ref 3.5–5.1)
PROT SERPL-MCNC: 7.8 G/DL (ref 6–8.4)
RBC # BLD AUTO: 4.84 M/UL (ref 4.6–6.2)
SODIUM SERPL-SCNC: 139 MMOL/L (ref 136–145)
TRIGL SERPL-MCNC: 135 MG/DL (ref 30–150)
WBC # BLD AUTO: 4.25 K/UL (ref 3.9–12.7)

## 2022-01-10 PROCEDURE — 1159F PR MEDICATION LIST DOCUMENTED IN MEDICAL RECORD: ICD-10-PCS | Mod: CPTII,S$GLB,, | Performed by: FAMILY MEDICINE

## 2022-01-10 PROCEDURE — 1126F PR PAIN SEVERITY QUANTIFIED, NO PAIN PRESENT: ICD-10-PCS | Mod: CPTII,S$GLB,, | Performed by: FAMILY MEDICINE

## 2022-01-10 PROCEDURE — 3288F PR FALLS RISK ASSESSMENT DOCUMENTED: ICD-10-PCS | Mod: CPTII,S$GLB,, | Performed by: FAMILY MEDICINE

## 2022-01-10 PROCEDURE — 99999 PR PBB SHADOW E&M-EST. PATIENT-LVL V: CPT | Mod: PBBFAC,,, | Performed by: FAMILY MEDICINE

## 2022-01-10 PROCEDURE — 80053 COMPREHEN METABOLIC PANEL: CPT | Performed by: FAMILY MEDICINE

## 2022-01-10 PROCEDURE — 99397 PER PM REEVAL EST PAT 65+ YR: CPT | Mod: S$GLB,,, | Performed by: FAMILY MEDICINE

## 2022-01-10 PROCEDURE — 3078F DIAST BP <80 MM HG: CPT | Mod: CPTII,S$GLB,, | Performed by: FAMILY MEDICINE

## 2022-01-10 PROCEDURE — 3078F PR MOST RECENT DIASTOLIC BLOOD PRESSURE < 80 MM HG: ICD-10-PCS | Mod: CPTII,S$GLB,, | Performed by: FAMILY MEDICINE

## 2022-01-10 PROCEDURE — 80061 LIPID PANEL: CPT | Performed by: FAMILY MEDICINE

## 2022-01-10 PROCEDURE — 99999 PR PBB SHADOW E&M-EST. PATIENT-LVL V: ICD-10-PCS | Mod: PBBFAC,,, | Performed by: FAMILY MEDICINE

## 2022-01-10 PROCEDURE — 85025 COMPLETE CBC W/AUTO DIFF WBC: CPT | Performed by: FAMILY MEDICINE

## 2022-01-10 PROCEDURE — 1159F MED LIST DOCD IN RCRD: CPT | Mod: CPTII,S$GLB,, | Performed by: FAMILY MEDICINE

## 2022-01-10 PROCEDURE — 99499 UNLISTED E&M SERVICE: CPT | Mod: S$GLB,,, | Performed by: FAMILY MEDICINE

## 2022-01-10 PROCEDURE — 1101F PT FALLS ASSESS-DOCD LE1/YR: CPT | Mod: CPTII,S$GLB,, | Performed by: FAMILY MEDICINE

## 2022-01-10 PROCEDURE — 99397 PR PREVENTIVE VISIT,EST,65 & OVER: ICD-10-PCS | Mod: S$GLB,,, | Performed by: FAMILY MEDICINE

## 2022-01-10 PROCEDURE — 3288F FALL RISK ASSESSMENT DOCD: CPT | Mod: CPTII,S$GLB,, | Performed by: FAMILY MEDICINE

## 2022-01-10 PROCEDURE — 84153 ASSAY OF PSA TOTAL: CPT | Performed by: FAMILY MEDICINE

## 2022-01-10 PROCEDURE — 1126F AMNT PAIN NOTED NONE PRSNT: CPT | Mod: CPTII,S$GLB,, | Performed by: FAMILY MEDICINE

## 2022-01-10 PROCEDURE — 3074F PR MOST RECENT SYSTOLIC BLOOD PRESSURE < 130 MM HG: ICD-10-PCS | Mod: CPTII,S$GLB,, | Performed by: FAMILY MEDICINE

## 2022-01-10 PROCEDURE — 1101F PR PT FALLS ASSESS DOC 0-1 FALLS W/OUT INJ PAST YR: ICD-10-PCS | Mod: CPTII,S$GLB,, | Performed by: FAMILY MEDICINE

## 2022-01-10 PROCEDURE — 3074F SYST BP LT 130 MM HG: CPT | Mod: CPTII,S$GLB,, | Performed by: FAMILY MEDICINE

## 2022-01-10 PROCEDURE — 36415 COLL VENOUS BLD VENIPUNCTURE: CPT | Mod: PO | Performed by: FAMILY MEDICINE

## 2022-01-10 PROCEDURE — 99499 RISK ADDL DX/OHS AUDIT: ICD-10-PCS | Mod: S$GLB,,, | Performed by: FAMILY MEDICINE

## 2022-01-10 NOTE — PROGRESS NOTES
Subjective:       Patient ID: Wilberto Hayes Jr. is a 81 y.o. male.    Chief Complaint: Follow-up      HPI Comments:       Current Outpatient Medications:     aspirin 81 mg Tab, Take 1 tablet by mouth Daily., Disp: , Rfl:     co-enzyme Q-10 30 mg capsule, Take 100 mg by mouth once daily., Disp: , Rfl:     cyanocobalamin (VITAMIN B-12) 100 MCG tablet, Take 1 tablet by mouth every other day. , Disp: , Rfl:     fluticasone propionate (FLONASE) 50 mcg/actuation nasal spray, 2 sprays (100 mcg total) by Each Nostril route once daily., Disp: 16 g, Rfl: 1    hydroCHLOROthiazide (HYDRODIURIL) 25 MG tablet, TAKE 1/2 TABLET BY MOUTH ONCE DAILY, Disp: 45 tablet, Rfl: 3    latanoprost 0.005 % ophthalmic solution, PLACE 1 DROP INTO BOTH EYES EVERY EVENING., Disp: 7.5 mL, Rfl: 3    losartan (COZAAR) 50 MG tablet, TAKE 1 TABLET BY MOUTH TWICE A DAY, Disp: 180 tablet, Rfl: 3    LUBRICANT EYE DROPS, GLYC-PG, 1-0.3 % Drop, , Disp: , Rfl:     multivit-min/ferrous fumarate (MULTI VITAMIN ORAL), , Disp: , Rfl:     nebivoloL (BYSTOLIC) 5 MG Tab, Take 1 tablet (5 mg total) by mouth once daily., Disp: 30 tablet, Rfl: 11    nitroGLYCERIN (NITROSTAT) 0.4 MG SL tablet, Place 1 tablet (0.4 mg total) under the tongue every 5 (five) minutes as needed for Chest pain (if no relief seek er trmt)., Disp: 25 tablet, Rfl: 3    OCEAN NASAL 0.65 % nasal spray, , Disp: , Rfl:     omega-3 fatty acids-vitamin E 1,000 mg Cap, Take 1 capsule by mouth Daily., Disp: , Rfl:     promethazine-dextromethorphan (PROMETHAZINE-DM) 6.25-15 mg/5 mL Syrp, Take 5 mLs by mouth every 6 to 8 hours as needed., Disp: 180 mL, Rfl: 0    saw palmetto 500 MG capsule, Take by mouth 2 (two) times daily. , Disp: , Rfl:     triamcinolone acetonide 0.1% (KENALOG) 0.1 % cream, Apply topically 2 (two) times daily., Disp: 45 g, Rfl: 0    zinc gluconate 50 mg tablet, Take 50 mg by mouth once daily., Disp: , Rfl:       Here for annual physical.  Doing well.  Plans to  "have cataracts removed bilaterally.  Has dry eyes and takes over-the-counter drops for them because he is allergic to the prescription.    Stable cardiac picture.  Continue micro glycerin when he has chest pain with exertion.  Declined cardiologist offer to start long acting nitrate.  Does not take statins because of myopathy.    Compliant with CPAP every night.    Followed by Dr. Arredondo, urology.  Has not had a PSA test in about 3 years.  No change in urinary symptoms.  No history of prostate problems.  Went to see urology initially because of frequent kidney stones.  My whole life.  Last kidney stone about 2 years ago.    Had COVID in on this.  Subsequent to the vaccine.  Had booster.    Done with colonoscopies.    Declines all shingles vaccine    Review of Systems   Constitutional: Negative for activity change and unexpected weight change.   HENT: Negative for hearing loss, rhinorrhea and trouble swallowing.    Eyes: Positive for visual disturbance. Negative for discharge.   Respiratory: Negative for chest tightness and wheezing.    Cardiovascular: Negative for chest pain and palpitations.   Gastrointestinal: Negative for blood in stool, constipation, diarrhea and vomiting.   Endocrine: Negative for polydipsia and polyuria.   Genitourinary: Negative for difficulty urinating, hematuria and urgency.   Musculoskeletal: Positive for arthralgias. Negative for joint swelling and neck pain.   Neurological: Positive for weakness. Negative for headaches.   Psychiatric/Behavioral: Negative for confusion and dysphoric mood.       Objective:      Vitals:    01/10/22 0904   BP: 118/76   Pulse: 65   Resp: 16   Temp: 97.7 °F (36.5 °C)   TempSrc: Temporal   SpO2: 95%   Weight: 100.8 kg (222 lb 3.6 oz)   Height: 5' 11" (1.803 m)   PainSc: 0-No pain     Physical Exam  Vitals and nursing note reviewed.   Constitutional:       General: He is not in acute distress.     Appearance: He is well-developed and well-nourished. He is not " diaphoretic.   HENT:      Head: Normocephalic.   Neck:      Thyroid: No thyromegaly.   Cardiovascular:      Rate and Rhythm: Normal rate and regular rhythm.      Heart sounds: Normal heart sounds. No murmur heard.      Pulmonary:      Effort: Pulmonary effort is normal.      Breath sounds: Normal breath sounds. No wheezing or rales.   Abdominal:      General: There is no distension.      Palpations: Abdomen is soft. There is no hepatomegaly, splenomegaly or mass.      Tenderness: There is no abdominal tenderness.   Musculoskeletal:         General: No edema.      Cervical back: Neck supple.   Lymphadenopathy:      Cervical: No cervical adenopathy.   Skin:     General: Skin is warm and dry.   Neurological:      Mental Status: He is alert and oriented to person, place, and time.   Psychiatric:         Mood and Affect: Mood and affect normal.         Behavior: Behavior normal.         Thought Content: Thought content normal.         Judgment: Judgment normal.         Assessment:       1. Annual physical exam    2. Essential hypertension    3. CHELO on CPAP    4. Coronary artery disease involving native coronary artery of native heart without angina pectoris    5. Statin myopathy    6. Dry eyes, bilateral    7. Screening for prostate cancer    8. Nephrolithiasis    9. Pure hypercholesterolemia        Plan:   Annual physical exam  Comments:  Declines shingles vaccine.  No further colonoscopies desired.  Orders:  -     Comprehensive Metabolic Panel; Future; Expected date: 01/10/2022  -     CBC Auto Differential; Future; Expected date: 01/10/2022  -     Lipid Panel; Future; Expected date: 01/10/2022    Essential hypertension  Comments:  Controlled.  Follow-up 6 months  Orders:  -     CBC Auto Differential; Future; Expected date: 01/10/2022    CHELO on CPAP  Comments:  Nightly compliance    Coronary artery disease involving native coronary artery of native heart without angina pectoris  Comments:  Stable angina.  Relieved with  nitroglycerin x1.  Followed by Cardiology.  On no statin or Zetia.  Takes aspirin daily    Statin myopathy  Comments:  No statin or Zetia    Dry eyes, bilateral  Comments:  Followed by ophthalmology    Screening for prostate cancer  Comments:  PSA ordered  Orders:  -     PSA, Screening; Future; Expected date: 01/10/2022    Nephrolithiasis  Comments:  Followed by urology.  No recent symptoms    Pure hypercholesterolemia  Comments:  Unable to take statins.  Fasting lipid profile today  Orders:  -     Lipid Panel; Future; Expected date: 01/10/2022

## 2022-01-27 ENCOUNTER — PES CALL (OUTPATIENT)
Dept: ADMINISTRATIVE | Facility: CLINIC | Age: 82
End: 2022-01-27
Payer: MEDICARE

## 2022-02-17 ENCOUNTER — TELEPHONE (OUTPATIENT)
Dept: ADMINISTRATIVE | Facility: HOSPITAL | Age: 82
End: 2022-02-17
Payer: MEDICARE

## 2022-03-21 ENCOUNTER — OFFICE VISIT (OUTPATIENT)
Dept: OPHTHALMOLOGY | Facility: CLINIC | Age: 82
End: 2022-03-21
Payer: MEDICARE

## 2022-03-21 DIAGNOSIS — H18.513 FUCHS' CORNEAL DYSTROPHY OF BOTH EYES: ICD-10-CM

## 2022-03-21 DIAGNOSIS — H04.123 DRY EYES, BILATERAL: ICD-10-CM

## 2022-03-21 DIAGNOSIS — H25.13 NUCLEAR SCLEROSIS OF BOTH EYES: ICD-10-CM

## 2022-03-21 DIAGNOSIS — H40.1131 PRIMARY OPEN ANGLE GLAUCOMA OF BOTH EYES, MILD STAGE: Primary | ICD-10-CM

## 2022-03-21 DIAGNOSIS — H26.9 CORTICAL CATARACT OF LEFT EYE: ICD-10-CM

## 2022-03-21 PROCEDURE — 99999 PR PBB SHADOW E&M-EST. PATIENT-LVL III: CPT | Mod: PBBFAC,,, | Performed by: OPHTHALMOLOGY

## 2022-03-21 PROCEDURE — 92014 PR EYE EXAM, EST PATIENT,COMPREHESV: ICD-10-PCS | Mod: S$GLB,,, | Performed by: OPHTHALMOLOGY

## 2022-03-21 PROCEDURE — 1160F RVW MEDS BY RX/DR IN RCRD: CPT | Mod: CPTII,S$GLB,, | Performed by: OPHTHALMOLOGY

## 2022-03-21 PROCEDURE — 1159F PR MEDICATION LIST DOCUMENTED IN MEDICAL RECORD: ICD-10-PCS | Mod: CPTII,S$GLB,, | Performed by: OPHTHALMOLOGY

## 2022-03-21 PROCEDURE — 92014 COMPRE OPH EXAM EST PT 1/>: CPT | Mod: S$GLB,,, | Performed by: OPHTHALMOLOGY

## 2022-03-21 PROCEDURE — 1159F MED LIST DOCD IN RCRD: CPT | Mod: CPTII,S$GLB,, | Performed by: OPHTHALMOLOGY

## 2022-03-21 PROCEDURE — 99999 PR PBB SHADOW E&M-EST. PATIENT-LVL III: ICD-10-PCS | Mod: PBBFAC,,, | Performed by: OPHTHALMOLOGY

## 2022-03-21 PROCEDURE — 1160F PR REVIEW ALL MEDS BY PRESCRIBER/CLIN PHARMACIST DOCUMENTED: ICD-10-PCS | Mod: CPTII,S$GLB,, | Performed by: OPHTHALMOLOGY

## 2022-03-21 RX ORDER — GATIFLOXACIN 5 MG/ML
1 SOLUTION/ DROPS OPHTHALMIC 2 TIMES DAILY
Qty: 5 ML | Refills: 2 | Status: SHIPPED | OUTPATIENT
Start: 2022-03-21 | End: 2022-08-10 | Stop reason: ALTCHOICE

## 2022-03-21 RX ORDER — KETOROLAC TROMETHAMINE 5 MG/ML
1 SOLUTION OPHTHALMIC 4 TIMES DAILY
Qty: 5 ML | Refills: 2 | Status: SHIPPED | OUTPATIENT
Start: 2022-03-21 | End: 2022-08-10 | Stop reason: ALTCHOICE

## 2022-03-21 RX ORDER — PREDNISOLONE ACETATE 10 MG/ML
1 SUSPENSION/ DROPS OPHTHALMIC 4 TIMES DAILY
Qty: 5 ML | Refills: 2 | Status: SHIPPED | OUTPATIENT
Start: 2022-03-21 | End: 2022-08-10 | Stop reason: ALTCHOICE

## 2022-03-21 NOTE — PROGRESS NOTES
"SUBJECTIVE  Wilberto Hayes Jr. is 82 y.o. male  Corrected distance visual acuity was 20/25 -2 in the right eye and 20/40 -2 in the left eye.   Chief Complaint   Patient presents with    Cataract     Pt here for cataract eval. Pt says he has been having issues with reading, especially the left eye. He also says that driving at night is difficult due to the glare from oncoming traffic. No pain or discomfort. 100% compliant with gtts.     Glaucoma          HPI     Cataract      Additional comments: Pt here for cataract eval. Pt says he has been   having issues with reading, especially the left eye. He also says that   driving at night is difficult due to the glare from oncoming traffic. No   pain or discomfort. 100% compliant with gtts.               Comments     1. Mild COAG OS>OD Goal =17-18 (Init 20/22)  +Fhx Glaucoma -g-dad   SLT OD 11/09 (min response) + 1/8/20 (22.5-14.5)   SLT OS 8/09 (22 to 16-17) + 1/8/20 (24.5-14)   2. Mod NSC OS>OD  3. Dry eyes (no dry mouth)   Gel Plugs OU   Xiidra caused irritation   Tried Restasis (burning and blurry vision)   4. Fuchs' corneal dystrophy  5. Ptosis OD     Latanoprost QHS OU     Homeopathic AT"s (Similasan)   Thera tears, Soothe XP   O3FO            Last edited by Dashawn Kuhn MA on 3/21/2022 10:12 AM. (History)         Assessment /Plan :  1. Primary open angle glaucoma of both eyes, mild stage Doing well, IOP within acceptable range relative to target IOP and no evidence of progression. Continue current treatment. Reviewed importance of continued compliance with treatment and follow up.      Patient instructed to continue using the following glaucoma medication as follows:  Latanoprost one drop in each eye nightly     2. Nuclear sclerosis of both eyes  Visually Significant Cataract OS > OD:   Patient reports decreased vision consistent with the clinical amount of lenticular opacity,  which reaches the level of visual significance and affects activities of daily " living such as  read. Risks, benefits, and alternatives to cataract surgery were  discussed.  IOL options were discussed, including Premium IOL'S and the associated  side effects and additional patient cost associated with them as well as patient's visual  goals. The pt expressed a desire to proceed with surgery with the potential for some  reasonable degree of visual improvement and was consented.  Risks of loss of vision and eye reviewed as well as possibility of need for spectacle correction after surgery even with premium implants. Verbal and written preop  instructions were provided to the patient.       Pt is interested in traditional monofocal IOL aiming for:    Distance OU and understands that glasses will be generally needed at all times for near vision and often for finer distance correction especially for higher degrees of astigmatism.       Final visual acuity may be limited by astigmatism, corneal disease and glaucoma damage    Pt wishes to have Phaco/IOL  OS with OMNI    Requests a Monofocal IOL.    Will aim for distance    Other considerations: Corneal Precautions  Discontinue Aspirin 2 weeks prior to surgery  Medications sent:  Prednisolone   Ketorolac   Gatifloxacin/ moxifloxacin       3. Cortical cataract of left eye    4. Dry eyes, bilateral  -- Condition stable, no therapeutic change required. Monitoring routinely.     5. Fuchs' corneal dystrophy of both eyes - monitor for now

## 2022-05-06 ENCOUNTER — PATIENT OUTREACH (OUTPATIENT)
Dept: ADMINISTRATIVE | Facility: HOSPITAL | Age: 82
End: 2022-05-06
Payer: MEDICARE

## 2022-05-06 NOTE — PROGRESS NOTES
Working HTN Report: Pt had controlled BP reading with PCP in January this year & currently scheduled with PCP 07/15/22.

## 2022-05-11 ENCOUNTER — OUTSIDE PLACE OF SERVICE (OUTPATIENT)
Dept: OPHTHALMOLOGY | Facility: CLINIC | Age: 82
End: 2022-05-11
Payer: MEDICARE

## 2022-05-11 PROCEDURE — 66984 PR REMOVAL, CATARACT, W/INSRT INTRAOC LENS, W/O ENDO CYCLO: ICD-10-PCS | Mod: 51,LT,, | Performed by: OPHTHALMOLOGY

## 2022-05-11 PROCEDURE — 66984 XCAPSL CTRC RMVL W/O ECP: CPT | Mod: 51,LT,, | Performed by: OPHTHALMOLOGY

## 2022-05-11 PROCEDURE — 66174 TRLUML DIL AQ O/F CAN W/O ST: CPT | Mod: LT,,, | Performed by: OPHTHALMOLOGY

## 2022-05-11 PROCEDURE — 66174 PR TRANSLUMINAL DILATION AQUEOUS CANAL, W/O RETENTION DEVICE/STENT: ICD-10-PCS | Mod: LT,,, | Performed by: OPHTHALMOLOGY

## 2022-05-12 ENCOUNTER — OFFICE VISIT (OUTPATIENT)
Dept: OPHTHALMOLOGY | Facility: CLINIC | Age: 82
End: 2022-05-12
Payer: MEDICARE

## 2022-05-12 DIAGNOSIS — Z98.890 POST-OPERATIVE STATE: Primary | ICD-10-CM

## 2022-05-12 PROCEDURE — 1159F PR MEDICATION LIST DOCUMENTED IN MEDICAL RECORD: ICD-10-PCS | Mod: CPTII,S$GLB,, | Performed by: OPHTHALMOLOGY

## 2022-05-12 PROCEDURE — 1160F RVW MEDS BY RX/DR IN RCRD: CPT | Mod: CPTII,S$GLB,, | Performed by: OPHTHALMOLOGY

## 2022-05-12 PROCEDURE — 99024 PR POST-OP FOLLOW-UP VISIT: ICD-10-PCS | Mod: S$GLB,,, | Performed by: OPHTHALMOLOGY

## 2022-05-12 PROCEDURE — 99024 POSTOP FOLLOW-UP VISIT: CPT | Mod: S$GLB,,, | Performed by: OPHTHALMOLOGY

## 2022-05-12 PROCEDURE — 99999 PR PBB SHADOW E&M-EST. PATIENT-LVL III: ICD-10-PCS | Mod: PBBFAC,,, | Performed by: OPHTHALMOLOGY

## 2022-05-12 PROCEDURE — 1160F PR REVIEW ALL MEDS BY PRESCRIBER/CLIN PHARMACIST DOCUMENTED: ICD-10-PCS | Mod: CPTII,S$GLB,, | Performed by: OPHTHALMOLOGY

## 2022-05-12 PROCEDURE — 99999 PR PBB SHADOW E&M-EST. PATIENT-LVL III: CPT | Mod: PBBFAC,,, | Performed by: OPHTHALMOLOGY

## 2022-05-12 PROCEDURE — 1159F MED LIST DOCD IN RCRD: CPT | Mod: CPTII,S$GLB,, | Performed by: OPHTHALMOLOGY

## 2022-05-12 NOTE — PROGRESS NOTES
"SUBJECTIVE  Wilberto Hayes Jr. is 82 y.o. male  Uncorrected distance visual acuity was not recorded in the right eye and 20/25 in the left eye.   Chief Complaint   Patient presents with    Post-op Evaluation          HPI     One day post phaco OS with omni 5/11/22    No eye pain  Foreign body sensation left eye    1. Mild COAG OS>OD Goal =17-18 (Init 20/22)  +Fhx Glaucoma -g-dad   SLT OD 11/09 (min response) + 1/8/20 (22.5-14.5)   SLT OS 8/09 (22 to 16-17) + 1/8/20 (24.5-14)   2. Mod NSC OD  PCIOL OS with Omni 5/11/22  3. Dry eyes (no dry mouth)   Gel Plugs OU   Xiidra caused irritation   Tried Restasis (burning and blurry vision)   4. Fuchs' corneal dystrophy  5. Ptosis OD     Latanoprost QHS OU     Pred Acetate and Ketorolac QID OS  Antibiotic BID OS    Homeopathic AT"s (Similasan)   Thera tears, Soothe XP   O3FO    Last edited by Christal Ray MA on 5/12/2022  8:09 AM. (History)         Assessment /Plan :  1. Post-operative state Exam:  SLE:  S/C: normal  K : trace k fold  AC: 1+ cell and flare  Iris: normal  Lens: PCIOL    IMP:  PO Day 1 S/P Phaco/IOL OS with OMNI : Doing well.    Plan:  Continue gtts to operative eye:  Pred 1% QID  Ketorolac QID  Zymaxid/moxifloxacin BID  Reinstructed in importance of absolute compliance with Post-OP instructions including medications, shield at bedtime, and limitation of activities. Follow up appointments in approximately one and six weeks or call immediately for increased pain, redness or vision loss.   Continue Latanoprost one drop in each eye every night             "

## 2022-05-17 ENCOUNTER — OFFICE VISIT (OUTPATIENT)
Dept: OPHTHALMOLOGY | Facility: CLINIC | Age: 82
End: 2022-05-17
Payer: MEDICARE

## 2022-05-17 DIAGNOSIS — Z98.890 POST-OPERATIVE STATE: Primary | ICD-10-CM

## 2022-05-17 PROCEDURE — 1159F PR MEDICATION LIST DOCUMENTED IN MEDICAL RECORD: ICD-10-PCS | Mod: CPTII,S$GLB,, | Performed by: OPHTHALMOLOGY

## 2022-05-17 PROCEDURE — 99999 PR PBB SHADOW E&M-EST. PATIENT-LVL III: ICD-10-PCS | Mod: PBBFAC,,, | Performed by: OPHTHALMOLOGY

## 2022-05-17 PROCEDURE — 1159F MED LIST DOCD IN RCRD: CPT | Mod: CPTII,S$GLB,, | Performed by: OPHTHALMOLOGY

## 2022-05-17 PROCEDURE — 99024 POSTOP FOLLOW-UP VISIT: CPT | Mod: S$GLB,,, | Performed by: OPHTHALMOLOGY

## 2022-05-17 PROCEDURE — 1160F RVW MEDS BY RX/DR IN RCRD: CPT | Mod: CPTII,S$GLB,, | Performed by: OPHTHALMOLOGY

## 2022-05-17 PROCEDURE — 99999 PR PBB SHADOW E&M-EST. PATIENT-LVL III: CPT | Mod: PBBFAC,,, | Performed by: OPHTHALMOLOGY

## 2022-05-17 PROCEDURE — 1160F PR REVIEW ALL MEDS BY PRESCRIBER/CLIN PHARMACIST DOCUMENTED: ICD-10-PCS | Mod: CPTII,S$GLB,, | Performed by: OPHTHALMOLOGY

## 2022-05-17 PROCEDURE — 99024 PR POST-OP FOLLOW-UP VISIT: ICD-10-PCS | Mod: S$GLB,,, | Performed by: OPHTHALMOLOGY

## 2022-05-17 NOTE — PROGRESS NOTES
"SUBJECTIVE  Wilberto Hayes Jr. is 82 y.o. male  Uncorrected distance visual acuity was not recorded in the right eye and 20/50 -1 in the left eye. Uncorrected near visual acuity was not recorded in the right eye and 20/NI in the left eye.   Chief Complaint   Patient presents with    Post-op Evaluation     PCIOL OS with Omni 5/11/22    Patient states VA in OS is not as sharp as it was on the 1 day PO visit, no pain just decrease VA.          HPI     Post-op Evaluation      Additional comments: PCIOL OS with Omni 5/11/22    Patient states VA in OS is not as sharp as it was on the 1 day PO visit,   no pain just decrease VA.              Comments     1. Mild COAG OS>OD Goal =17-18 (Init 20/22)  +Fhx Glaucoma -g-dad   SLT OD 11/09 (min response) + 1/8/20 (22.5-14.5)   SLT OS 8/09 (22 to 16-17) + 1/8/20 (24.5-14)   2. Mod NSC OD  PCIOL OS with Omni 5/11/22  3. Dry eyes (no dry mouth)   Gel Plugs OU   Xiidra caused irritation   Tried Restasis (burning and blurry vision)   4. Fuchs' corneal dystrophy  5. Ptosis OD     Latanoprost QHS OU     Pred Acetate and Ketorolac QID OS      Homeopathic AT"s (Similasan)   Thera tears, Soothe XP   O3FO          Last edited by Holly Fonseca, Patient Care Assistant on 5/17/2022 10:02   AM. (History)         Assessment /Plan :  1. Post-operative state Slit lamp exam:  L/L: nl  K: clear, wound sealed  AC: trace cell and flare  Iris/Lens: IOL centered and stable      IMP:  PO Week 1 S/P Phaco/ IOL w/ OMNI OS : Doing well with no evidence of infection or abnormal inflammation.     Plan:  Pt given and instructed in one week PO instructions. D/C zymaxid/moxifloxacin and start to taper off Ketorlac and Pred Forte 1% weekly. Can resume normal activitites and d/c eye shield. OTC reading glasses can be used until evaluated for final MR. Follow up in one month or PRN pain, redness, vision loss, or other concerns.             "

## 2022-06-07 ENCOUNTER — OFFICE VISIT (OUTPATIENT)
Dept: CARDIOLOGY | Facility: CLINIC | Age: 82
End: 2022-06-07
Payer: MEDICARE

## 2022-06-07 VITALS
DIASTOLIC BLOOD PRESSURE: 74 MMHG | HEIGHT: 71 IN | OXYGEN SATURATION: 96 % | SYSTOLIC BLOOD PRESSURE: 126 MMHG | BODY MASS INDEX: 30.96 KG/M2 | WEIGHT: 221.13 LBS | HEART RATE: 53 BPM

## 2022-06-07 DIAGNOSIS — I70.0 CALCIFICATION OF AORTA: Chronic | ICD-10-CM

## 2022-06-07 DIAGNOSIS — I25.10 CORONARY ARTERY DISEASE INVOLVING NATIVE CORONARY ARTERY OF NATIVE HEART WITHOUT ANGINA PECTORIS: Chronic | ICD-10-CM

## 2022-06-07 DIAGNOSIS — E78.00 PURE HYPERCHOLESTEROLEMIA: ICD-10-CM

## 2022-06-07 DIAGNOSIS — G47.33 OSA ON CPAP: Chronic | ICD-10-CM

## 2022-06-07 DIAGNOSIS — I10 ESSENTIAL HYPERTENSION: Primary | ICD-10-CM

## 2022-06-07 PROCEDURE — 3074F PR MOST RECENT SYSTOLIC BLOOD PRESSURE < 130 MM HG: ICD-10-PCS | Mod: CPTII,S$GLB,, | Performed by: INTERNAL MEDICINE

## 2022-06-07 PROCEDURE — 1101F PT FALLS ASSESS-DOCD LE1/YR: CPT | Mod: CPTII,S$GLB,, | Performed by: INTERNAL MEDICINE

## 2022-06-07 PROCEDURE — 1101F PR PT FALLS ASSESS DOC 0-1 FALLS W/OUT INJ PAST YR: ICD-10-PCS | Mod: CPTII,S$GLB,, | Performed by: INTERNAL MEDICINE

## 2022-06-07 PROCEDURE — 99214 OFFICE O/P EST MOD 30 MIN: CPT | Mod: S$GLB,,, | Performed by: INTERNAL MEDICINE

## 2022-06-07 PROCEDURE — 3288F PR FALLS RISK ASSESSMENT DOCUMENTED: ICD-10-PCS | Mod: CPTII,S$GLB,, | Performed by: INTERNAL MEDICINE

## 2022-06-07 PROCEDURE — 99999 PR PBB SHADOW E&M-EST. PATIENT-LVL IV: ICD-10-PCS | Mod: PBBFAC,,, | Performed by: INTERNAL MEDICINE

## 2022-06-07 PROCEDURE — 3074F SYST BP LT 130 MM HG: CPT | Mod: CPTII,S$GLB,, | Performed by: INTERNAL MEDICINE

## 2022-06-07 PROCEDURE — 3288F FALL RISK ASSESSMENT DOCD: CPT | Mod: CPTII,S$GLB,, | Performed by: INTERNAL MEDICINE

## 2022-06-07 PROCEDURE — 1126F AMNT PAIN NOTED NONE PRSNT: CPT | Mod: CPTII,S$GLB,, | Performed by: INTERNAL MEDICINE

## 2022-06-07 PROCEDURE — 3078F DIAST BP <80 MM HG: CPT | Mod: CPTII,S$GLB,, | Performed by: INTERNAL MEDICINE

## 2022-06-07 PROCEDURE — 99214 PR OFFICE/OUTPT VISIT, EST, LEVL IV, 30-39 MIN: ICD-10-PCS | Mod: S$GLB,,, | Performed by: INTERNAL MEDICINE

## 2022-06-07 PROCEDURE — 1159F PR MEDICATION LIST DOCUMENTED IN MEDICAL RECORD: ICD-10-PCS | Mod: CPTII,S$GLB,, | Performed by: INTERNAL MEDICINE

## 2022-06-07 PROCEDURE — 1159F MED LIST DOCD IN RCRD: CPT | Mod: CPTII,S$GLB,, | Performed by: INTERNAL MEDICINE

## 2022-06-07 PROCEDURE — 1126F PR PAIN SEVERITY QUANTIFIED, NO PAIN PRESENT: ICD-10-PCS | Mod: CPTII,S$GLB,, | Performed by: INTERNAL MEDICINE

## 2022-06-07 PROCEDURE — 99499 RISK ADDL DX/OHS AUDIT: ICD-10-PCS | Mod: S$GLB,,, | Performed by: INTERNAL MEDICINE

## 2022-06-07 PROCEDURE — 99499 UNLISTED E&M SERVICE: CPT | Mod: S$GLB,,, | Performed by: INTERNAL MEDICINE

## 2022-06-07 PROCEDURE — 3078F PR MOST RECENT DIASTOLIC BLOOD PRESSURE < 80 MM HG: ICD-10-PCS | Mod: CPTII,S$GLB,, | Performed by: INTERNAL MEDICINE

## 2022-06-07 PROCEDURE — 99999 PR PBB SHADOW E&M-EST. PATIENT-LVL IV: CPT | Mod: PBBFAC,,, | Performed by: INTERNAL MEDICINE

## 2022-06-07 NOTE — PROGRESS NOTES
Subjective:   Patient ID:  Wilberto Hayes Jr. is a 82 y.o. male who presents for follow-up of No chief complaint on file.  Patient denies CP, angina or anginal equivalent.Pt with orthostatic sx    Hypertension  This is a chronic problem. The current episode started more than 1 year ago. The problem has been gradually improving since onset. The problem is controlled. Pertinent negatives include no chest pain, palpitations or shortness of breath. Past treatments include beta blockers, angiotensin blockers and diuretics. The current treatment provides moderate improvement. There are no compliance problems.    Coronary Artery Disease  Presents for follow-up visit. Pertinent negatives include no chest pain, chest pressure, chest tightness, dizziness, leg swelling, muscle weakness, palpitations, shortness of breath or weight gain. The symptoms have been stable. Compliance with diet is variable. Compliance with exercise is variable. Compliance with medications is good.       Review of Systems   Constitutional: Negative. Negative for weight gain.   HENT: Negative.    Eyes: Negative.    Cardiovascular: Negative.  Negative for chest pain, leg swelling and palpitations.   Respiratory: Negative.  Negative for chest tightness and shortness of breath.    Endocrine: Negative.    Hematologic/Lymphatic: Negative.    Skin: Negative.    Musculoskeletal: Negative for muscle weakness.   Gastrointestinal: Negative.    Genitourinary: Negative.    Neurological: Negative.  Negative for dizziness.   Psychiatric/Behavioral: Negative.    Allergic/Immunologic: Negative.      Family History   Problem Relation Age of Onset    Blindness Paternal Grandfather     Glaucoma Paternal Grandfather     Alcohol abuse Paternal Grandfather     Kidney disease Paternal Grandfather     Cancer Mother         brain    COPD Mother     Alcohol abuse Mother     Hypertension Mother     Cancer Father         lung, breast with left mastectomy    COPD  Father     Alcohol abuse Father     Hypertension Father     Kidney disease Maternal Grandmother     Alcohol abuse Maternal Grandfather     Kidney disease Maternal Grandfather     Cancer Paternal Grandmother         Gyn     Kidney disease Paternal Grandmother     Retinal detachment Neg Hx     Macular degeneration Neg Hx     Diabetes Neg Hx     Heart disease Neg Hx     Stroke Neg Hx     Mental illness Neg Hx     Mental retardation Neg Hx      Past Medical History:   Diagnosis Date    ACE-inhibitor cough     Acute coronary syndrome     Angina pectoris     Cataract     Coronary artery disease     Degenerative disc disease, lumbar     Degenerative disc disease, lumbar     Glaucoma     Hyperlipidemia     cholesterol    Hypertension     Kidney stone     last in     Myocardial infarction     Obesity     Peripheral vascular disease     Polyneuropathy     Sleep apnea     Tobacco dependence     resolved quit     Trouble in sleeping      Social History     Socioeconomic History    Marital status:    Tobacco Use    Smoking status: Former Smoker     Packs/day: 1.00     Years: 10.00     Pack years: 10.00     Quit date: 1980     Years since quittin.2    Smokeless tobacco: Never Used   Substance and Sexual Activity    Alcohol use: Yes     Alcohol/week: 7.0 standard drinks     Types: 7 Glasses of wine per week     Comment: nightly wine    Drug use: No    Sexual activity: Yes     Partners: Female     Birth control/protection: Partner-Vasectomy   Social History Narrative    . Lives with wife. They had 4 children. Retired  at [employer] and then [employer] work. Still drives. Does have a Living Will, signed at time of CABG ().      Social Determinants of Health     Financial Resource Strain: Low Risk     Difficulty of Paying Living Expenses: Not hard at all   Food Insecurity: No Food Insecurity    Worried About Running Out of Food in the Last Year:  Never true    Ran Out of Food in the Last Year: Never true   Transportation Needs: No Transportation Needs    Lack of Transportation (Medical): No    Lack of Transportation (Non-Medical): No   Physical Activity: Insufficiently Active    Days of Exercise per Week: 1 day    Minutes of Exercise per Session: 60 min   Stress: No Stress Concern Present    Feeling of Stress : Not at all   Social Connections: Unknown    Frequency of Communication with Friends and Family: More than three times a week    Frequency of Social Gatherings with Friends and Family: Once a week    Active Member of Clubs or Organizations: Yes    Attends Club or Organization Meetings: 1 to 4 times per year    Marital Status:    Housing Stability: Low Risk     Unable to Pay for Housing in the Last Year: No    Number of Places Lived in the Last Year: 1    Unstable Housing in the Last Year: No     Current Outpatient Medications on File Prior to Visit   Medication Sig Dispense Refill    aspirin 81 mg Tab Take 1 tablet by mouth Daily.      co-enzyme Q-10 30 mg capsule Take 100 mg by mouth once daily.      cyanocobalamin (VITAMIN B-12) 100 MCG tablet Take 1 tablet by mouth every other day.       fluticasone propionate (FLONASE) 50 mcg/actuation nasal spray 2 sprays (100 mcg total) by Each Nostril route once daily. 16 g 1    gatifloxacin 0.5 % Drop drops Place 1 drop into the left eye 2 (two) times daily. Eyedrops to start one day before surgery 5 mL 2    hydroCHLOROthiazide (HYDRODIURIL) 25 MG tablet TAKE 1/2 TABLET BY MOUTH ONCE DAILY 45 tablet 3    ketorolac 0.5% (ACULAR) 0.5 % Drop Place 1 drop into the left eye 4 (four) times daily. Eyedrops to start one day before surgery 5 mL 2    latanoprost 0.005 % ophthalmic solution PLACE 1 DROP INTO BOTH EYES EVERY EVENING. 7.5 mL 3    losartan (COZAAR) 50 MG tablet TAKE 1 TABLET BY MOUTH TWICE A  tablet 3    LUBRICANT EYE DROPS, GLYC-PG, 1-0.3 % Drop        multivit-min/ferrous fumarate (MULTI VITAMIN ORAL)       nebivoloL (BYSTOLIC) 5 MG Tab Take 1 tablet (5 mg total) by mouth once daily. 30 tablet 11    nitroGLYCERIN (NITROSTAT) 0.4 MG SL tablet Place 1 tablet (0.4 mg total) under the tongue every 5 (five) minutes as needed for Chest pain (if no relief seek er trmt). 25 tablet 3    OCEAN NASAL 0.65 % nasal spray       omega-3 fatty acids-vitamin E 1,000 mg Cap Take 1 capsule by mouth Daily.      prednisoLONE acetate (PRED FORTE) 1 % DrpS Place 1 drop into the left eye 4 (four) times daily. Start one day before surgery 5 mL 2    promethazine-dextromethorphan (PROMETHAZINE-DM) 6.25-15 mg/5 mL Syrp Take 5 mLs by mouth every 6 to 8 hours as needed. 180 mL 0    saw palmetto 500 MG capsule Take by mouth 2 (two) times daily.       triamcinolone acetonide 0.1% (KENALOG) 0.1 % cream Apply topically 2 (two) times daily. 45 g 0    zinc gluconate 50 mg tablet Take 50 mg by mouth once daily.       No current facility-administered medications on file prior to visit.     Review of patient's allergies indicates:   Allergen Reactions    Niacin preparations Other (See Comments)     myalgia    Statins-hmg-coa reductase inhibitors      Muscle weakness    Codeine Other (See Comments) and Swelling     unknown    Fosinopril Other (See Comments)     cough    Pravastatin Other (See Comments)      Muscle pain    Simvastatin Other (See Comments)     Muscle pain    Sulfa (sulfonamide antibiotics) Rash and Hives       Objective:     Physical Exam  Vitals and nursing note reviewed.   Constitutional:       Appearance: He is well-developed.   HENT:      Head: Normocephalic and atraumatic.   Eyes:      Conjunctiva/sclera: Conjunctivae normal.      Pupils: Pupils are equal, round, and reactive to light.   Cardiovascular:      Rate and Rhythm: Normal rate and regular rhythm.      Pulses: Intact distal pulses.      Heart sounds: Normal heart sounds.   Pulmonary:      Effort: Pulmonary  effort is normal.      Breath sounds: Normal breath sounds.   Abdominal:      General: Bowel sounds are normal.      Palpations: Abdomen is soft.   Musculoskeletal:      Cervical back: Normal range of motion and neck supple.   Skin:     General: Skin is warm and dry.   Neurological:      Mental Status: He is alert and oriented to person, place, and time.         Assessment:     1. Essential hypertension    2. Pure hypercholesterolemia    3. Coronary artery disease involving native coronary artery of native heart without angina pectoris    4. Calcification of aorta    5. CHELO on CPAP        Plan:     Essential hypertension    Pure hypercholesterolemia    Coronary artery disease involving native coronary artery of native heart without angina pectoris    Calcification of aorta    CHELO on CPAP        continue  losartan, hctz ,bystolic  -htn  Continue asa, nitrates- cad  Check lipids    Adjust losartan?- dizziness

## 2022-06-10 ENCOUNTER — TELEPHONE (OUTPATIENT)
Dept: FAMILY MEDICINE | Facility: CLINIC | Age: 82
End: 2022-06-10
Payer: MEDICARE

## 2022-06-10 ENCOUNTER — PATIENT MESSAGE (OUTPATIENT)
Dept: FAMILY MEDICINE | Facility: CLINIC | Age: 82
End: 2022-06-10
Payer: MEDICARE

## 2022-06-29 ENCOUNTER — PATIENT MESSAGE (OUTPATIENT)
Dept: CARDIOLOGY | Facility: CLINIC | Age: 82
End: 2022-06-29
Payer: MEDICARE

## 2022-07-05 ENCOUNTER — OFFICE VISIT (OUTPATIENT)
Dept: OPHTHALMOLOGY | Facility: CLINIC | Age: 82
End: 2022-07-05
Payer: MEDICARE

## 2022-07-05 DIAGNOSIS — Z98.890 POST-OPERATIVE STATE: Primary | ICD-10-CM

## 2022-07-05 PROCEDURE — 1160F PR REVIEW ALL MEDS BY PRESCRIBER/CLIN PHARMACIST DOCUMENTED: ICD-10-PCS | Mod: CPTII,S$GLB,, | Performed by: OPHTHALMOLOGY

## 2022-07-05 PROCEDURE — 1160F RVW MEDS BY RX/DR IN RCRD: CPT | Mod: CPTII,S$GLB,, | Performed by: OPHTHALMOLOGY

## 2022-07-05 PROCEDURE — 99024 POSTOP FOLLOW-UP VISIT: CPT | Mod: S$GLB,,, | Performed by: OPHTHALMOLOGY

## 2022-07-05 PROCEDURE — 1159F MED LIST DOCD IN RCRD: CPT | Mod: CPTII,S$GLB,, | Performed by: OPHTHALMOLOGY

## 2022-07-05 PROCEDURE — 99999 PR PBB SHADOW E&M-EST. PATIENT-LVL III: CPT | Mod: PBBFAC,,, | Performed by: OPHTHALMOLOGY

## 2022-07-05 PROCEDURE — 99999 PR PBB SHADOW E&M-EST. PATIENT-LVL III: ICD-10-PCS | Mod: PBBFAC,,, | Performed by: OPHTHALMOLOGY

## 2022-07-05 PROCEDURE — 99024 PR POST-OP FOLLOW-UP VISIT: ICD-10-PCS | Mod: S$GLB,,, | Performed by: OPHTHALMOLOGY

## 2022-07-05 PROCEDURE — 1159F PR MEDICATION LIST DOCUMENTED IN MEDICAL RECORD: ICD-10-PCS | Mod: CPTII,S$GLB,, | Performed by: OPHTHALMOLOGY

## 2022-07-05 NOTE — PROGRESS NOTES
"SUBJECTIVE  Wilberto Hayes Jr. is 82 y.o. male  Uncorrected distance visual acuity was not recorded in the right eye and 20/40 -2 in the left eye. Corrected distance visual acuity was 20/30 -1 in the right eye and not recorded in the left eye.   Chief Complaint   Patient presents with    Post-op Evaluation     Pt reports for 1m PO OMNI OS. Denies any pain or irritation. Va stable. 100% compliant with gtts.           HPI     Post-op Evaluation      Additional comments: Pt reports for 1m PO OMNI OS. Denies any pain or   irritation. Va stable. 100% compliant with gtts.               Comments       1. Mild COAG OS>OD Goal =17-18 (Init 20/22)  +Fhx Glaucoma -g-dad   SLT OD 11/09 (min response) + 1/8/20 (22.5-14.5)   SLT OS 8/09 (22 to 16-17) + 1/8/20 (24.5-14)   2. Mod NSC OD  PCIOL OS with Omni 5/11/22  3. Dry eyes (no dry mouth)   Gel Plugs OU   Xiidra caused irritation   Tried Restasis (burning and blurry vision)   4. Fuchs' corneal dystrophy  5. Ptosis OD     Latanoprost QHS OU         Homeopathic AT"s (Similasan)   Thera tears, Soothe XP   O3FO            Last edited by Rick Kelly on 7/5/2022 10:39 AM. (History)         Assessment /Plan :  1. Post-operative state   Exam:    Slit lamp exam:  L/L: nl  S/C: quiet and uninflamed  K: clear, wound sealed  AC: no cell or flare  Iris/Lens: IOL centered and stable      Assessment:    PO Month 1 S/P Phaco/IOL OS with OMNI: Doing Well and completing healing process. Final visual correction options discussed and appropriate prescriptions and/or OTC reading glass recommendations offered to patient. Pt desires Trifocal Rx. Pt instructed to follow up with Dr. Molina in 4 months for IOP check and GOCT or PRN any pain, redness, vision changes or other concerns.               "

## 2022-07-13 ENCOUNTER — PATIENT MESSAGE (OUTPATIENT)
Dept: PULMONOLOGY | Facility: CLINIC | Age: 82
End: 2022-07-13
Payer: MEDICARE

## 2022-07-13 ENCOUNTER — OFFICE VISIT (OUTPATIENT)
Dept: FAMILY MEDICINE | Facility: CLINIC | Age: 82
End: 2022-07-13
Payer: MEDICARE

## 2022-07-13 ENCOUNTER — LAB VISIT (OUTPATIENT)
Dept: LAB | Facility: HOSPITAL | Age: 82
End: 2022-07-13
Attending: FAMILY MEDICINE
Payer: MEDICARE

## 2022-07-13 VITALS
SYSTOLIC BLOOD PRESSURE: 132 MMHG | DIASTOLIC BLOOD PRESSURE: 64 MMHG | TEMPERATURE: 98 F | OXYGEN SATURATION: 99 % | HEIGHT: 71 IN | BODY MASS INDEX: 30.34 KG/M2 | HEART RATE: 59 BPM | WEIGHT: 216.69 LBS

## 2022-07-13 DIAGNOSIS — R53.1 WEAKNESS: Primary | ICD-10-CM

## 2022-07-13 DIAGNOSIS — E78.00 PURE HYPERCHOLESTEROLEMIA: ICD-10-CM

## 2022-07-13 DIAGNOSIS — I25.119 ATHEROSCLEROSIS OF NATIVE CORONARY ARTERY OF NATIVE HEART WITH ANGINA PECTORIS: ICD-10-CM

## 2022-07-13 DIAGNOSIS — G47.33 OSA ON CPAP: ICD-10-CM

## 2022-07-13 DIAGNOSIS — R53.1 WEAKNESS: ICD-10-CM

## 2022-07-13 DIAGNOSIS — I10 ESSENTIAL HYPERTENSION: ICD-10-CM

## 2022-07-13 DIAGNOSIS — N18.31 CHRONIC KIDNEY DISEASE, STAGE 3A: ICD-10-CM

## 2022-07-13 DIAGNOSIS — Z23 NEED FOR VACCINATION: ICD-10-CM

## 2022-07-13 LAB
ALBUMIN SERPL BCP-MCNC: 4.6 G/DL (ref 3.5–5.2)
ALP SERPL-CCNC: 77 U/L (ref 55–135)
ALT SERPL W/O P-5'-P-CCNC: 10 U/L (ref 10–44)
ANION GAP SERPL CALC-SCNC: 9 MMOL/L (ref 8–16)
AST SERPL-CCNC: 18 U/L (ref 10–40)
BASOPHILS # BLD AUTO: 0.05 K/UL (ref 0–0.2)
BASOPHILS NFR BLD: 1 % (ref 0–1.9)
BILIRUB SERPL-MCNC: 1.2 MG/DL (ref 0.1–1)
BUN SERPL-MCNC: 19 MG/DL (ref 8–23)
CALCIUM SERPL-MCNC: 10.4 MG/DL (ref 8.7–10.5)
CHLORIDE SERPL-SCNC: 102 MMOL/L (ref 95–110)
CHOLEST SERPL-MCNC: 173 MG/DL (ref 120–199)
CHOLEST/HDLC SERPL: 4.1 {RATIO} (ref 2–5)
CO2 SERPL-SCNC: 30 MMOL/L (ref 23–29)
CREAT SERPL-MCNC: 1.4 MG/DL (ref 0.5–1.4)
DIFFERENTIAL METHOD: ABNORMAL
EOSINOPHIL # BLD AUTO: 0.1 K/UL (ref 0–0.5)
EOSINOPHIL NFR BLD: 2.7 % (ref 0–8)
ERYTHROCYTE [DISTWIDTH] IN BLOOD BY AUTOMATED COUNT: 13.2 % (ref 11.5–14.5)
ERYTHROCYTE [SEDIMENTATION RATE] IN BLOOD BY PHOTOMETRIC METHOD: <2 MM/HR (ref 0–23)
EST. GFR  (AFRICAN AMERICAN): 54.5 ML/MIN/1.73 M^2
EST. GFR  (NON AFRICAN AMERICAN): 47.1 ML/MIN/1.73 M^2
GLUCOSE SERPL-MCNC: 103 MG/DL (ref 70–110)
HCT VFR BLD AUTO: 47.3 % (ref 40–54)
HDLC SERPL-MCNC: 42 MG/DL (ref 40–75)
HDLC SERPL: 24.3 % (ref 20–50)
HGB BLD-MCNC: 15.3 G/DL (ref 14–18)
IMM GRANULOCYTES # BLD AUTO: 0.01 K/UL (ref 0–0.04)
IMM GRANULOCYTES NFR BLD AUTO: 0.2 % (ref 0–0.5)
LDLC SERPL CALC-MCNC: 109.4 MG/DL (ref 63–159)
LYMPHOCYTES # BLD AUTO: 1.2 K/UL (ref 1–4.8)
LYMPHOCYTES NFR BLD: 24 % (ref 18–48)
MCH RBC QN AUTO: 32.1 PG (ref 27–31)
MCHC RBC AUTO-ENTMCNC: 32.3 G/DL (ref 32–36)
MCV RBC AUTO: 99 FL (ref 82–98)
MONOCYTES # BLD AUTO: 0.6 K/UL (ref 0.3–1)
MONOCYTES NFR BLD: 11.5 % (ref 4–15)
NEUTROPHILS # BLD AUTO: 3 K/UL (ref 1.8–7.7)
NEUTROPHILS NFR BLD: 60.6 % (ref 38–73)
NONHDLC SERPL-MCNC: 131 MG/DL
NRBC BLD-RTO: 0 /100 WBC
PLATELET # BLD AUTO: 208 K/UL (ref 150–450)
PMV BLD AUTO: 11.3 FL (ref 9.2–12.9)
POTASSIUM SERPL-SCNC: 5 MMOL/L (ref 3.5–5.1)
PROT SERPL-MCNC: 7.7 G/DL (ref 6–8.4)
RBC # BLD AUTO: 4.77 M/UL (ref 4.6–6.2)
SODIUM SERPL-SCNC: 141 MMOL/L (ref 136–145)
TRIGL SERPL-MCNC: 108 MG/DL (ref 30–150)
TSH SERPL DL<=0.005 MIU/L-ACNC: 3.1 UIU/ML (ref 0.4–4)
WBC # BLD AUTO: 4.88 K/UL (ref 3.9–12.7)

## 2022-07-13 PROCEDURE — 36415 COLL VENOUS BLD VENIPUNCTURE: CPT | Mod: PO | Performed by: FAMILY MEDICINE

## 2022-07-13 PROCEDURE — G0009 ADMIN PNEUMOCOCCAL VACCINE: HCPCS | Mod: S$GLB,,, | Performed by: FAMILY MEDICINE

## 2022-07-13 PROCEDURE — 3078F PR MOST RECENT DIASTOLIC BLOOD PRESSURE < 80 MM HG: ICD-10-PCS | Mod: CPTII,S$GLB,, | Performed by: FAMILY MEDICINE

## 2022-07-13 PROCEDURE — 1101F PT FALLS ASSESS-DOCD LE1/YR: CPT | Mod: CPTII,S$GLB,, | Performed by: FAMILY MEDICINE

## 2022-07-13 PROCEDURE — 1126F PR PAIN SEVERITY QUANTIFIED, NO PAIN PRESENT: ICD-10-PCS | Mod: CPTII,S$GLB,, | Performed by: FAMILY MEDICINE

## 2022-07-13 PROCEDURE — 3078F DIAST BP <80 MM HG: CPT | Mod: CPTII,S$GLB,, | Performed by: FAMILY MEDICINE

## 2022-07-13 PROCEDURE — 85025 COMPLETE CBC W/AUTO DIFF WBC: CPT | Performed by: FAMILY MEDICINE

## 2022-07-13 PROCEDURE — 99214 OFFICE O/P EST MOD 30 MIN: CPT | Mod: S$GLB,,, | Performed by: FAMILY MEDICINE

## 2022-07-13 PROCEDURE — 3288F FALL RISK ASSESSMENT DOCD: CPT | Mod: CPTII,S$GLB,, | Performed by: FAMILY MEDICINE

## 2022-07-13 PROCEDURE — 3075F PR MOST RECENT SYSTOLIC BLOOD PRESS GE 130-139MM HG: ICD-10-PCS | Mod: CPTII,S$GLB,, | Performed by: FAMILY MEDICINE

## 2022-07-13 PROCEDURE — 84443 ASSAY THYROID STIM HORMONE: CPT | Performed by: FAMILY MEDICINE

## 2022-07-13 PROCEDURE — 90677 PCV20 VACCINE IM: CPT | Mod: S$GLB,,, | Performed by: FAMILY MEDICINE

## 2022-07-13 PROCEDURE — 99999 PR PBB SHADOW E&M-EST. PATIENT-LVL IV: ICD-10-PCS | Mod: PBBFAC,,, | Performed by: FAMILY MEDICINE

## 2022-07-13 PROCEDURE — 1101F PR PT FALLS ASSESS DOC 0-1 FALLS W/OUT INJ PAST YR: ICD-10-PCS | Mod: CPTII,S$GLB,, | Performed by: FAMILY MEDICINE

## 2022-07-13 PROCEDURE — 1126F AMNT PAIN NOTED NONE PRSNT: CPT | Mod: CPTII,S$GLB,, | Performed by: FAMILY MEDICINE

## 2022-07-13 PROCEDURE — 90677 PNEUMOCOCCAL CONJUGATE VACCINE 20-VALENT: ICD-10-PCS | Mod: S$GLB,,, | Performed by: FAMILY MEDICINE

## 2022-07-13 PROCEDURE — 1159F PR MEDICATION LIST DOCUMENTED IN MEDICAL RECORD: ICD-10-PCS | Mod: CPTII,S$GLB,, | Performed by: FAMILY MEDICINE

## 2022-07-13 PROCEDURE — 1159F MED LIST DOCD IN RCRD: CPT | Mod: CPTII,S$GLB,, | Performed by: FAMILY MEDICINE

## 2022-07-13 PROCEDURE — 85652 RBC SED RATE AUTOMATED: CPT | Performed by: FAMILY MEDICINE

## 2022-07-13 PROCEDURE — 80053 COMPREHEN METABOLIC PANEL: CPT | Performed by: FAMILY MEDICINE

## 2022-07-13 PROCEDURE — 3288F PR FALLS RISK ASSESSMENT DOCUMENTED: ICD-10-PCS | Mod: CPTII,S$GLB,, | Performed by: FAMILY MEDICINE

## 2022-07-13 PROCEDURE — 3075F SYST BP GE 130 - 139MM HG: CPT | Mod: CPTII,S$GLB,, | Performed by: FAMILY MEDICINE

## 2022-07-13 PROCEDURE — 99214 PR OFFICE/OUTPT VISIT, EST, LEVL IV, 30-39 MIN: ICD-10-PCS | Mod: S$GLB,,, | Performed by: FAMILY MEDICINE

## 2022-07-13 PROCEDURE — 80061 LIPID PANEL: CPT | Performed by: FAMILY MEDICINE

## 2022-07-13 PROCEDURE — 99999 PR PBB SHADOW E&M-EST. PATIENT-LVL IV: CPT | Mod: PBBFAC,,, | Performed by: FAMILY MEDICINE

## 2022-07-13 PROCEDURE — G0009 PNEUMOCOCCAL CONJUGATE VACCINE 20-VALENT: ICD-10-PCS | Mod: S$GLB,,, | Performed by: FAMILY MEDICINE

## 2022-07-13 NOTE — PROGRESS NOTES
Subjective:       Patient ID: Wilberto Hayes Jr. is a 82 y.o. male.    Chief Complaint: Follow-up      HPI Comments:       Current Outpatient Medications:     aspirin 81 mg Tab, Take 1 tablet by mouth Daily., Disp: , Rfl:     co-enzyme Q-10 30 mg capsule, Take 100 mg by mouth once daily., Disp: , Rfl:     cyanocobalamin (VITAMIN B-12) 100 MCG tablet, Take 1 tablet by mouth every other day. , Disp: , Rfl:     fluticasone propionate (FLONASE) 50 mcg/actuation nasal spray, 2 sprays (100 mcg total) by Each Nostril route once daily., Disp: 16 g, Rfl: 1    gatifloxacin 0.5 % Drop drops, Place 1 drop into the left eye 2 (two) times daily. Eyedrops to start one day before surgery, Disp: 5 mL, Rfl: 2    hydroCHLOROthiazide (HYDRODIURIL) 25 MG tablet, TAKE 1/2 TABLET BY MOUTH ONCE DAILY, Disp: 45 tablet, Rfl: 3    ketorolac 0.5% (ACULAR) 0.5 % Drop, Place 1 drop into the left eye 4 (four) times daily. Eyedrops to start one day before surgery, Disp: 5 mL, Rfl: 2    latanoprost 0.005 % ophthalmic solution, PLACE 1 DROP INTO BOTH EYES EVERY EVENING., Disp: 7.5 mL, Rfl: 3    losartan (COZAAR) 50 MG tablet, TAKE 1 TABLET BY MOUTH TWICE A DAY (Patient taking differently: once daily.), Disp: 180 tablet, Rfl: 3    LUBRICANT EYE DROPS, GLYC-PG, 1-0.3 % Drop, , Disp: , Rfl:     multivit-min/ferrous fumarate (MULTI VITAMIN ORAL), , Disp: , Rfl:     nebivoloL (BYSTOLIC) 5 MG Tab, Take 1 tablet (5 mg total) by mouth once daily., Disp: 30 tablet, Rfl: 11    nitroGLYCERIN (NITROSTAT) 0.4 MG SL tablet, Place 1 tablet (0.4 mg total) under the tongue every 5 (five) minutes as needed for Chest pain (if no relief seek er trmt)., Disp: 25 tablet, Rfl: 3    OCEAN NASAL 0.65 % nasal spray, , Disp: , Rfl:     omega-3 fatty acids-vitamin E 1,000 mg Cap, Take 1 capsule by mouth Daily., Disp: , Rfl:     prednisoLONE acetate (PRED FORTE) 1 % DrpS, Place 1 drop into the left eye 4 (four) times daily. Start one day before surgery,  "Disp: 5 mL, Rfl: 2    promethazine-dextromethorphan (PROMETHAZINE-DM) 6.25-15 mg/5 mL Syrp, Take 5 mLs by mouth every 6 to 8 hours as needed., Disp: 180 mL, Rfl: 0    saw palmetto 500 MG capsule, Take by mouth 2 (two) times daily. , Disp: , Rfl:     triamcinolone acetonide 0.1% (KENALOG) 0.1 % cream, Apply topically 2 (two) times daily., Disp: 45 g, Rfl: 0    zinc gluconate 50 mg tablet, Take 50 mg by mouth once daily., Disp: , Rfl:       Complains of about 6 months of weakness.  Generalized fatigue.  Sometimes difficult to get out of chair.  Denies any focal weakness in his arms or legs.  No pain in his shoulders or hips.  Denies depression.  Continues to use CPAP every night.  Feels rested in the morning.  Stays physically active but could do more like riding bike.  Plans to do so.    Blood pressure readings at home continue to be good to low.    Has lost a few lb since our last visit due to dietary modifications.    Review of Systems   Constitutional: Positive for activity change and fatigue. Negative for unexpected weight change.   HENT: Negative for hearing loss, rhinorrhea and trouble swallowing.    Eyes: Negative for discharge and visual disturbance.   Respiratory: Negative for chest tightness and wheezing.    Cardiovascular: Negative for chest pain and palpitations.   Gastrointestinal: Negative for blood in stool, constipation, diarrhea and vomiting.   Endocrine: Negative for polydipsia and polyuria.   Genitourinary: Negative for difficulty urinating, hematuria and urgency.   Musculoskeletal: Positive for arthralgias. Negative for joint swelling and neck pain.   Neurological: Positive for weakness. Negative for headaches.   Psychiatric/Behavioral: Negative for confusion and dysphoric mood.       Objective:      Vitals:    07/13/22 0859 07/13/22 0914   BP: (!) 142/70 132/64   Pulse: (!) 59    Temp: 97.7 °F (36.5 °C)    SpO2: 99%    Weight: 98.3 kg (216 lb 11.4 oz)    Height: 5' 11" (1.803 m)  "   PainSc: 0-No pain      Physical Exam  Vitals and nursing note reviewed.   Constitutional:       General: He is not in acute distress.     Appearance: He is well-developed. He is not diaphoretic.   HENT:      Head: Normocephalic.   Neck:      Thyroid: No thyromegaly.   Cardiovascular:      Rate and Rhythm: Normal rate and regular rhythm.      Heart sounds: Normal heart sounds. No murmur heard.  Pulmonary:      Effort: Pulmonary effort is normal.      Breath sounds: Normal breath sounds. No wheezing or rales.   Abdominal:      General: There is no distension.      Palpations: Abdomen is soft.   Musculoskeletal:      Cervical back: Neck supple.      Right lower leg: No edema.      Left lower leg: No edema.   Lymphadenopathy:      Cervical: No cervical adenopathy.   Skin:     General: Skin is warm and dry.   Neurological:      Mental Status: He is alert and oriented to person, place, and time.      Cranial Nerves: No cranial nerve deficit.      Motor: No weakness.      Coordination: Coordination normal.      Gait: Gait normal.   Psychiatric:         Mood and Affect: Mood normal.         Behavior: Behavior normal.         Thought Content: Thought content normal.         Judgment: Judgment normal.       the   Assessment:       1. Weakness    2. Essential hypertension    3. CHELO on CPAP    4. Pure hypercholesterolemia    5. Chronic kidney disease, stage 3a    6. Atherosclerosis of native coronary artery of native heart with angina pectoris    7. Need for vaccination        Plan:   Weakness  Comments:  Temporally post the- COVID.  No cardiac symptoms.  Increase physical activity.  Declines physical therapy.  Blood work today  Orders:  -     CBC Auto Differential; Future; Expected date: 07/13/2022  -     TSH; Future; Expected date: 07/13/2022  -     Comprehensive Metabolic Panel; Future; Expected date: 07/13/2022  -     Sedimentation rate; Future; Expected date: 07/13/2022  -     Cologuard Screening (Multitarget Stool DNA);  Future; Expected date: 07/13/2022    Essential hypertension  Comments:  Controlled on qday dosing    CHELO on CPAP  Comments:  Nightly adherence    Pure hypercholesterolemia  Comments:  Fasting lipid profile    Chronic kidney disease, stage 3a  Comments:  Stable.  CMP    Atherosclerosis of native coronary artery of native heart with angina pectoris  Comments:  No recent chest pain or shortness of breath  Orders:  -     Lipid Panel; Future; Expected date: 07/13/2022    Need for vaccination  Comments:  Prevnar 20 today  Orders:  -     (In Office Administered) Pneumococcal Conjugate Vaccine (20 Valent) (IM)

## 2022-07-15 ENCOUNTER — PES CALL (OUTPATIENT)
Dept: ADMINISTRATIVE | Facility: CLINIC | Age: 82
End: 2022-07-15
Payer: MEDICARE

## 2022-07-18 ENCOUNTER — TELEPHONE (OUTPATIENT)
Dept: FAMILY MEDICINE | Facility: CLINIC | Age: 82
End: 2022-07-18
Payer: MEDICARE

## 2022-07-18 DIAGNOSIS — Z12.11 SCREENING FOR COLON CANCER: Primary | ICD-10-CM

## 2022-07-18 NOTE — TELEPHONE ENCOUNTER
----- Message from Jocelin Sahni sent at 7/18/2022 11:06 AM CDT -----  Contact: [facility]  Yoan is requesting a call in regards to a colo grad order. Please call him at [phone].  ref number [id number] Thanks KB

## 2022-07-18 NOTE — TELEPHONE ENCOUNTER
Spoke with Yoan the order for the Cologuard for ICD 10 was for weakness will not work. Gave new ICD 10 code of Z12.11 Screening for colon cancer.

## 2022-07-21 ENCOUNTER — PATIENT MESSAGE (OUTPATIENT)
Dept: OPHTHALMOLOGY | Facility: CLINIC | Age: 82
End: 2022-07-21
Payer: MEDICARE

## 2022-07-21 ENCOUNTER — PATIENT MESSAGE (OUTPATIENT)
Dept: CARDIOLOGY | Facility: CLINIC | Age: 82
End: 2022-07-21
Payer: MEDICARE

## 2022-07-22 ENCOUNTER — PATIENT MESSAGE (OUTPATIENT)
Dept: CARDIOLOGY | Facility: CLINIC | Age: 82
End: 2022-07-22
Payer: MEDICARE

## 2022-08-06 LAB — NONINV COLON CA DNA+OCC BLD SCRN STL QL: POSITIVE

## 2022-08-09 ENCOUNTER — PATIENT MESSAGE (OUTPATIENT)
Dept: FAMILY MEDICINE | Facility: CLINIC | Age: 82
End: 2022-08-09
Payer: MEDICARE

## 2022-08-09 DIAGNOSIS — R19.5 POSITIVE COLORECTAL CANCER SCREENING USING COLOGUARD TEST: Primary | ICD-10-CM

## 2022-08-10 ENCOUNTER — PATIENT MESSAGE (OUTPATIENT)
Dept: FAMILY MEDICINE | Facility: CLINIC | Age: 82
End: 2022-08-10
Payer: MEDICARE

## 2022-08-10 ENCOUNTER — HOSPITAL ENCOUNTER (OUTPATIENT)
Dept: PREADMISSION TESTING | Facility: HOSPITAL | Age: 82
Discharge: HOME | End: 2022-08-10
Attending: FAMILY MEDICINE
Payer: MEDICARE

## 2022-08-10 ENCOUNTER — HOSPITAL ENCOUNTER (EMERGENCY)
Facility: HOSPITAL | Age: 82
Discharge: HOME | End: 2022-08-10
Attending: EMERGENCY MEDICINE
Payer: MEDICARE

## 2022-08-10 ENCOUNTER — TELEPHONE (OUTPATIENT)
Dept: SURGERY | Facility: CLINIC | Age: 82
End: 2022-08-10
Payer: MEDICARE

## 2022-08-10 VITALS
HEART RATE: 92 BPM | WEIGHT: 221.25 LBS | OXYGEN SATURATION: 96 % | SYSTOLIC BLOOD PRESSURE: 178 MMHG | BODY MASS INDEX: 30.86 KG/M2 | TEMPERATURE: 100 F | RESPIRATION RATE: 22 BRPM | DIASTOLIC BLOOD PRESSURE: 99 MMHG

## 2022-08-10 DIAGNOSIS — K80.20 CALCULUS OF GALLBLADDER WITHOUT CHOLECYSTITIS WITHOUT OBSTRUCTION: Primary | ICD-10-CM

## 2022-08-10 DIAGNOSIS — R07.9 CHEST PAIN: ICD-10-CM

## 2022-08-10 DIAGNOSIS — R19.5 POSITIVE COLORECTAL CANCER SCREENING USING COLOGUARD TEST: Primary | ICD-10-CM

## 2022-08-10 DIAGNOSIS — R10.11 RIGHT UPPER QUADRANT ABDOMINAL PAIN: ICD-10-CM

## 2022-08-10 LAB
ALBUMIN SERPL BCP-MCNC: 4.6 G/DL (ref 3.5–5.2)
ALP SERPL-CCNC: 79 U/L (ref 55–135)
ALT SERPL W/O P-5'-P-CCNC: 7 U/L (ref 10–44)
ANION GAP SERPL CALC-SCNC: 8 MMOL/L (ref 8–16)
AST SERPL-CCNC: 18 U/L (ref 10–40)
BASOPHILS # BLD AUTO: 0.02 K/UL (ref 0–0.2)
BASOPHILS NFR BLD: 0.2 % (ref 0–1.9)
BILIRUB SERPL-MCNC: 1.2 MG/DL (ref 0.1–1)
BNP SERPL-MCNC: 350 PG/ML (ref 0–99)
BUN SERPL-MCNC: 18 MG/DL (ref 8–23)
CALCIUM SERPL-MCNC: 10.8 MG/DL (ref 8.7–10.5)
CHLORIDE SERPL-SCNC: 101 MMOL/L (ref 95–110)
CO2 SERPL-SCNC: 30 MMOL/L (ref 23–29)
CREAT SERPL-MCNC: 1.3 MG/DL (ref 0.5–1.4)
DIFFERENTIAL METHOD: ABNORMAL
EOSINOPHIL # BLD AUTO: 0 K/UL (ref 0–0.5)
EOSINOPHIL NFR BLD: 0.1 % (ref 0–8)
ERYTHROCYTE [DISTWIDTH] IN BLOOD BY AUTOMATED COUNT: 12.3 % (ref 11.5–14.5)
EST. GFR  (NO RACE VARIABLE): 56 ML/MIN/1.73 M^2
GLUCOSE SERPL-MCNC: 116 MG/DL (ref 70–110)
HCT VFR BLD AUTO: 43.5 % (ref 40–54)
HGB BLD-MCNC: 14.8 G/DL (ref 14–18)
IMM GRANULOCYTES # BLD AUTO: 0.03 K/UL (ref 0–0.04)
IMM GRANULOCYTES NFR BLD AUTO: 0.3 % (ref 0–0.5)
LYMPHOCYTES # BLD AUTO: 0.7 K/UL (ref 1–4.8)
LYMPHOCYTES NFR BLD: 5.7 % (ref 18–48)
MCH RBC QN AUTO: 31.2 PG (ref 27–31)
MCHC RBC AUTO-ENTMCNC: 34 G/DL (ref 32–36)
MCV RBC AUTO: 92 FL (ref 82–98)
MONOCYTES # BLD AUTO: 0.8 K/UL (ref 0.3–1)
MONOCYTES NFR BLD: 6.9 % (ref 4–15)
NEUTROPHILS # BLD AUTO: 10.2 K/UL (ref 1.8–7.7)
NEUTROPHILS NFR BLD: 86.8 % (ref 38–73)
NRBC BLD-RTO: 0 /100 WBC
PLATELET # BLD AUTO: 204 K/UL (ref 150–450)
PMV BLD AUTO: 10.5 FL (ref 9.2–12.9)
POTASSIUM SERPL-SCNC: 3.8 MMOL/L (ref 3.5–5.1)
PROT SERPL-MCNC: 8.1 G/DL (ref 6–8.4)
RBC # BLD AUTO: 4.74 M/UL (ref 4.6–6.2)
SODIUM SERPL-SCNC: 139 MMOL/L (ref 136–145)
TROPONIN I SERPL DL<=0.01 NG/ML-MCNC: 0.04 NG/ML (ref 0–0.03)
TROPONIN I SERPL DL<=0.01 NG/ML-MCNC: 0.04 NG/ML (ref 0–0.03)
WBC # BLD AUTO: 11.68 K/UL (ref 3.9–12.7)

## 2022-08-10 PROCEDURE — 96374 THER/PROPH/DIAG INJ IV PUSH: CPT

## 2022-08-10 PROCEDURE — 84484 ASSAY OF TROPONIN QUANT: CPT | Mod: 91 | Performed by: EMERGENCY MEDICINE

## 2022-08-10 PROCEDURE — 93010 EKG 12-LEAD: ICD-10-PCS | Mod: ,,, | Performed by: INTERNAL MEDICINE

## 2022-08-10 PROCEDURE — 93010 ELECTROCARDIOGRAM REPORT: CPT | Mod: ,,, | Performed by: INTERNAL MEDICINE

## 2022-08-10 PROCEDURE — 63600175 PHARM REV CODE 636 W HCPCS: Performed by: EMERGENCY MEDICINE

## 2022-08-10 PROCEDURE — 93005 ELECTROCARDIOGRAM TRACING: CPT

## 2022-08-10 PROCEDURE — 25500020 PHARM REV CODE 255: Performed by: EMERGENCY MEDICINE

## 2022-08-10 PROCEDURE — 80053 COMPREHEN METABOLIC PANEL: CPT | Performed by: EMERGENCY MEDICINE

## 2022-08-10 PROCEDURE — 85025 COMPLETE CBC W/AUTO DIFF WBC: CPT | Performed by: EMERGENCY MEDICINE

## 2022-08-10 PROCEDURE — 83880 ASSAY OF NATRIURETIC PEPTIDE: CPT | Performed by: EMERGENCY MEDICINE

## 2022-08-10 PROCEDURE — 25000003 PHARM REV CODE 250: Performed by: EMERGENCY MEDICINE

## 2022-08-10 PROCEDURE — 99285 EMERGENCY DEPT VISIT HI MDM: CPT | Mod: 25

## 2022-08-10 RX ORDER — LIDOCAINE HYDROCHLORIDE 20 MG/ML
15 SOLUTION OROPHARYNGEAL ONCE
Status: COMPLETED | OUTPATIENT
Start: 2022-08-10 | End: 2022-08-10

## 2022-08-10 RX ORDER — ONDANSETRON 2 MG/ML
INJECTION INTRAMUSCULAR; INTRAVENOUS
Status: DISCONTINUED
Start: 2022-08-10 | End: 2022-08-10 | Stop reason: HOSPADM

## 2022-08-10 RX ORDER — ASPIRIN 325 MG
325 TABLET ORAL
Status: COMPLETED | OUTPATIENT
Start: 2022-08-10 | End: 2022-08-10

## 2022-08-10 RX ORDER — HYDROCODONE BITARTRATE AND ACETAMINOPHEN 5; 325 MG/1; MG/1
1 TABLET ORAL EVERY 6 HOURS PRN
Qty: 30 TABLET | Refills: 0 | Status: SHIPPED | OUTPATIENT
Start: 2022-08-10 | End: 2022-09-23 | Stop reason: ALTCHOICE

## 2022-08-10 RX ORDER — MAG HYDROX/ALUMINUM HYD/SIMETH 200-200-20
30 SUSPENSION, ORAL (FINAL DOSE FORM) ORAL ONCE
Status: COMPLETED | OUTPATIENT
Start: 2022-08-10 | End: 2022-08-10

## 2022-08-10 RX ORDER — ONDANSETRON 4 MG/1
4 TABLET, FILM COATED ORAL EVERY 6 HOURS
Qty: 30 TABLET | Refills: 0 | Status: SHIPPED | OUTPATIENT
Start: 2022-08-10

## 2022-08-10 RX ORDER — ONDANSETRON 2 MG/ML
4 INJECTION INTRAMUSCULAR; INTRAVENOUS
Status: COMPLETED | OUTPATIENT
Start: 2022-08-10 | End: 2022-08-10

## 2022-08-10 RX ADMIN — ALUMINUM HYDROXIDE, MAGNESIUM HYDROXIDE, AND SIMETHICONE 30 ML: 200; 200; 20 SUSPENSION ORAL at 12:08

## 2022-08-10 RX ADMIN — LIDOCAINE HYDROCHLORIDE 15 ML: 20 SOLUTION ORAL; TOPICAL at 12:08

## 2022-08-10 RX ADMIN — ASPIRIN 325 MG ORAL TABLET 325 MG: 325 PILL ORAL at 11:08

## 2022-08-10 RX ADMIN — ONDANSETRON 4 MG: 2 INJECTION INTRAMUSCULAR; INTRAVENOUS at 11:08

## 2022-08-10 RX ADMIN — IOHEXOL 100 ML: 350 INJECTION, SOLUTION INTRAVENOUS at 12:08

## 2022-08-10 NOTE — ED PROVIDER NOTES
"SCRIBE #1 NOTE: I, Beckie Fidel, am scribing for, and in the presence of, So Qureshi Do, MD. I have scribed the entire note.      History      Chief Complaint   Patient presents with    Chest Pain     Pt reports L. Anterior chest tightness since last night. Pt has taken 5 SNG with no help. Pt also experiencing nausea and mid back pain. Pt experienced same thing Sunday night and got better but symptoms returned last night       Review of patient's allergies indicates:   Allergen Reactions    Niacin preparations Other (See Comments)     myalgia    Statins-hmg-coa reductase inhibitors      Muscle weakness    Codeine Other (See Comments) and Swelling     unknown    Fosinopril Other (See Comments)     cough    Pravastatin Other (See Comments)      Muscle pain    Simvastatin Other (See Comments)     Muscle pain    Sulfa (sulfonamide antibiotics) Rash and Hives        HPI   HPI    8/10/2022, 12:14 PM   History obtained from the patient      History of Present Illness: Wilberto Hayes Jr. is a 82 y.o. male patient with a PMHx of acute coronary syndrome, angina pectoris, CAD s/p CABG, HLD, HTN, MI, PVD, and tobacco dependence (resolved quit in 1980) who presents to the Emergency Department for CP which onset gradually at 2130 last night after he ate a bologna sandwich at 1900. Pt describes his pain as a dull, aching pain and pressure. He reports that his pain radiates to his back. The pt states, "It feels like indigestion pain." Symptoms are constant and moderate in severity. No mitigating or exacerbating factors reported. Associated sxs include back pain radiating from the chest and nausea. Patient denies any fever, chills, numbness, diaphoresis, vomiting, weakness, and all other sxs at this time. Prior Tx includes 5 NTG taken since last night and milk of magnesia taken this morning with no relief of sxs. 3 days ago, per the pt, he had a similar pain after he ate seafood gumbo and it was relieves after he " took a Laxative. Pt reports that his Cardiologist is Dr. Valle. No further complaints or concerns at this time.         Arrival mode: Personal vehicle    PCP: Dayne Steward MD       Past Medical History:  Past Medical History:   Diagnosis Date    ACE-inhibitor cough     Acute coronary syndrome     Angina pectoris     Cataract     Coronary artery disease     Degenerative disc disease, lumbar     Degenerative disc disease, lumbar     Glaucoma     Hyperlipidemia     cholesterol    Hypertension     Kidney stone     last in 2001    Myocardial infarction     Obesity     Peripheral vascular disease     Polyneuropathy     Sleep apnea     Tobacco dependence     resolved quit 1980    Trouble in sleeping        Past Surgical History:  Past Surgical History:   Procedure Laterality Date    ABCESS DRAINAGE Left 2003    inguinal abcess/cellulitis - MRSA- 60 days of IVAB and repacking w/ Home health     CORONARY ARTERY BYPASS GRAFT  2002    Two-vessel    cystoscopy and stone removal via scope  2003    several between age 23 and 53    EXTRACTION OF TOOTH  07/2021    EYE SURGERY      lthotripsy  1985    SLT - OU - BOTH EYES  2009    TONSILLECTOMY      at age 4    VASECTOMY  1975         Family History:  Family History   Problem Relation Age of Onset    Blindness Paternal Grandfather     Glaucoma Paternal Grandfather     Alcohol abuse Paternal Grandfather     Kidney disease Paternal Grandfather     Cancer Mother         brain    COPD Mother     Alcohol abuse Mother     Hypertension Mother     Cancer Father         lung, breast with left mastectomy    COPD Father     Alcohol abuse Father     Hypertension Father     Kidney disease Maternal Grandmother     Alcohol abuse Maternal Grandfather     Kidney disease Maternal Grandfather     Cancer Paternal Grandmother         Gyn     Kidney disease Paternal Grandmother     Retinal detachment Neg Hx     Macular degeneration Neg Hx     Diabetes  Neg Hx     Heart disease Neg Hx     Stroke Neg Hx     Mental illness Neg Hx     Mental retardation Neg Hx        Social History:  Social History     Tobacco Use    Smoking status: Former Smoker     Packs/day: 1.00     Years: 10.00     Pack years: 10.00     Quit date: 1980     Years since quittin.3    Smokeless tobacco: Never Used   Substance and Sexual Activity    Alcohol use: Yes     Alcohol/week: 7.0 standard drinks     Types: 7 Glasses of wine per week     Comment: nightly wine    Drug use: No    Sexual activity: Yes     Partners: Female     Birth control/protection: Partner-Vasectomy       ROS   Review of Systems   Constitutional: Negative for chills, diaphoresis and fever.   HENT: Negative for sore throat.    Respiratory: Negative for shortness of breath.    Cardiovascular: Positive for chest pain (radiating to the back).   Gastrointestinal: Positive for nausea. Negative for vomiting.   Genitourinary: Negative for dysuria.   Musculoskeletal: Positive for back pain (radiating from the chest).   Skin: Negative for rash.   Neurological: Negative for weakness and numbness.   Hematological: Does not bruise/bleed easily.   All other systems reviewed and are negative.    Physical Exam      Initial Vitals [08/10/22 1035]   BP Pulse Resp Temp SpO2   (!) 197/79 (!) 57 20 98 °F (36.7 °C) 98 %      MAP       --          Physical Exam  Nursing Notes and Vital Signs Reviewed.  Constitutional: Patient is in no acute distress. Well-developed and well-nourished.  Head: Atraumatic. Normocephalic.  Eyes: PERRL. EOM intact. Conjunctivae are not pale. No scleral icterus.  ENT: Mucous membranes are moist. Oropharynx is clear and symmetric.    Neck: Supple. Full ROM. No lymphadenopathy.  Cardiovascular: Regular rate. Regular rhythm. No murmurs, rubs, or gallops. Distal pulses are 2+ and symmetric. No JVD.  Pulmonary/Chest: No respiratory distress. Clear to auscultation bilaterally. No wheezing or rales.  Abdominal:  Soft and non-distended.  There is no obvious tenderness.  No rebound, guarding, or rigidity.   Musculoskeletal: Moves all extremities. No obvious deformities. No edema.  Skin: Warm and dry.  Neurological:  Alert, awake, and appropriate.  Normal speech.  No acute focal neurological deficits are appreciated.  Psychiatric: Normal affect. Good eye contact. Appropriate in content.    ED Course    Procedures  ED Vital Signs:  Vitals:    08/10/22 1035 08/10/22 1058 08/10/22 1100 08/10/22 1200   BP: (!) 197/79  (!) 185/82 (!) 190/80   Pulse: (!) 57 (!) 58 (!) 59 61   Resp: 20  20 (!) 31   Temp: 98 °F (36.7 °C)      TempSrc: Oral      SpO2: 98%  100% 97%   Weight: 100.4 kg (221 lb 3.7 oz)       08/10/22 1345 08/10/22 1400 08/10/22 1500   BP: (!) 183/81 (!) 184/86 (!) 178/99   Pulse: 72 72 92   Resp: 16 (!) 22 (!) 22   Temp: 99.6 °F (37.6 °C)     TempSrc: Oral     SpO2: 97% (!) 94% 96%   Weight:          Abnormal Lab Results:  Labs Reviewed   CBC W/ AUTO DIFFERENTIAL - Abnormal; Notable for the following components:       Result Value    MCH 31.2 (*)     Gran # (ANC) 10.2 (*)     Lymph # 0.7 (*)     Gran % 86.8 (*)     Lymph % 5.7 (*)     All other components within normal limits   COMPREHENSIVE METABOLIC PANEL - Abnormal; Notable for the following components:    CO2 30 (*)     Glucose 116 (*)     Calcium 10.8 (*)     Total Bilirubin 1.2 (*)     ALT 7 (*)     eGFR 56 (*)     All other components within normal limits   TROPONIN I - Abnormal; Notable for the following components:    Troponin I 0.037 (*)     All other components within normal limits   TROPONIN I - Abnormal; Notable for the following components:    Troponin I 0.045 (*)     All other components within normal limits   B-TYPE NATRIURETIC PEPTIDE - Abnormal; Notable for the following components:     (*)     All other components within normal limits        All Lab Results:  Results for orders placed or performed during the hospital encounter of 08/10/22   CBC  auto differential   Result Value Ref Range    WBC 11.68 3.90 - 12.70 K/uL    RBC 4.74 4.60 - 6.20 M/uL    Hemoglobin 14.8 14.0 - 18.0 g/dL    Hematocrit 43.5 40.0 - 54.0 %    MCV 92 82 - 98 fL    MCH 31.2 (H) 27.0 - 31.0 pg    MCHC 34.0 32.0 - 36.0 g/dL    RDW 12.3 11.5 - 14.5 %    Platelets 204 150 - 450 K/uL    MPV 10.5 9.2 - 12.9 fL    Immature Granulocytes 0.3 0.0 - 0.5 %    Gran # (ANC) 10.2 (H) 1.8 - 7.7 K/uL    Immature Grans (Abs) 0.03 0.00 - 0.04 K/uL    Lymph # 0.7 (L) 1.0 - 4.8 K/uL    Mono # 0.8 0.3 - 1.0 K/uL    Eos # 0.0 0.0 - 0.5 K/uL    Baso # 0.02 0.00 - 0.20 K/uL    nRBC 0 0 /100 WBC    Gran % 86.8 (H) 38.0 - 73.0 %    Lymph % 5.7 (L) 18.0 - 48.0 %    Mono % 6.9 4.0 - 15.0 %    Eosinophil % 0.1 0.0 - 8.0 %    Basophil % 0.2 0.0 - 1.9 %    Differential Method Automated    Comprehensive metabolic panel   Result Value Ref Range    Sodium 139 136 - 145 mmol/L    Potassium 3.8 3.5 - 5.1 mmol/L    Chloride 101 95 - 110 mmol/L    CO2 30 (H) 23 - 29 mmol/L    Glucose 116 (H) 70 - 110 mg/dL    BUN 18 8 - 23 mg/dL    Creatinine 1.3 0.5 - 1.4 mg/dL    Calcium 10.8 (H) 8.7 - 10.5 mg/dL    Total Protein 8.1 6.0 - 8.4 g/dL    Albumin 4.6 3.5 - 5.2 g/dL    Total Bilirubin 1.2 (H) 0.1 - 1.0 mg/dL    Alkaline Phosphatase 79 55 - 135 U/L    AST 18 10 - 40 U/L    ALT 7 (L) 10 - 44 U/L    Anion Gap 8 8 - 16 mmol/L    eGFR 56 (A) >60 mL/min/1.73 m^2   Troponin I #1   Result Value Ref Range    Troponin I 0.037 (H) 0.000 - 0.026 ng/mL   Troponin I #2   Result Value Ref Range    Troponin I 0.045 (H) 0.000 - 0.026 ng/mL   BNP   Result Value Ref Range     (H) 0 - 99 pg/mL         Imaging Results:  Imaging Results          US Abdomen Limited (Gallbladder) (Final result)  Result time 08/10/22 15:12:52    Final result by Ramone Sesay MD (08/10/22 15:12:52)                 Impression:      1.  Progressive gallbladder wall thickening in this patient who has multiple gallstones.  There is a trace amount of  pericholecystic fluid.  Negative for sonographic Patton sign at this time.  Acute and chronic cholecystitis must be considered.  Diabetic patients can have acute cholecystitis without a sonographic Patton sign.  Clinical correlation is advised.    2.  Otherwise, normal right upper quadrant ultrasound.      Electronically signed by: Ramone Sesay MD  Date:    08/10/2022  Time:    15:12             Narrative:    EXAMINATION:  US ABDOMEN LIMITED, color flow and spectral Doppler interrogation    CLINICAL HISTORY:  Right upper quadrant pain    COMPARISON:  August 17, 2020    FINDINGS:  The liver is normal, and measures 14.5 cm. The gallbladder demonstrates progressive gallbladder wall thickening with multiple gallstones, with minimal pericholecystic fluid.  Negative for a sonographic Patton's sign.  The common duct measures 4 mm. The right kidney measures 11.2 cm.  The right kidney is without focal solid or cystic masses, hydronephrosis, perinephric fluid collections or shadowing stones. The visualized portions of the pancreas are normal.    The aorta and IVC are normal in caliber.  Hepatopedal flow is documented in the portal vein.  The flow velocity of the portal vein is 22 centimeters/second.    Hepatorenal index is not recorded.                               CTA Chest Abdomen Non Coronary (Final result)  Result time 08/10/22 13:23:21    Final result by Ramone Sesay MD (08/10/22 13:23:21)                 Impression:      1.  There are inflammatory changes surrounding the gallbladder, which is nearly completely full of gallstones.  Could the patient's symptoms be related to cholecystitis?    2.  There is fluid throughout nondilated loops of large and small bowel, nonspecific finding.  Gastroenteritis or other diarrheal disease is could cause this appearance.    3.  Negative for acute process otherwise.  Negative for focal infiltrate, effusion or pneumothorax.  Negative for renal stone disease or hydronephrosis.  No  other inflammatory changes are seen.  Negative for free air.    4.  There is a 9 mm minor fissure perifissural nodule.  Numerous tiny peripheral bilateral upper lobe nodules measuring maximum of 2 mm in size noted.  For multiple sub solid nodules with any ?6 mm, Fleischner Society 2017 guidelines recommend short term follow up non-contrast chest CT in 3-6 months, as these may be secondary to infectious etiology. If these findings persist at that time, subsequent management should be based on the most suspicious nodule.    5.  Median sternotomy wires with CABG changes.  Coronary artery calcifications.  Hiatal hernia.  1.4 cm superior right hepatic lobe cyst.  Evidence for old granulomatous disease.  Scattered colonic diverticula.  Degenerative changes of the spine as detailed above.  Tiny fat filled umbilical hernia.  Other nonemergent findings as described above.    All CT scans at this facility are performed  using dose modulation techniques as appropriate to performed exam including the following:  automated exposure control; adjustment of mA and/or kV according to the patients size (this includes techniques or standardized protocols for targeted exams where dose is matched to indication/reason for exam: i.e. extremities or head);  iterative reconstruction technique.      Electronically signed by: Ramone Sesay MD  Date:    08/10/2022  Time:    13:23             Narrative:    EXAMINATION:  CTA CHEST ABDOMEN NON CORONARY (XPD), multiplanar reconstructions    CLINICAL HISTORY:  Aortic aneurysm (AAA) suspected;    TECHNIQUE:  Axial images through the chest and abdomen were obtained with the use of IV contrast.  Sagittal and coronal reconstructions are provided for review.  Oral contrast was not utilized.  Delayed postcontrast images were obtained.    COMPARISON:  Chest x-ray performed earlier the same day.  Abdomen and pelvis CT scan from January 29, 2020    FINDINGS:  VASCULATURE: The thoracic and abdominal aorta  are intact without evidence for aneurysmal change or dissection.  Negative for significant stenosis.  The celiac axis, SMA, single renal arteries and DAY are patent.    CHEST: There are numerous peripheral nodules throughout the lungs measuring 2 mm in size or less.  There is a 9 mm perifissural nodule along the minor fissure on image 297 of series 3.  Inferior middle lobe and lingular scarring or atelectasis.  Lungs are otherwise clear.  Negative for pleural or pericardial effusions.  Negative for adenopathy.  Coronary artery calcifications.  Median sternotomy wires and CABG changes.    The airways are patent.  The thyroid gland is normal.  The esophagus is normal.  Small hiatal hernia.    ABDOMEN: 1.4 cm superior hepatic cyst may have increased in size by a mm in the interim.  Calcified granulomas throughout the liver and the spleen again seen.  Numerous gallstones measuring up to 2 cm again seen.  There appear to be some inflammatory changes surrounding the gallbladder and there is a prominent but not abnormal by size criteria lymph node adjacent to the gallbladder.  The liver and spleen otherwise appear normal.  The pancreas is unremarkable.  Kidneys and adrenal glands are normal.  On the precontrast images, there are no renal stones.  On the delayed postcontrast images, there is symmetric excretion of contrast into both normal caliber renal collecting systems.    Negative for adenopathy, ascites or other inflammatory change noted within the abdomen or pelvis.    There is fluid within the otherwise normal caliber normal appearing colon.  There are fluid in distal loops of small bowel.  There are scattered colonic diverticula seen.  I do not see a normal or abnormal appendix.  The rest of the visualized portions of the bowel appear normal.    The very superior portions of the urinary bladder are unremarkable.    No significant osseous abnormality is identified.  Multilevel marginal spondylosis and degenerative  facet arthropathy of the lower thoracic and lumbar spine again seen.  Stable Schmorl node formation involving the inferior endplate of L1.  Moderate degenerative changes of the mid thoracic spine with early DISH changes noted.  Convex-right curvature of the thoracic spine.    Negative for groin adenopathy.    Tiny fat filled umbilical hernia.  Abdominal wall is otherwise intact.                               X-Ray Chest AP Portable (Final result)  Result time 08/10/22 11:44:45    Final result by Ramone Sesay MD (08/10/22 11:44:45)                 Impression:      1.  Mild vascular congestion.  Lungs are otherwise clear.    2.  Stable findings as noted above.      Electronically signed by: Ramone Sesay MD  Date:    08/10/2022  Time:    11:44             Narrative:    EXAMINATION:  XR CHEST AP PORTABLE    CLINICAL HISTORY:  Chest Pain;    COMPARISON:  September 29, 2021    FINDINGS:  EKG leads overlie the chest which is lordotic in position.  The lungs are free of alveolar opacities.  The cardiac silhouette size is normal. The trachea is midline and the mediastinal width is normal. Negative for focal infiltrate, effusion or pneumothorax. Pulmonary vasculature is mildly congested.  Negative for osseous abnormalities. Stable median sternotomy wires, CABG changes, tortuosity of the aorta, mild degenerative changes of the spine and both shoulder girdles, convex-right curvature of the thoracic spine and cardiophrenic fat pads.                               The EKG was ordered, reviewed, and independently interpreted by the ED provider.  Interpretation time: 10:32  Rate: 55 BPM  Rhythm: sinus bradycardia  Interpretation: Nonspecific intraventricular conduction delay. No STEMI.           The Emergency Provider reviewed the vital signs and test results, which are outlined above.    ED Discussion     3:31 PM: Discussed pt's case with Dr. Alatorre (General Surgery) who states that the pt can go home and f/u with him or one  of his partners.    3:36 PM: Reassessed pt at this time. Pt's reports that he will schedule his own appointment with Dr. Alatorre (General Surgery) and is aware that Dr. Alatorre will be out of town on Friday and Monday. Discussed with pt all pertinent ED information and results. Discussed pt dx and plan of tx. Gave pt all f/u and return to the ED instructions. All questions and concerns were addressed at this time. Pt expresses understanding of information and instructions, and is comfortable with plan to discharge. Pt is stable for discharge.    I discussed with patient and/or family/caretaker that evaluation in the ED does not suggest any emergent or life threatening medical conditions requiring immediate intervention beyond what was provided in the ED, and I believe patient is safe for discharge.  Regardless, an unremarkable evaluation in the ED does not preclude the development or presence of a serious of life threatening condition. As such, patient was instructed to return immediately for any worsening or change in current symptoms.           ED Medication(s):  Medications   aspirin tablet 325 mg (325 mg Oral Given 8/10/22 1140)   aluminum-magnesium hydroxide-simethicone 200-200-20 mg/5 mL suspension 30 mL (30 mLs Oral Given 8/10/22 1200)     And   LIDOcaine HCl 2% oral solution 15 mL (15 mLs Oral Given 8/10/22 1201)   ondansetron injection 4 mg (4 mg Intravenous Given 8/10/22 1142)   iohexoL (OMNIPAQUE 350) injection 100 mL (100 mLs Intravenous Given 8/10/22 1259)        Follow-up Information     Dayne Steward MD In 2 days.    Specialty: Family Medicine  Contact information:  [address] [place] [id number] [phone]             Schedule an appointment as soon as possible for a visit  with Meet Alatorre MD.    Specialty: General Surgery  Contact information:  [address]  [place] [address] [phone]                         New Prescriptions    HYDROCODONE-ACETAMINOPHEN  (NORCO) 5-325 MG PER TABLET    Take 1 tablet by mouth every 6 (six) hours as needed for Pain.    ONDANSETRON (ZOFRAN) 4 MG TABLET    Take 1 tablet (4 mg total) by mouth every 6 (six) hours.        Medication List      START taking these medications    HYDROcodone-acetaminophen 5-325 mg per tablet  Commonly known as: NORCO  Take 1 tablet by mouth every 6 (six) hours as needed for Pain.     ondansetron 4 MG tablet  Commonly known as: ZOFRAN  Take 1 tablet (4 mg total) by mouth every 6 (six) hours.        ASK your doctor about these medications    aspirin 81 mg Tab     co-enzyme Q-10 30 mg capsule     cyanocobalamin 100 MCG tablet  Commonly known as: VITAMIN B-12     fluticasone propionate 50 mcg/actuation nasal spray  Commonly known as: FLONASE  2 sprays (100 mcg total) by Each Nostril route once daily.     hydroCHLOROthiazide 25 MG tablet  Commonly known as: HYDRODIURIL  TAKE 1/2 TABLET BY MOUTH ONCE DAILY     latanoprost 0.005 % ophthalmic solution  PLACE 1 DROP INTO BOTH EYES EVERY EVENING.     losartan 50 MG tablet  Commonly known as: COZAAR  TAKE 1 TABLET BY MOUTH TWICE A DAY     LUBRICANT (P-GLYCOL-GLYCERIN) 1-0.3 % Drop  Generic drug: propylene glycol-glycerin     MULTI VITAMIN ORAL     nebivoloL 5 MG Tab  Commonly known as: BYSTOLIC  Take 1 tablet (5 mg total) by mouth once daily.     nitroGLYCERIN 0.4 MG SL tablet  Commonly known as: NITROSTAT  Place 1 tablet (0.4 mg total) under the tongue every 5 (five) minutes as needed for Chest pain (if no relief seek er trmt).     OCEAN NASAL 0.65 % nasal spray  Generic drug: sodium chloride     omega-3 fatty acids-vitamin E 1,000 mg Cap     promethazine-dextromethorphan 6.25-15 mg/5 mL Syrp  Commonly known as: PROMETHAZINE-DM  Take 5 mLs by mouth every 6 to 8 hours as needed.     saw palmetto 500 MG capsule     zinc gluconate 50 mg tablet           Where to Get Your Medications      These medications were sent to [facility]/pharmacy #[id number] - [place], [place] - [address] [address]  [address]  [address] [address] [address]    Phone: [phone]   · ondansetron 4 MG tablet     You can get these medications from any pharmacy    Bring a paper prescription for each of these medications  · HYDROcodone-acetaminophen 5-325 mg per tablet           Medical Decision Making    Medical Decision Making:   Clinical Tests:   Lab Tests: Ordered and Reviewed  Radiological Study: Ordered and Reviewed  Medical Tests: Ordered and Reviewed           Scribe Attestation:   Scribe #1: I performed the above scribed service and the documentation accurately describes the services I performed. I attest to the accuracy of the note.    Attending:   Physician Attestation Statement for Scribe #1: I, So Qureshi Do, MD, personally performed the services described in this documentation, as scribed by Beckie Parra, in my presence, and it is both accurate and complete.          Clinical Impression       ICD-10-CM ICD-9-CM   1. Calculus of gallbladder without cholecystitis without obstruction  K80.20 574.20   2. Chest pain  R07.9 786.50   3. Right upper quadrant abdominal pain  R10.11 789.01       Disposition:   Disposition: Discharged  Condition: Stable         So Qureshi Do, MD  08/10/22 2491

## 2022-08-10 NOTE — TELEPHONE ENCOUNTER
attempted to contact the patient Re: scheduling appointment to discuss gallbladder surgery, no answer, left a voice massage to contact the office.

## 2022-08-10 NOTE — TELEPHONE ENCOUNTER
----- Message from Meet Alatorre MD sent at 8/10/2022  3:53 PM CDT -----  Patient was in the ER with gallstones.  He needs an appointment to see me either tomorrow or somebody on Friday or Monday to discuss cholecystectomy.

## 2022-08-12 ENCOUNTER — OFFICE VISIT (OUTPATIENT)
Dept: FAMILY MEDICINE | Facility: CLINIC | Age: 82
End: 2022-08-12
Payer: MEDICARE

## 2022-08-12 VITALS
HEART RATE: 94 BPM | TEMPERATURE: 98 F | OXYGEN SATURATION: 98 % | WEIGHT: 217.13 LBS | DIASTOLIC BLOOD PRESSURE: 58 MMHG | BODY MASS INDEX: 30.4 KG/M2 | HEIGHT: 71 IN | SYSTOLIC BLOOD PRESSURE: 138 MMHG

## 2022-08-12 DIAGNOSIS — R07.9 CHEST PAIN, UNSPECIFIED TYPE: ICD-10-CM

## 2022-08-12 DIAGNOSIS — K80.20 GALLSTONES: Primary | ICD-10-CM

## 2022-08-12 DIAGNOSIS — R19.5 POSITIVE COLORECTAL CANCER SCREENING USING COLOGUARD TEST: ICD-10-CM

## 2022-08-12 DIAGNOSIS — I10 ESSENTIAL HYPERTENSION: ICD-10-CM

## 2022-08-12 PROCEDURE — 1101F PT FALLS ASSESS-DOCD LE1/YR: CPT | Mod: CPTII,S$GLB,, | Performed by: FAMILY MEDICINE

## 2022-08-12 PROCEDURE — 99999 PR PBB SHADOW E&M-EST. PATIENT-LVL IV: ICD-10-PCS | Mod: PBBFAC,,, | Performed by: FAMILY MEDICINE

## 2022-08-12 PROCEDURE — 3288F PR FALLS RISK ASSESSMENT DOCUMENTED: ICD-10-PCS | Mod: CPTII,S$GLB,, | Performed by: FAMILY MEDICINE

## 2022-08-12 PROCEDURE — 3075F PR MOST RECENT SYSTOLIC BLOOD PRESS GE 130-139MM HG: ICD-10-PCS | Mod: CPTII,S$GLB,, | Performed by: FAMILY MEDICINE

## 2022-08-12 PROCEDURE — 1126F PR PAIN SEVERITY QUANTIFIED, NO PAIN PRESENT: ICD-10-PCS | Mod: CPTII,S$GLB,, | Performed by: FAMILY MEDICINE

## 2022-08-12 PROCEDURE — 99999 PR PBB SHADOW E&M-EST. PATIENT-LVL IV: CPT | Mod: PBBFAC,,, | Performed by: FAMILY MEDICINE

## 2022-08-12 PROCEDURE — 1126F AMNT PAIN NOTED NONE PRSNT: CPT | Mod: CPTII,S$GLB,, | Performed by: FAMILY MEDICINE

## 2022-08-12 PROCEDURE — 1159F MED LIST DOCD IN RCRD: CPT | Mod: CPTII,S$GLB,, | Performed by: FAMILY MEDICINE

## 2022-08-12 PROCEDURE — 3288F FALL RISK ASSESSMENT DOCD: CPT | Mod: CPTII,S$GLB,, | Performed by: FAMILY MEDICINE

## 2022-08-12 PROCEDURE — 1101F PR PT FALLS ASSESS DOC 0-1 FALLS W/OUT INJ PAST YR: ICD-10-PCS | Mod: CPTII,S$GLB,, | Performed by: FAMILY MEDICINE

## 2022-08-12 PROCEDURE — 1159F PR MEDICATION LIST DOCUMENTED IN MEDICAL RECORD: ICD-10-PCS | Mod: CPTII,S$GLB,, | Performed by: FAMILY MEDICINE

## 2022-08-12 PROCEDURE — 99214 PR OFFICE/OUTPT VISIT, EST, LEVL IV, 30-39 MIN: ICD-10-PCS | Mod: S$GLB,,, | Performed by: FAMILY MEDICINE

## 2022-08-12 PROCEDURE — 99214 OFFICE O/P EST MOD 30 MIN: CPT | Mod: S$GLB,,, | Performed by: FAMILY MEDICINE

## 2022-08-12 PROCEDURE — 3078F PR MOST RECENT DIASTOLIC BLOOD PRESSURE < 80 MM HG: ICD-10-PCS | Mod: CPTII,S$GLB,, | Performed by: FAMILY MEDICINE

## 2022-08-12 PROCEDURE — 3075F SYST BP GE 130 - 139MM HG: CPT | Mod: CPTII,S$GLB,, | Performed by: FAMILY MEDICINE

## 2022-08-12 PROCEDURE — 3078F DIAST BP <80 MM HG: CPT | Mod: CPTII,S$GLB,, | Performed by: FAMILY MEDICINE

## 2022-08-12 NOTE — PROGRESS NOTES
Subjective:       Patient ID: Wilberto Hayes Jr. is a 82 y.o. male.    Chief Complaint: Abdominal Pain, Follow-up, and Results      HPI Comments:       Current Outpatient Medications:     aspirin 81 mg Tab, Take 1 tablet by mouth Daily., Disp: , Rfl:     co-enzyme Q-10 30 mg capsule, Take 100 mg by mouth once daily., Disp: , Rfl:     cyanocobalamin (VITAMIN B-12) 100 MCG tablet, Take 1 tablet by mouth every other day. , Disp: , Rfl:     fluticasone propionate (FLONASE) 50 mcg/actuation nasal spray, 2 sprays (100 mcg total) by Each Nostril route once daily., Disp: 16 g, Rfl: 1    hydroCHLOROthiazide (HYDRODIURIL) 25 MG tablet, TAKE 1/2 TABLET BY MOUTH ONCE DAILY, Disp: 45 tablet, Rfl: 3    HYDROcodone-acetaminophen (NORCO) 5-325 mg per tablet, Take 1 tablet by mouth every 6 (six) hours as needed for Pain., Disp: 30 tablet, Rfl: 0    latanoprost 0.005 % ophthalmic solution, PLACE 1 DROP INTO BOTH EYES EVERY EVENING., Disp: 7.5 mL, Rfl: 3    losartan (COZAAR) 50 MG tablet, TAKE 1 TABLET BY MOUTH TWICE A DAY (Patient taking differently: once daily.), Disp: 180 tablet, Rfl: 3    LUBRICANT EYE DROPS, GLYC-PG, 1-0.3 % Drop, , Disp: , Rfl:     multivit-min/ferrous fumarate (MULTI VITAMIN ORAL), , Disp: , Rfl:     nebivoloL (BYSTOLIC) 5 MG Tab, Take 1 tablet (5 mg total) by mouth once daily., Disp: 30 tablet, Rfl: 11    nitroGLYCERIN (NITROSTAT) 0.4 MG SL tablet, Place 1 tablet (0.4 mg total) under the tongue every 5 (five) minutes as needed for Chest pain (if no relief seek er trmt)., Disp: 25 tablet, Rfl: 3    OCEAN NASAL 0.65 % nasal spray, , Disp: , Rfl:     omega-3 fatty acids-vitamin E 1,000 mg Cap, Take 1 capsule by mouth Daily., Disp: , Rfl:     ondansetron (ZOFRAN) 4 MG tablet, Take 1 tablet (4 mg total) by mouth every 6 (six) hours., Disp: 30 tablet, Rfl: 0    promethazine-dextromethorphan (PROMETHAZINE-DM) 6.25-15 mg/5 mL Syrp, Take 5 mLs by mouth every 6 to 8 hours as needed., Disp: 180 mL,  Rfl: 0    saw palmetto 500 MG capsule, Take by mouth 2 (two) times daily. , Disp: , Rfl:     zinc gluconate 50 mg tablet, Take 50 mg by mouth once daily., Disp: , Rfl:       ER follow-up from 2 days ago.  Went in with pain everywhere.  Chest and abdomen, extending into his back.  No response to multiple nitroglycerins.  Workup found multiple gallstones and gallbladder inflammation.  Discharged home to follow-up with surgery next week.  [place] diet.  Pain is much better but he takes Norco when it gets a little worse.  No nausea or vomiting.  Abdominal pain is mostly resolved.  Low-grade temp elevations up to 100.8.    Has been very fatigued lately and saw me for this last time.  Blood workup was negative.  Has also seen Cardiology who was concerned about some associated dizziness.  Decrease his losartan dose.  Currently he is taking 50 mg in the morning 25 minutes even.  Today we negotiated taking 25 b.i.d. so that his blood pressure does not remain so low.    Some constipation.  Response to milk of magnesia    Wife is concerned that his BNP and troponins were elevated in the emergency room.  EKG was unchanged    Review of Systems   Constitutional: Positive for activity change. Negative for appetite change, chills, fever and unexpected weight change.   HENT: Negative for hearing loss, rhinorrhea and trouble swallowing.    Eyes: Negative for discharge and visual disturbance.   Respiratory: Negative for chest tightness and wheezing.    Cardiovascular: Negative for chest pain and palpitations.   Gastrointestinal: Positive for constipation. Negative for blood in stool, diarrhea and vomiting.   Endocrine: Negative for polydipsia and polyuria.   Genitourinary: Negative for difficulty urinating, hematuria and urgency.   Musculoskeletal: Negative for arthralgias, joint swelling and neck pain.   Neurological: Positive for weakness. Negative for headaches.   Psychiatric/Behavioral: Negative for confusion and dysphoric mood.  "      Objective:      Vitals:    08/12/22 1114   BP: (!) 138/58   Pulse: 94   Temp: 98.3 °F (36.8 °C)   TempSrc: Temporal   SpO2: 98%   Weight: 98.5 kg (217 lb 2.5 oz)   Height: 5' 11" (1.803 m)   PainSc: 0-No pain     Physical Exam  Vitals and nursing note reviewed.   Constitutional:       General: He is not in acute distress.     Appearance: He is well-developed. He is not diaphoretic.   HENT:      Head: Normocephalic.      Mouth/Throat:      Pharynx: No oropharyngeal exudate.   Neck:      Thyroid: No thyromegaly.   Cardiovascular:      Rate and Rhythm: Normal rate and regular rhythm.      Heart sounds: Normal heart sounds. No murmur heard.  Pulmonary:      Effort: Pulmonary effort is normal.      Breath sounds: Normal breath sounds. No wheezing or rales.   Abdominal:      General: There is no distension.      Palpations: Abdomen is soft.      Tenderness: There is abdominal tenderness in the right upper quadrant. There is no guarding or rebound.   Musculoskeletal:      Cervical back: Neck supple.   Lymphadenopathy:      Cervical: No cervical adenopathy.   Skin:     General: Skin is warm and dry.   Neurological:      Mental Status: He is alert and oriented to person, place, and time.   Psychiatric:         Behavior: Behavior normal.         Thought Content: Thought content normal.         Judgment: Judgment normal.         Assessment:       1. Gallstones    2. Chest pain, unspecified type    3. Positive colorectal cancer screening using Cologuard test    4. Essential hypertension        Plan:   Gallstones  Comments:  With apparent cholecystitis.  Patient stable.  Follow-up with surgery next week for likely laparoscopic cholecystectomy    Chest pain, unspecified type  Comments:  Resolved.  Discussed elevated troponin and BNP with patient and his wife.  Likely not cardiac origin    Positive colorectal cancer screening using Cologuard test  Comments:  Will be getting a diagnostic colonoscopy within the next few " weeks    Essential hypertension  Comments:  Still too low.  Decrease am losartan dose to 25 mg

## 2022-08-18 ENCOUNTER — PATIENT MESSAGE (OUTPATIENT)
Dept: SURGERY | Facility: HOSPITAL | Age: 82
End: 2022-08-18
Payer: MEDICARE

## 2022-08-18 ENCOUNTER — OFFICE VISIT (OUTPATIENT)
Dept: SURGERY | Facility: CLINIC | Age: 82
End: 2022-08-18
Payer: MEDICARE

## 2022-08-18 ENCOUNTER — TELEPHONE (OUTPATIENT)
Dept: PREADMISSION TESTING | Facility: HOSPITAL | Age: 82
End: 2022-08-18
Payer: MEDICARE

## 2022-08-18 VITALS
SYSTOLIC BLOOD PRESSURE: 140 MMHG | WEIGHT: 212.31 LBS | BODY MASS INDEX: 29.61 KG/M2 | DIASTOLIC BLOOD PRESSURE: 70 MMHG | HEART RATE: 57 BPM

## 2022-08-18 DIAGNOSIS — K80.20 CALCULUS OF GALLBLADDER WITHOUT CHOLECYSTITIS WITHOUT OBSTRUCTION: Primary | ICD-10-CM

## 2022-08-18 DIAGNOSIS — K42.9 UMBILICAL HERNIA WITHOUT OBSTRUCTION AND WITHOUT GANGRENE: ICD-10-CM

## 2022-08-18 PROCEDURE — 3078F DIAST BP <80 MM HG: CPT | Mod: CPTII,S$GLB,, | Performed by: SURGERY

## 2022-08-18 PROCEDURE — 3077F PR MOST RECENT SYSTOLIC BLOOD PRESSURE >= 140 MM HG: ICD-10-PCS | Mod: CPTII,S$GLB,, | Performed by: SURGERY

## 2022-08-18 PROCEDURE — 3078F PR MOST RECENT DIASTOLIC BLOOD PRESSURE < 80 MM HG: ICD-10-PCS | Mod: CPTII,S$GLB,, | Performed by: SURGERY

## 2022-08-18 PROCEDURE — 1125F PR PAIN SEVERITY QUANTIFIED, PAIN PRESENT: ICD-10-PCS | Mod: CPTII,S$GLB,, | Performed by: SURGERY

## 2022-08-18 PROCEDURE — 1159F PR MEDICATION LIST DOCUMENTED IN MEDICAL RECORD: ICD-10-PCS | Mod: CPTII,S$GLB,, | Performed by: SURGERY

## 2022-08-18 PROCEDURE — 99999 PR PBB SHADOW E&M-EST. PATIENT-LVL V: ICD-10-PCS | Mod: PBBFAC,,, | Performed by: SURGERY

## 2022-08-18 PROCEDURE — 99999 PR PBB SHADOW E&M-EST. PATIENT-LVL V: CPT | Mod: PBBFAC,,, | Performed by: SURGERY

## 2022-08-18 PROCEDURE — 1125F AMNT PAIN NOTED PAIN PRSNT: CPT | Mod: CPTII,S$GLB,, | Performed by: SURGERY

## 2022-08-18 PROCEDURE — 1159F MED LIST DOCD IN RCRD: CPT | Mod: CPTII,S$GLB,, | Performed by: SURGERY

## 2022-08-18 PROCEDURE — 3077F SYST BP >= 140 MM HG: CPT | Mod: CPTII,S$GLB,, | Performed by: SURGERY

## 2022-08-18 PROCEDURE — 99204 OFFICE O/P NEW MOD 45 MIN: CPT | Mod: S$GLB,,, | Performed by: SURGERY

## 2022-08-18 PROCEDURE — 99204 PR OFFICE/OUTPT VISIT, NEW, LEVL IV, 45-59 MIN: ICD-10-PCS | Mod: S$GLB,,, | Performed by: SURGERY

## 2022-08-18 RX ORDER — INDOCYANINE GREEN AND WATER 25 MG
2.5 KIT INJECTION
Status: CANCELLED | OUTPATIENT
Start: 2022-08-24

## 2022-08-18 NOTE — PROGRESS NOTES
Patient ID: Wilberto Hayes Jr. is a 82 y.o. male.    Symptomatic cholelithiasis    Chief Complaint: Pre-op Exam (Discuss gallbladder sx)      HPI:  Patient has had 2 episodes of abdominal pain.  The pain was located on the right side of the abdomen with radiation to the chest.  The initial episode he treated with over-the-counter medicines at home.  He then improved.  He then ate a cheese and below-knee sandwich and had a significant episode of the pain associated with nausea and vomiting.  He describes it as sharp to crampy.  He presented the emergency room was evaluated a CTA that showed gallstones as well as an ultrasound.  He had no elevation of his transaminases and his white count was normal.  He presents now to discuss cholecystectomy      Review of Systems   Constitutional: Negative.    HENT: Negative.    Eyes: Negative.    Respiratory: Negative.    Cardiovascular: Negative.    Gastrointestinal: Positive for abdominal distention, nausea and vomiting.   Endocrine: Negative.    Genitourinary: Negative.    Musculoskeletal: Negative.    Skin: Negative.    Allergic/Immunologic: Negative.    Neurological: Negative.    Hematological: Negative.    Psychiatric/Behavioral: Negative.        Current Outpatient Medications   Medication Sig Dispense Refill    aspirin 81 mg Tab Take 1 tablet by mouth Daily.      co-enzyme Q-10 30 mg capsule Take 100 mg by mouth once daily.      cyanocobalamin (VITAMIN B-12) 100 MCG tablet Take 1 tablet by mouth every other day.       fluticasone propionate (FLONASE) 50 mcg/actuation nasal spray 2 sprays (100 mcg total) by Each Nostril route once daily. 16 g 1    hydroCHLOROthiazide (HYDRODIURIL) 25 MG tablet TAKE 1/2 TABLET BY MOUTH ONCE DAILY 45 tablet 3    HYDROcodone-acetaminophen (NORCO) 5-325 mg per tablet Take 1 tablet by mouth every 6 (six) hours as needed for Pain. (Patient not taking: Reported on 8/18/2022) 30 tablet 0    latanoprost 0.005 % ophthalmic solution PLACE 1  DROP INTO BOTH EYES EVERY EVENING. 7.5 mL 3    losartan (COZAAR) 50 MG tablet TAKE 1 TABLET BY MOUTH TWICE A DAY (Patient taking differently: once daily.) 180 tablet 3    LUBRICANT EYE DROPS, GLYC-PG, 1-0.3 % Drop       multivit-min/ferrous fumarate (MULTI VITAMIN ORAL)       nebivoloL (BYSTOLIC) 5 MG Tab Take 1 tablet (5 mg total) by mouth once daily. 30 tablet 11    nitroGLYCERIN (NITROSTAT) 0.4 MG SL tablet Place 1 tablet (0.4 mg total) under the tongue every 5 (five) minutes as needed for Chest pain (if no relief seek er trmt). 25 tablet 3    OCEAN NASAL 0.65 % nasal spray       omega-3 fatty acids-vitamin E 1,000 mg Cap Take 1 capsule by mouth Daily.      ondansetron (ZOFRAN) 4 MG tablet Take 1 tablet (4 mg total) by mouth every 6 (six) hours. 30 tablet 0    promethazine-dextromethorphan (PROMETHAZINE-DM) 6.25-15 mg/5 mL Syrp Take 5 mLs by mouth every 6 to 8 hours as needed. (Patient not taking: Reported on 8/18/2022) 180 mL 0    saw palmetto 500 MG capsule Take by mouth 2 (two) times daily.       zinc gluconate 50 mg tablet Take 50 mg by mouth once daily.       No current facility-administered medications for this visit.       Review of patient's allergies indicates:   Allergen Reactions    Niacin preparations Other (See Comments)     myalgia    Statins-hmg-coa reductase inhibitors      Muscle weakness    Codeine Other (See Comments) and Swelling     unknown    Fosinopril Other (See Comments)     cough    Pravastatin Other (See Comments)      Muscle pain    Simvastatin Other (See Comments)     Muscle pain    Sulfa (sulfonamide antibiotics) Rash and Hives       Past Medical History:   Diagnosis Date    ACE-inhibitor cough     Acute coronary syndrome     Angina pectoris     Cataract     Coronary artery disease     Degenerative disc disease, lumbar     Degenerative disc disease, lumbar     Glaucoma     Hyperlipidemia     cholesterol    Hypertension     Kidney stone     last in 2001     Myocardial infarction     Obesity     Peripheral vascular disease     Polyneuropathy     Sleep apnea     Tobacco dependence     resolved quit     Trouble in sleeping        Past Surgical History:   Procedure Laterality Date    ABCESS DRAINAGE Left     inguinal abcess/cellulitis - MRSA- 60 days of IVAB and repacking w/ Home health     CORONARY ARTERY BYPASS GRAFT      Two-vessel    cystoscopy and stone removal via scope      several between age 23 and 53    EXTRACTION OF TOOTH  2021    EYE SURGERY      lthotripsy  1985    SLT - OU - BOTH EYES  2009    TONSILLECTOMY      at age 4    VASECTOMY  1975       Family History   Problem Relation Age of Onset    Blindness Paternal Grandfather     Glaucoma Paternal Grandfather     Alcohol abuse Paternal Grandfather     Kidney disease Paternal Grandfather     Cancer Mother         brain    COPD Mother     Alcohol abuse Mother     Hypertension Mother     Cancer Father         lung, breast with left mastectomy    COPD Father     Alcohol abuse Father     Hypertension Father     Kidney disease Maternal Grandmother     Alcohol abuse Maternal Grandfather     Kidney disease Maternal Grandfather     Cancer Paternal Grandmother         Gyn     Kidney disease Paternal Grandmother     Retinal detachment Neg Hx     Macular degeneration Neg Hx     Diabetes Neg Hx     Heart disease Neg Hx     Stroke Neg Hx     Mental illness Neg Hx     Mental retardation Neg Hx        Social History     Socioeconomic History    Marital status:    Tobacco Use    Smoking status: Former Smoker     Packs/day: 1.00     Years: 10.00     Pack years: 10.00     Quit date: 1980     Years since quittin.4    Smokeless tobacco: Never Used   Substance and Sexual Activity    Alcohol use: Yes     Alcohol/week: 7.0 standard drinks     Types: 7 Glasses of wine per week     Comment: nightly wine    Drug use: No    Sexual activity: Yes      Partners: Female     Birth control/protection: Partner-Vasectomy   Social History Narrative    . Lives with wife. They had 4 children. 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 Still drives. Does have a Living Will, signed at time of CABG (2002).      Social Determinants of Health     Financial Resource Strain: Low Risk     Difficulty of Paying Living Expenses: Not hard at all   Food Insecurity: No Food Insecurity    Worried About Running Out of Food in the Last Year: Never true    Ran Out of Food in the Last Year: Never true   Transportation Needs: No Transportation Needs    Lack of Transportation (Medical): No    Lack of Transportation (Non-Medical): No   Physical Activity: Insufficiently Active    Days of Exercise per Week: 2 days    Minutes of Exercise per Session: 40 min   Stress: No Stress Concern Present    Feeling of Stress : Not at all   Social Connections: Unknown    Frequency of Communication with Friends and Family: More than three times a week    Frequency of Social Gatherings with Friends and Family: Once a week    Active Member of Clubs or Organizations: Yes    Attends Club or Organization Meetings: 1 to 4 times per year    Marital Status:    Housing Stability: Low Risk     Unable to Pay for Housing in the Last Year: No    Number of Places Lived in the Last Year: 1    Unstable Housing in the Last Year: No       Vitals:    08/18/22 1030   BP: (!) 140/70   Pulse: (!) 57       Physical Exam  Constitutional:       General: He is not in acute distress.     Appearance: He is well-developed.   HENT:      Head: Normocephalic and atraumatic.   Eyes:      General: No scleral icterus.     Pupils: Pupils are equal, round, and reactive to light.   Neck:      Thyroid: No thyromegaly.      Vascular: No JVD.      Trachea: No tracheal deviation.   Cardiovascular:      Rate and Rhythm: Normal rate and regular rhythm.      Heart sounds: Normal heart sounds.   Pulmonary:       Effort: Pulmonary effort is normal.      Breath sounds: Normal breath sounds.   Abdominal:      General: Bowel sounds are normal. There is no distension.      Palpations: Abdomen is soft. There is no mass.      Tenderness: There is abdominal tenderness ( mild right upper quadrant). There is no guarding or rebound.      Hernia: A hernia (Small umbilical) is present.   Musculoskeletal:         General: Normal range of motion.      Cervical back: Normal range of motion and neck supple.   Lymphadenopathy:      Cervical: No cervical adenopathy.   Skin:     General: Skin is warm and dry.   Neurological:      Mental Status: He is alert and oriented to person, place, and time.   Psychiatric:         Behavior: Behavior normal.         Thought Content: Thought content normal.         Judgment: Judgment normal.       Gallbladder ultrasound was reviewed   CTA was reviewed   EKG was reviewed  CBC was reviewed   CMP was reviewed  Assessment & Plan:        Symptomatic cholelithiasis.      We will plan a robotic assisted cholecystectomy.  The need for surgery, risks benefits and complications were discussed.  The rationale for the robotic approach was discussed.      We will repair the umbilical hernia at the same time using suture and the risks associated with umbilical hernia repair was discussed.  These include infection, bleeding, bowel injury and recurrence.      Questions were answered consent was obtained    The risks, benefits and complications of laparoscopic cholecystectomy were discussed.  These included infection, retained stone call bleeding, abscess and bile leak.  The need for open conversion was dicussed.  The rare possibility of a common bile duct injury and the need for additional procedures and/or surgery was reviewed.  All question were answered.  The patient has agreed to proceed.  Consent was obtained.

## 2022-08-18 NOTE — TELEPHONE ENCOUNTER
Pre op instructions reviewed with pt per phone: Spoke about pre op process and surgery instructions, verbalized understanding.    Surgery is scheduled on 8/24/22. Please arrive at 1130am. We will call you the afternoon prior to surgery to confirm arrival time, as it is subject to change due to cancellations & emergencies.    Please report to the [facility] (1st Floor) at [facility] located off of [facility] (2nd building on the left, in front of the flag pole).  Address: [address] [place] [address] [place] [address]        INSTRUCTIONS IMPORTANT!!!  Do Not Eat, Drink, or Smoke after 12 midnight unless instructed otherwise by your Surgeon. OK to brush teeth, no gum, candy or mints!      *Take Only these medications with a small sip of water morning of surgery:  Eye drops    ____  NO Acrylic/fake nails or nail polish worn day of surgery (specifically hand/arm & foot surgeries).  ____  NO powder, lotions, deodorants, oils or creams on body.  ____  Remove jewelry & piercings prior to surgery.  ____  Dentures, Hearing Aids & Contact Lens will need to be removed prior to the start of surgery.  ____  Bring photo ID and insurance information to hospital (Leave Valuables at Home).  ____  If going home the same day, arrange for a ride home. You will not be able to drive 24 hrs if Anesthesia was used.   ____  Females (ages 11-60) may need to give a urine sample the morning of surgery; please see Pre op Nurse prior to using the restroom.  ____  Wear clean, loose fitting clothing. Allow for dressings, bandages.  ____  Stop all Aspirin products, Ibuprofen, Advil, Motrin & Aleve at least 5-7 days before surgery, unless otherwise instructed by your Physician office (May use Tylenol).  ____  Blood Thinners are stopped based on your Provider's recommendation; Call Surgeon's Office to inquire when to stop/hold.  ____  Stop taking any Fish Oil supplements or Vitamins at least 5-7 days prior to surgery, unless instructed otherwise by  your Physician office.            Diabetic Patients: If you take diabetic medication, do NOT take morning of surgery unless instructed by             Doctor. Metformin to be stopped 24 hrs prior to surgery time. DO NOT take long-acting insulin the evening before surgery. Blood sugars will be checked in pre-op by Nurse.    Bathing Instructions:    -Do not shave your face or body the day before or the day of surgery.  -Do not shave pubic hair 7 days prior to surgery (gyn pt's).   -Shower & Rinse your body as usual with anti-bacterial Soap (Dial, Lever 2000, or Hibiclens)   -Do not use Hibiclens on your head, face, or genitals.   -Do not wash with anti-bacterial soap after you use the Hibiclens.   -Rinse your body thoroughly.      [facility] Visitor/Ride Policy:  Only 2 adults allowed (over the age of 18) to accompany you to the Hospital. You Must have a ride home from a responsible adult that you know and trust. Medical Transport, Uber or Lyft can only be used if patient has a responsible adult to accompany them during ride home.    Post-Op Instructions: You will receive Post-op/Discharge instructions by your Discharge Nurse prior to going home. Please call your Surgeon's office with any post-surgery questions/concerns @ [phone].    *If you are running late or have questions the morning of surgery, please call the Surgery Dept @ [phone]  *Insurance/ Financial Questions, please call [phone].    Thank you,  -[facility] Pre Admit Testing Dept.  [phone] or [phone]  M-F 7:30 am-4 pm

## 2022-08-23 ENCOUNTER — ANESTHESIA EVENT (OUTPATIENT)
Dept: SURGERY | Facility: HOSPITAL | Age: 82
End: 2022-08-23
Payer: MEDICARE

## 2022-08-23 ENCOUNTER — TELEPHONE (OUTPATIENT)
Dept: PREADMISSION TESTING | Facility: HOSPITAL | Age: 82
End: 2022-08-23
Payer: MEDICARE

## 2022-08-23 NOTE — ANESTHESIA PREPROCEDURE EVALUATION
08/23/2022  Wilberto Hayes Jr. is a 82 y.o., male.    Patient Active Problem List   Diagnosis    Atherosclerosis of native coronary artery of native heart with angina pectoris    Essential hypertension    Pure hypercholesterolemia    CHELO on CPAP    Nuclear sclerosis of both eyes    Bilateral dry eyes    Atherosclerosis of native artery of extremity with intermittent claudication    Calcification of aorta    DDD (degenerative disc disease), lumbar    Primary open angle glaucoma of both eyes, mild stage    Dry eyes, bilateral    Refractive error    Obesity (BMI 30.0-34.9)    Laxity of eyelid    Other fatigue    Ptosis of eyelid, right    Chronic kidney disease, stage 3a     Pre-op Assessment    I have reviewed the Patient Summary Reports.     I have reviewed the Nursing Notes. I have reviewed the NPO Status.   I have reviewed the Medications.     Review of Systems  Anesthesia Hx:  No problems with previous Anesthesia  Denies Family Hx of Anesthesia complications.   Denies Personal Hx of Anesthesia complications.   Social:  No Alcohol Use, Former Smoker    Hematology/Oncology:  Hematology Normal   Oncology Normal     EENT/Dental:EENT/Dental Normal   Cardiovascular:   Hypertension, well controlled CAD    Denies Angina. ECG has been reviewed. Saw his cardiologist 6-7-22 for medical management of stable CAD.  Cardiologist made no mention of an MI  2020 NMST negative  2018 ECHO    1 - Concentric hypertrophy.     2 - No wall motion abnormalities.     3 - Normal left ventricular systolic function (EF 60-65%).     4 - Normal left ventricular diastolic function.     5 - Normal right ventricular systolic function .     6 - The estimated PA systolic pressure is greater than 27 mmHg.     7 - Mild mitral regurgitation.     Pulmonary:   Sleep Apnea    Renal/:   Chronic Renal Disease, CRI     Hepatic/GI:   Cholecystitis    Musculoskeletal:  Spine Disorders: lumbar Degenerative disease    Neurological:   Peripheral Neuropathy    Endocrine:  Endocrine Normal    Dermatological:  Skin Normal    Psych:  Psychiatric Normal           Physical Exam  General: Well nourished, Alert and Oriented    Airway:  Mallampati: II / II  Mouth Opening: Normal  TM Distance: Normal  Tongue: Normal  Neck ROM: Normal ROM    Dental:Missing left upper mesial incisor      Anesthesia Plan  Type of Anesthesia, risks & benefits discussed:    Anesthesia Type: Gen ETT  Intra-op Monitoring Plan: Standard ASA Monitors  Post Op Pain Control Plan: IV/PO Opioids PRN  Induction:  IV  Informed Consent: Informed consent signed with the Patient and all parties understand the risks and agree with anesthesia plan.  All questions answered.   ASA Score: 3  Day of Surgery Review of History & Physical: I have interviewed and examined the patient. I have reviewed the patient's H&P dated:     Ready For Surgery From Anesthesia Perspective.     .  Lab Results   Component Value Date    WBC 11.68 08/10/2022    HGB 14.8 08/10/2022    HCT 43.5 08/10/2022    MCV 92 08/10/2022     08/10/2022     Past Medical History:   Diagnosis Date    ACE-inhibitor cough     Acute coronary syndrome     Angina pectoris     Cataract     Coronary artery disease     Degenerative disc disease, lumbar     Degenerative disc disease, lumbar     Glaucoma     Hyperlipidemia     cholesterol    Hypertension     Kidney stone     last in 2001    Myocardial infarction     Obesity     Peripheral vascular disease     Polyneuropathy     Sleep apnea     Tobacco dependence     resolved quit 1980    Trouble in sleeping        Past Surgical History:   Procedure Laterality Date    ABCESS DRAINAGE Left 2003    inguinal abcess/cellulitis - MRSA- 60 days of IVAB and repacking w/ Home health     CORONARY ARTERY BYPASS GRAFT  2002    Two-vessel    cystoscopy and stone  removal via scope  2003    several between age 23 and 53    EXTRACTION OF TOOTH  2021    EYE SURGERY      lthotripsy  1985    SLT - OU - BOTH EYES  2009    TONSILLECTOMY      at age 4    VASECTOMY  1975       Family History   Problem Relation Age of Onset    Blindness Paternal Grandfather     Glaucoma Paternal Grandfather     Alcohol abuse Paternal Grandfather     Kidney disease Paternal Grandfather     Cancer Mother         brain    COPD Mother     Alcohol abuse Mother     Hypertension Mother     Cancer Father         lung, breast with left mastectomy    COPD Father     Alcohol abuse Father     Hypertension Father     Kidney disease Maternal Grandmother     Alcohol abuse Maternal Grandfather     Kidney disease Maternal Grandfather     Cancer Paternal Grandmother         Gyn     Kidney disease Paternal Grandmother     Retinal detachment Neg Hx     Macular degeneration Neg Hx     Diabetes Neg Hx     Heart disease Neg Hx     Stroke Neg Hx     Mental illness Neg Hx     Mental retardation Neg Hx        Social History     Socioeconomic History    Marital status:    Tobacco Use    Smoking status: Former Smoker     Packs/day: 1.00     Years: 10.00     Pack years: 10.00     Quit date: 1980     Years since quittin.4    Smokeless tobacco: Never Used   Substance and Sexual Activity    Alcohol use: Yes     Alcohol/week: 7.0 standard drinks     Types: 7 Glasses of wine per week     Comment: nightly wine    Drug use: No    Sexual activity: Yes     Partners: Female     Birth control/protection: Partner-Vasectomy   Social History Narrative    . Lives with wife. They had 4 children. Retired  at [employer] and then [employer] work. Still drives. Does have a Living Will, signed at time of CABG ().      Social Determinants of Health     Financial Resource Strain: Low Risk     Difficulty of Paying Living Expenses: Not hard at all   Food Insecurity: No Food  Insecurity    Worried About Running Out of Food in the Last Year: Never true    Ran Out of Food in the Last Year: Never true   Transportation Needs: No Transportation Needs    Lack of Transportation (Medical): No    Lack of Transportation (Non-Medical): No   Physical Activity: Insufficiently Active    Days of Exercise per Week: 2 days    Minutes of Exercise per Session: 40 min   Stress: No Stress Concern Present    Feeling of Stress : Not at all   Social Connections: Unknown    Frequency of Communication with Friends and Family: More than three times a week    Frequency of Social Gatherings with Friends and Family: Once a week    Active Member of Clubs or Organizations: Yes    Attends Club or Organization Meetings: 1 to 4 times per year    Marital Status:    Housing Stability: Low Risk     Unable to Pay for Housing in the Last Year: No    Number of Places Lived in the Last Year: 1    Unstable Housing in the Last Year: No       No current facility-administered medications for this encounter.     Current Outpatient Medications   Medication Sig Dispense Refill    aspirin 81 mg Tab Take 1 tablet by mouth Daily.      co-enzyme Q-10 30 mg capsule Take 100 mg by mouth once daily.      cyanocobalamin (VITAMIN B-12) 100 MCG tablet Take 1 tablet by mouth every other day.       fluticasone propionate (FLONASE) 50 mcg/actuation nasal spray 2 sprays (100 mcg total) by Each Nostril route once daily. 16 g 1    hydroCHLOROthiazide (HYDRODIURIL) 25 MG tablet TAKE 1/2 TABLET BY MOUTH ONCE DAILY 45 tablet 3    latanoprost 0.005 % ophthalmic solution PLACE 1 DROP INTO BOTH EYES EVERY EVENING. 7.5 mL 3    losartan (COZAAR) 50 MG tablet TAKE 1 TABLET BY MOUTH TWICE A DAY (Patient taking differently: once daily.) 180 tablet 3    LUBRICANT EYE DROPS, GLYC-PG, 1-0.3 % Drop       multivit-min/ferrous fumarate (MULTI VITAMIN ORAL)       nebivoloL (BYSTOLIC) 5 MG Tab Take 1 tablet (5 mg total) by mouth once  daily. (Patient taking differently: Take 5 mg by mouth every evening.) 30 tablet 11    omega-3 fatty acids-vitamin E 1,000 mg Cap Take 1 capsule by mouth Daily.      ondansetron (ZOFRAN) 4 MG tablet Take 1 tablet (4 mg total) by mouth every 6 (six) hours. 30 tablet 0    saw palmetto 500 MG capsule Take by mouth 2 (two) times daily.       zinc gluconate 50 mg tablet Take 50 mg by mouth once daily.      HYDROcodone-acetaminophen (NORCO) 5-325 mg per tablet Take 1 tablet by mouth every 6 (six) hours as needed for Pain. (Patient not taking: Reported on 8/18/2022) 30 tablet 0    nitroGLYCERIN (NITROSTAT) 0.4 MG SL tablet Place 1 tablet (0.4 mg total) under the tongue every 5 (five) minutes as needed for Chest pain (if no relief seek er trmt). 25 tablet 3    OCEAN NASAL 0.65 % nasal spray       promethazine-dextromethorphan (PROMETHAZINE-DM) 6.25-15 mg/5 mL Syrp Take 5 mLs by mouth every 6 to 8 hours as needed. (Patient not taking: Reported on 8/18/2022) 180 mL 0       Review of patient's allergies indicates:   Allergen Reactions    Niacin preparations Other (See Comments)     myalgia    Statins-hmg-coa reductase inhibitors      Muscle weakness    Codeine Other (See Comments) and Swelling     unknown    Fosinopril Other (See Comments)     cough    Pravastatin Other (See Comments)      Muscle pain    Simvastatin Other (See Comments)     Muscle pain    Sulfa (sulfonamide antibiotics) Rash and Hives

## 2022-08-23 NOTE — TELEPHONE ENCOUNTER
Called and spoke with Pt about the following:     Please arrive to [facility] main lobby at 11:30am on 8/24/22 for your scheduled procedure. NOTHING to eat or drink after midnight unless instructed otherwise by your Surgeon. Take only the medications instructed by your Pre Admit Nurse with small sip of water.

## 2022-08-24 ENCOUNTER — HOSPITAL ENCOUNTER (OUTPATIENT)
Facility: HOSPITAL | Age: 82
Discharge: HOME | End: 2022-08-25
Attending: SURGERY | Admitting: SURGERY
Payer: MEDICARE

## 2022-08-24 ENCOUNTER — ANESTHESIA (OUTPATIENT)
Dept: SURGERY | Facility: HOSPITAL | Age: 82
End: 2022-08-24
Payer: MEDICARE

## 2022-08-24 DIAGNOSIS — K80.10 CHOLELITHIASIS WITH CHRONIC CHOLECYSTITIS WITHOUT BILIARY OBSTRUCTION: ICD-10-CM

## 2022-08-24 DIAGNOSIS — K42.9 UMBILICAL HERNIA WITHOUT OBSTRUCTION AND WITHOUT GANGRENE: ICD-10-CM

## 2022-08-24 DIAGNOSIS — K80.20 CALCULUS OF GALLBLADDER WITHOUT CHOLECYSTITIS WITHOUT OBSTRUCTION: ICD-10-CM

## 2022-08-24 PROCEDURE — 71000015 HC POSTOP RECOV 1ST HR: Performed by: SURGERY

## 2022-08-24 PROCEDURE — 94761 N-INVAS EAR/PLS OXIMETRY MLT: CPT

## 2022-08-24 PROCEDURE — C1729 CATH, DRAINAGE: HCPCS | Performed by: SURGERY

## 2022-08-24 PROCEDURE — 71000033 HC RECOVERY, INTIAL HOUR: Performed by: SURGERY

## 2022-08-24 PROCEDURE — 88304 TISSUE EXAM BY PATHOLOGIST: CPT | Performed by: PATHOLOGY

## 2022-08-24 PROCEDURE — 27201423 OPTIME MED/SURG SUP & DEVICES STERILE SUPPLY: Performed by: SURGERY

## 2022-08-24 PROCEDURE — 88304 PR  SURG PATH,LEVEL III: ICD-10-PCS | Mod: 26,,, | Performed by: PATHOLOGY

## 2022-08-24 PROCEDURE — 25000003 PHARM REV CODE 250: Performed by: SURGERY

## 2022-08-24 PROCEDURE — 36000711: Performed by: SURGERY

## 2022-08-24 PROCEDURE — 63600175 PHARM REV CODE 636 W HCPCS: Performed by: NURSE ANESTHETIST, CERTIFIED REGISTERED

## 2022-08-24 PROCEDURE — 49585 PR REPAIR UMBILICAL HERN,5+Y/O,REDUC: CPT | Mod: 51,,, | Performed by: SURGERY

## 2022-08-24 PROCEDURE — 63600175 PHARM REV CODE 636 W HCPCS: Performed by: ANESTHESIOLOGY

## 2022-08-24 PROCEDURE — 47562 PR LAP,CHOLECYSTECTOMY: ICD-10-PCS | Mod: ,,, | Performed by: SURGERY

## 2022-08-24 PROCEDURE — 47562 LAPAROSCOPIC CHOLECYSTECTOMY: CPT | Mod: ,,, | Performed by: SURGERY

## 2022-08-24 PROCEDURE — 37000009 HC ANESTHESIA EA ADD 15 MINS: Performed by: SURGERY

## 2022-08-24 PROCEDURE — 37000008 HC ANESTHESIA 1ST 15 MINUTES: Performed by: SURGERY

## 2022-08-24 PROCEDURE — 63600175 PHARM REV CODE 636 W HCPCS: Performed by: SURGERY

## 2022-08-24 PROCEDURE — 49585 PR REPAIR UMBILICAL HERN,5+Y/O,REDUC: ICD-10-PCS | Mod: 51,,, | Performed by: SURGERY

## 2022-08-24 PROCEDURE — 94799 UNLISTED PULMONARY SVC/PX: CPT

## 2022-08-24 PROCEDURE — 36000710: Performed by: SURGERY

## 2022-08-24 PROCEDURE — 88304 TISSUE EXAM BY PATHOLOGIST: CPT | Mod: 26,,, | Performed by: PATHOLOGY

## 2022-08-24 PROCEDURE — 25000003 PHARM REV CODE 250: Performed by: NURSE ANESTHETIST, CERTIFIED REGISTERED

## 2022-08-24 PROCEDURE — 99900035 HC TECH TIME PER 15 MIN (STAT)

## 2022-08-24 RX ORDER — ONDANSETRON 2 MG/ML
4 INJECTION INTRAMUSCULAR; INTRAVENOUS DAILY PRN
Status: DISCONTINUED | OUTPATIENT
Start: 2022-08-24 | End: 2022-08-24 | Stop reason: HOSPADM

## 2022-08-24 RX ORDER — ALBUTEROL SULFATE 0.83 MG/ML
2.5 SOLUTION RESPIRATORY (INHALATION) EVERY 6 HOURS PRN
Status: DISCONTINUED | OUTPATIENT
Start: 2022-08-24 | End: 2022-08-25 | Stop reason: HOSPADM

## 2022-08-24 RX ORDER — CEFAZOLIN SODIUM 2 G/50ML
2 SOLUTION INTRAVENOUS
Status: COMPLETED | OUTPATIENT
Start: 2022-08-24 | End: 2022-08-24

## 2022-08-24 RX ORDER — ONDANSETRON 2 MG/ML
INJECTION INTRAMUSCULAR; INTRAVENOUS
Status: DISCONTINUED | OUTPATIENT
Start: 2022-08-24 | End: 2022-08-24

## 2022-08-24 RX ORDER — PROCHLORPERAZINE EDISYLATE 5 MG/ML
5 INJECTION INTRAMUSCULAR; INTRAVENOUS EVERY 30 MIN PRN
Status: DISCONTINUED | OUTPATIENT
Start: 2022-08-24 | End: 2022-08-24 | Stop reason: HOSPADM

## 2022-08-24 RX ORDER — LATANOPROST 50 UG/ML
1 SOLUTION/ DROPS OPHTHALMIC NIGHTLY
Status: DISCONTINUED | OUTPATIENT
Start: 2022-08-24 | End: 2022-08-25 | Stop reason: HOSPADM

## 2022-08-24 RX ORDER — GLUCAGON 1 MG
1 KIT INJECTION
Status: DISCONTINUED | OUTPATIENT
Start: 2022-08-24 | End: 2022-08-25 | Stop reason: HOSPADM

## 2022-08-24 RX ORDER — HYDROMORPHONE HYDROCHLORIDE 2 MG/ML
1 INJECTION, SOLUTION INTRAMUSCULAR; INTRAVENOUS; SUBCUTANEOUS EVERY 4 HOURS PRN
Status: DISCONTINUED | OUTPATIENT
Start: 2022-08-24 | End: 2022-08-25 | Stop reason: HOSPADM

## 2022-08-24 RX ORDER — DIPHENHYDRAMINE HYDROCHLORIDE 50 MG/ML
25 INJECTION INTRAMUSCULAR; INTRAVENOUS EVERY 6 HOURS PRN
Status: DISCONTINUED | OUTPATIENT
Start: 2022-08-24 | End: 2022-08-24 | Stop reason: HOSPADM

## 2022-08-24 RX ORDER — SODIUM CHLORIDE 0.9 % (FLUSH) 0.9 %
3 SYRINGE (ML) INJECTION
Status: DISCONTINUED | OUTPATIENT
Start: 2022-08-24 | End: 2022-08-25 | Stop reason: HOSPADM

## 2022-08-24 RX ORDER — ONDANSETRON 4 MG/1
4 TABLET, FILM COATED ORAL EVERY 6 HOURS
Status: DISCONTINUED | OUTPATIENT
Start: 2022-08-24 | End: 2022-08-25 | Stop reason: HOSPADM

## 2022-08-24 RX ORDER — HYDROCODONE BITARTRATE AND ACETAMINOPHEN 7.5; 325 MG/1; MG/1
1 TABLET ORAL EVERY 6 HOURS PRN
Status: DISCONTINUED | OUTPATIENT
Start: 2022-08-24 | End: 2022-08-25 | Stop reason: HOSPADM

## 2022-08-24 RX ORDER — NITROGLYCERIN 0.4 MG/1
0.4 TABLET SUBLINGUAL EVERY 5 MIN PRN
Status: DISCONTINUED | OUTPATIENT
Start: 2022-08-24 | End: 2022-08-25 | Stop reason: HOSPADM

## 2022-08-24 RX ORDER — ACETAMINOPHEN 10 MG/ML
INJECTION, SOLUTION INTRAVENOUS
Status: DISCONTINUED | OUTPATIENT
Start: 2022-08-24 | End: 2022-08-24

## 2022-08-24 RX ORDER — BUPIVACAINE HYDROCHLORIDE 5 MG/ML
INJECTION, SOLUTION EPIDURAL; INTRACAUDAL
Status: DISCONTINUED | OUTPATIENT
Start: 2022-08-24 | End: 2022-08-24 | Stop reason: HOSPADM

## 2022-08-24 RX ORDER — INDOCYANINE GREEN AND WATER 25 MG
2.5 KIT INJECTION
Status: DISCONTINUED | OUTPATIENT
Start: 2022-08-24 | End: 2022-08-24 | Stop reason: HOSPADM

## 2022-08-24 RX ORDER — IBUPROFEN 200 MG
16 TABLET ORAL
Status: DISCONTINUED | OUTPATIENT
Start: 2022-08-24 | End: 2022-08-25 | Stop reason: HOSPADM

## 2022-08-24 RX ORDER — FENTANYL CITRATE 50 UG/ML
INJECTION, SOLUTION INTRAMUSCULAR; INTRAVENOUS
Status: DISCONTINUED | OUTPATIENT
Start: 2022-08-24 | End: 2022-08-24

## 2022-08-24 RX ORDER — LIDOCAINE HYDROCHLORIDE 20 MG/ML
INJECTION, SOLUTION EPIDURAL; INFILTRATION; INTRACAUDAL; PERINEURAL
Status: DISCONTINUED | OUTPATIENT
Start: 2022-08-24 | End: 2022-08-24

## 2022-08-24 RX ORDER — KETOROLAC TROMETHAMINE 30 MG/ML
15 INJECTION, SOLUTION INTRAMUSCULAR; INTRAVENOUS EVERY 8 HOURS PRN
Status: DISCONTINUED | OUTPATIENT
Start: 2022-08-24 | End: 2022-08-24 | Stop reason: HOSPADM

## 2022-08-24 RX ORDER — MEPERIDINE HYDROCHLORIDE 25 MG/ML
12.5 INJECTION INTRAMUSCULAR; INTRAVENOUS; SUBCUTANEOUS ONCE
Status: DISCONTINUED | OUTPATIENT
Start: 2022-08-24 | End: 2022-08-24 | Stop reason: HOSPADM

## 2022-08-24 RX ORDER — OXYCODONE HYDROCHLORIDE 5 MG/1
5 TABLET ORAL
Status: DISCONTINUED | OUTPATIENT
Start: 2022-08-24 | End: 2022-08-24 | Stop reason: HOSPADM

## 2022-08-24 RX ORDER — DIPHENHYDRAMINE HYDROCHLORIDE 50 MG/ML
25 INJECTION INTRAMUSCULAR; INTRAVENOUS EVERY 4 HOURS PRN
Status: DISCONTINUED | OUTPATIENT
Start: 2022-08-24 | End: 2022-08-25 | Stop reason: HOSPADM

## 2022-08-24 RX ORDER — SODIUM CHLORIDE, SODIUM LACTATE, POTASSIUM CHLORIDE, CALCIUM CHLORIDE 600; 310; 30; 20 MG/100ML; MG/100ML; MG/100ML; MG/100ML
INJECTION, SOLUTION INTRAVENOUS CONTINUOUS
Status: DISCONTINUED | OUTPATIENT
Start: 2022-08-24 | End: 2022-08-25 | Stop reason: HOSPADM

## 2022-08-24 RX ORDER — PROPOFOL 10 MG/ML
VIAL (ML) INTRAVENOUS
Status: DISCONTINUED | OUTPATIENT
Start: 2022-08-24 | End: 2022-08-24

## 2022-08-24 RX ORDER — LOSARTAN POTASSIUM 25 MG/1
25 TABLET ORAL 2 TIMES DAILY
Status: DISCONTINUED | OUTPATIENT
Start: 2022-08-24 | End: 2022-08-25 | Stop reason: HOSPADM

## 2022-08-24 RX ORDER — ALBUTEROL SULFATE 0.83 MG/ML
2.5 SOLUTION RESPIRATORY (INHALATION) EVERY 4 HOURS PRN
Status: DISCONTINUED | OUTPATIENT
Start: 2022-08-24 | End: 2022-08-24

## 2022-08-24 RX ORDER — HYDROCHLOROTHIAZIDE 12.5 MG/1
12.5 TABLET ORAL DAILY
Status: DISCONTINUED | OUTPATIENT
Start: 2022-08-25 | End: 2022-08-25 | Stop reason: HOSPADM

## 2022-08-24 RX ORDER — HYDROCODONE BITARTRATE AND ACETAMINOPHEN 5; 325 MG/1; MG/1
1 TABLET ORAL EVERY 6 HOURS PRN
Status: DISCONTINUED | OUTPATIENT
Start: 2022-08-24 | End: 2022-08-25 | Stop reason: HOSPADM

## 2022-08-24 RX ORDER — IBUPROFEN 200 MG
24 TABLET ORAL
Status: DISCONTINUED | OUTPATIENT
Start: 2022-08-24 | End: 2022-08-25 | Stop reason: HOSPADM

## 2022-08-24 RX ORDER — ROCURONIUM BROMIDE 10 MG/ML
INJECTION, SOLUTION INTRAVENOUS
Status: DISCONTINUED | OUTPATIENT
Start: 2022-08-24 | End: 2022-08-24

## 2022-08-24 RX ORDER — METOPROLOL TARTRATE 25 MG/1
25 TABLET, FILM COATED ORAL 2 TIMES DAILY
Status: DISCONTINUED | OUTPATIENT
Start: 2022-08-24 | End: 2022-08-25 | Stop reason: HOSPADM

## 2022-08-24 RX ORDER — CHLORHEXIDINE GLUCONATE ORAL RINSE 1.2 MG/ML
10 SOLUTION DENTAL 2 TIMES DAILY
Status: DISCONTINUED | OUTPATIENT
Start: 2022-08-24 | End: 2022-08-25 | Stop reason: HOSPADM

## 2022-08-24 RX ORDER — DEXAMETHASONE SODIUM PHOSPHATE 4 MG/ML
INJECTION, SOLUTION INTRA-ARTICULAR; INTRALESIONAL; INTRAMUSCULAR; INTRAVENOUS; SOFT TISSUE
Status: DISCONTINUED | OUTPATIENT
Start: 2022-08-24 | End: 2022-08-24

## 2022-08-24 RX ORDER — HYDROMORPHONE HYDROCHLORIDE 2 MG/ML
0.2 INJECTION, SOLUTION INTRAMUSCULAR; INTRAVENOUS; SUBCUTANEOUS EVERY 5 MIN PRN
Status: DISCONTINUED | OUTPATIENT
Start: 2022-08-24 | End: 2022-08-24 | Stop reason: HOSPADM

## 2022-08-24 RX ADMIN — ACETAMINOPHEN 1000 MG: 10 INJECTION, SOLUTION INTRAVENOUS at 02:08

## 2022-08-24 RX ADMIN — HYDROMORPHONE HYDROCHLORIDE 0.2 MG: 2 INJECTION, SOLUTION INTRAMUSCULAR; INTRAVENOUS; SUBCUTANEOUS at 04:08

## 2022-08-24 RX ADMIN — ONDANSETRON 4 MG: 2 INJECTION, SOLUTION INTRAMUSCULAR; INTRAVENOUS at 02:08

## 2022-08-24 RX ADMIN — SODIUM CHLORIDE, SODIUM LACTATE, POTASSIUM CHLORIDE, AND CALCIUM CHLORIDE: .6; .31; .03; .02 INJECTION, SOLUTION INTRAVENOUS at 05:08

## 2022-08-24 RX ADMIN — PROPOFOL 150 MG: 10 INJECTION, EMULSION INTRAVENOUS at 12:08

## 2022-08-24 RX ADMIN — SUGAMMADEX 200 MG: 100 INJECTION, SOLUTION INTRAVENOUS at 03:08

## 2022-08-24 RX ADMIN — ROCURONIUM BROMIDE 10 MG: 10 INJECTION, SOLUTION INTRAVENOUS at 01:08

## 2022-08-24 RX ADMIN — SODIUM CHLORIDE, POTASSIUM CHLORIDE, SODIUM LACTATE AND CALCIUM CHLORIDE: 600; 310; 30; 20 INJECTION, SOLUTION INTRAVENOUS at 02:08

## 2022-08-24 RX ADMIN — FENTANYL CITRATE 50 MCG: 50 INJECTION, SOLUTION INTRAMUSCULAR; INTRAVENOUS at 01:08

## 2022-08-24 RX ADMIN — ONDANSETRON HYDROCHLORIDE 4 MG: 4 TABLET, FILM COATED ORAL at 06:08

## 2022-08-24 RX ADMIN — LIDOCAINE HYDROCHLORIDE 100 MG: 20 INJECTION, SOLUTION EPIDURAL; INFILTRATION; INTRACAUDAL; PERINEURAL at 12:08

## 2022-08-24 RX ADMIN — HYDROMORPHONE HYDROCHLORIDE 1 MG: 2 INJECTION, SOLUTION INTRAMUSCULAR; INTRAVENOUS; SUBCUTANEOUS at 09:08

## 2022-08-24 RX ADMIN — LATANOPROST 1 DROP: 50 SOLUTION OPHTHALMIC at 08:08

## 2022-08-24 RX ADMIN — LOSARTAN POTASSIUM 25 MG: 25 TABLET, FILM COATED ORAL at 08:08

## 2022-08-24 RX ADMIN — CEFAZOLIN SODIUM 2 G: 2 SOLUTION INTRAVENOUS at 12:08

## 2022-08-24 RX ADMIN — 0.12% CHLORHEXIDINE GLUCONATE 10 ML: 1.2 RINSE ORAL at 08:08

## 2022-08-24 RX ADMIN — SODIUM CHLORIDE, POTASSIUM CHLORIDE, SODIUM LACTATE AND CALCIUM CHLORIDE: 600; 310; 30; 20 INJECTION, SOLUTION INTRAVENOUS at 12:08

## 2022-08-24 RX ADMIN — ROCURONIUM BROMIDE 20 MG: 10 INJECTION, SOLUTION INTRAVENOUS at 01:08

## 2022-08-24 RX ADMIN — ROCURONIUM BROMIDE 30 MG: 10 INJECTION, SOLUTION INTRAVENOUS at 12:08

## 2022-08-24 RX ADMIN — FENTANYL CITRATE 50 MCG: 50 INJECTION, SOLUTION INTRAMUSCULAR; INTRAVENOUS at 12:08

## 2022-08-24 RX ADMIN — DEXAMETHASONE SODIUM PHOSPHATE 8 MG: 4 INJECTION, SOLUTION INTRAMUSCULAR; INTRAVENOUS at 01:08

## 2022-08-24 RX ADMIN — ROCURONIUM BROMIDE 10 MG: 10 INJECTION, SOLUTION INTRAVENOUS at 02:08

## 2022-08-24 RX ADMIN — HYDROCODONE BITARTRATE AND ACETAMINOPHEN 1 TABLET: 7.5; 325 TABLET ORAL at 04:08

## 2022-08-24 NOTE — ANESTHESIA POSTPROCEDURE EVALUATION
Anesthesia Post Evaluation    Patient: Wilberto Hayes Jr.    Procedure(s) Performed: Procedure(s) (LRB):  XI ROBOTIC CHOLECYSTECTOMY (N/A)  REPAIR, HERNIA, UMBILICAL, AGE 5 YEARS OR OLDER (N/A)    Final Anesthesia Type: general      Patient location during evaluation: PACU  Patient participation: Yes- Able to Participate  Level of consciousness: awake  Post-procedure vital signs: reviewed and stable  Pain management: adequate  Airway patency: patent    PONV status at discharge: No PONV  Anesthetic complications: no      Cardiovascular status: hemodynamically stable  Respiratory status: unassisted  Hydration status: euvolemic  Follow-up not needed.          Vitals Value Taken Time   /72 08/24/22 1615   Temp 37.4 °C (99.4 °F) 08/24/22 1528   Pulse 78 08/24/22 1616   Resp 13 08/24/22 1616   SpO2 93 % 08/24/22 1616   Vitals shown include unvalidated device data.      No case tracking events are documented in the log.      Pain/Trisha Score: Pain Rating Prior to Med Admin: 5 (8/24/2022  4:09 PM)  Trisha Score: 9 (8/24/2022  4:00 PM)

## 2022-08-24 NOTE — ANESTHESIA PROCEDURE NOTES
Intubation    Date/Time: 8/24/2022 12:43 PM  Performed by: Angela Ackerman CRNA  Authorized by: Sergey Tinajero MD     Intubation:     Induction:  Intravenous    Intubated:  Postinduction    Mask Ventilation:  Easy mask    Attempts:  1    Attempted By:  CRNA    Method of Intubation:  Direct    Blade:  Bautista 2    Laryngeal View Grade: Grade I - full view of cords      Difficult Airway Encountered?: No      Complications:  None    Airway Device:  Oral endotracheal tube    Airway Device Size:  7.5    Style/Cuff Inflation:  Cuffed (inflated to minimal occlusive pressure)    Tube secured:  23    Secured at:  The lips    Placement Verified By:  Capnometry    Complicating Factors:  None    Findings Post-Intubation:  BS equal bilateral and atraumatic/condition of teeth unchanged

## 2022-08-24 NOTE — HPI
HPI:  Patient has had 2 episodes of abdominal pain.  The pain was located on the right side of the abdomen with radiation to the chest.  The initial episode he treated with over-the-counter medicines at home.  He then improved.  He then ate a cheese and below-knee sandwich and had a significant episode of the pain associated with nausea and vomiting.  He describes it as sharp to crampy.  He presented the emergency room was evaluated a CTA that showed gallstones as well as an ultrasound.  He had no elevation of his transaminases and his white count was normal.  He presents now to discuss cholecystectomy

## 2022-08-24 NOTE — INTERVAL H&P NOTE
The patient has been examined and the H&P has been reviewed:    I concur with the findings and no changes have occurred since H&P was written.    Surgery risks, benefits and alternative options discussed and understood by patient/family.      Labs reviewed    There are no hospital problems to display for this patient.

## 2022-08-24 NOTE — PLAN OF CARE
Pt resting on stretcher s/p quyen familia, duodenum repair and umbilical hernia repair performed under general anesthesia by Dr. Alatorre. Respirations even and unlabored on 98% face tent with O2 sats of 100%, Surgical site to abd observed with slight amt of ss drainage to MAY insertion site dsg. VSS. See flow sheet for detailed assessment. Pt denies pain at present. Will cont to monitor.

## 2022-08-25 VITALS
TEMPERATURE: 99 F | BODY MASS INDEX: 28.84 KG/M2 | HEIGHT: 71 IN | DIASTOLIC BLOOD PRESSURE: 64 MMHG | WEIGHT: 206 LBS | RESPIRATION RATE: 16 BRPM | SYSTOLIC BLOOD PRESSURE: 146 MMHG | OXYGEN SATURATION: 96 % | HEART RATE: 71 BPM

## 2022-08-25 LAB
ANION GAP SERPL CALC-SCNC: 15 MMOL/L (ref 8–16)
BUN SERPL-MCNC: 22 MG/DL (ref 8–23)
CALCIUM SERPL-MCNC: 9.1 MG/DL (ref 8.7–10.5)
CHLORIDE SERPL-SCNC: 101 MMOL/L (ref 95–110)
CO2 SERPL-SCNC: 23 MMOL/L (ref 23–29)
CREAT SERPL-MCNC: 1.2 MG/DL (ref 0.5–1.4)
EST. GFR  (NO RACE VARIABLE): >60 ML/MIN/1.73 M^2
GLUCOSE SERPL-MCNC: 95 MG/DL (ref 70–110)
POTASSIUM SERPL-SCNC: 4.9 MMOL/L (ref 3.5–5.1)
SODIUM SERPL-SCNC: 139 MMOL/L (ref 136–145)

## 2022-08-25 PROCEDURE — 25500020 PHARM REV CODE 255: Performed by: SURGERY

## 2022-08-25 PROCEDURE — 94799 UNLISTED PULMONARY SVC/PX: CPT

## 2022-08-25 PROCEDURE — 36415 COLL VENOUS BLD VENIPUNCTURE: CPT | Performed by: SURGERY

## 2022-08-25 PROCEDURE — 25000003 PHARM REV CODE 250: Performed by: SURGERY

## 2022-08-25 PROCEDURE — 63600175 PHARM REV CODE 636 W HCPCS: Performed by: SURGERY

## 2022-08-25 PROCEDURE — 80048 BASIC METABOLIC PNL TOTAL CA: CPT | Performed by: SURGERY

## 2022-08-25 PROCEDURE — 99900035 HC TECH TIME PER 15 MIN (STAT)

## 2022-08-25 RX ORDER — HYDROCODONE BITARTRATE AND ACETAMINOPHEN 5; 325 MG/1; MG/1
1 TABLET ORAL EVERY 6 HOURS PRN
Qty: 15 TABLET | Refills: 0 | Status: SHIPPED | OUTPATIENT
Start: 2022-08-25 | End: 2022-09-23 | Stop reason: ALTCHOICE

## 2022-08-25 RX ADMIN — DIATRIZOATE MEGLUMINE AND DIATRIZOATE SODIUM 120 ML: 660; 100 LIQUID ORAL; RECTAL at 01:08

## 2022-08-25 RX ADMIN — ONDANSETRON HYDROCHLORIDE 4 MG: 4 TABLET, FILM COATED ORAL at 02:08

## 2022-08-25 RX ADMIN — LOSARTAN POTASSIUM 25 MG: 25 TABLET, FILM COATED ORAL at 08:08

## 2022-08-25 RX ADMIN — 0.12% CHLORHEXIDINE GLUCONATE 10 ML: 1.2 RINSE ORAL at 08:08

## 2022-08-25 RX ADMIN — HYDROCODONE BITARTRATE AND ACETAMINOPHEN 1 TABLET: 7.5; 325 TABLET ORAL at 07:08

## 2022-08-25 RX ADMIN — ONDANSETRON HYDROCHLORIDE 4 MG: 4 TABLET, FILM COATED ORAL at 12:08

## 2022-08-25 RX ADMIN — HYDROCHLOROTHIAZIDE 12.5 MG: 12.5 TABLET ORAL at 08:08

## 2022-08-25 RX ADMIN — SODIUM CHLORIDE, SODIUM LACTATE, POTASSIUM CHLORIDE, AND CALCIUM CHLORIDE: .6; .31; .03; .02 INJECTION, SOLUTION INTRAVENOUS at 11:08

## 2022-08-25 RX ADMIN — ONDANSETRON HYDROCHLORIDE 4 MG: 4 TABLET, FILM COATED ORAL at 05:08

## 2022-08-25 NOTE — DISCHARGE SUMMARY
'[facility] Surg  General Surgery  Discharge Summary      Patient Name: Wilberto Hayes Jr.  MRN: [id number]  Admission Date: 8/24/2022  Hospital Length of Stay: 0 days  Discharge Date and Time:  08/25/2022 4:09 PM  Attending Physician: Meet Alatorre MD   Discharging Provider: Meet Alatorre MD  Primary Care Provider: Dayne Steward MD    HPI:   HPI:  Patient has had 2 episodes of abdominal pain.  The pain was located on the right side of the abdomen with radiation to the chest.  The initial episode he treated with over-the-counter medicines at home.  He then improved.  He then ate a cheese and below-knee sandwich and had a significant episode of the pain associated with nausea and vomiting.  He describes it as sharp to crampy.  He presented the emergency room was evaluated a CTA that showed gallstones as well as an ultrasound.  He had no elevation of his transaminases and his white count was normal.  He presents now to discuss cholecystectomy      Procedure(s) (LRB):  XI ROBOTIC CHOLECYSTECTOMY (N/A)  REPAIR, HERNIA, UMBILICAL, AGE 5 YEARS OR OLDER (N/A)      Indwelling Lines/Drains at time of discharge:   Lines/Drains/Airways     Drain  Duration                Closed/Suction Drain 08/24/22 1500 Lateral;Right Abdomen Bulb 15 Fr. 1 day              Hospital Course: 08/25/2022.  Right-sided incisional pain.  Afebrile.  Drain is serous.  Upper GI planned for today.  Anticipate discharge on clear liquids if upper GI shows no evidence of a leak    The upper GI study was performed that showed no evidence of a link.      The patient will be started on clear liquids if these are tolerated he be discharged home    Goals of Care Treatment Preferences:  Code Status: Full Code      Consults:     Significant Diagnostic Studies: Labs:   BMP:   Recent Labs   Lab 08/25/22  0546   GLU 95      K 4.9      CO2 23   BUN 22   CREATININE 1.2   CALCIUM 9.1   , CMP   Recent Labs   Lab 08/25/22  0546      K 4.9       CO2 23   GLU 95   BUN 22   CREATININE 1.2   CALCIUM 9.1   ANIONGAP 15    and CBC No results for input(s): WBC, HGB, HCT, PLT in the last 48 hours.  Radiology:  Upper GI    Reading Physician Reading Date Result Priority   Link Hardin MD  [phone]  [phone] 8/25/2022 Routine     Narrative & Impression  EXAMINATION:  FL UPPER GI WATER SOLUABLE     CLINICAL HISTORY:  Patient was noted to have a opening in the duodenum at the time a cholecystectomy.  This was repaired in 2 layers.  Evaluate for leak;     TECHNIQUE:  Contrast material: Barium sulfate suspension     Fluoroscopy time: 1.2 minutes 53 fluoroscopic images     COMPARISON:  None     FINDINGS:  Preliminary radiograph: Unremarkable     Swallowing: Normal.     Esophagus: Significant tertiary contractions throughout the esophagus consistent with dysmotility.     Gastroesophageal reflux: None observed.     Stomach: Rugal fold thickening within the stomach concerning for gastritis.     Duodenum: Normal.  No extravasation or perforation within the duodenum.  No stricture.     Other findings: None.     Impression:     See findings above.        Electronically signed by: Link Hardin MD  Date:                                            08/25/2022  Time:                                           15:18      Pending Diagnostic Studies:     Procedure Component Value Units Date/Time    Specimen to Pathology, Surgery General Surgery [[id number]] Collected: 08/24/22 1517    Order Status: Sent Lab Status: In process Updated: 08/25/22 0847    Specimen: Tissue         Final Active Diagnoses:    Diagnosis Date Noted POA    PRINCIPAL PROBLEM:  Calculus of gallbladder without cholecystitis without obstruction [K80.20] 08/24/2022 Yes    CHELO on CPAP [G47.33, Z99.89] 06/02/2015 Not Applicable     Chronic    Essential hypertension [I10] 07/22/2013 Yes    Pure hypercholesterolemia [E78.00] 07/22/2013 Yes      Problems Resolved During this Admission:       Discharged Condition: stable    Disposition: Home or Self Care    Follow Up:   Follow-up Information     Meet Alatorre MD Follow up in 1 week(s).    Specialty: General Surgery  Why: Drain removal, post op appt  Contact information:  [address] [address] [facility]  [place] [address] [phone]                       Patient Instructions:      Diet clear liquid     Lifting restrictions   Order Comments: No lifting more than 20 lb for 2 weeks     Notify your health care provider if you experience any of the following:  temperature >100.4     Notify your health care provider if you experience any of the following:  persistent nausea and vomiting or diarrhea     Notify your health care provider if you experience any of the following:  severe uncontrolled pain     Notify your health care provider if you experience any of the following:  redness, tenderness, or signs of infection (pain, swelling, redness, odor or green/yellow discharge around incision site)     No dressing needed     Change dressing (specify)   Order Comments: Dressing change:  After showering please place a dry Band-Aid over the drain tubing where it exits the skin     Medications:  Reconciled Home Medications:      Medication List      CHANGE how you take these medications    * HYDROcodone-acetaminophen 5-325 mg per tablet  Commonly known as: NORCO  Take 1 tablet by mouth every 6 (six) hours as needed for Pain.  What changed: Another medication with the same name was added. Make sure you understand how and when to take each.     * HYDROcodone-acetaminophen 5-325 mg per tablet  Commonly known as: NORCO  Take 1 tablet by mouth every 6 (six) hours as needed for Pain.  What changed: You were already taking a medication with the same name, and this prescription was added. Make sure you understand how and when to take each.     losartan 50 MG tablet  Commonly known as: COZAAR  TAKE 1 TABLET BY MOUTH TWICE A DAY  What changed:   · how much to take  · how to  take this  · when to take this     nebivoloL 5 MG Tab  Commonly known as: BYSTOLIC  Take 1 tablet (5 mg total) by mouth once daily.  What changed: when to take this         * This list has 2 medication(s) that are the same as other medications prescribed for you. Read the directions carefully, and ask your doctor or other care provider to review them with you.            CONTINUE taking these medications    aspirin 81 mg Tab  Take 1 tablet by mouth Daily.     co-enzyme Q-10 30 mg capsule  Take 100 mg by mouth once daily.     cyanocobalamin 100 MCG tablet  Commonly known as: VITAMIN B-12  Take 1 tablet by mouth every other day.     fluticasone propionate 50 mcg/actuation nasal spray  Commonly known as: FLONASE  2 sprays (100 mcg total) by Each Nostril route once daily.     hydroCHLOROthiazide 25 MG tablet  Commonly known as: HYDRODIURIL  TAKE 1/2 TABLET BY MOUTH ONCE DAILY     latanoprost 0.005 % ophthalmic solution  PLACE 1 DROP INTO BOTH EYES EVERY EVENING.     LUBRICANT (P-GLYCOL-GLYCERIN) 1-0.3 % Drop  Generic drug: propylene glycol-glycerin     MULTI VITAMIN ORAL     nitroGLYCERIN 0.4 MG SL tablet  Commonly known as: NITROSTAT  Place 1 tablet (0.4 mg total) under the tongue every 5 (five) minutes as needed for Chest pain (if no relief seek er trmt).     OCEAN NASAL 0.65 % nasal spray  Generic drug: sodium chloride     omega-3 fatty acids-vitamin E 1,000 mg Cap  Take 1 capsule by mouth Daily.     ondansetron 4 MG tablet  Commonly known as: ZOFRAN  Take 1 tablet (4 mg total) by mouth every 6 (six) hours.     promethazine-dextromethorphan 6.25-15 mg/5 mL Syrp  Commonly known as: PROMETHAZINE-DM  Take 5 mLs by mouth every 6 to 8 hours as needed.     saw palmetto 500 MG capsule  Take by mouth 2 (two) times daily.     zinc gluconate 50 mg tablet  Take 50 mg by mouth once daily.          Time spent on the discharge of patient: 8 minutes    Meet Alatorre MD  General Surgery  [facility] - Med S[facility]

## 2022-08-25 NOTE — PROGRESS NOTES
[facility]  General Surgery  Progress Note    Subjective:     History of Present Illness:  HPI:  Patient has had 2 episodes of abdominal pain.  The pain was located on the right side of the abdomen with radiation to the chest.  The initial episode he treated with over-the-counter medicines at home.  He then improved.  He then ate a cheese and below-knee sandwich and had a significant episode of the pain associated with nausea and vomiting.  He describes it as sharp to crampy.  He presented the emergency room was evaluated a CTA that showed gallstones as well as an ultrasound.  He had no elevation of his transaminases and his white count was normal.  He presents now to discuss cholecystectomy      Post-Op Info:  Procedure(s) (LRB):  XI ROBOTIC CHOLECYSTECTOMY (N/A)  REPAIR, HERNIA, UMBILICAL, AGE 5 YEARS OR OLDER (N/A)   1 Day Post-Op     Interval History:  Right-sided incisional pain.  Afebrile.  AMY serosanguineous.  Upper GI study today.    Medications:  Continuous Infusions:   lactated ringers 100 mL/hr at 08/24/22 1702     Scheduled Meds:   chlorhexidine  10 mL Mouth/Throat BID    hydroCHLOROthiazide  12.5 mg Oral Daily    latanoprost  1 drop Both Eyes QHS    losartan  25 mg Oral BID    metoprolol tartrate  25 mg Oral BID    ondansetron  4 mg Oral Q6H    white petrolatum-mineral oiL   Both Eyes QHS     PRN Meds:albuterol sulfate, dextrose 10%, dextrose 10%, diphenhydrAMINE, glucagon (human recombinant), glucose, glucose, HYDROcodone-acetaminophen, HYDROcodone-acetaminophen, HYDROmorphone, nitroGLYCERIN, sodium chloride 0.9%, sodium chloride 0.9%     Review of patient's allergies indicates:   Allergen Reactions    Codeine Swelling and Other (See Comments)     unknown    Niacin preparations Other (See Comments)     myalgia    Statins-hmg-coa reductase inhibitors      Muscle weakness    Fosinopril Other (See Comments)     cough    Pravastatin Other (See Comments)      Muscle pain    Simvastatin  Other (See Comments)     Muscle pain    Sulfa (sulfonamide antibiotics) Rash and Hives     Objective:     Vital Signs (Most Recent):  Temp: 97.7 °F (36.5 °C) (08/25/22 0744)  Pulse: 66 (08/25/22 0744)  Resp: 16 (08/25/22 0744)  BP: (!) 142/63 (08/25/22 0744)  SpO2: 97 % (08/25/22 0744)   Vital Signs (24h Range):  Temp:  [96.4 °F (35.8 °C)-99.4 °F (37.4 °C)] 97.7 °F (36.5 °C)  Pulse:  [66-85] 66  Resp:  [12-35] 16  SpO2:  [93 %-100 %] 97 %  BP: (131-173)/(60-79) 142/63     Weight: 93.5 kg (206 lb 0.3 oz)  Body mass index is 28.73 kg/m².    Intake/Output - Last 3 Shifts         08/23 0700 08/24 0659 08/24 0700 08/25 0659 08/25 0700 08/26 0659    IV Piggyback  1300     Total Intake(mL/kg)  1300 (13.9)     Urine (mL/kg/hr)  350     Drains  70     Total Output  420     Net  +880                    Physical Exam  Vitals and nursing note reviewed.   Constitutional:       General: He is not in acute distress.     Appearance: He is well-developed.   HENT:      Head: Normocephalic and atraumatic.   Eyes:      General: No scleral icterus.     Pupils: Pupils are equal, round, and reactive to light.   Neck:      Thyroid: No thyromegaly.      Vascular: No JVD.      Trachea: No tracheal deviation.   Cardiovascular:      Rate and Rhythm: Normal rate and regular rhythm.      Heart sounds: Normal heart sounds.   Pulmonary:      Effort: Pulmonary effort is normal.      Breath sounds: Normal breath sounds.   Abdominal:      General: Abdomen is flat. Bowel sounds are normal. There is no distension.      Palpations: Abdomen is soft. There is no mass.      Tenderness: There is no abdominal tenderness. There is no guarding or rebound.      Comments: Incisions are clean.  AMY is serosanguineous   Musculoskeletal:         General: Normal range of motion.      Cervical back: Normal range of motion and neck supple.   Lymphadenopathy:      Cervical: No cervical adenopathy.   Skin:     General: Skin is warm and dry.      Capillary Refill:  Capillary refill takes less than 2 seconds.   Neurological:      Mental Status: He is alert and oriented to person, place, and time.   Psychiatric:         Behavior: Behavior normal.         Thought Content: Thought content normal.         Judgment: Judgment normal.       Significant Labs:  I have reviewed all pertinent lab results within the past 24 hours.  CBC: No results for input(s): WBC, RBC, HGB, HCT, PLT, MCV, MCH, MCHC in the last 168 hours.  CMP:   Recent Labs   Lab 08/25/22  0546   GLU 95   CALCIUM 9.1      K 4.9   CO2 23      BUN 22   CREATININE 1.2       Significant Diagnostics:  I have reviewed all pertinent imaging results/findings within the past 24 hours.  Upper GI study    Assessment/Plan:     * Calculus of gallbladder without cholecystitis without obstruction  Patient is doing well on postop day 1 from a robotic cholecystectomy with repair of a duodenal perforation.  The AMY is serous.      Upper GI study today.  The GI study shows no leak then clear liquids.  If the patient tolerates clear liquids will be discharged home.    CHELO on CPAP  CPAP if needed    Pure hypercholesterolemia  Resume home medications        Meet Alatorre MD  General Surgery  [facility]

## 2022-08-25 NOTE — HOSPITAL COURSE
08/25/2022.  Right-sided incisional pain.  Afebrile.  Drain is serous.  Upper GI planned for today.  Anticipate discharge on clear liquids if upper GI shows no evidence of a leak

## 2022-08-25 NOTE — PLAN OF CARE
[place] [facility]  Initial Discharge Assessment       Primary Care Provider: Dayne Steward MD    Admission Diagnosis: Calculus of gallbladder without cholecystitis without obstruction [K80.20]  Umbilical hernia without obstruction and without gangrene [K42.9]  Cholelithiasis with chronic cholecystitis without biliary obstruction [K80.10]    Admission Date: 8/24/2022  Expected Discharge Date:     Discharge Barriers Identified: None    Payor: [employer]ICARE / Plan: [employer] CHOICES 65 / Product Type: Medicare Advantage /     Extended Emergency Contact Information  Primary Emergency Contact: Tanya Hayes  Address: [address]           [place] [address] [place]  Mobile Phone: [phone]  Relation: Spouse  Secondary Emergency Contact: Robb Hayes  Address: [address] [place] [address] [place]  Mobile Phone: [phone]  Relation: Son    Discharge Plan A: Home with family  Discharge Plan B: Home with family      [id number] [place], [place] - [address] [address]  [address] [address]  Phone: [phone] Fax: [phone]    [facility] [address] [place] [address]  Phone: [phone] Fax: [phone]  My Chart; Active  Swer spoke with pt and pt's spouse for initial assessment. Swer explained role discharge planner. Pt lives with spouse and was independent with ADLs prior to admission. Pt's spouse is help at home and will provide transportation at discharge. Pt uses a CPAP. Pt was not current with any home health agencies. Pt drives self to medical appointments. Pt does have an advanced directive at this time.    SWer provided a transitional care folder, information on advanced directives, information on pharmacy bedside delivery, and discharge planning begins on admission with contact information for any needs/questions.  CM needs to  be determined based on hospital progress.     Initial Assessment (most recent)     Adult Discharge Assessment - 08/25/22 1017        Discharge Assessment    Assessment Type Discharge Planning Assessment     Confirmed/corrected address, phone number and insurance Yes     Confirmed Demographics Correct on Facesheet     Source of Information patient     If unable to respond/provide information was family/caregiver contacted? Yes     Contact Name/Number Tanya gamble (spouse) [phone]     Communicated ALLEGRA with patient/caregiver Date not available/Unable to determine     Reason For Admission calculus of gallbladder without cholecystitis without obstruction     Lives With spouse     Facility Arrived From: home     Do you expect to return to your current living situation? Yes     Do you have help at home or someone to help you manage your care at home? Yes     Who are your caregiver(s) and their phone number(s)? Tanay gamble (spouse) [phone]     Prior to hospitilization cognitive status: Alert/Oriented     Current cognitive status: Alert/Oriented     Walking or Climbing Stairs Difficulty none     Dressing/Bathing Difficulty none     Home Accessibility wheelchair accessible     Home Layout Able to live on 1st floor     Equipment Currently Used at Home CPAP     Readmission within 30 days? No     Patient currently being followed by outpatient case management? No     Do you currently have service(s) that help you manage your care at home? No     Do you take prescription medications? Yes     Do you have prescription coverage? Yes     Do you have any problems affording any of your prescribed medications? No     Is the patient taking medications as prescribed? yes     Who is going to help you get home at discharge? spouse     How do you get to doctors appointments? family or friend will provide     Are you on dialysis? No     Discharge Plan A Home with family     Discharge Plan B Home with family     DME Needed  Upon Discharge  none     Discharge Plan discussed with: Patient     Discharge Barriers Identified None

## 2022-08-25 NOTE — NURSING
Pt being discharged home in stable condition. IV removed, catheter intact. Tele monitor removed, given to US. Discharge instructions given to pt, pt verbalized understanding. Drain care taught to pt and wife, follow up appointment set, prescription delivered to pt bedside.

## 2022-08-25 NOTE — PLAN OF CARE
Problem: Adult Inpatient Plan of Care  Goal: Plan of Care Review  Outcome: Ongoing, Progressing  Flowsheets (Taken 8/24/2022 1935)  Plan of Care Reviewed With: patient     Problem: Adult Inpatient Plan of Care  Goal: Patient-Specific Goal (Individualized)  Outcome: Ongoing, Progressing     Problem: Adult Inpatient Plan of Care  Goal: Optimal Comfort and Wellbeing  Outcome: Ongoing, Progressing     Problem: Fall Injury Risk  Goal: Absence of Fall and Fall-Related Injury  Outcome: Ongoing, Progressing  Intervention: Promote Injury-Free Environment  Flowsheets (Taken 8/24/2022 1935)  Safety Promotion/Fall Prevention: assistive device/personal item within reach

## 2022-08-25 NOTE — PROGRESS NOTES
[facility]  Adult Nutrition  Progress Note    SUMMARY       Recommendations    Recommendation/Intervention:   1.  Monitor NPO status and encourage po Intake once diet advances.    2.  Collaboration with medical providers    Goals: Patient to consume and tolerate oral intake prior to discharge.    Nutrition Goal Status: new    Communication of RD Recs: other (comment) (POC)    Assessment and Plan        Nutrition Problem  Altered GI Function    Related to (etiology):   Condition associated with diagnoses     Signs and Symptoms (as evidenced by):   NPO status     Interventions(treatment strategy):  1.  Monitor NPO status and encourage po Intake once diet advances.    2.  Collaboration with medical providers    Nutrition Diagnosis Status:   New       Malnutrition Assessment                                       Reason for Assessment    Reason For Assessment: identified at risk by screening criteria    Diagnosis: gastrointestinal disease, other (see comments) (calculus of gallbladder)    Patient Active Problem List   Diagnosis    Atherosclerosis of native coronary artery of native heart with angina pectoris    Essential hypertension    Pure hypercholesterolemia    CHELO on CPAP    Nuclear sclerosis of both eyes    Bilateral dry eyes    Atherosclerosis of native artery of extremity with intermittent claudication    Calcification of aorta    DDD (degenerative disc disease), lumbar    Primary open angle glaucoma of both eyes, mild stage    Dry eyes, bilateral    Refractive error    Obesity (BMI 30.0-34.9)    Laxity of eyelid    Other fatigue    Ptosis of eyelid, right    Chronic kidney disease, stage 3a    Calculus of gallbladder without cholecystitis without obstruction     Past Medical History:   Diagnosis Date    ACE-inhibitor cough     Acute coronary syndrome     Angina pectoris     Cataract     Coronary artery disease     Degenerative disc disease, lumbar     Degenerative disc disease,  "lumbar     Glaucoma     Hyperlipidemia     cholesterol    Hypertension     Kidney stone     last in 2001    Myocardial infarction     Obesity     Peripheral vascular disease     Polyneuropathy     Sleep apnea     Tobacco dependence     resolved quit 1980    Trouble in sleeping      Interdisciplinary Rounds: did not attend    General Information Comments:   8/24/22:Patient npo at this time but is consuming small amounts of ice chips s/p procedure.  Labs reviewed.  NKFA.  LBM:8/23/2022.  Patient with reported weight loss.  See weight chart history below.  Patient with 15 # weight loss in 1 year.  RD to continue to monitor diet advancement, and malnutrition risks.(RD remote)  Wt Readings from Last 12 Encounters:   08/25/22 93.5 kg (206 lb 0.3 oz)   08/18/22 96.3 kg (212 lb 4.9 oz)   08/12/22 98.5 kg (217 lb 2.5 oz)   08/10/22 100.4 kg (221 lb 3.7 oz)   07/13/22 98.3 kg (216 lb 11.4 oz)   06/07/22 100.3 kg (221 lb 1.9 oz)   01/10/22 100.8 kg (222 lb 3.6 oz)   11/10/21 100.3 kg (221 lb 1.9 oz)   09/29/21 98.1 kg (216 lb 4.3 oz)   08/08/21 100.4 kg (221 lb 3.7 oz)   07/12/21 101 kg (222 lb 10.6 oz)   05/17/21 101.3 kg (223 lb 5.2 oz)       Nutrition Discharge Planning: Patient to consume and tolerate oral intake prior to discharge.    Nutrition Risk Screen    Nutrition Risk Screen: no indicators present    Nutrition/Diet History    Spiritual, Cultural Beliefs, Catholic Practices, Values that Affect Care: no  Food Allergies: NKFA  Factors Affecting Nutritional Intake: NPO    Anthropometrics    Temp: 99 °F (37.2 °C)  Height Method: Stated  Height: 5' 11" (180.3 cm)  Height (inches): 71 in  Weight Method: Standard Scale  Weight: 93.5 kg (206 lb 0.3 oz)  Weight (lb): 206.02 lb  Ideal Body Weight (IBW), Male: 172 lb  % Ideal Body Weight, Male (lb): 119.78 %  BMI (Calculated): 28.7  BMI Grade: 25 - 29.9 - overweight       Lab/Procedures/Meds    Pertinent Labs Reviewed: reviewed  Pertinent Medications Reviewed: " reviewed  BMP  Lab Results   Component Value Date     08/25/2022    K 4.9 08/25/2022     08/25/2022    CO2 23 08/25/2022    BUN 22 08/25/2022    CREATININE 1.2 08/25/2022    CALCIUM 9.1 08/25/2022    ANIONGAP 15 08/25/2022    ESTGFRAFRICA 54.5 (A) 07/13/2022    EGFRNONAA 47.1 (A) 07/13/2022     Lab Results   Component Value Date     08/25/2022    K 4.9 08/25/2022     08/25/2022    CO2 23 08/25/2022     Lab Results   Component Value Date    CREATININE 1.2 08/25/2022    BUN 22 08/25/2022     08/25/2022    K 4.9 08/25/2022     08/25/2022    CO2 23 08/25/2022     Lab Results   Component Value Date    CALCIUM 9.1 08/25/2022     Lab Results   Component Value Date    LABPROT 11.1 09/07/2012    ALBUMIN 4.6 08/10/2022     Scheduled Meds:   chlorhexidine  10 mL Mouth/Throat BID    hydroCHLOROthiazide  12.5 mg Oral Daily    latanoprost  1 drop Both Eyes QHS    losartan  25 mg Oral BID    metoprolol tartrate  25 mg Oral BID    ondansetron  4 mg Oral Q6H    white petrolatum-mineral oiL   Both Eyes QHS     Continuous Infusions:   lactated ringers 100 mL/hr at 08/25/22 1156     PRN Meds:.albuterol sulfate, dextrose 10%, dextrose 10%, diphenhydrAMINE, glucagon (human recombinant), glucose, glucose, HYDROcodone-acetaminophen, HYDROcodone-acetaminophen, HYDROmorphone, nitroGLYCERIN, sodium chloride 0.9%, sodium chloride 0.9%    Physical Findings/Assessment         Estimated/Assessed Needs    Weight Used For Calorie Calculations: 93.5 kg (206 lb 2.1 oz)  Energy Calorie Requirements (kcal): 20-25kcals/kg     Protein Requirements: 1.0-1.2g/kg  Weight Used For Protein Calculations: 93.5 kg (206 lb 2.1 oz)  Fluid Requirements (mL): 0321-4469  Estimated Fluid Requirement Method: RDA Method  RDA Method (mL): 20  CHO Requirement: 233-292    Wt Readings from Last 12 Encounters:   08/25/22 93.5 kg (206 lb 0.3 oz)   08/18/22 96.3 kg (212 lb 4.9 oz)   08/12/22 98.5 kg (217 lb 2.5 oz)   08/10/22 100.4  kg (221 lb 3.7 oz)   07/13/22 98.3 kg (216 lb 11.4 oz)   06/07/22 100.3 kg (221 lb 1.9 oz)   01/10/22 100.8 kg (222 lb 3.6 oz)   11/10/21 100.3 kg (221 lb 1.9 oz)   09/29/21 98.1 kg (216 lb 4.3 oz)   08/08/21 100.4 kg (221 lb 3.7 oz)   07/12/21 101 kg (222 lb 10.6 oz)   05/17/21 101.3 kg (223 lb 5.2 oz)       Nutrition Prescription Ordered    Current Diet Order: NPO    Evaluation of Received Nutrient/Fluid Intake    % Kcal Needs: npo  % Protein Needs: npo  Energy Calories Required: not meeting needs  Protein Required: not meeting needs  Fluid Required: not meeting needs  Tolerance: other (see comments)  % Intake of Estimated Energy Needs: Other: npo  % Meal Intake: NPO  Intake/Output - Last 3 Shifts       08/23 0700 08/24 0659 08/24 0700 08/25 0659 08/25 0700 08/26 0659    IV Piggyback  1300     Total Intake(mL/kg)  1300 (13.9)     Urine (mL/kg/hr)  350     Drains  70     Total Output  420     Net  +880                  Nutrition Risk    Level of Risk/Frequency of Follow-up: moderate     Monitor and Evaluation    Food and Nutrient Intake: energy intake, food and beverage intake  Food and Nutrient Adminstration: diet order  Knowledge/Beliefs/Attitudes: food and nutrition knowledge/skill, beliefs and attitudes  Physical Activity and Function: nutrition-related ADLs and IADLs, factors affecting access to physical activity  Anthropometric Measurements: height/length, weight, weight change, body mass index  Biochemical Data, Medical Tests and Procedures: electrolyte and renal panel, lipid profile, gastrointestinal profile, glucose/endocrine profile, inflammatory profile     Nutrition Follow-Up    RD Follow-up?: Yes

## 2022-08-25 NOTE — ASSESSMENT & PLAN NOTE
Patient is doing well on postop day 1 from a robotic cholecystectomy with repair of a duodenal perforation.  The AMY is serous.      Upper GI study today.  The GI study shows no leak then clear liquids.  If the patient tolerates clear liquids will be discharged home.

## 2022-08-25 NOTE — PLAN OF CARE
Nutrition Plan of Care       Recommendation/Intervention:   1.  Monitor NPO status and encourage po Intake once diet advances.    2.  Collaboration with medical providers     Goals: Patient to consume and tolerate oral intake prior to discharge.     Nutrition Goal Status: new     Communication of RD Recs: other (comment) (POC)    Alise Bonilla MS, RDN, LDN

## 2022-08-25 NOTE — DISCHARGE INSTRUCTIONS
Please stay on a clear liquid diet until Sunday.  You can resume a regular diet on Sunday      Please call for any fever, increase in pain, nausea or vomiting or redness or drainage from incision(s).    No lifting more than 30 pounds for 2 weeks    May shower once dressings are removed  R removed the glue when it becomes loose.      Empty the drain on a daily basis or twice a day if need    If you become constipated from the pain medication you can use over the counter laxatives,  Miralax or Glycolax, or Magnesium Citrate for severe constipation.

## 2022-08-25 NOTE — SUBJECTIVE & OBJECTIVE
Interval History:  Right-sided incisional pain.  Afebrile.  AMY serosanguineous.  Upper GI study today.    Medications:  Continuous Infusions:   lactated ringers 100 mL/hr at 08/24/22 1702     Scheduled Meds:   chlorhexidine  10 mL Mouth/Throat BID    hydroCHLOROthiazide  12.5 mg Oral Daily    latanoprost  1 drop Both Eyes QHS    losartan  25 mg Oral BID    metoprolol tartrate  25 mg Oral BID    ondansetron  4 mg Oral Q6H    white petrolatum-mineral oiL   Both Eyes QHS     PRN Meds:albuterol sulfate, dextrose 10%, dextrose 10%, diphenhydrAMINE, glucagon (human recombinant), glucose, glucose, HYDROcodone-acetaminophen, HYDROcodone-acetaminophen, HYDROmorphone, nitroGLYCERIN, sodium chloride 0.9%, sodium chloride 0.9%     Review of patient's allergies indicates:   Allergen Reactions    Codeine Swelling and Other (See Comments)     unknown    Niacin preparations Other (See Comments)     myalgia    Statins-hmg-coa reductase inhibitors      Muscle weakness    Fosinopril Other (See Comments)     cough    Pravastatin Other (See Comments)      Muscle pain    Simvastatin Other (See Comments)     Muscle pain    Sulfa (sulfonamide antibiotics) Rash and Hives     Objective:     Vital Signs (Most Recent):  Temp: 97.7 °F (36.5 °C) (08/25/22 0744)  Pulse: 66 (08/25/22 0744)  Resp: 16 (08/25/22 0744)  BP: (!) 142/63 (08/25/22 0744)  SpO2: 97 % (08/25/22 0744)   Vital Signs (24h Range):  Temp:  [96.4 °F (35.8 °C)-99.4 °F (37.4 °C)] 97.7 °F (36.5 °C)  Pulse:  [66-85] 66  Resp:  [12-35] 16  SpO2:  [93 %-100 %] 97 %  BP: (131-173)/(60-79) 142/63     Weight: 93.5 kg (206 lb 0.3 oz)  Body mass index is 28.73 kg/m².    Intake/Output - Last 3 Shifts         08/23 0700 08/24 0659 08/24 0700 08/25 0659 08/25 0700 08/26 0659    IV Piggyback  1300     Total Intake(mL/kg)  1300 (13.9)     Urine (mL/kg/hr)  350     Drains  70     Total Output  420     Net  +880                    Physical Exam  Vitals and nursing note reviewed.    Constitutional:       General: He is not in acute distress.     Appearance: He is well-developed.   HENT:      Head: Normocephalic and atraumatic.   Eyes:      General: No scleral icterus.     Pupils: Pupils are equal, round, and reactive to light.   Neck:      Thyroid: No thyromegaly.      Vascular: No JVD.      Trachea: No tracheal deviation.   Cardiovascular:      Rate and Rhythm: Normal rate and regular rhythm.      Heart sounds: Normal heart sounds.   Pulmonary:      Effort: Pulmonary effort is normal.      Breath sounds: Normal breath sounds.   Abdominal:      General: Abdomen is flat. Bowel sounds are normal. There is no distension.      Palpations: Abdomen is soft. There is no mass.      Tenderness: There is no abdominal tenderness. There is no guarding or rebound.      Comments: Incisions are clean.  AMY is serosanguineous   Musculoskeletal:         General: Normal range of motion.      Cervical back: Normal range of motion and neck supple.   Lymphadenopathy:      Cervical: No cervical adenopathy.   Skin:     General: Skin is warm and dry.      Capillary Refill: Capillary refill takes less than 2 seconds.   Neurological:      Mental Status: He is alert and oriented to person, place, and time.   Psychiatric:         Behavior: Behavior normal.         Thought Content: Thought content normal.         Judgment: Judgment normal.       Significant Labs:  I have reviewed all pertinent lab results within the past 24 hours.  CBC: No results for input(s): WBC, RBC, HGB, HCT, PLT, MCV, MCH, MCHC in the last 168 hours.  CMP:   Recent Labs   Lab 08/25/22  0546   GLU 95   CALCIUM 9.1      K 4.9   CO2 23      BUN 22   CREATININE 1.2       Significant Diagnostics:  I have reviewed all pertinent imaging results/findings within the past 24 hours.  Upper GI study

## 2022-08-26 NOTE — PLAN OF CARE
[facility]  Discharge Final Note    Primary Care Provider: Dayne Steward MD    Expected Discharge Date: 8/25/2022    Final Discharge Note (most recent)     Final Note - 08/26/22 0758        Final Note    Assessment Type Final Discharge Note     Anticipated Discharge Disposition Home or Self Care     Hospital Resources/Appts/Education Provided Provided patient/caregiver with written discharge plan information;Appointments scheduled and added to AVS        Post-Acute Status    Discharge Delays None known at this time                 HOSPITAL F/U APPT ADDED TO AVS.     Important Message from Medicare             Contact Info     Meet Alatorre MD   Specialty: General Surgery    [address]  [facility] [place] [place] [address]   Phone: [phone]       Next Steps: Follow up in 1 week(s)    Instructions: Drain removal, post op appt

## 2022-08-29 ENCOUNTER — OFFICE VISIT (OUTPATIENT)
Dept: SURGERY | Facility: CLINIC | Age: 82
End: 2022-08-29
Payer: MEDICARE

## 2022-08-29 VITALS
SYSTOLIC BLOOD PRESSURE: 133 MMHG | WEIGHT: 206 LBS | DIASTOLIC BLOOD PRESSURE: 67 MMHG | HEART RATE: 61 BPM | HEIGHT: 71 IN | TEMPERATURE: 98 F | BODY MASS INDEX: 28.84 KG/M2

## 2022-08-29 DIAGNOSIS — K42.9 UMBILICAL HERNIA WITHOUT OBSTRUCTION AND WITHOUT GANGRENE: ICD-10-CM

## 2022-08-29 DIAGNOSIS — K80.20 CALCULUS OF GALLBLADDER WITHOUT CHOLECYSTITIS WITHOUT OBSTRUCTION: Primary | ICD-10-CM

## 2022-08-29 LAB
FINAL PATHOLOGIC DIAGNOSIS: NORMAL
GROSS: NORMAL
Lab: NORMAL

## 2022-08-29 PROCEDURE — 1159F PR MEDICATION LIST DOCUMENTED IN MEDICAL RECORD: ICD-10-PCS | Mod: CPTII,S$GLB,, | Performed by: SURGERY

## 2022-08-29 PROCEDURE — 1126F PR PAIN SEVERITY QUANTIFIED, NO PAIN PRESENT: ICD-10-PCS | Mod: CPTII,S$GLB,, | Performed by: SURGERY

## 2022-08-29 PROCEDURE — 99024 POSTOP FOLLOW-UP VISIT: CPT | Mod: S$GLB,,, | Performed by: SURGERY

## 2022-08-29 PROCEDURE — 3078F DIAST BP <80 MM HG: CPT | Mod: CPTII,S$GLB,, | Performed by: SURGERY

## 2022-08-29 PROCEDURE — 99999 PR PBB SHADOW E&M-EST. PATIENT-LVL IV: ICD-10-PCS | Mod: PBBFAC,,, | Performed by: SURGERY

## 2022-08-29 PROCEDURE — 99999 PR PBB SHADOW E&M-EST. PATIENT-LVL IV: CPT | Mod: PBBFAC,,, | Performed by: SURGERY

## 2022-08-29 PROCEDURE — 3075F PR MOST RECENT SYSTOLIC BLOOD PRESS GE 130-139MM HG: ICD-10-PCS | Mod: CPTII,S$GLB,, | Performed by: SURGERY

## 2022-08-29 PROCEDURE — 3075F SYST BP GE 130 - 139MM HG: CPT | Mod: CPTII,S$GLB,, | Performed by: SURGERY

## 2022-08-29 PROCEDURE — 1126F AMNT PAIN NOTED NONE PRSNT: CPT | Mod: CPTII,S$GLB,, | Performed by: SURGERY

## 2022-08-29 PROCEDURE — 99024 PR POST-OP FOLLOW-UP VISIT: ICD-10-PCS | Mod: S$GLB,,, | Performed by: SURGERY

## 2022-08-29 PROCEDURE — 1159F MED LIST DOCD IN RCRD: CPT | Mod: CPTII,S$GLB,, | Performed by: SURGERY

## 2022-08-29 PROCEDURE — 3078F PR MOST RECENT DIASTOLIC BLOOD PRESSURE < 80 MM HG: ICD-10-PCS | Mod: CPTII,S$GLB,, | Performed by: SURGERY

## 2022-08-29 NOTE — PATIENT INSTRUCTIONS
It is okay to shower and get the all the areas wet.      Place a Band-Aid over the drain site daily for 10 days.      We will see you back in several weeks

## 2022-08-29 NOTE — PROGRESS NOTES
Subjective:       Patient ID: Wilberto Hayes Jr. is a 82 y.o. male.    Post robotic cholecystectomy and duodenal repair    Chief Complaint: Post-op Evaluation (Drain removal)    Patient underwent a robotic cholecystectomy.  A time-out cholecystectomy a opening into the duodenum was repaired in 2 layers.  A drain was left.  He underwent an upper GI study that showed no leak.  He presents now for drain removal.  Complains of some incisional pain.  He has chronic back pain.  The drain is been minimal output and it is reddish yellow in color  Review of Systems   Gastrointestinal:         Incisional pain    Minimal drain output     Objective:      Physical Exam  Cardiovascular:      Rate and Rhythm: Normal rate and regular rhythm.   Pulmonary:      Effort: Pulmonary effort is normal.      Breath sounds: Normal breath sounds.   Abdominal:      General: Abdomen is flat. Bowel sounds are normal.      Palpations: Abdomen is soft.      Comments: All incisions are clean.  The drain has minimal output is serosanguineous.  The drain was removed.  There is evolving ecchymosis around the incision   Neurological:      Mental Status: He is alert.         Pathology is pending  Assessment:     Recovering as expected after robotic cholecystectomy and repair of a duodenal opening.  Drain was removed.    Plan:          May resume driving   Okay to shower and get the areas wet   Band-Aid to drain site for 10 days   Follow-up in 3-4 weeks

## 2022-08-30 ENCOUNTER — PES CALL (OUTPATIENT)
Dept: ADMINISTRATIVE | Facility: CLINIC | Age: 82
End: 2022-08-30
Payer: MEDICARE

## 2022-09-22 ENCOUNTER — OFFICE VISIT (OUTPATIENT)
Dept: PULMONOLOGY | Facility: CLINIC | Age: 82
End: 2022-09-22
Payer: MEDICARE

## 2022-09-22 VITALS
BODY MASS INDEX: 29.29 KG/M2 | HEIGHT: 71 IN | WEIGHT: 209.19 LBS | OXYGEN SATURATION: 98 % | SYSTOLIC BLOOD PRESSURE: 140 MMHG | DIASTOLIC BLOOD PRESSURE: 62 MMHG | HEART RATE: 53 BPM

## 2022-09-22 DIAGNOSIS — E66.9 OBESITY (BMI 30.0-34.9): Chronic | ICD-10-CM

## 2022-09-22 DIAGNOSIS — I25.119 ATHEROSCLEROSIS OF NATIVE CORONARY ARTERY OF NATIVE HEART WITH ANGINA PECTORIS: ICD-10-CM

## 2022-09-22 DIAGNOSIS — R91.1 PULMONARY NODULE: ICD-10-CM

## 2022-09-22 DIAGNOSIS — G47.33 OSA ON CPAP: Primary | ICD-10-CM

## 2022-09-22 DIAGNOSIS — I10 ESSENTIAL HYPERTENSION: ICD-10-CM

## 2022-09-22 PROCEDURE — 99999 PR PBB SHADOW E&M-EST. PATIENT-LVL IV: ICD-10-PCS | Mod: PBBFAC,,, | Performed by: NURSE PRACTITIONER

## 2022-09-22 PROCEDURE — 1160F RVW MEDS BY RX/DR IN RCRD: CPT | Mod: CPTII,S$GLB,, | Performed by: NURSE PRACTITIONER

## 2022-09-22 PROCEDURE — 99214 OFFICE O/P EST MOD 30 MIN: CPT | Mod: S$GLB,,, | Performed by: NURSE PRACTITIONER

## 2022-09-22 PROCEDURE — 1101F PT FALLS ASSESS-DOCD LE1/YR: CPT | Mod: CPTII,S$GLB,, | Performed by: NURSE PRACTITIONER

## 2022-09-22 PROCEDURE — 99499 UNLISTED E&M SERVICE: CPT | Mod: S$GLB,,, | Performed by: NURSE PRACTITIONER

## 2022-09-22 PROCEDURE — 1159F PR MEDICATION LIST DOCUMENTED IN MEDICAL RECORD: ICD-10-PCS | Mod: CPTII,S$GLB,, | Performed by: NURSE PRACTITIONER

## 2022-09-22 PROCEDURE — 99999 PR PBB SHADOW E&M-EST. PATIENT-LVL IV: CPT | Mod: PBBFAC,,, | Performed by: NURSE PRACTITIONER

## 2022-09-22 PROCEDURE — 3288F PR FALLS RISK ASSESSMENT DOCUMENTED: ICD-10-PCS | Mod: CPTII,S$GLB,, | Performed by: NURSE PRACTITIONER

## 2022-09-22 PROCEDURE — 99499 RISK ADDL DX/OHS AUDIT: ICD-10-PCS | Mod: S$GLB,,, | Performed by: NURSE PRACTITIONER

## 2022-09-22 PROCEDURE — 1159F MED LIST DOCD IN RCRD: CPT | Mod: CPTII,S$GLB,, | Performed by: NURSE PRACTITIONER

## 2022-09-22 PROCEDURE — 3078F DIAST BP <80 MM HG: CPT | Mod: CPTII,S$GLB,, | Performed by: NURSE PRACTITIONER

## 2022-09-22 PROCEDURE — 3288F FALL RISK ASSESSMENT DOCD: CPT | Mod: CPTII,S$GLB,, | Performed by: NURSE PRACTITIONER

## 2022-09-22 PROCEDURE — 1160F PR REVIEW ALL MEDS BY PRESCRIBER/CLIN PHARMACIST DOCUMENTED: ICD-10-PCS | Mod: CPTII,S$GLB,, | Performed by: NURSE PRACTITIONER

## 2022-09-22 PROCEDURE — 1101F PR PT FALLS ASSESS DOC 0-1 FALLS W/OUT INJ PAST YR: ICD-10-PCS | Mod: CPTII,S$GLB,, | Performed by: NURSE PRACTITIONER

## 2022-09-22 PROCEDURE — 3077F PR MOST RECENT SYSTOLIC BLOOD PRESSURE >= 140 MM HG: ICD-10-PCS | Mod: CPTII,S$GLB,, | Performed by: NURSE PRACTITIONER

## 2022-09-22 PROCEDURE — 3078F PR MOST RECENT DIASTOLIC BLOOD PRESSURE < 80 MM HG: ICD-10-PCS | Mod: CPTII,S$GLB,, | Performed by: NURSE PRACTITIONER

## 2022-09-22 PROCEDURE — 99214 PR OFFICE/OUTPT VISIT, EST, LEVL IV, 30-39 MIN: ICD-10-PCS | Mod: S$GLB,,, | Performed by: NURSE PRACTITIONER

## 2022-09-22 PROCEDURE — 3077F SYST BP >= 140 MM HG: CPT | Mod: CPTII,S$GLB,, | Performed by: NURSE PRACTITIONER

## 2022-09-22 NOTE — PROGRESS NOTES
Subjective:      Patient ID: Wilberto Hayes Jr. is a 82 y.o. male.    Chief Complaint: Apnea    HPI: Wilberto Hayes Jr. is here for follow up for CHELO with yearly CPAP complaince assessment.   He is on CPAP of 8 cmH2O pressure which is optimally controlling sleep apnea with apneic index (AHI) 1.7 events an hour.   He is compliant with CPAP use. Complaince download today reveals 96.7% of days with greater than 4 hours of device use.    Patient reports benefit from CPAP use.  Patient reports no complaints. Nasal pillow mask is tolerated and preferred. He does not like to new design of head gear with soft head gear, finds gives too much. But he wants to stay with nasal pillows mask.      Obtained replacement CPAP machine from [employer], has Active Circle station 2.   Never obtained replacement with [facility], decided on jeanne replacement.     Compliance Summary  8/16/2022 - 9/14/2022 (30 days)  Days with Device Usage 29 days  Days without Device Usage 1 day  Percent Days with Device Usage 96.7%  Cumulative Usage 10 days 20 hrs. 28 mins. 33 secs.  Maximum Usage (1 Day) 11 hrs. 39 mins. 49 secs.  Average Usage (All Days) 8 hrs. 40 mins. 57 secs.  Average Usage (Days Used) 8 hrs. 58 mins. 54 secs.  Minimum Usage (1 Day) 7 hrs. 40 mins. 24 secs.  Percent of Days with Usage >= 4 Hours 96.7%  Percent of Days with Usage < 4 Hours 3.3%  Date Range  Total Blower Time 10 days 20 hrs. 28 mins. 44 secs.  Average AHI 1.7  Auto-CPAP Summary  Auto-CPAP Mean Pressure 6.5 cmH2O  Auto-CPAP Peak Average Pressure 11.5 cmH2O  Device Pressure <= 90% of Time 9.0 cmH2O  Average Time in Large Leak Per Day 0 secs.    9 mm pul nodule seen on 8/10/2022 CTA. Follow up CT chest in 6 months, March 2023. Low risk 10 pack year former smoker. Quit 1980.  There is no cough, no wheezing, no shortness of breath, no hemoptysis, no sputum production, no weight loss, no chest pain.     Previous Report Reviewed: lab reports and office notes     Past  Medical History: The following portions of the patient's history were reviewed and updated as appropriate:   He  has a past surgical history that includes Coronary artery bypass graft (2002); Argon Trabeculoplasty - OU - Both Eyes (2009); Eye surgery; Vasectomy (1975); lthotripsy (1985); cystoscopy and stone removal via scope (2003); Abscess drainage (Left, 2003); Tonsillectomy; Extraction of tooth (07/2021); Robot-assisted cholecystectomy using da Sean Xi (N/A, 8/24/2022); and Umbilical hernia repair (N/A, 8/24/2022).  His family history includes Alcohol abuse in his father, maternal grandfather, mother, and paternal grandfather; Blindness in his paternal grandfather; COPD in his father and mother; Cancer in his father, mother, and paternal grandmother; Glaucoma in his paternal grandfather; Hypertension in his father and mother; Kidney disease in his maternal grandfather, maternal grandmother, paternal grandfather, and paternal grandmother.  He  reports that he quit smoking about 42 years ago. He has a 10.00 pack-year smoking history. He has never used smokeless tobacco. He reports current alcohol use of about 7.0 standard drinks per week. He reports that he does not use drugs.  He has a current medication list which includes the following prescription(s): aspirin, co-enzyme q-10, cyanocobalamin, fluticasone propionate, hydrochlorothiazide, latanoprost, losartan, lubricant (p-glycol-glycerin), multivit-min/ferrous fumarate, nebivolol, nitroglycerin, ocean nasal, omega-3 fatty acids-vitamin e, ondansetron, promethazine-dextromethorphan, saw palmetto, zinc gluconate, hydrocodone-acetaminophen, and hydrocodone-acetaminophen.  He is allergic to codeine, niacin preparations, statins-hmg-coa reductase inhibitors, fosinopril, pravastatin, simvastatin, and sulfa (sulfonamide antibiotics).    The following portions of the patient's history were reviewed and updated as appropriate: allergies, current medications, past  "family history, past medical history, past social history, past surgical history and problem list.    Review of Systems   Constitutional:  Negative for fever, chills, weight loss, weight gain, activity change, appetite change, fatigue and night sweats.   HENT:  Negative for postnasal drip, rhinorrhea, sinus pressure, voice change and congestion.    Eyes:  Negative for redness and itching.   Respiratory:  Negative for snoring, cough, sputum production, chest tightness, shortness of breath, wheezing, orthopnea, asthma nighttime symptoms, dyspnea on extertion, use of rescue inhaler and somnolence.    Cardiovascular: Negative.  Negative for chest pain, palpitations and leg swelling.   Genitourinary:  Negative for difficulty urinating and hematuria.   Endocrine:  Negative for cold intolerance and heat intolerance.    Musculoskeletal:  Negative for arthralgias, gait problem, joint swelling and myalgias.   Skin: Negative.    Gastrointestinal:  Negative for nausea, vomiting, abdominal pain and acid reflux.   Neurological:  Negative for dizziness, weakness, light-headedness and headaches.   Hematological:  Negative for adenopathy. No excessive bruising.   All other systems reviewed and are negative.   Objective:   BP (!) 140/62   Pulse (!) 53   Ht 5' 11" (1.803 m)   Wt 94.9 kg (209 lb 3.5 oz)   SpO2 98%   BMI 29.18 kg/m²   Physical Exam  Vitals reviewed.   Constitutional:       General: He is not in acute distress.     Appearance: He is well-developed. He is not ill-appearing or toxic-appearing.   HENT:      Head: Normocephalic and atraumatic.      Right Ear: External ear normal.      Left Ear: External ear normal.      Nose: Nose normal.      Mouth/Throat:      Pharynx: No oropharyngeal exudate.   Eyes:      Conjunctiva/sclera: Conjunctivae normal.   Cardiovascular:      Rate and Rhythm: Normal rate and regular rhythm.      Heart sounds: Normal heart sounds.   Pulmonary:      Effort: Pulmonary effort is normal.      " Breath sounds: Normal breath sounds.   Abdominal:      Palpations: Abdomen is soft.   Musculoskeletal:      Cervical back: Normal range of motion and neck supple.   Skin:     General: Skin is warm and dry.   Neurological:      Mental Status: He is alert and oriented to person, place, and time.   Psychiatric:         Behavior: Behavior normal. Behavior is cooperative.         Thought Content: Thought content normal.         Judgment: Judgment normal.     Personal Diagnostic Review  CPAP download    Narrative & Impression  EXAMINATION:  CTA CHEST ABDOMEN NON CORONARY (XPD), multiplanar reconstructions     CLINICAL HISTORY:  Aortic aneurysm (AAA) suspected;     TECHNIQUE:  Axial images through the chest and abdomen were obtained with the use of IV contrast.  Sagittal and coronal reconstructions are provided for review.  Oral contrast was not utilized.  Delayed postcontrast images were obtained.     COMPARISON:  Chest x-ray performed earlier the same day.  Abdomen and pelvis CT scan from January 29, 2020     FINDINGS:  VASCULATURE: The thoracic and abdominal aorta are intact without evidence for aneurysmal change or dissection.  Negative for significant stenosis.  The celiac axis, SMA, single renal arteries and DAY are patent.     CHEST: There are numerous peripheral nodules throughout the lungs measuring 2 mm in size or less.  There is a 9 mm perifissural nodule along the minor fissure on image 297 of series 3.  Inferior middle lobe and lingular scarring or atelectasis.  Lungs are otherwise clear.  Negative for pleural or pericardial effusions.  Negative for adenopathy.  Coronary artery calcifications.  Median sternotomy wires and CABG changes.     The airways are patent.  The thyroid gland is normal.  The esophagus is normal.  Small hiatal hernia.     ABDOMEN: 1.4 cm superior hepatic cyst may have increased in size by a mm in the interim.  Calcified granulomas throughout the liver and the spleen again seen.  Numerous  gallstones measuring up to 2 cm again seen.  There appear to be some inflammatory changes surrounding the gallbladder and there is a prominent but not abnormal by size criteria lymph node adjacent to the gallbladder.  The liver and spleen otherwise appear normal.  The pancreas is unremarkable.  Kidneys and adrenal glands are normal.  On the precontrast images, there are no renal stones.  On the delayed postcontrast images, there is symmetric excretion of contrast into both normal caliber renal collecting systems.     Negative for adenopathy, ascites or other inflammatory change noted within the abdomen or pelvis.     There is fluid within the otherwise normal caliber normal appearing colon.  There are fluid in distal loops of small bowel.  There are scattered colonic diverticula seen.  I do not see a normal or abnormal appendix.  The rest of the visualized portions of the bowel appear normal.     The very superior portions of the urinary bladder are unremarkable.     No significant osseous abnormality is identified.  Multilevel marginal spondylosis and degenerative facet arthropathy of the lower thoracic and lumbar spine again seen.  Stable Schmorl node formation involving the inferior endplate of L1.  Moderate degenerative changes of the mid thoracic spine with early DISH changes noted.  Convex-right curvature of the thoracic spine.     Negative for groin adenopathy.     Tiny fat filled umbilical hernia.  Abdominal wall is otherwise intact.     Impression:     1.  There are inflammatory changes surrounding the gallbladder, which is nearly completely full of gallstones.  Could the patient's symptoms be related to cholecystitis?     2.  There is fluid throughout nondilated loops of large and small bowel, nonspecific finding.  Gastroenteritis or other diarrheal disease is could cause this appearance.     3.  Negative for acute process otherwise.  Negative for focal infiltrate, effusion or pneumothorax.  Negative for  renal stone disease or hydronephrosis.  No other inflammatory changes are seen.  Negative for free air.     4.  There is a 9 mm minor fissure perifissural nodule.  Numerous tiny peripheral bilateral upper lobe nodules measuring maximum of 2 mm in size noted.  For multiple sub solid nodules with any ?6 mm, Fleischner Society 2017 guidelines recommend short term follow up non-contrast chest CT in 3-6 months, as these may be secondary to infectious etiology. If these findings persist at that time, subsequent management should be based on the most suspicious nodule.     5.  Median sternotomy wires with CABG changes.  Coronary artery calcifications.  Hiatal hernia.  1.4 cm superior right hepatic lobe cyst.  Evidence for old granulomatous disease.  Scattered colonic diverticula.  Degenerative changes of the spine as detailed above.  Tiny fat filled umbilical hernia.  Other nonemergent findings as described above.     All CT scans at this facility are performed  using dose modulation techniques as appropriate to performed exam including the following:  automated exposure control; adjustment of mA and/or kV according to the patients size (this includes techniques or standardized protocols for targeted exams where dose is matched to indication/reason for exam: i.e. extremities or head);  iterative reconstruction technique.        Electronically signed by: Ramone Sesay MD  Date:                                            08/10/2022  Time:                                           13:23    FL Upper GI Water Soluable  Narrative: EXAMINATION:  FL UPPER GI WATER SOLUABLE    CLINICAL HISTORY:  Patient was noted to have a opening in the duodenum at the time a cholecystectomy.  This was repaired in 2 layers.  Evaluate for leak;    TECHNIQUE:  Contrast material: Barium sulfate suspension    Fluoroscopy time: 1.2 minutes 53 fluoroscopic images    COMPARISON:  None    FINDINGS:  Preliminary radiograph: Unremarkable    Swallowing:  Normal.    Esophagus: Significant tertiary contractions throughout the esophagus consistent with dysmotility.    Gastroesophageal reflux: None observed.    Stomach: Rugal fold thickening within the stomach concerning for gastritis.    Duodenum: Normal.  No extravasation or perforation within the duodenum.  No stricture.    Other findings: None.  Impression: See findings above.    Electronically signed by: Link Hardin MD  Date:    08/25/2022  Time:    15:18      Assessment:     1. CHELO on CPAP    2. Pulmonary nodule    3. Essential hypertension    4. Atherosclerosis of native coronary artery of native heart with angina pectoris    5. Obesity (BMI 30.0-34.9)        Orders Placed This Encounter   Procedures    CPAP/BIPAP SUPPLIES     Benefits and compliant  90 day supply. 4 refills  HME: [facility]     Order Specific Question:   Length of need (1-99 months):     Answer:   99     Order Specific Question:   Choose ONE mask type and its corresponding cushions and/or pillows:     Answer:    Nasal Mask, 1 per 90 days:  Nasal Cushions, (6 per 90 days):  Nasal Pillows, (6 per 90 days)     Comments:   nasal pillows mask     Order Specific Question:   Choose EITHER Heated or Non-Heated Tubjing     Answer:    Non-Heated Tubing, 1 per 90 days     Order Specific Question:   Number of Days Needed:     Answer:   99     Order Specific Question:   All other supplies as needed as listed below:     Answer:    Headgear, 1 per 180 days     Order Specific Question:   All other supplies as needed as listed below:     Answer:    Chin Strap, 1 per 180 days     Order Specific Question:   All other supplies as needed as listed below:     Answer:    Disposable Filter, 6 per 90 days     Order Specific Question:   All other supplies as needed as listed below:     Answer:    Humidifier Chamber, 1 per 180 days     Order Specific Question:   All other supplies as needed as listed below:     Answer:     Non-Disposable Filter, 1 per 180 days    CT Chest Without Contrast     Standing Status:   Future     Standing Expiration Date:   9/22/2023     Order Specific Question:   May the Radiologist modify the order per protocol to meet the clinical needs of the patient?     Answer:   Yes       Plan:     Problem List Items Addressed This Visit       CHELO on CPAP - Primary (Chronic)     1/13/2009 Sleep study Moderate CHELO AHI: 22.8.  On CPAP 8 cm with AHI 1.7  Nasal pillows mask  HME: [facility]   Continue CPAP 8 cm  Updated supply order  Follow up annually           Relevant Orders    CPAP/BIPAP SUPPLIES    Obesity (BMI 30.0-34.9) (Chronic)     Encouraged calorie reduction and 30 minutes of exercise daily. Discussed impact of obesity on general health.  Wt Readings from Last 9 Encounters:   09/22/22 94.9 kg (209 lb 3.5 oz)   08/29/22 93.4 kg (206 lb)   08/25/22 93.5 kg (206 lb 0.3 oz)   08/18/22 96.3 kg (212 lb 4.9 oz)   08/12/22 98.5 kg (217 lb 2.5 oz)   08/10/22 100.4 kg (221 lb 3.7 oz)   07/13/22 98.3 kg (216 lb 11.4 oz)   06/07/22 100.3 kg (221 lb 1.9 oz)   01/10/22 100.8 kg (222 lb 3.6 oz)   Body mass index is 29.18 kg/m².             Pulmonary nodule     8/10/2022 CTA There is a 9 mm minor fissure perifissural nodule.  Numerous tiny peripheral bilateral upper lobe nodules measuring maximum of 2 mm in size noted.  For multiple sub solid nodules with any ?6 mm, Fleischner Society 2017 guidelines recommend short term follow up non-contrast chest CT in 3-6 months, as these may be secondary to infectious etiology. If these findings persist at that time, subsequent management should be based on the most suspicious nodule.  Follow up 6 months CT chest, March 2023.   Low risk former 10 pack year smoker. Quit 1980.          Relevant Orders    CT Chest Without Contrast    Essential hypertension     Stable and controlled. Continue current treatment plan as previously prescribed with your PCP.              Atherosclerosis of native  coronary artery of native heart with angina pectoris     Seen imaging, follow heart healthy diet, managed by cardiology             Follow up in about 6 months (around 3/22/2023) for Pulmonary nodule w/review CT chest.

## 2022-09-22 NOTE — ASSESSMENT & PLAN NOTE
1/13/2009 Sleep study Moderate CHELO AHI: 22.8.  On CPAP 8 cm with AHI 1.7  Nasal pillows mask  HME: [facility]   Continue CPAP 8 cm  Updated supply order  Follow up annually

## 2022-09-23 PROBLEM — R91.1 PULMONARY NODULE: Status: ACTIVE | Noted: 2022-09-23

## 2022-09-23 NOTE — ASSESSMENT & PLAN NOTE
Encouraged calorie reduction and 30 minutes of exercise daily. Discussed impact of obesity on general health.  Wt Readings from Last 9 Encounters:   09/22/22 94.9 kg (209 lb 3.5 oz)   08/29/22 93.4 kg (206 lb)   08/25/22 93.5 kg (206 lb 0.3 oz)   08/18/22 96.3 kg (212 lb 4.9 oz)   08/12/22 98.5 kg (217 lb 2.5 oz)   08/10/22 100.4 kg (221 lb 3.7 oz)   07/13/22 98.3 kg (216 lb 11.4 oz)   06/07/22 100.3 kg (221 lb 1.9 oz)   01/10/22 100.8 kg (222 lb 3.6 oz)   Body mass index is 29.18 kg/m².

## 2022-09-23 NOTE — ASSESSMENT & PLAN NOTE
8/10/2022 CTA There is a 9 mm minor fissure perifissural nodule.  Numerous tiny peripheral bilateral upper lobe nodules measuring maximum of 2 mm in size noted.  For multiple sub solid nodules with any ?6 mm, Fleischner Society 2017 guidelines recommend short term follow up non-contrast chest CT in 3-6 months, as these may be secondary to infectious etiology. If these findings persist at that time, subsequent management should be based on the most suspicious nodule.  Follow up 6 months CT chest, March 2023.   Low risk former 10 pack year smoker. Quit 1980.

## 2022-09-25 ENCOUNTER — PATIENT MESSAGE (OUTPATIENT)
Dept: SURGERY | Facility: CLINIC | Age: 82
End: 2022-09-25
Payer: MEDICARE

## 2022-10-05 ENCOUNTER — TELEPHONE (OUTPATIENT)
Dept: ADMINISTRATIVE | Facility: HOSPITAL | Age: 82
End: 2022-10-05
Payer: MEDICARE

## 2022-10-10 ENCOUNTER — OFFICE VISIT (OUTPATIENT)
Dept: SURGERY | Facility: CLINIC | Age: 82
End: 2022-10-10
Payer: MEDICARE

## 2022-10-10 ENCOUNTER — TELEPHONE (OUTPATIENT)
Dept: ADMINISTRATIVE | Facility: HOSPITAL | Age: 82
End: 2022-10-10
Payer: MEDICARE

## 2022-10-10 VITALS
BODY MASS INDEX: 29.61 KG/M2 | HEART RATE: 52 BPM | WEIGHT: 212.31 LBS | DIASTOLIC BLOOD PRESSURE: 70 MMHG | SYSTOLIC BLOOD PRESSURE: 152 MMHG | TEMPERATURE: 99 F

## 2022-10-10 DIAGNOSIS — K80.20 CALCULUS OF GALLBLADDER WITHOUT CHOLECYSTITIS WITHOUT OBSTRUCTION: Primary | ICD-10-CM

## 2022-10-10 PROCEDURE — 1126F PR PAIN SEVERITY QUANTIFIED, NO PAIN PRESENT: ICD-10-PCS | Mod: CPTII,S$GLB,, | Performed by: SURGERY

## 2022-10-10 PROCEDURE — 1159F PR MEDICATION LIST DOCUMENTED IN MEDICAL RECORD: ICD-10-PCS | Mod: CPTII,S$GLB,, | Performed by: SURGERY

## 2022-10-10 PROCEDURE — 1159F MED LIST DOCD IN RCRD: CPT | Mod: CPTII,S$GLB,, | Performed by: SURGERY

## 2022-10-10 PROCEDURE — 3078F DIAST BP <80 MM HG: CPT | Mod: CPTII,S$GLB,, | Performed by: SURGERY

## 2022-10-10 PROCEDURE — 1126F AMNT PAIN NOTED NONE PRSNT: CPT | Mod: CPTII,S$GLB,, | Performed by: SURGERY

## 2022-10-10 PROCEDURE — 99024 POSTOP FOLLOW-UP VISIT: CPT | Mod: S$GLB,,, | Performed by: SURGERY

## 2022-10-10 PROCEDURE — 3077F PR MOST RECENT SYSTOLIC BLOOD PRESSURE >= 140 MM HG: ICD-10-PCS | Mod: CPTII,S$GLB,, | Performed by: SURGERY

## 2022-10-10 PROCEDURE — 99999 PR PBB SHADOW E&M-EST. PATIENT-LVL IV: ICD-10-PCS | Mod: PBBFAC,,, | Performed by: SURGERY

## 2022-10-10 PROCEDURE — 99024 PR POST-OP FOLLOW-UP VISIT: ICD-10-PCS | Mod: S$GLB,,, | Performed by: SURGERY

## 2022-10-10 PROCEDURE — 1160F RVW MEDS BY RX/DR IN RCRD: CPT | Mod: CPTII,S$GLB,, | Performed by: SURGERY

## 2022-10-10 PROCEDURE — 3078F PR MOST RECENT DIASTOLIC BLOOD PRESSURE < 80 MM HG: ICD-10-PCS | Mod: CPTII,S$GLB,, | Performed by: SURGERY

## 2022-10-10 PROCEDURE — 99999 PR PBB SHADOW E&M-EST. PATIENT-LVL IV: CPT | Mod: PBBFAC,,, | Performed by: SURGERY

## 2022-10-10 PROCEDURE — 1160F PR REVIEW ALL MEDS BY PRESCRIBER/CLIN PHARMACIST DOCUMENTED: ICD-10-PCS | Mod: CPTII,S$GLB,, | Performed by: SURGERY

## 2022-10-10 PROCEDURE — 3077F SYST BP >= 140 MM HG: CPT | Mod: CPTII,S$GLB,, | Performed by: SURGERY

## 2022-10-10 NOTE — PROGRESS NOTES
Subjective:       Patient ID: Wilberto Hayes Jr. is a 82 y.o. male.    Post cholecystectomy    Chief Complaint: Post-op Evaluation (1 month f/u )    82-year-old male presents for follow-up after undergoing a robotic cholecystectomy.  At the time of surgery the duodenum was densely adherent to the gallbladder and a repair in the 1st portion of the duodenum was performed.  Patient states he is doing well, tolerating a diet, no abdominal  Review of Systems   Gastrointestinal: Negative.      Objective:      Physical Exam  Vitals reviewed.   Eyes:      General: No scleral icterus.  Abdominal:      Comments: All incisions are healing well   Neurological:      Mental Status: He is alert.       Assessment:     Doing well after robotic cholecystectomy and repair of a duodenal enterotomy    Plan:       Activity as tolerated.  Follow up with surgery as needed.      Patient should undergo colonoscopy for positive Cologuard test

## 2022-10-10 NOTE — PATIENT INSTRUCTIONS
With regards to your gallbladder surgery there are no limits on your activities.  The incisions will fade over time      I would recommend calling the colonoscopy , [facility], to schedule a colonoscopy due to the positive Cologuard test.      If you have any issues or concerns please call the office    Our office phone numbers are  [phone] and

## 2022-11-08 ENCOUNTER — OFFICE VISIT (OUTPATIENT)
Dept: INTERNAL MEDICINE | Facility: CLINIC | Age: 82
End: 2022-11-08
Payer: MEDICARE

## 2022-11-08 VITALS
HEIGHT: 71 IN | OXYGEN SATURATION: 95 % | DIASTOLIC BLOOD PRESSURE: 68 MMHG | SYSTOLIC BLOOD PRESSURE: 120 MMHG | HEART RATE: 54 BPM | WEIGHT: 213.88 LBS | BODY MASS INDEX: 29.94 KG/M2

## 2022-11-08 DIAGNOSIS — Z00.00 ENCOUNTER FOR PREVENTIVE HEALTH EXAMINATION: Primary | ICD-10-CM

## 2022-11-08 DIAGNOSIS — D69.2 SENILE PURPURA: ICD-10-CM

## 2022-11-08 DIAGNOSIS — G47.33 OSA (OBSTRUCTIVE SLEEP APNEA): ICD-10-CM

## 2022-11-08 DIAGNOSIS — Z80.0 FAMILY HISTORY OF COLON CANCER: ICD-10-CM

## 2022-11-08 DIAGNOSIS — H40.1131 PRIMARY OPEN ANGLE GLAUCOMA OF BOTH EYES, MILD STAGE: Chronic | ICD-10-CM

## 2022-11-08 DIAGNOSIS — I25.119 ATHEROSCLEROSIS OF NATIVE CORONARY ARTERY OF NATIVE HEART WITH ANGINA PECTORIS: ICD-10-CM

## 2022-11-08 DIAGNOSIS — R19.5 POSITIVE COLORECTAL CANCER SCREENING USING COLOGUARD TEST: ICD-10-CM

## 2022-11-08 DIAGNOSIS — I10 ESSENTIAL HYPERTENSION: ICD-10-CM

## 2022-11-08 DIAGNOSIS — R91.8 LUNG NODULES: ICD-10-CM

## 2022-11-08 DIAGNOSIS — R20.0 NUMBNESS IN FEET: ICD-10-CM

## 2022-11-08 DIAGNOSIS — I73.9 PAD (PERIPHERAL ARTERY DISEASE): ICD-10-CM

## 2022-11-08 DIAGNOSIS — E78.5 HYPERLIPIDEMIA, UNSPECIFIED HYPERLIPIDEMIA TYPE: ICD-10-CM

## 2022-11-08 DIAGNOSIS — I70.0 ATHEROSCLEROSIS OF AORTA: ICD-10-CM

## 2022-11-08 PROCEDURE — 99499 UNLISTED E&M SERVICE: CPT | Mod: S$GLB,,, | Performed by: NURSE PRACTITIONER

## 2022-11-08 PROCEDURE — 1126F AMNT PAIN NOTED NONE PRSNT: CPT | Mod: CPTII,S$GLB,, | Performed by: NURSE PRACTITIONER

## 2022-11-08 PROCEDURE — 1101F PT FALLS ASSESS-DOCD LE1/YR: CPT | Mod: CPTII,S$GLB,, | Performed by: NURSE PRACTITIONER

## 2022-11-08 PROCEDURE — 1170F FXNL STATUS ASSESSED: CPT | Mod: CPTII,S$GLB,, | Performed by: NURSE PRACTITIONER

## 2022-11-08 PROCEDURE — 99499 RISK ADDL DX/OHS AUDIT: ICD-10-PCS | Mod: S$GLB,,, | Performed by: NURSE PRACTITIONER

## 2022-11-08 PROCEDURE — 1170F PR FUNCTIONAL STATUS ASSESSED: ICD-10-PCS | Mod: CPTII,S$GLB,, | Performed by: NURSE PRACTITIONER

## 2022-11-08 PROCEDURE — 1159F MED LIST DOCD IN RCRD: CPT | Mod: CPTII,S$GLB,, | Performed by: NURSE PRACTITIONER

## 2022-11-08 PROCEDURE — 1160F RVW MEDS BY RX/DR IN RCRD: CPT | Mod: CPTII,S$GLB,, | Performed by: NURSE PRACTITIONER

## 2022-11-08 PROCEDURE — 99999 PR PBB SHADOW E&M-EST. PATIENT-LVL V: CPT | Mod: PBBFAC,,, | Performed by: NURSE PRACTITIONER

## 2022-11-08 PROCEDURE — 1101F PR PT FALLS ASSESS DOC 0-1 FALLS W/OUT INJ PAST YR: ICD-10-PCS | Mod: CPTII,S$GLB,, | Performed by: NURSE PRACTITIONER

## 2022-11-08 PROCEDURE — G0439 PPPS, SUBSEQ VISIT: HCPCS | Mod: S$GLB,,, | Performed by: NURSE PRACTITIONER

## 2022-11-08 PROCEDURE — 3078F DIAST BP <80 MM HG: CPT | Mod: CPTII,S$GLB,, | Performed by: NURSE PRACTITIONER

## 2022-11-08 PROCEDURE — 3288F PR FALLS RISK ASSESSMENT DOCUMENTED: ICD-10-PCS | Mod: CPTII,S$GLB,, | Performed by: NURSE PRACTITIONER

## 2022-11-08 PROCEDURE — 3288F FALL RISK ASSESSMENT DOCD: CPT | Mod: CPTII,S$GLB,, | Performed by: NURSE PRACTITIONER

## 2022-11-08 PROCEDURE — 1159F PR MEDICATION LIST DOCUMENTED IN MEDICAL RECORD: ICD-10-PCS | Mod: CPTII,S$GLB,, | Performed by: NURSE PRACTITIONER

## 2022-11-08 PROCEDURE — 3074F SYST BP LT 130 MM HG: CPT | Mod: CPTII,S$GLB,, | Performed by: NURSE PRACTITIONER

## 2022-11-08 PROCEDURE — 3074F PR MOST RECENT SYSTOLIC BLOOD PRESSURE < 130 MM HG: ICD-10-PCS | Mod: CPTII,S$GLB,, | Performed by: NURSE PRACTITIONER

## 2022-11-08 PROCEDURE — G0439 PR MEDICARE ANNUAL WELLNESS SUBSEQUENT VISIT: ICD-10-PCS | Mod: S$GLB,,, | Performed by: NURSE PRACTITIONER

## 2022-11-08 PROCEDURE — 1126F PR PAIN SEVERITY QUANTIFIED, NO PAIN PRESENT: ICD-10-PCS | Mod: CPTII,S$GLB,, | Performed by: NURSE PRACTITIONER

## 2022-11-08 PROCEDURE — 3078F PR MOST RECENT DIASTOLIC BLOOD PRESSURE < 80 MM HG: ICD-10-PCS | Mod: CPTII,S$GLB,, | Performed by: NURSE PRACTITIONER

## 2022-11-08 PROCEDURE — 99999 PR PBB SHADOW E&M-EST. PATIENT-LVL V: ICD-10-PCS | Mod: PBBFAC,,, | Performed by: NURSE PRACTITIONER

## 2022-11-08 PROCEDURE — 1160F PR REVIEW ALL MEDS BY PRESCRIBER/CLIN PHARMACIST DOCUMENTED: ICD-10-PCS | Mod: CPTII,S$GLB,, | Performed by: NURSE PRACTITIONER

## 2022-11-08 NOTE — PROGRESS NOTES
"  Wilberto Hayes presented for a  Medicare AWV and comprehensive Health Risk Assessment today. The following components were reviewed and updated:    Medical history  Family History  Social history  Allergies and Current Medications  Health Risk Assessment  Health Maintenance  Care Team         ** See Completed Assessments for Annual Wellness Visit within the encounter summary.**         The following assessments were completed:  Living Situation  CAGE  Depression Screening  Timed Get Up and Go  Whisper Test-na  Cognitive Function Screening  Nutrition Screening  ADL Screening  PAQ Screening        Vitals:    11/08/22 1013   BP: 120/68   BP Location: Left arm   Patient Position: Sitting   Pulse: (!) 54   SpO2: 95%   Weight: 97 kg (213 lb 13.5 oz)   Height: 5' 11" (1.803 m)     Body mass index is 29.83 kg/m².  Physical Exam  Vitals and nursing note reviewed.   Constitutional:       Appearance: He is well-developed.   HENT:      Head: Normocephalic.   Cardiovascular:      Rate and Rhythm: Normal rate and regular rhythm.      Heart sounds: Normal heart sounds.   Pulmonary:      Effort: Pulmonary effort is normal. No respiratory distress.      Breath sounds: Normal breath sounds.   Abdominal:      Palpations: Abdomen is soft. There is no mass.      Tenderness: There is no abdominal tenderness.   Musculoskeletal:         General: Normal range of motion.   Skin:     General: Skin is warm and dry.   Neurological:      Mental Status: He is alert and oriented to person, place, and time.      Motor: No abnormal muscle tone.   Psychiatric:         Speech: Speech normal.         Behavior: Behavior normal.             Diagnoses and health risks identified today and associated recommendations/orders:    1. Encounter for preventive health examination     Review for Opioid Screening: Pt does not have Rx for Opioids      Review for Substance Use Disorders: Alcohol use, see flow sheet for detail No drug use per chart     Declines " COVID booster and Shingrix    Reports food intake declined due to surgery but appetite is back to baseline.   Encouraged healthy diet and exercise as tolerated   Reports cardiac conditions are stable.        2. Atherosclerosis of native coronary artery of native heart with angina pectoris  stable. Continue current treatment plan as previously prescribed with your  cardiologist.     3. PAD (peripheral artery disease)  US 6/14  Stable. Continue current treatment plan as previously prescribed with your  cardiologist.     4. Atherosclerosis of aorta  Ct 1/20  Stable. Continue current treatment plan as previously prescribed with your  cardiologist.     5. Essential hypertension  Stable and controlled. Continue current treatment plan as previously prescribed with your PCP and cardiologist.     6. Hyperlipidemia, unspecified hyperlipidemia type  Continue current treatment plan as previously prescribed with your  pcp and cardiologist.       7. Lung nodules  Cta 8/22  Continue current treatment plan as previously prescribed with your  pulmonologist.     8. CHELO (obstructive sleep apnea)  Continue current treatment plan as previously prescribed with your  pulmonologist.     9. Primary open angle glaucoma of both eyes, mild stage  Continue current treatment plan as previously prescribed with your ophthalmologist.     10. Positive colorectal cancer screening using Cologuard test  - Ambulatory referral/consult to Endo Procedure ; Future    11. Family history of colon cancer  - Ambulatory referral/consult to Endo Procedure ; Future    12. Numbness in feet  Chronic  Advised to follow up with PCP for further evaluation and recommendations. Patient expressed understanding.      13. Senile purpura  Chronic  See pic for documentation  Advised to follow up with PCP for further evaluation and recommendations. Patient expressed understanding.        Provided Wilberto with a 5-10 year written screening schedule and personal  prevention plan. Recommendations were developed using the USPSTF age appropriate recommendations. Education, counseling, and referrals were provided as needed. After Visit Summary printed and given to patient which includes a list of additional screenings\tests needed.    Follow up in about 1 year (around 11/8/2023) for awv.    Rox Delarosa NP  I offered to discuss advanced care planning, including how to pick a person who would make decisions for you if you were unable to make them for yourself, called a health care power of , and what kind of decisions you might make such as use of life sustaining treatments such as ventilators and tube feeding when faced with a life limiting illness recorded on a living will that they will need to know. (How you want to be cared for as you near the end of your natural life)     X Patient is interested in learning more about how to make advanced directives.  I provided them paperwork and offered to discuss this with them.

## 2022-11-08 NOTE — PATIENT INSTRUCTIONS
Counseling and Referral of Other Preventative  (Italic type indicates deductible and co-insurance are waived)    Patient Name: Wilberto Hayes  Today's Date: 11/8/2022    Health Maintenance       Date Due Completion Date    Shingles Vaccine (2 of 3) 11/08/2023 (Originally 9/10/2009) 7/16/2009    COVID-19 Vaccine (4 - Booster for Pfizer series) 11/08/2023 (Originally 2/8/2022) 12/14/2021    Lipid Panel 07/13/2023 7/13/2022    Aspirin/Antiplatelet Therapy 11/08/2023 11/8/2022    Override on 1/28/2019: Done (on ASA 81mg daily)    TETANUS VACCINE 02/12/2025 2/12/2015        Orders Placed This Encounter   Procedures    Ambulatory referral/consult to Endo Procedure        The following information is provided to all patients.  This information is to help you find resources for any of the problems found today that may be affecting your health:                Living healthy guide: www.[facility].[place].[facility]      Understanding Diabetes: www.diabetes.org      Eating healthy: www.cdc.gov/healthyweight      [facility] home safety checklist: www.cdc.gov/steadi/patient.html      Agency on Aging: www.goea.[place].[facility]      Alcoholics anonymous (AA): www.aa.org      Physical Activity: www.mamta.nih.gov/pu1jjbq      Tobacco use: www.quitwithusla.org

## 2022-11-11 ENCOUNTER — HOSPITAL ENCOUNTER (OUTPATIENT)
Dept: PREADMISSION TESTING | Facility: HOSPITAL | Age: 82
Discharge: HOME | End: 2022-11-11
Payer: MEDICARE

## 2022-11-11 DIAGNOSIS — Z80.0 FAMILY HISTORY OF COLON CANCER: ICD-10-CM

## 2022-11-11 DIAGNOSIS — R19.5 POSITIVE COLORECTAL CANCER SCREENING USING COLOGUARD TEST: ICD-10-CM

## 2022-11-14 ENCOUNTER — TELEPHONE (OUTPATIENT)
Dept: FAMILY MEDICINE | Facility: CLINIC | Age: 82
End: 2022-11-14
Payer: MEDICARE

## 2022-11-14 DIAGNOSIS — Z12.11 SCREENING FOR COLON CANCER: Primary | ICD-10-CM

## 2022-11-14 NOTE — TELEPHONE ENCOUNTER
----- Message from Tiara Cabrera sent at 11/14/2022  3:14 PM CST -----  Contact: pt  Pt is calling in regard to having a referral to a gastro per the colonoscopy dept..  Pt states that he was told b/c of his age, he needs to see a gastro before the colonoscopy can be performed.  Please call him back at [phone]  thanks, mpd  Pt states he would like to see a ocasio at [facility], sometime in January, 2023

## 2022-11-29 ENCOUNTER — OFFICE VISIT (OUTPATIENT)
Dept: OPHTHALMOLOGY | Facility: CLINIC | Age: 82
End: 2022-11-29
Payer: MEDICARE

## 2022-11-29 DIAGNOSIS — H26.9 CORTICAL CATARACT OF LEFT EYE: ICD-10-CM

## 2022-11-29 DIAGNOSIS — H40.1131 PRIMARY OPEN ANGLE GLAUCOMA OF BOTH EYES, MILD STAGE: Primary | ICD-10-CM

## 2022-11-29 DIAGNOSIS — H04.123 DRY EYES, BILATERAL: ICD-10-CM

## 2022-11-29 PROCEDURE — 1159F MED LIST DOCD IN RCRD: CPT | Mod: CPTII,S$GLB,, | Performed by: OPHTHALMOLOGY

## 2022-11-29 PROCEDURE — 92133 POSTERIOR SEGMENT OCT OPTIC NERVE(OCULAR COHERENCE TOMOGRAPHY) - OU - BOTH EYES: ICD-10-PCS | Mod: S$GLB,,, | Performed by: OPHTHALMOLOGY

## 2022-11-29 PROCEDURE — 1160F PR REVIEW ALL MEDS BY PRESCRIBER/CLIN PHARMACIST DOCUMENTED: ICD-10-PCS | Mod: CPTII,S$GLB,, | Performed by: OPHTHALMOLOGY

## 2022-11-29 PROCEDURE — 1159F PR MEDICATION LIST DOCUMENTED IN MEDICAL RECORD: ICD-10-PCS | Mod: CPTII,S$GLB,, | Performed by: OPHTHALMOLOGY

## 2022-11-29 PROCEDURE — 1160F RVW MEDS BY RX/DR IN RCRD: CPT | Mod: CPTII,S$GLB,, | Performed by: OPHTHALMOLOGY

## 2022-11-29 PROCEDURE — 99999 PR PBB SHADOW E&M-EST. PATIENT-LVL II: ICD-10-PCS | Mod: PBBFAC,,, | Performed by: OPHTHALMOLOGY

## 2022-11-29 PROCEDURE — 99999 PR PBB SHADOW E&M-EST. PATIENT-LVL II: CPT | Mod: PBBFAC,,, | Performed by: OPHTHALMOLOGY

## 2022-11-29 PROCEDURE — 99214 OFFICE O/P EST MOD 30 MIN: CPT | Mod: S$GLB,,, | Performed by: OPHTHALMOLOGY

## 2022-11-29 PROCEDURE — 99214 PR OFFICE/OUTPT VISIT, EST, LEVL IV, 30-39 MIN: ICD-10-PCS | Mod: S$GLB,,, | Performed by: OPHTHALMOLOGY

## 2022-11-29 PROCEDURE — 92133 CPTRZD OPH DX IMG PST SGM ON: CPT | Mod: S$GLB,,, | Performed by: OPHTHALMOLOGY

## 2022-11-29 RX ORDER — TIMOLOL MALEATE 5 MG/ML
1 SOLUTION/ DROPS OPHTHALMIC DAILY
Qty: 10 ML | Refills: 4 | Status: SHIPPED | OUTPATIENT
Start: 2022-11-29 | End: 2023-02-06 | Stop reason: SDUPTHER

## 2022-11-29 NOTE — PROGRESS NOTES
"SUBJECTIVE  Wilberto Hayes Jr. is 82 y.o. male  Corrected distance visual acuity was 20/20 -2 in the right eye and 20/20 -1 in the left eye.   Chief Complaint   Patient presents with    Glaucoma     Pt here for 4m IOP GOCT chk. No pain or discomfort. Pt says he is having major difficulties with his glasses.           HPI     Glaucoma     Additional comments: Pt here for 4m IOP GOCT chk. No pain or discomfort.   Pt says he is having major difficulties with his glasses.            Comments    1. Mild COAG OS>OD Goal =17-18 (Init 20/22)  +Fhx Glaucoma -g-dad   SLT OD 11/09 (min response) + 1/8/20 (22.5-14.5)   SLT OS 8/09 (22 to 16-17) + 1/8/20 (24.5-14)   2. Mod NSC OD  PCIOL OS with Omni 5/11/22  3. Dry eyes (no dry mouth)   Gel Plugs OU   Xiidra caused irritation   Tried Restasis (burning and blurry vision)   4. Fuchs' corneal dystrophy  5. Ptosis OD     Latanoprost QHS OU       Homeopathic AT"s (Similasan)   Thera tears, Soothe XP   O3FO            Last edited by Dashawn Kuhn MA on 11/29/2022  9:57 AM.         Assessment /Plan :  1. Primary open angle glaucoma of both eyes, mild stage Intraocular pressure (IOP) not within acceptable range relative to target IOP with risk of irreversible visual loss. Better IOP control is recommended. Treatment options may include change or additional medications, Selective Laser Trabeculoplasty (SLT laser), and/or incisional glaucoma surgery. Reviewed importance of continued compliance with treatment and follow up.     Patient chooses change glaucoma medication by adding Timolol one drop in each eye daily       Return to clinic in 6 weeks  or as needed.  With IOP Check     2. Cortical cataract of left eye    3. Dry eyes, bilateral Findings and symptoms consistent with moderate dry eyes. Dry eye instruction sheet reviewed with patient recommending:AT's qid/titrate prn                "

## 2022-11-30 ENCOUNTER — PATIENT MESSAGE (OUTPATIENT)
Dept: OPHTHALMOLOGY | Facility: CLINIC | Age: 82
End: 2022-11-30
Payer: MEDICARE

## 2022-12-09 ENCOUNTER — OFFICE VISIT (OUTPATIENT)
Dept: CARDIOLOGY | Facility: CLINIC | Age: 82
End: 2022-12-09
Payer: MEDICARE

## 2022-12-09 VITALS
BODY MASS INDEX: 30.06 KG/M2 | HEIGHT: 71 IN | SYSTOLIC BLOOD PRESSURE: 122 MMHG | HEART RATE: 50 BPM | OXYGEN SATURATION: 97 % | DIASTOLIC BLOOD PRESSURE: 75 MMHG | WEIGHT: 214.75 LBS | RESPIRATION RATE: 16 BRPM

## 2022-12-09 DIAGNOSIS — I25.119 ATHEROSCLEROSIS OF NATIVE CORONARY ARTERY OF NATIVE HEART WITH ANGINA PECTORIS: Primary | ICD-10-CM

## 2022-12-09 DIAGNOSIS — G47.33 OSA ON CPAP: Chronic | ICD-10-CM

## 2022-12-09 DIAGNOSIS — I70.0 CALCIFICATION OF AORTA: Chronic | ICD-10-CM

## 2022-12-09 DIAGNOSIS — I10 ESSENTIAL HYPERTENSION: ICD-10-CM

## 2022-12-09 DIAGNOSIS — I70.211 ATHEROSCLEROSIS OF NATIVE ARTERY OF RIGHT LOWER EXTREMITY WITH INTERMITTENT CLAUDICATION: Chronic | ICD-10-CM

## 2022-12-09 DIAGNOSIS — E78.00 PURE HYPERCHOLESTEROLEMIA: ICD-10-CM

## 2022-12-09 PROCEDURE — 3288F PR FALLS RISK ASSESSMENT DOCUMENTED: ICD-10-PCS | Mod: CPTII,S$GLB,, | Performed by: INTERNAL MEDICINE

## 2022-12-09 PROCEDURE — 3078F DIAST BP <80 MM HG: CPT | Mod: CPTII,S$GLB,, | Performed by: INTERNAL MEDICINE

## 2022-12-09 PROCEDURE — 3074F PR MOST RECENT SYSTOLIC BLOOD PRESSURE < 130 MM HG: ICD-10-PCS | Mod: CPTII,S$GLB,, | Performed by: INTERNAL MEDICINE

## 2022-12-09 PROCEDURE — 1159F PR MEDICATION LIST DOCUMENTED IN MEDICAL RECORD: ICD-10-PCS | Mod: CPTII,S$GLB,, | Performed by: INTERNAL MEDICINE

## 2022-12-09 PROCEDURE — 1101F PR PT FALLS ASSESS DOC 0-1 FALLS W/OUT INJ PAST YR: ICD-10-PCS | Mod: CPTII,S$GLB,, | Performed by: INTERNAL MEDICINE

## 2022-12-09 PROCEDURE — 1126F PR PAIN SEVERITY QUANTIFIED, NO PAIN PRESENT: ICD-10-PCS | Mod: CPTII,S$GLB,, | Performed by: INTERNAL MEDICINE

## 2022-12-09 PROCEDURE — 1101F PT FALLS ASSESS-DOCD LE1/YR: CPT | Mod: CPTII,S$GLB,, | Performed by: INTERNAL MEDICINE

## 2022-12-09 PROCEDURE — 3288F FALL RISK ASSESSMENT DOCD: CPT | Mod: CPTII,S$GLB,, | Performed by: INTERNAL MEDICINE

## 2022-12-09 PROCEDURE — 99999 PR PBB SHADOW E&M-EST. PATIENT-LVL V: CPT | Mod: PBBFAC,,, | Performed by: INTERNAL MEDICINE

## 2022-12-09 PROCEDURE — 99999 PR PBB SHADOW E&M-EST. PATIENT-LVL V: ICD-10-PCS | Mod: PBBFAC,,, | Performed by: INTERNAL MEDICINE

## 2022-12-09 PROCEDURE — 99214 PR OFFICE/OUTPT VISIT, EST, LEVL IV, 30-39 MIN: ICD-10-PCS | Mod: S$GLB,,, | Performed by: INTERNAL MEDICINE

## 2022-12-09 PROCEDURE — 1159F MED LIST DOCD IN RCRD: CPT | Mod: CPTII,S$GLB,, | Performed by: INTERNAL MEDICINE

## 2022-12-09 PROCEDURE — 3078F PR MOST RECENT DIASTOLIC BLOOD PRESSURE < 80 MM HG: ICD-10-PCS | Mod: CPTII,S$GLB,, | Performed by: INTERNAL MEDICINE

## 2022-12-09 PROCEDURE — 1126F AMNT PAIN NOTED NONE PRSNT: CPT | Mod: CPTII,S$GLB,, | Performed by: INTERNAL MEDICINE

## 2022-12-09 PROCEDURE — 99214 OFFICE O/P EST MOD 30 MIN: CPT | Mod: S$GLB,,, | Performed by: INTERNAL MEDICINE

## 2022-12-09 PROCEDURE — 3074F SYST BP LT 130 MM HG: CPT | Mod: CPTII,S$GLB,, | Performed by: INTERNAL MEDICINE

## 2022-12-09 NOTE — PROGRESS NOTES
Subjective:   Patient ID:  Wilberto Hayes Jr. is a 82 y.o. male who presents for follow-up of Hypertension  Patient denies CP, angina or anginal equivalent.   Pt active without sx.   Pt is s/p cholecytectomy  Hypertension  This is a chronic problem. The current episode started more than 1 year ago. The problem has been gradually improving since onset. The problem is controlled. Pertinent negatives include no chest pain, palpitations or shortness of breath. Past treatments include beta blockers, angiotensin blockers and diuretics. The current treatment provides moderate improvement. There are no compliance problems.    Coronary Artery Disease  Presents for follow-up visit. Pertinent negatives include no chest pain, chest pressure, chest tightness, dizziness, leg swelling, muscle weakness, palpitations, shortness of breath or weight gain. The symptoms have been stable. Compliance with diet is variable. Compliance with exercise is variable. Compliance with medications is good.     Review of Systems   Constitutional: Negative. Negative for weight gain.   HENT: Negative.     Eyes: Negative.    Cardiovascular: Negative.  Negative for chest pain, leg swelling and palpitations.   Respiratory: Negative.  Negative for chest tightness and shortness of breath.    Endocrine: Negative.    Hematologic/Lymphatic: Negative.    Skin: Negative.    Musculoskeletal:  Negative for muscle weakness.   Gastrointestinal: Negative.    Genitourinary: Negative.    Neurological: Negative.  Negative for dizziness.   Psychiatric/Behavioral: Negative.     Allergic/Immunologic: Negative.    All other systems reviewed and are negative.  Family History   Problem Relation Age of Onset    Cancer Mother         brain    COPD Mother     Alcohol abuse Mother     Hypertension Mother     Cancer Father         lung, breast with left mastectomy    COPD Father     Alcohol abuse Father     Hypertension Father     Kidney disease Maternal Grandmother      Alcohol abuse Maternal Grandfather     Kidney disease Maternal Grandfather     Cancer Paternal Grandmother         Gyn & colon    Kidney disease Paternal Grandmother     Blindness Paternal Grandfather     Glaucoma Paternal Grandfather     Alcohol abuse Paternal Grandfather     Kidney disease Paternal Grandfather     Retinal detachment Neg Hx     Macular degeneration Neg Hx     Diabetes Neg Hx     Heart disease Neg Hx     Stroke Neg Hx     Mental illness Neg Hx     Mental retardation Neg Hx      Past Medical History:   Diagnosis Date    ACE-inhibitor cough     Acute coronary syndrome     Angina pectoris     Cataract     Coronary artery disease     Degenerative disc disease, lumbar     Degenerative disc disease, lumbar     Glaucoma     Hyperlipidemia     cholesterol    Hypertension     Kidney stone     last in     Myocardial infarction     Obesity     Peripheral vascular disease     Polyneuropathy     Sleep apnea     Tobacco dependence     resolved quit     Trouble in sleeping      Social History     Socioeconomic History    Marital status:    Tobacco Use    Smoking status: Former     Packs/day: 1.00     Years: 10.00     Pack years: 10.00     Types: Cigarettes     Quit date: 1980     Years since quittin.7    Smokeless tobacco: Never   Substance and Sexual Activity    Alcohol use: Yes     Alcohol/week: 7.0 standard drinks     Types: 7 Glasses of wine per week     Comment: nightly wine    Drug use: No    Sexual activity: Yes     Partners: Female     Birth control/protection: Partner-Vasectomy   Social History Narrative    . Lives with wife. They had 4 children. Retired  at [employer] and then [employer] work. Still drives. Does have a Living Will, signed at time of CABG ().      Social Determinants of Health     Financial Resource Strain: Unknown    Difficulty of Paying Living Expenses: Patient refused   Food Insecurity: Unknown    Worried About Running Out of Food in the  Last Year: Patient refused    Ran Out of Food in the Last Year: Patient refused   Transportation Needs: Unknown    Lack of Transportation (Medical): No    Lack of Transportation (Non-Medical): Patient refused   Physical Activity: Unknown    Days of Exercise per Week: Patient refused    Minutes of Exercise per Session: 0 min   Stress: Unknown    Feeling of Stress : Patient refused   Social Connections: Socially Integrated    Frequency of Communication with Friends and Family: More than three times a week    Frequency of Social Gatherings with Friends and Family: Once a week    Attends [employer] Services: More than 4 times per year    Active Member of Clubs or Organizations: Yes    Attends Club or Organization Meetings: 1 to 4 times per year    Marital Status:    Housing Stability: Unknown    Unable to Pay for Housing in the Last Year: Patient refused    Number of Places Lived in the Last Year: 1    Unstable Housing in the Last Year: No     Current Outpatient Medications on File Prior to Visit   Medication Sig Dispense Refill    aspirin 81 mg Tab Take 1 tablet by mouth Daily.      co-enzyme Q-10 30 mg capsule Take 100 mg by mouth once daily.      cyanocobalamin (VITAMIN B-12) 100 MCG tablet Take 1 tablet by mouth every other day.       fluticasone propionate (FLONASE) 50 mcg/actuation nasal spray 2 sprays (100 mcg total) by Each Nostril route once daily. 16 g 1    hydroCHLOROthiazide (HYDRODIURIL) 25 MG tablet TAKE 1/2 TABLET BY MOUTH ONCE DAILY 45 tablet 3    latanoprost 0.005 % ophthalmic solution PLACE 1 DROP INTO BOTH EYES EVERY EVENING. 7.5 mL 3    losartan (COZAAR) 50 MG tablet TAKE 1 TABLET BY MOUTH TWICE A DAY (Patient taking differently: 25 mg 2 (two) times a day.) 180 tablet 3    LUBRICANT EYE DROPS, GLYC-PG, 1-0.3 % Drop       multivit-min/ferrous fumarate (MULTI VITAMIN ORAL)       nitroGLYCERIN (NITROSTAT) 0.4 MG SL tablet Place 1 tablet (0.4 mg total) under the tongue every 5 (five) minutes as  needed for Chest pain (if no relief seek er trmt). 25 tablet 3    OCEAN NASAL 0.65 % nasal spray       omega-3 fatty acids-vitamin E 1,000 mg Cap Take 1 capsule by mouth Daily.      ondansetron (ZOFRAN) 4 MG tablet Take 1 tablet (4 mg total) by mouth every 6 (six) hours. (Patient taking differently: Take 4 mg by mouth every 6 (six) hours. Takes prn) 30 tablet 0    promethazine-dextromethorphan (PROMETHAZINE-DM) 6.25-15 mg/5 mL Syrp Take 5 mLs by mouth every 6 to 8 hours as needed. 180 mL 0    saw palmetto 500 MG capsule Take by mouth 2 (two) times daily.       timolol maleate 0.5% (TIMOPTIC) 0.5 % Drop Place 1 drop into both eyes once daily. 10 mL 4    zinc gluconate 50 mg tablet Take 50 mg by mouth once daily.      nebivoloL (BYSTOLIC) 5 MG Tab Take 1 tablet (5 mg total) by mouth once daily. (Patient taking differently: Take 5 mg by mouth every evening.) 30 tablet 11     No current facility-administered medications on file prior to visit.     Review of patient's allergies indicates:   Allergen Reactions    Codeine Swelling and Other (See Comments)     unknown    Niacin preparations Other (See Comments)     myalgia    Statins-hmg-coa reductase inhibitors      Muscle weakness    Fosinopril Other (See Comments)     cough    Pravastatin Other (See Comments)      Muscle pain    Simvastatin Other (See Comments)     Muscle pain    Sulfa (sulfonamide antibiotics) Rash and Hives       Objective:     Physical Exam  Vitals and nursing note reviewed.   Constitutional:       Appearance: He is well-developed.   HENT:      Head: Normocephalic and atraumatic.   Eyes:      Conjunctiva/sclera: Conjunctivae normal.      Pupils: Pupils are equal, round, and reactive to light.   Cardiovascular:      Rate and Rhythm: Normal rate and regular rhythm.      Pulses: Intact distal pulses.      Heart sounds: Normal heart sounds.   Pulmonary:      Effort: Pulmonary effort is normal.      Breath sounds: Normal breath sounds.   Abdominal:       General: Bowel sounds are normal.      Palpations: Abdomen is soft.   Musculoskeletal:      Cervical back: Normal range of motion and neck supple.   Skin:     General: Skin is warm and dry.   Neurological:      Mental Status: He is alert and oriented to person, place, and time.       Assessment:     1. Atherosclerosis of native coronary artery of native heart with angina pectoris    2. Essential hypertension    3. Pure hypercholesterolemia    4. 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 Calcification of aorta    6. 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

## 2022-12-20 ENCOUNTER — PATIENT MESSAGE (OUTPATIENT)
Dept: CARDIOLOGY | Facility: CLINIC | Age: 82
End: 2022-12-20
Payer: MEDICARE

## 2023-01-10 ENCOUNTER — OFFICE VISIT (OUTPATIENT)
Dept: OPHTHALMOLOGY | Facility: CLINIC | Age: 83
End: 2023-01-10
Payer: MEDICARE

## 2023-01-10 DIAGNOSIS — H40.1131 PRIMARY OPEN ANGLE GLAUCOMA OF BOTH EYES, MILD STAGE: Primary | ICD-10-CM

## 2023-01-10 DIAGNOSIS — H25.11 NUCLEAR SCLEROSIS OF RIGHT EYE: ICD-10-CM

## 2023-01-10 DIAGNOSIS — Z96.1 PSEUDOPHAKIA OF LEFT EYE: ICD-10-CM

## 2023-01-10 DIAGNOSIS — H04.123 DRY EYES, BILATERAL: ICD-10-CM

## 2023-01-10 PROCEDURE — 99999 PR PBB SHADOW E&M-EST. PATIENT-LVL III: ICD-10-PCS | Mod: PBBFAC,,, | Performed by: OPHTHALMOLOGY

## 2023-01-10 PROCEDURE — 1160F PR REVIEW ALL MEDS BY PRESCRIBER/CLIN PHARMACIST DOCUMENTED: ICD-10-PCS | Mod: CPTII,S$GLB,, | Performed by: OPHTHALMOLOGY

## 2023-01-10 PROCEDURE — 1160F RVW MEDS BY RX/DR IN RCRD: CPT | Mod: CPTII,S$GLB,, | Performed by: OPHTHALMOLOGY

## 2023-01-10 PROCEDURE — 1159F MED LIST DOCD IN RCRD: CPT | Mod: CPTII,S$GLB,, | Performed by: OPHTHALMOLOGY

## 2023-01-10 PROCEDURE — 1159F PR MEDICATION LIST DOCUMENTED IN MEDICAL RECORD: ICD-10-PCS | Mod: CPTII,S$GLB,, | Performed by: OPHTHALMOLOGY

## 2023-01-10 PROCEDURE — 99214 OFFICE O/P EST MOD 30 MIN: CPT | Mod: S$GLB,,, | Performed by: OPHTHALMOLOGY

## 2023-01-10 PROCEDURE — 99214 PR OFFICE/OUTPT VISIT, EST, LEVL IV, 30-39 MIN: ICD-10-PCS | Mod: S$GLB,,, | Performed by: OPHTHALMOLOGY

## 2023-01-10 PROCEDURE — 99999 PR PBB SHADOW E&M-EST. PATIENT-LVL III: CPT | Mod: PBBFAC,,, | Performed by: OPHTHALMOLOGY

## 2023-01-10 NOTE — PROGRESS NOTES
"SUBJECTIVE  Wilberto Hayes Jr. is 82 y.o. male  Corrected distance visual acuity was 20/30 -2 in the right eye and 20/50 -1 in the left eye.   Chief Complaint   Patient presents with    Glaucoma     Pt here for 6 wk Timolol trial. Pt says he has noticed some increased weakness from taking the additional beta blocker. Pt has been 100% compliant with gtts.           HPI     Glaucoma     Additional comments: Pt here for 6 wk Timolol trial. Pt says he has   noticed some increased weakness from taking the additional beta blocker.   Pt has been 100% compliant with gtts.            Comments    1. Mild COAG OS>OD Goal =17-18 (Init 20/22)  +Fhx Glaucoma -g-dad   SLT OD 11/09 (min response) + 1/8/20 (22.5-14.5)   SLT OS 8/09 (22 to 16-17) + 1/8/20 (24.5-14)   2. Mod NSC OD  PCIOL OS with Omni 5/11/22  3. Dry eyes (no dry mouth)   Gel Plugs OU   Xiidra caused irritation   Tried Restasis (burning and blurry vision)   4. Fuchs' corneal dystrophy  5. Ptosis OD     Latanoprost QHS OU   Timolol QAM OU      Homeopathic AT"s (Similasan)   Thera tears, Soothe XP   O3FO            Last edited by Dashawn Kuhn MA on 1/10/2023 11:00 AM.         Assessment /Plan :  1. Primary open angle glaucoma of both eyes, mild stage Doing well, intraocular pressure (IOP) within acceptable range relative to target IOP and no evidence of progression. Continue current treatment. Reviewed importance of continued compliance with treatment and follow up.      Patient instructed to continue using the following glaucoma medication as follows:  Latanoprost one drop in each eye nightly and Timolol one drop both eyes daily      Return to clinic in 4 months  or as needed.  With Dilation, GOCT and HVF 24-2     2. Dry eyes, bilateral  -- Condition stable, no therapeutic change required. Monitoring routinely.     3. Nuclear sclerosis of right eye - monitor for now   4. Pseudophakia of left eye  -- Condition stable, no therapeutic change required. Monitoring " routinely.

## 2023-01-30 ENCOUNTER — E-CONSULT (OUTPATIENT)
Dept: CARDIOLOGY | Facility: CLINIC | Age: 83
End: 2023-01-30
Payer: MEDICARE

## 2023-01-30 ENCOUNTER — OFFICE VISIT (OUTPATIENT)
Dept: GASTROENTEROLOGY | Facility: CLINIC | Age: 83
End: 2023-01-30
Payer: MEDICARE

## 2023-01-30 VITALS
HEIGHT: 71 IN | DIASTOLIC BLOOD PRESSURE: 76 MMHG | SYSTOLIC BLOOD PRESSURE: 154 MMHG | BODY MASS INDEX: 29.87 KG/M2 | OXYGEN SATURATION: 97 % | HEART RATE: 60 BPM | WEIGHT: 213.38 LBS

## 2023-01-30 DIAGNOSIS — Z12.11 SCREENING FOR COLON CANCER: ICD-10-CM

## 2023-01-30 DIAGNOSIS — Z01.810 PREOP CARDIOVASCULAR EXAM: ICD-10-CM

## 2023-01-30 DIAGNOSIS — R19.5 POSITIVE COLORECTAL CANCER SCREENING USING COLOGUARD TEST: Primary | ICD-10-CM

## 2023-01-30 PROCEDURE — 1126F PR PAIN SEVERITY QUANTIFIED, NO PAIN PRESENT: ICD-10-PCS | Mod: CPTII,S$GLB,, | Performed by: PHYSICIAN ASSISTANT

## 2023-01-30 PROCEDURE — 3078F DIAST BP <80 MM HG: CPT | Mod: CPTII,S$GLB,, | Performed by: PHYSICIAN ASSISTANT

## 2023-01-30 PROCEDURE — 3078F PR MOST RECENT DIASTOLIC BLOOD PRESSURE < 80 MM HG: ICD-10-PCS | Mod: CPTII,S$GLB,, | Performed by: PHYSICIAN ASSISTANT

## 2023-01-30 PROCEDURE — 1101F PR PT FALLS ASSESS DOC 0-1 FALLS W/OUT INJ PAST YR: ICD-10-PCS | Mod: CPTII,S$GLB,, | Performed by: PHYSICIAN ASSISTANT

## 2023-01-30 PROCEDURE — 99451 NTRPROF PH1/NTRNET/EHR 5/>: CPT | Mod: S$GLB,,, | Performed by: INTERNAL MEDICINE

## 2023-01-30 PROCEDURE — 1101F PT FALLS ASSESS-DOCD LE1/YR: CPT | Mod: CPTII,S$GLB,, | Performed by: PHYSICIAN ASSISTANT

## 2023-01-30 PROCEDURE — 99203 PR OFFICE/OUTPT VISIT, NEW, LEVL III, 30-44 MIN: ICD-10-PCS | Mod: S$GLB,,, | Performed by: PHYSICIAN ASSISTANT

## 2023-01-30 PROCEDURE — 3077F SYST BP >= 140 MM HG: CPT | Mod: CPTII,S$GLB,, | Performed by: PHYSICIAN ASSISTANT

## 2023-01-30 PROCEDURE — 99999 PR PBB SHADOW E&M-EST. PATIENT-LVL V: CPT | Mod: PBBFAC,,, | Performed by: PHYSICIAN ASSISTANT

## 2023-01-30 PROCEDURE — 3288F FALL RISK ASSESSMENT DOCD: CPT | Mod: CPTII,S$GLB,, | Performed by: PHYSICIAN ASSISTANT

## 2023-01-30 PROCEDURE — 99999 PR PBB SHADOW E&M-EST. PATIENT-LVL V: ICD-10-PCS | Mod: PBBFAC,,, | Performed by: PHYSICIAN ASSISTANT

## 2023-01-30 PROCEDURE — 1126F AMNT PAIN NOTED NONE PRSNT: CPT | Mod: CPTII,S$GLB,, | Performed by: PHYSICIAN ASSISTANT

## 2023-01-30 PROCEDURE — 99451 PR INTERPROF, PHONE/INTERNET/EHR, CONSULT, >= 5MINS: ICD-10-PCS | Mod: S$GLB,,, | Performed by: INTERNAL MEDICINE

## 2023-01-30 PROCEDURE — 1159F MED LIST DOCD IN RCRD: CPT | Mod: CPTII,S$GLB,, | Performed by: PHYSICIAN ASSISTANT

## 2023-01-30 PROCEDURE — 99203 OFFICE O/P NEW LOW 30 MIN: CPT | Mod: S$GLB,,, | Performed by: PHYSICIAN ASSISTANT

## 2023-01-30 PROCEDURE — 3077F PR MOST RECENT SYSTOLIC BLOOD PRESSURE >= 140 MM HG: ICD-10-PCS | Mod: CPTII,S$GLB,, | Performed by: PHYSICIAN ASSISTANT

## 2023-01-30 PROCEDURE — 1159F PR MEDICATION LIST DOCUMENTED IN MEDICAL RECORD: ICD-10-PCS | Mod: CPTII,S$GLB,, | Performed by: PHYSICIAN ASSISTANT

## 2023-01-30 PROCEDURE — 3288F PR FALLS RISK ASSESSMENT DOCUMENTED: ICD-10-PCS | Mod: CPTII,S$GLB,, | Performed by: PHYSICIAN ASSISTANT

## 2023-01-30 RX ORDER — POLYETHYLENE GLYCOL 3350, SODIUM SULFATE ANHYDROUS, SODIUM BICARBONATE, SODIUM CHLORIDE, POTASSIUM CHLORIDE 236; 22.74; 6.74; 5.86; 2.97 G/4L; G/4L; G/4L; G/4L; G/4L
4 POWDER, FOR SOLUTION ORAL ONCE
Qty: 4000 ML | Refills: 0 | Status: SHIPPED | OUTPATIENT
Start: 2023-01-30 | End: 2023-01-30

## 2023-01-30 NOTE — PROGRESS NOTES
[facility]  Response for E-Consult     Patient Name: Wilberto Hayes Jr.  MRN: [id number]  Primary Care Provider: Dayne Steward MD   Requesting Provider: Maura Mora PA-C      Findings:       82 yo male, E consult for preop clearance of colonoscopy  The chart reviewed.  PMH CAD s/p CABG, HTN HLD   ekg NSR and no acute stt change  2020 MPI showed no ischemia. EFnl    Plan  Elevated periop risk of CV events for non-high risk procedure.  Ok to proceed the scheduled procedure without further cardiac study.  OK to hold ASA 82 mg before the procedure and resume postop once hemodynamically stable      I did not speak to the requesting provider verbally about this.     Total time of Consultation: 15 minute    Percentage of time spent on verbal/written discussion: 75%     *This eConsult is based on the clinical data available to me and is furnished without benefit of a physical examination. The eConsult will need to be interpreted in light of any clinical issues or changes in patient status not available to me at the time of filing this eConsults. Significant changes in patient condition or level of acuity should result in immediate formal consultation and reevaluation. Please alert me if you have further questions.    Thank you for your consult.     Pierre Plummer MD  [facility]

## 2023-01-30 NOTE — PROGRESS NOTES
Clinic Consult:  [facility] Consultation Note    Reason for Consult:  The primary encounter diagnosis was Positive colorectal cancer screening using Cologuard test. A diagnosis of Screening for colon cancer was also pertinent to this visit.    PCP: Dayne Steward   [address] [facility] [id number]    CC: Colonoscopy (Positive cologuard test)      HPI:  This is a 83 y.o. male here for evaluation of the above. Positive cologuard in August 2022. Shortly thereafter, needed cholecystectomy so colonoscopy was put on hold.  Last colonoscopy at age 67 with one cecal ulcer. Path shows chronic inflammation. Today, he denies any gI concerns such as abdominal pain, changes in bowel habits, blood in the stool, abnormal weight loss.   Paternal grandmother with CRC.  Oldest son has Crohns.   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 Recently seen by cardiology.  Only on Baby Aspirin.    Review of Systems   Constitutional:  Negative for activity change, appetite change, chills, diaphoresis, fatigue, fever and unexpected weight change.   HENT:  Negative for trouble swallowing.    Respiratory:  Negative for cough and shortness of breath.    Cardiovascular:  Negative for chest pain.   Gastrointestinal:  Negative for abdominal distention, abdominal pain, anal bleeding, blood in stool, constipation, diarrhea, nausea, rectal pain and vomiting.   Skin:  Negative for color change and pallor.   Neurological:  Negative for dizziness and weakness.   Psychiatric/Behavioral:  Negative for dysphoric mood. The patient is not nervous/anxious.       Medical History:   Past Medical History:   Diagnosis Date    ACE-inhibitor cough     Acute coronary syndrome     Angina pectoris     Cataract     Coronary artery disease     Degenerative disc disease, lumbar     Degenerative disc disease, lumbar     Glaucoma     Hyperlipidemia     cholesterol    Hypertension     Kidney stone     last in 2001    Myocardial infarction     Obesity      Peripheral vascular disease     Polyneuropathy     Sleep apnea     Tobacco dependence     resolved quit 1980    Trouble in sleeping        Surgical History:   Past Surgical History:   Procedure Laterality Date    ABCESS DRAINAGE Left 2003    inguinal abcess/cellulitis - MRSA- 60 days of IVAB and repacking w/ Home health     CORONARY ARTERY BYPASS GRAFT  2002    Two-vessel    cystoscopy and stone removal via scope  2003    several between age 23 and 53    EXTRACTION OF TOOTH  07/2021    EYE SURGERY      lthotripsy  1985    ROBOT-ASSISTED CHOLECYSTECTOMY USING DA ERI XI N/A 8/24/2022    Procedure: XI ROBOTIC CHOLECYSTECTOMY;  Surgeon: Meet Alatorre MD;  Location: [facility] OR;  Service: General;  Laterality: N/A;  Repair of hole in duodenum prior to cholecystectomy    SLT - OU - BOTH EYES  2009    TONSILLECTOMY      at age 4    UMBILICAL HERNIA REPAIR N/A 8/24/2022    Procedure: REPAIR, HERNIA, UMBILICAL, AGE 5 YEARS OR OLDER;  Surgeon: Meet Alatorre MD;  Location: [facility] OR;  Service: General;  Laterality: N/A;    VASECTOMY  1975       Family History:    Family History   Problem Relation Age of Onset    Cancer Mother         brain    COPD Mother     Alcohol abuse Mother     Hypertension Mother     Cancer Father         lung, breast with left mastectomy    COPD Father     Alcohol abuse Father     Hypertension Father     Kidney disease Maternal Grandmother     Alcohol abuse Maternal Grandfather     Kidney disease Maternal Grandfather     Cancer Paternal Grandmother         Gyn & colon    Kidney disease Paternal Grandmother     Blindness Paternal Grandfather     Glaucoma Paternal Grandfather     Alcohol abuse Paternal Grandfather     Kidney disease Paternal Grandfather     Retinal detachment Neg Hx     Macular degeneration Neg Hx     Diabetes Neg Hx     Heart disease Neg Hx     Stroke Neg Hx     Mental illness Neg Hx     Mental retardation Neg Hx        Social History:   Social History     Socioeconomic History     Marital status:    Tobacco Use    Smoking status: Former     Packs/day: 1.00     Years: 10.00     Pack years: 10.00     Types: Cigarettes     Quit date: 1980     Years since quittin.8    Smokeless tobacco: Never   Substance and Sexual Activity    Alcohol use: Yes     Alcohol/week: 7.0 standard drinks     Types: 7 Glasses of wine per week     Comment: nightly wine    Drug use: No    Sexual activity: Yes     Partners: Female     Birth control/protection: Partner-Vasectomy   Social History Narrative    . Lives with wife. They had 4 children. Retired  at [employer] and then [employer] work. Still drives. Does have a Living Will, signed at time of CABG ().      Social Determinants of Health     Financial Resource Strain: Unknown    Difficulty of Paying Living Expenses: Patient refused   Food Insecurity: Unknown    Worried About Running Out of Food in the Last Year: Patient refused    Ran Out of Food in the Last Year: Patient refused   Transportation Needs: Unknown    Lack of Transportation (Medical): No    Lack of Transportation (Non-Medical): Patient refused   Physical Activity: Unknown    Days of Exercise per Week: Patient refused    Minutes of Exercise per Session: 0 min   Stress: Unknown    Feeling of Stress : Patient refused   Social Connections: Socially Integrated    Frequency of Communication with Friends and Family: More than three times a week    Frequency of Social Gatherings with Friends and Family: Once a week    Attends Hoahaoism Services: More than 4 times per year    Active Member of Clubs or Organizations: Yes    Attends Club or Organization Meetings: 1 to 4 times per year    Marital Status:    Housing Stability: Unknown    Unable to Pay for Housing in the Last Year: Patient refused    Number of Places Lived in the Last Year: 1    Unstable Housing in the Last Year: No       Allergies:   Review of patient's allergies indicates:   Allergen Reactions     Codeine Swelling and Other (See Comments)     unknown    Niacin preparations Other (See Comments)     myalgia    Statins-hmg-coa reductase inhibitors      Muscle weakness    Fosinopril Other (See Comments)     cough    Pravastatin Other (See Comments)      Muscle pain    Simvastatin Other (See Comments)     Muscle pain    Sulfa (sulfonamide antibiotics) Rash and Hives       Home Medications:   Current Outpatient Medications on File Prior to Visit   Medication Sig Dispense Refill    aspirin 81 mg Tab Take 1 tablet by mouth Daily.      co-enzyme Q-10 30 mg capsule Take 100 mg by mouth once daily.      cyanocobalamin (VITAMIN B-12) 100 MCG tablet Take 1 tablet by mouth every other day.       hydroCHLOROthiazide (HYDRODIURIL) 25 MG tablet TAKE 1/2 TABLET BY MOUTH ONCE DAILY 45 tablet 3    latanoprost 0.005 % ophthalmic solution PLACE 1 DROP INTO BOTH EYES EVERY EVENING. 7.5 mL 3    losartan (COZAAR) 50 MG tablet TAKE 1 TABLET BY MOUTH TWICE A  tablet 3    LUBRICANT EYE DROPS, GLYC-PG, 1-0.3 % Drop       multivit-min/ferrous fumarate (MULTI VITAMIN ORAL)       nebivoloL (BYSTOLIC) 5 MG Tab Take 1 tablet (5 mg total) by mouth once daily. 30 tablet 11    OCEAN NASAL 0.65 % nasal spray       omega-3 fatty acids-vitamin E 1,000 mg Cap Take 1 capsule by mouth Daily.      saw palmetto 500 MG capsule Take by mouth 2 (two) times daily.       timolol maleate 0.5% (TIMOPTIC) 0.5 % Drop Place 1 drop into both eyes once daily. 10 mL 4    zinc gluconate 50 mg tablet Take 50 mg by mouth once daily.      fluticasone propionate (FLONASE) 50 mcg/actuation nasal spray 2 sprays (100 mcg total) by Each Nostril route once daily. (Patient not taking: Reported on 1/30/2023) 16 g 1    nitroGLYCERIN (NITROSTAT) 0.4 MG SL tablet Place 1 tablet (0.4 mg total) under the tongue every 5 (five) minutes as needed for Chest pain (if no relief seek er trmt). (Patient not taking: Reported on 1/30/2023) 25 tablet 3    ondansetron (ZOFRAN) 4 MG  "tablet Take 1 tablet (4 mg total) by mouth every 6 (six) hours. (Patient not taking: Reported on 2023) 30 tablet 0    promethazine-dextromethorphan (PROMETHAZINE-DM) 6.25-15 mg/5 mL Syrp Take 5 mLs by mouth every 6 to 8 hours as needed. (Patient not taking: Reported on 2023) 180 mL 0     No current facility-administered medications on file prior to visit.       Physical Exam:  BP (!) 154/76   Pulse 60   Ht 5' 11" (1.803 m)   Wt 96.8 kg (213 lb 6.5 oz)   SpO2 97%   BMI 29.76 kg/m²   Body mass index is 29.76 kg/m².  Physical Exam  Constitutional:       General: He is not in acute distress.     Appearance: Normal appearance. He is not ill-appearing, toxic-appearing or diaphoretic.   HENT:      Head: Normocephalic and atraumatic.   Eyes:      General: No scleral icterus.     Extraocular Movements: Extraocular movements intact.   Cardiovascular:      Rate and Rhythm: Normal rate and regular rhythm.   Pulmonary:      Effort: Pulmonary effort is normal. No respiratory distress.      Breath sounds: Normal breath sounds.   Abdominal:      General: Bowel sounds are normal. There is no distension.      Palpations: Abdomen is soft.      Tenderness: There is no abdominal tenderness. There is no guarding.   Musculoskeletal:         General: Normal range of motion.      Cervical back: Normal range of motion.   Skin:     General: Skin is warm and dry.      Coloration: Skin is not jaundiced or pale.   Neurological:      Mental Status: He is alert and oriented to person, place, and time.         Labs: Pertinent labs reviewed.  Endoscopy: --  CRC Screenin  Anticoagulation: Aspirin 81mg    Assessment:  1. Positive colorectal cancer screening using Cologuard test    2. Screening for colon cancer         Recommendations:  -Schedule for colonoscopy given + cologuard. Last cscope in  with cecal ulcer - chronic inflammation on pathology.   -Golytely  -Referral to endoscopy  -e consult to cardiology - history of " MI    Positive colorectal cancer screening using Cologuard test  -     Case Request Endoscopy: COLONOSCOPY  -     Ambulatory referral/consult to Endo Procedure ; Future; Expected date: 01/31/2023  -     polyethylene glycol (GOLYTELY) 236-22.74-6.74 -5.86 gram suspension; Take 4,000 mLs (4 L total) by mouth once. for 1 dose  Dispense: 4000 mL; Refill: 0    Screening for colon cancer  -     Ambulatory referral/consult to Gastroenterology        No follow-ups on file.    Thank you so much for allowing me to participate in the care of Wilberto Mora PA-C

## 2023-02-03 ENCOUNTER — HOSPITAL ENCOUNTER (OUTPATIENT)
Dept: PREADMISSION TESTING | Facility: HOSPITAL | Age: 83
Discharge: HOME | End: 2023-02-03
Attending: PHYSICIAN ASSISTANT
Payer: MEDICARE

## 2023-02-03 DIAGNOSIS — R19.5 POSITIVE COLORECTAL CANCER SCREENING USING COLOGUARD TEST: ICD-10-CM

## 2023-02-04 ENCOUNTER — PATIENT MESSAGE (OUTPATIENT)
Dept: OPHTHALMOLOGY | Facility: CLINIC | Age: 83
End: 2023-02-04
Payer: MEDICARE

## 2023-02-06 RX ORDER — LATANOPROST 50 UG/ML
1 SOLUTION/ DROPS OPHTHALMIC NIGHTLY
Qty: 7.5 ML | Refills: 3 | Status: SHIPPED | OUTPATIENT
Start: 2023-02-06 | End: 2023-06-08

## 2023-02-06 RX ORDER — TIMOLOL MALEATE 5 MG/ML
1 SOLUTION/ DROPS OPHTHALMIC DAILY
Qty: 10 ML | Refills: 4 | Status: SHIPPED | OUTPATIENT
Start: 2023-02-06 | End: 2023-06-08

## 2023-02-07 ENCOUNTER — PATIENT MESSAGE (OUTPATIENT)
Dept: GASTROENTEROLOGY | Facility: CLINIC | Age: 83
End: 2023-02-07
Payer: MEDICARE

## 2023-02-28 ENCOUNTER — PATIENT MESSAGE (OUTPATIENT)
Dept: OPHTHALMOLOGY | Facility: CLINIC | Age: 83
End: 2023-02-28
Payer: MEDICARE

## 2023-03-02 ENCOUNTER — ANESTHESIA (OUTPATIENT)
Dept: ENDOSCOPY | Facility: HOSPITAL | Age: 83
End: 2023-03-02
Payer: MEDICARE

## 2023-03-02 ENCOUNTER — HOSPITAL ENCOUNTER (OUTPATIENT)
Facility: HOSPITAL | Age: 83
Discharge: HOME | End: 2023-03-02
Attending: INTERNAL MEDICINE | Admitting: INTERNAL MEDICINE
Payer: MEDICARE

## 2023-03-02 ENCOUNTER — ANESTHESIA EVENT (OUTPATIENT)
Dept: ENDOSCOPY | Facility: HOSPITAL | Age: 83
End: 2023-03-02
Payer: MEDICARE

## 2023-03-02 DIAGNOSIS — Z12.11 COLON CANCER SCREENING: Primary | ICD-10-CM

## 2023-03-02 PROCEDURE — 27201012 HC FORCEPS, HOT/COLD, DISP: Performed by: INTERNAL MEDICINE

## 2023-03-02 PROCEDURE — 45380 COLONOSCOPY AND BIOPSY: CPT | Mod: PT,,, | Performed by: INTERNAL MEDICINE

## 2023-03-02 PROCEDURE — 63600175 PHARM REV CODE 636 W HCPCS: Performed by: NURSE ANESTHETIST, CERTIFIED REGISTERED

## 2023-03-02 PROCEDURE — 45380 COLONOSCOPY AND BIOPSY: CPT | Mod: PT | Performed by: INTERNAL MEDICINE

## 2023-03-02 PROCEDURE — 88305 TISSUE EXAM BY PATHOLOGIST: ICD-10-PCS | Mod: 26,,, | Performed by: PATHOLOGY

## 2023-03-02 PROCEDURE — 37000008 HC ANESTHESIA 1ST 15 MINUTES: Performed by: INTERNAL MEDICINE

## 2023-03-02 PROCEDURE — 25000003 PHARM REV CODE 250: Performed by: NURSE ANESTHETIST, CERTIFIED REGISTERED

## 2023-03-02 PROCEDURE — 37000009 HC ANESTHESIA EA ADD 15 MINS: Performed by: INTERNAL MEDICINE

## 2023-03-02 PROCEDURE — 88305 TISSUE EXAM BY PATHOLOGIST: CPT | Mod: 26,,, | Performed by: PATHOLOGY

## 2023-03-02 PROCEDURE — 88305 TISSUE EXAM BY PATHOLOGIST: CPT | Mod: 59 | Performed by: PATHOLOGY

## 2023-03-02 PROCEDURE — 45380 PR COLONOSCOPY,BIOPSY: ICD-10-PCS | Mod: PT,,, | Performed by: INTERNAL MEDICINE

## 2023-03-02 PROCEDURE — 25000003 PHARM REV CODE 250: Performed by: INTERNAL MEDICINE

## 2023-03-02 RX ORDER — LIDOCAINE HYDROCHLORIDE 10 MG/ML
INJECTION, SOLUTION EPIDURAL; INFILTRATION; INTRACAUDAL; PERINEURAL
Status: DISCONTINUED | OUTPATIENT
Start: 2023-03-02 | End: 2023-03-02

## 2023-03-02 RX ORDER — PROPOFOL 10 MG/ML
VIAL (ML) INTRAVENOUS
Status: DISCONTINUED | OUTPATIENT
Start: 2023-03-02 | End: 2023-03-02

## 2023-03-02 RX ORDER — DEXTROMETHORPHAN/PSEUDOEPHED 2.5-7.5/.8
DROPS ORAL
Status: COMPLETED | OUTPATIENT
Start: 2023-03-02 | End: 2023-03-02

## 2023-03-02 RX ADMIN — SODIUM CHLORIDE, SODIUM LACTATE, POTASSIUM CHLORIDE, AND CALCIUM CHLORIDE: .6; .31; .03; .02 INJECTION, SOLUTION INTRAVENOUS at 09:03

## 2023-03-02 RX ADMIN — PROPOFOL 30 MG: 10 INJECTION, EMULSION INTRAVENOUS at 10:03

## 2023-03-02 RX ADMIN — PROPOFOL 70 MG: 10 INJECTION, EMULSION INTRAVENOUS at 10:03

## 2023-03-02 RX ADMIN — LIDOCAINE HYDROCHLORIDE 50 MG: 10 INJECTION, SOLUTION EPIDURAL; INFILTRATION; INTRACAUDAL; PERINEURAL at 10:03

## 2023-03-02 NOTE — ANESTHESIA POSTPROCEDURE EVALUATION
Anesthesia Post Evaluation    Patient: Wilberto Hayes Jr.    Procedure(s) Performed: Procedure(s) (LRB):  COLONOSCOPY (N/A)    Final Anesthesia Type: MAC      Patient location during evaluation: PACU  Patient participation: Yes- Able to Participate  Level of consciousness: awake and alert  Post-procedure vital signs: reviewed and stable  Pain management: adequate  Airway patency: patent    PONV status at discharge: No PONV  Anesthetic complications: no      Cardiovascular status: blood pressure returned to baseline  Respiratory status: unassisted and room air  Hydration status: euvolemic  Follow-up not needed.          Vitals Value Taken Time   /70 03/02/23 0941   Temp 36.5 °C (97.7 °F) 03/02/23 0940   Pulse 61 03/02/23 0940   Resp 16 03/02/23 0940   SpO2 96 % 03/02/23 0940         No case tracking events are documented in the log.      Pain/Trisha Score: No data recorded

## 2023-03-02 NOTE — PROVATION PATIENT INSTRUCTIONS
Discharge Summary/Instructions after an Endoscopic Procedure  Patient Name: Wilberto Hayes  Patient MRN: [id number]  Patient YOB: 1940 Thursday, March 2, 2023 Bambi Bailey MD  Dear patient,  As a result of recent federal legislation (The Federal Cures Act), you may   receive lab or pathology results from your procedure in your [facility]   account before your physician is able to contact you. Your physician or   their representative will relay the results to you with their   recommendations at their soonest availability.  Thank you,  RESTRICTIONS:  During your procedure today, you received medications for sedation.  These   medications may affect your judgment, balance and coordination.  Therefore,   for 24 hours, you have the following restrictions:   - DO NOT drive a car, operate machinery, make legal/financial decisions,   sign important papers or drink alcohol.    ACTIVITY:  Today: no heavy lifting, straining or running due to procedural   sedation/anesthesia.  The following day: return to full activity including work.  DIET:  Eat and drink normally unless instructed otherwise.     TREATMENT FOR COMMON SIDE EFFECTS:  - Mild abdominal pain, nausea, belching, bloating or excessive gas:  rest,   eat lightly and use a heating pad.  - Sore Throat: treat with throat lozenges and/or gargle with warm salt   water.  - Because air was used during the procedure, expelling large amounts of air   from your rectum or belching is normal.  - If a bowel prep was taken, you may not have a bowel movement for 1-3 days.    This is normal.  SYMPTOMS TO WATCH FOR AND REPORT TO YOUR PHYSICIAN:  1. Abdominal pain or bloating, other than gas cramps.  2. Chest pain.  3. Back pain.  4. Signs of infection such as: chills or fever occurring within 24 hours   after the procedure.  5. Rectal bleeding, which would show as bright red, maroon, or black stools.   (A tablespoon of blood from the rectum is not serious, especially  if   hemorrhoids are present.)  6. Vomiting.  7. Weakness or dizziness.  GO DIRECTLY TO THE NEAREST EMERGENCY ROOM IF YOU HAVE ANY OF THE FOLLOWING:      Difficulty breathing              Chills and/or fever over 101 F   Persistent vomiting and/or vomiting blood   Severe abdominal pain   Severe chest pain   Black, tarry stools   Bleeding- more than one tablespoon   Any other symptom or condition that you feel may need urgent attention  Your doctor recommends these additional instructions:  If any biopsies were taken, your doctors clinic will contact you in 1 to 2   weeks with any results.  - Patient has a contact number available for emergencies.  The signs and   symptoms of potential delayed complications were discussed with the   patient.  Return to normal activities tomorrow.  Written discharge   instructions were provided to the patient.   - Discharge patient to home (via wheelchair).   - Resume previous diet today.   - Continue present medications.   - Await pathology results.   - Repeat colonoscopy is not recommended for screening purposes.  For questions, problems or results please call your physician Bambi Bailey MD at Work:  [phone]  If you have any questions about the above instructions, call the GI   department at [phone] or call the endoscopy unit at [phone]   from 7am until 3 pm.  [facility] [place] [facility], EMERGENCY ROOM PHONE NUMBER:   [phone]  IF A COMPLICATION OR EMERGENCY SITUATION ARISES AND YOU ARE UNABLE TO REACH   YOUR PHYSICIAN - GO DIRECTLY TO THE EMERGENCY ROOM.  I have read or have had read to me these discharge instructions for my   procedure and have received a written copy.  I understand these   instructions and will follow-up with my physician if I have any questions.     __________________________________       _____________________________________  Nurse Signature                                          Patient/Designated   Responsible Party  Signature  MD Bambi Block MD  3/2/2023 10:27:27 AM  This report has been verified and signed electronically.  Dear patient,  As a result of recent federal legislation (The Federal Cures Act), you may   receive lab or pathology results from your procedure in your [facility]   account before your physician is able to contact you. Your physician or   their representative will relay the results to you with their   recommendations at their soonest availability.  Thank you,  PROVATION

## 2023-03-02 NOTE — ANESTHESIA PREPROCEDURE EVALUATION
03/02/2023  Wilberto Hayes Jr. is a 83 y.o., male.      Pre-op Assessment    I have reviewed the Patient Summary Reports.     I have reviewed the Nursing Notes. I have reviewed the NPO Status.   I have reviewed the Medications.     Review of Systems  Anesthesia Hx:  No problems with previous Anesthesia  Denies Family Hx of Anesthesia complications.   Denies Personal Hx of Anesthesia complications.   Social:  Former Smoker, Non-Smoker    Hematology/Oncology:  Hematology Normal   Oncology Normal     EENT/Dental:EENT/Dental Normal   Cardiovascular:   Hypertension Past MI CAD  CABG/stent  Angina ECG has been reviewed.    Pulmonary:   Sleep Apnea, CPAP    Renal/:   Chronic Renal Disease    Hepatic/GI:  Hepatic/GI Normal    Musculoskeletal:   Arthritis     Neurological:   Neuromuscular Disease,    Endocrine:  Endocrine Normal    Dermatological:  Skin Normal    Psych:  Psychiatric Normal           Physical Exam  General: Well nourished, Cooperative, Alert and Oriented    Airway:  Mallampati: II   Mouth Opening: Normal  TM Distance: Normal  Tongue: Normal  Neck ROM: Normal ROM    Chest/Lungs:  Clear to auscultation, Normal Respiratory Rate    Heart:  Rate: Normal  Rhythm: Regular Rhythm        Anesthesia Plan  Type of Anesthesia, risks & benefits discussed:    Anesthesia Type: MAC  Intra-op Monitoring Plan: Standard ASA Monitors  Induction:  IV  Informed Consent: Informed consent signed with the Patient and all parties understand the risks and agree with anesthesia plan.  All questions answered.   ASA Score: 3  Day of Surgery Review of History & Physical: H&P Update referred to the surgeon/provider.I have interviewed and examined the patient. I have reviewed the patient's H&P dated: There are no significant changes.     Ready For Surgery From Anesthesia Perspective.     .

## 2023-03-02 NOTE — TRANSFER OF CARE
"Anesthesia Transfer of Care Note    Patient: Wilberto Hayes Jr.    Procedure(s) Performed: Procedure(s) (LRB):  COLONOSCOPY (N/A)    Patient location: PACU    Anesthesia Type: MAC    Transport from OR: Transported from OR on room air with adequate spontaneous ventilation    Post pain: adequate analgesia    Post assessment: no apparent anesthetic complications    Post vital signs: stable    Level of consciousness: responds to stimulation    Nausea/Vomiting: no nausea/vomiting    Complications: none    Transfer of care protocol was followed      Last vitals:   Visit Vitals  BP (!) 158/70   Pulse 61   Temp 36.5 °C (97.7 °F) (Tympanic)   Resp 16   Ht 5' 11" (1.803 m)   Wt 97.5 kg (215 lb)   SpO2 96%   BMI 29.99 kg/m²     "

## 2023-03-02 NOTE — H&P
PRE PROCEDURE H&P    Patient Name: Wilberto Hayes Jr.  MRN: [id number]  : 1940  Date of Procedure:  3/2/2023  Referring Physician: Maura Mora PA-C  Primary Physician: Dayne Steward MD  Procedure Physician: Bambi Bailey MD       Planned Procedure: Colonoscopy  Diagnosis: screening for colon cancer  Chief Complaint: Same as above    HPI: Patient is an 83 y.o. male is here for the above.     Last colonoscopy: ?   Family history: neg   Anticoagulation: none     Past Medical History:   Past Medical History:   Diagnosis Date    ACE-inhibitor cough     Acute coronary syndrome     Angina pectoris     Cataract     Coronary artery disease     Degenerative disc disease, lumbar     Degenerative disc disease, lumbar     Glaucoma     Hyperlipidemia     cholesterol    Hypertension     Kidney stone     last in     Myocardial infarction     Obesity     Peripheral vascular disease     Polyneuropathy     Sleep apnea     Tobacco dependence     resolved quit     Trouble in sleeping         Past Surgical History:  Past Surgical History:   Procedure Laterality Date    ABCESS DRAINAGE Left     inguinal abcess/cellulitis - MRSA- 60 days of IVAB and repacking w/ Home health     CORONARY ARTERY BYPASS GRAFT      Two-vessel    cystoscopy and stone removal via scope      several between age 23 and 53    EXTRACTION OF TOOTH  2021    EYE SURGERY      lthotripsy  1985    ROBOT-ASSISTED CHOLECYSTECTOMY USING DA ERI XI N/A 2022    Procedure: XI ROBOTIC CHOLECYSTECTOMY;  Surgeon: Meet Alatorre MD;  Location: [facility] OR;  Service: General;  Laterality: N/A;  Repair of hole in duodenum prior to cholecystectomy    SLT - OU - BOTH EYES  2009    TONSILLECTOMY      at age 4    UMBILICAL HERNIA REPAIR N/A 2022    Procedure: REPAIR, HERNIA, UMBILICAL, AGE 5 YEARS OR OLDER;  Surgeon: Meet Alatorre MD;  Location: [facility] OR;  Service: General;  Laterality: N/A;    VASECTOMY  1975        Home  Medications:  Prior to Admission medications    Medication Sig Start Date End Date Taking? Authorizing Provider   aspirin 81 mg Tab Take 1 tablet by mouth Daily. 5/8/12  Yes Historical Provider   co-enzyme Q-10 30 mg capsule Take 100 mg by mouth once daily.   Yes Historical Provider   cyanocobalamin (VITAMIN B-12) 100 MCG tablet Take 1 tablet by mouth every other day.  5/8/12  Yes Historical Provider   hydroCHLOROthiazide (HYDRODIURIL) 25 MG tablet TAKE 1/2 TABLET BY MOUTH ONCE DAILY 5/31/22  Yes Jonel Valle MD   latanoprost 0.005 % ophthalmic solution Place 1 drop into both eyes every evening. 2/6/23  Yes Reggie Molina MD   losartan (COZAAR) 50 MG tablet TAKE 1 TABLET BY MOUTH TWICE A DAY 1/7/23  Yes Jonel Valle MD   LUBRICANT EYE DROPS, GLYC-PG, 1-0.3 % Drop  7/5/16  Yes Historical Provider   multivit-min/ferrous fumarate (MULTI VITAMIN ORAL)    Yes Historical Provider   nebivoloL (BYSTOLIC) 5 MG Tab Take 1 tablet (5 mg total) by mouth once daily. 12/20/22 12/20/23 Yes Jonel Valle MD   OCEAN NASAL 0.65 % nasal spray  6/4/16  Yes Historical Provider   omega-3 fatty acids-vitamin E 1,000 mg Cap Take 1 capsule by mouth Daily. 5/8/12  Yes Historical Provider   ondansetron (ZOFRAN) 4 MG tablet Take 1 tablet (4 mg total) by mouth every 6 (six) hours. 8/10/22  Yes So Qureshi Do, MD   saw palmetto 500 MG capsule Take by mouth 2 (two) times daily.  5/8/12  Yes Historical Provider   timolol maleate 0.5% (TIMOPTIC) 0.5 % Drop Place 1 drop into both eyes once daily. 2/6/23  Yes Reggie Molina MD   zinc gluconate 50 mg tablet Take 50 mg by mouth once daily.   Yes Historical Provider   fluticasone propionate (FLONASE) 50 mcg/actuation nasal spray 2 sprays (100 mcg total) by Each Nostril route once daily.  Patient not taking: Reported on 1/30/2023 8/8/21   Shawanda D Link, NP   nitroGLYCERIN (NITROSTAT) 0.4 MG SL tablet Place 1 tablet (0.4 mg total) under the tongue every 5 (five)  minutes as needed for Chest pain (if no relief seek er trmt).  Patient not taking: Reported on 2023   Jonel Valle MD   promethazine-dextromethorphan (PROMETHAZINE-DM) 6.25-15 mg/5 mL Syrp Take 5 mLs by mouth every 6 to 8 hours as needed.  Patient not taking: Reported on 2023   Shawanda Maza NP        Allergies:  Review of patient's allergies indicates:   Allergen Reactions    Codeine Swelling and Other (See Comments)     unknown    Niacin preparations Other (See Comments)     myalgia    Statins-hmg-coa reductase inhibitors      Muscle weakness    Fosinopril Other (See Comments)     cough    Pravastatin Other (See Comments)      Muscle pain    Simvastatin Other (See Comments)     Muscle pain    Sulfa (sulfonamide antibiotics) Rash and Hives        Social History:   Social History     Socioeconomic History    Marital status:    Tobacco Use    Smoking status: Former     Packs/day: 1.00     Years: 10.00     Pack years: 10.00     Types: Cigarettes     Quit date: 1980     Years since quittin.9    Smokeless tobacco: Never   Substance and Sexual Activity    Alcohol use: Yes     Alcohol/week: 7.0 standard drinks     Types: 7 Glasses of wine per week     Comment: nightly wine    Drug use: No    Sexual activity: Yes     Partners: Female     Birth control/protection: Partner-Vasectomy   Social History Narrative    . Lives with wife. They had 4 children. Retired  at [employer] and then [employer] work. Still drives. Does have a Living Will, signed at time of CABG ().      Social Determinants of Health     Financial Resource Strain: Unknown    Difficulty of Paying Living Expenses: Patient refused   Food Insecurity: Unknown    Worried About Running Out of Food in the Last Year: Patient refused    Ran Out of Food in the Last Year: Patient refused   Transportation Needs: Unknown    Lack of Transportation (Medical): No    Lack of Transportation  "(Non-Medical): Patient refused   Physical Activity: Unknown    Days of Exercise per Week: Patient refused    Minutes of Exercise per Session: 0 min   Stress: Unknown    Feeling of Stress : Patient refused   Social Connections: Socially Integrated    Frequency of Communication with Friends and Family: More than three times a week    Frequency of Social Gatherings with Friends and Family: Once a week    Attends Hoahaoism Services: More than 4 times per year    Active Member of Clubs or Organizations: Yes    Attends Club or Organization Meetings: 1 to 4 times per year    Marital Status:    Housing Stability: Unknown    Unable to Pay for Housing in the Last Year: Patient refused    Number of Places Lived in the Last Year: 1    Unstable Housing in the Last Year: No       Family History:  Family History   Problem Relation Age of Onset    Cancer Mother         brain    COPD Mother     Alcohol abuse Mother     Hypertension Mother     Cancer Father         lung, breast with left mastectomy    COPD Father     Alcohol abuse Father     Hypertension Father     Kidney disease Maternal Grandmother     Alcohol abuse Maternal Grandfather     Kidney disease Maternal Grandfather     Cancer Paternal Grandmother         Gyn & colon    Kidney disease Paternal Grandmother     Blindness Paternal Grandfather     Glaucoma Paternal Grandfather     Alcohol abuse Paternal Grandfather     Kidney disease Paternal Grandfather     Retinal detachment Neg Hx     Macular degeneration Neg Hx     Diabetes Neg Hx     Heart disease Neg Hx     Stroke Neg Hx     Mental illness Neg Hx     Mental retardation Neg Hx        ROS: No acute cardiac events, no acute respiratory complaints.     Physical Exam (all patients):    BP (!) 158/70   Pulse 61   Temp 97.7 °F (36.5 °C) (Tympanic)   Resp 16   Ht 5' 11" (1.803 m)   Wt 97.5 kg (215 lb)   SpO2 96%   BMI 29.99 kg/m²   Lungs: Clear to auscultation bilaterally, respirations unlabored  Heart: Regular " rate and rhythm, S1 and S2 normal, no obvious murmurs  Abdomen:         Soft, non-tender, bowel sounds normal, no masses, no organomegaly    Lab Results   Component Value Date    WBC 11.68 08/10/2022    MCV 92 08/10/2022    RDW 12.3 08/10/2022     08/10/2022    INR 1.0 09/07/2012    GLU 95 08/25/2022    HGBA1C 4.7 05/22/2019    BUN 22 08/25/2022     08/25/2022    K 4.9 08/25/2022     08/25/2022        SEDATION PLAN: per anesthesia      History reviewed, vital signs satisfactory, cardiopulmonary status satisfactory, sedation options, risks and plans have been discussed with the patient  All their questions were answered and the patient agrees to the sedation procedures as planned and the patient is deemed an appropriate candidate for the sedation as planned.    Procedure explained to patient, informed consent obtained and placed in chart.    Bambi Bailey  3/2/2023  10:07 AM

## 2023-03-03 VITALS
BODY MASS INDEX: 30.1 KG/M2 | HEIGHT: 71 IN | OXYGEN SATURATION: 96 % | WEIGHT: 215 LBS | SYSTOLIC BLOOD PRESSURE: 123 MMHG | RESPIRATION RATE: 16 BRPM | DIASTOLIC BLOOD PRESSURE: 57 MMHG | HEART RATE: 54 BPM | TEMPERATURE: 97 F

## 2023-03-06 ENCOUNTER — PES CALL (OUTPATIENT)
Dept: ADMINISTRATIVE | Facility: CLINIC | Age: 83
End: 2023-03-06
Payer: MEDICARE

## 2023-03-07 LAB
FINAL PATHOLOGIC DIAGNOSIS: NORMAL
Lab: NORMAL

## 2023-03-09 ENCOUNTER — OFFICE VISIT (OUTPATIENT)
Dept: OPHTHALMOLOGY | Facility: CLINIC | Age: 83
End: 2023-03-09
Payer: MEDICARE

## 2023-03-09 DIAGNOSIS — H04.123 DRY EYES, BILATERAL: ICD-10-CM

## 2023-03-09 DIAGNOSIS — Z96.1 PSEUDOPHAKIA OF LEFT EYE: ICD-10-CM

## 2023-03-09 DIAGNOSIS — H25.11 NUCLEAR SCLEROSIS OF RIGHT EYE: ICD-10-CM

## 2023-03-09 DIAGNOSIS — H40.1131 PRIMARY OPEN ANGLE GLAUCOMA OF BOTH EYES, MILD STAGE: Primary | ICD-10-CM

## 2023-03-09 PROCEDURE — 99999 PR PBB SHADOW E&M-EST. PATIENT-LVL III: ICD-10-PCS | Mod: PBBFAC,,, | Performed by: OPHTHALMOLOGY

## 2023-03-09 PROCEDURE — 99214 PR OFFICE/OUTPT VISIT, EST, LEVL IV, 30-39 MIN: ICD-10-PCS | Mod: S$GLB,,, | Performed by: OPHTHALMOLOGY

## 2023-03-09 PROCEDURE — 1160F RVW MEDS BY RX/DR IN RCRD: CPT | Mod: CPTII,S$GLB,, | Performed by: OPHTHALMOLOGY

## 2023-03-09 PROCEDURE — 1160F PR REVIEW ALL MEDS BY PRESCRIBER/CLIN PHARMACIST DOCUMENTED: ICD-10-PCS | Mod: CPTII,S$GLB,, | Performed by: OPHTHALMOLOGY

## 2023-03-09 PROCEDURE — 1159F MED LIST DOCD IN RCRD: CPT | Mod: CPTII,S$GLB,, | Performed by: OPHTHALMOLOGY

## 2023-03-09 PROCEDURE — 99999 PR PBB SHADOW E&M-EST. PATIENT-LVL III: CPT | Mod: PBBFAC,,, | Performed by: OPHTHALMOLOGY

## 2023-03-09 PROCEDURE — 99214 OFFICE O/P EST MOD 30 MIN: CPT | Mod: S$GLB,,, | Performed by: OPHTHALMOLOGY

## 2023-03-09 PROCEDURE — 1159F PR MEDICATION LIST DOCUMENTED IN MEDICAL RECORD: ICD-10-PCS | Mod: CPTII,S$GLB,, | Performed by: OPHTHALMOLOGY

## 2023-03-09 NOTE — PROGRESS NOTES
"SUBJECTIVE  Wilberto Hayes Jr. is 83 y.o. male  Corrected distance visual acuity was 20/40 -2 in the right eye and 20/30 -2 in the left eye. Comments: Using older pair old glasses .   Chief Complaint   Patient presents with    Glaucoma     Pt reports for IOP check afte d/c timolol. Denies any pain or irritation. Va stable. 100% compliant with gtts.           HPI     Glaucoma     Additional comments: Pt reports for IOP check afte d/c timolol. Denies   any pain or irritation. Va stable. 100% compliant with gtts.            Comments    1. Mild COAG OS>OD Goal =17-18 (Init 20/22)  +Fhx Glaucoma -g-dad   SLT OD 11/09 (min response) + 1/8/20 (22.5-14.5)   SLT OS 8/09 (22 to 16-17) + 1/8/20 (24.5-14)   2. Mod NSC OD  PCIOL OS with Omni 5/11/22  3. Dry eyes (no dry mouth)   Gel Plugs OU   Xiidra caused irritation   Tried Restasis (burning and blurry vision)   4. Fuchs' corneal dystrophy  5. Ptosis OD     Latanoprost QHS OU   Timolol QAM OU (d/c by pt)      Homeopathic AT"s (Similasan)   Thera tears, Soothe XP   O3FO            Last edited by Rick Kelly on 3/9/2023  9:35 AM.         Assessment /Plan :  1. Primary open angle glaucoma of both eyes, mild stage Doing well, intraocular pressure (IOP) within acceptable range relative to target IOP and no evidence of progression. Continue current treatment. Reviewed importance of continued compliance with treatment and follow up.      Patient instructed to continue using the following glaucoma medication as follows:  Latanoprost one drop in each eye nightly       2. Nuclear sclerosis of right eye - monitor for now   3. Pseudophakia of left eye  -- Condition stable, no therapeutic change required. Monitoring routinely.     4. Dry eyes, bilateral  -- Condition stable, no therapeutic change required. Monitoring routinely.       RTC as scheduled or prn          "

## 2023-03-22 ENCOUNTER — HOSPITAL ENCOUNTER (OUTPATIENT)
Dept: RADIOLOGY | Facility: HOSPITAL | Age: 83
Discharge: HOME | End: 2023-03-22
Attending: NURSE PRACTITIONER
Payer: MEDICARE

## 2023-03-22 ENCOUNTER — PATIENT MESSAGE (OUTPATIENT)
Dept: PULMONOLOGY | Facility: CLINIC | Age: 83
End: 2023-03-22

## 2023-03-22 ENCOUNTER — OFFICE VISIT (OUTPATIENT)
Dept: PULMONOLOGY | Facility: CLINIC | Age: 83
End: 2023-03-22
Payer: MEDICARE

## 2023-03-22 VITALS
DIASTOLIC BLOOD PRESSURE: 60 MMHG | WEIGHT: 213.75 LBS | OXYGEN SATURATION: 100 % | SYSTOLIC BLOOD PRESSURE: 122 MMHG | HEIGHT: 71 IN | HEART RATE: 55 BPM | BODY MASS INDEX: 29.93 KG/M2

## 2023-03-22 DIAGNOSIS — R91.1 PULMONARY NODULE: Primary | ICD-10-CM

## 2023-03-22 DIAGNOSIS — I25.119 ATHEROSCLEROSIS OF NATIVE CORONARY ARTERY OF NATIVE HEART WITH ANGINA PECTORIS: ICD-10-CM

## 2023-03-22 DIAGNOSIS — G47.33 OSA ON CPAP: Chronic | ICD-10-CM

## 2023-03-22 DIAGNOSIS — I10 ESSENTIAL HYPERTENSION: ICD-10-CM

## 2023-03-22 DIAGNOSIS — R91.1 PULMONARY NODULE: ICD-10-CM

## 2023-03-22 DIAGNOSIS — E66.9 OBESITY (BMI 30.0-34.9): Chronic | ICD-10-CM

## 2023-03-22 PROCEDURE — 1160F PR REVIEW ALL MEDS BY PRESCRIBER/CLIN PHARMACIST DOCUMENTED: ICD-10-PCS | Mod: CPTII,S$GLB,, | Performed by: NURSE PRACTITIONER

## 2023-03-22 PROCEDURE — 1101F PR PT FALLS ASSESS DOC 0-1 FALLS W/OUT INJ PAST YR: ICD-10-PCS | Mod: CPTII,S$GLB,, | Performed by: NURSE PRACTITIONER

## 2023-03-22 PROCEDURE — 71250 CT THORAX DX C-: CPT | Mod: 26,,, | Performed by: RADIOLOGY

## 2023-03-22 PROCEDURE — 71250 CT CHEST WITHOUT CONTRAST: ICD-10-PCS | Mod: 26,,, | Performed by: RADIOLOGY

## 2023-03-22 PROCEDURE — 3078F DIAST BP <80 MM HG: CPT | Mod: CPTII,S$GLB,, | Performed by: NURSE PRACTITIONER

## 2023-03-22 PROCEDURE — 99214 PR OFFICE/OUTPT VISIT, EST, LEVL IV, 30-39 MIN: ICD-10-PCS | Mod: S$GLB,,, | Performed by: NURSE PRACTITIONER

## 2023-03-22 PROCEDURE — 71250 CT THORAX DX C-: CPT | Mod: TC

## 2023-03-22 PROCEDURE — 1160F RVW MEDS BY RX/DR IN RCRD: CPT | Mod: CPTII,S$GLB,, | Performed by: NURSE PRACTITIONER

## 2023-03-22 PROCEDURE — 1159F PR MEDICATION LIST DOCUMENTED IN MEDICAL RECORD: ICD-10-PCS | Mod: CPTII,S$GLB,, | Performed by: NURSE PRACTITIONER

## 2023-03-22 PROCEDURE — 99999 PR PBB SHADOW E&M-EST. PATIENT-LVL IV: ICD-10-PCS | Mod: PBBFAC,,, | Performed by: NURSE PRACTITIONER

## 2023-03-22 PROCEDURE — 3074F PR MOST RECENT SYSTOLIC BLOOD PRESSURE < 130 MM HG: ICD-10-PCS | Mod: CPTII,S$GLB,, | Performed by: NURSE PRACTITIONER

## 2023-03-22 PROCEDURE — 3288F FALL RISK ASSESSMENT DOCD: CPT | Mod: CPTII,S$GLB,, | Performed by: NURSE PRACTITIONER

## 2023-03-22 PROCEDURE — 3078F PR MOST RECENT DIASTOLIC BLOOD PRESSURE < 80 MM HG: ICD-10-PCS | Mod: CPTII,S$GLB,, | Performed by: NURSE PRACTITIONER

## 2023-03-22 PROCEDURE — 99499 UNLISTED E&M SERVICE: CPT | Mod: S$GLB,,, | Performed by: NURSE PRACTITIONER

## 2023-03-22 PROCEDURE — 99499 RISK ADDL DX/OHS AUDIT: ICD-10-PCS | Mod: S$GLB,,, | Performed by: NURSE PRACTITIONER

## 2023-03-22 PROCEDURE — 3074F SYST BP LT 130 MM HG: CPT | Mod: CPTII,S$GLB,, | Performed by: NURSE PRACTITIONER

## 2023-03-22 PROCEDURE — 3288F PR FALLS RISK ASSESSMENT DOCUMENTED: ICD-10-PCS | Mod: CPTII,S$GLB,, | Performed by: NURSE PRACTITIONER

## 2023-03-22 PROCEDURE — 1101F PT FALLS ASSESS-DOCD LE1/YR: CPT | Mod: CPTII,S$GLB,, | Performed by: NURSE PRACTITIONER

## 2023-03-22 PROCEDURE — 99999 PR PBB SHADOW E&M-EST. PATIENT-LVL IV: CPT | Mod: PBBFAC,,, | Performed by: NURSE PRACTITIONER

## 2023-03-22 PROCEDURE — 1159F MED LIST DOCD IN RCRD: CPT | Mod: CPTII,S$GLB,, | Performed by: NURSE PRACTITIONER

## 2023-03-22 PROCEDURE — 99214 OFFICE O/P EST MOD 30 MIN: CPT | Mod: S$GLB,,, | Performed by: NURSE PRACTITIONER

## 2023-03-22 NOTE — ASSESSMENT & PLAN NOTE
3/22/2023 CT Chest stable 9 mm minor fissure nodule.  Linear grouping of sub 4 mm pulmonary nodules right upper lung.  Three-month follow-up is recommended. The 9 mm minor fissure perifissural nodule is stable.  Patient is not agreeable to a 3 month or 6 month CT follow up, he is agreeable to 1 year CT follow up, March 2024.   Low risk former 10 pack year smoker. Quit 1980.

## 2023-03-22 NOTE — ASSESSMENT & PLAN NOTE
1/13/2009 Sleep study Moderate CHELO AHI: 22.8.  On CPAP 8 cm with AHI 1.2  Nasal pillows mask  HME: [facility]   Continue CPAP 8 cm  Updated supply order  Follow up annually

## 2023-03-22 NOTE — ASSESSMENT & PLAN NOTE
Encouraged calorie reduction and 30 minutes of exercise daily. Discussed impact of obesity on general health.  Wt Readings from Last 9 Encounters:   03/22/23 97 kg (213 lb 11.8 oz)   03/02/23 97.5 kg (215 lb)   01/30/23 96.8 kg (213 lb 6.5 oz)   12/09/22 97.4 kg (214 lb 11.7 oz)   11/08/22 97 kg (213 lb 13.5 oz)   10/10/22 96.3 kg (212 lb 4.9 oz)   09/22/22 94.9 kg (209 lb 3.5 oz)   08/29/22 93.4 kg (206 lb)   08/25/22 93.5 kg (206 lb 0.3 oz)   Body mass index is 29.81 kg/m².

## 2023-03-22 NOTE — PROGRESS NOTES
Subjective:      Patient ID: Wilberto Hayes Jr. is a 83 y.o. male.    Chief Complaint: Pulmonary Nodules and Sleep Apnea      HPI: Wilberto Hayes Jr. is here for follow up for CHELO with yearly CPAP complaince assessment.   He is on CPAP of 8 cmH2O pressure which is optimally controlling sleep apnea with apneic index (AHI) 1.2 events an hour.   He is compliant with CPAP use. Complaince download today reveals 100% of days with greater than 4 hours of device use.    Patient reports benefit from CPAP use.  Patient reports no complaints. Nasal pillow mask is tolerated and preferred.     Obtained replacement CPAP machine from [facility], has Dream station 2.   Never obtained replacement with [facility], decided on jeanne replacement.     Compliance Summary  2/14/2023 - 3/15/2023 (30 days)  Days with Device Usage 30 days  Days without Device Usage 0 days  Percent Days with Device Usage 100.0%  Cumulative Usage 10 days 4 hrs. 43 mins. 11 secs.  Maximum Usage (1 Day) 9 hrs. 15 mins. 40 secs.  Average Usage (All Days) 8 hrs. 9 mins. 26 secs.  Average Usage (Days Used) 8 hrs. 9 mins. 26 secs.  Minimum Usage (1 Day) 6 hrs. 31 mins. 58 secs.  Percent of Days with Usage >= 4 Hours 100.0%  Percent of Days with Usage < 4 Hours 0.0%  Date Range  Total Blower Time 10 days 4 hrs. 43 mins. 21 secs.  Average AHI 1.2  Auto-CPAP Summary  Auto-CPAP Mean Pressure 6.6 cmH2O  Auto-CPAP Peak Average Pressure 9.8 cmH2O  Device Pressure <= 90% of Time 8.8 cmH2O  Average Time in Large Leak Per Day 0 secs.      9 mm pul nodule seen on 8/10/2022 CTA. Follow up CT chest in 6 months, March 2023. Low risk 10 pack year former smoker. Quit 1980.  3/22/2023 CT Chest stable 9 mm minor fissure nodule.  Linear grouping of sub 4 mm pulmonary nodules right upper lung.  Three-month follow-up is recommended. The 9 mm minor fissure perifissural nodule is stable.  Patient is not agreeable to a 3 month or 6 month CT follow up, he is agreeable to 1 year  CT follow up, March 2024. He understands risk and feels at his age and no pulmonary symptoms he is only willing for repeat CT scan in 1 year. He will let us know if he changes his mind.   Low risk former 10 pack year smoker. Quit 1980.     There is no cough, no wheezing, no shortness of breath, no hemoptysis, no sputum production, no weight loss, no chest pain.     Previous Report Reviewed: lab reports and office notes     Past Medical History: The following portions of the patient's history were reviewed and updated as appropriate:   He  has a past surgical history that includes Coronary artery bypass graft (2002); Argon Trabeculoplasty - OU - Both Eyes (2009); Eye surgery; Vasectomy (1975); lthotripsy (1985); cystoscopy and stone removal via scope (2003); Abscess drainage (Left, 2003); Tonsillectomy; Extraction of tooth (07/2021); Robot-assisted cholecystectomy using da Sean Xi (N/A, 8/24/2022); Umbilical hernia repair (N/A, 8/24/2022); and Colonoscopy (N/A, 3/2/2023).  His family history includes Alcohol abuse in his father, maternal grandfather, mother, and paternal grandfather; Blindness in his paternal grandfather; COPD in his father and mother; Cancer in his father, mother, and paternal grandmother; Glaucoma in his paternal grandfather; Hypertension in his father and mother; Kidney disease in his maternal grandfather, maternal grandmother, paternal grandfather, and paternal grandmother.  He  reports that he quit smoking about 43 years ago. His smoking use included cigarettes. He has a 10.00 pack-year smoking history. He has never used smokeless tobacco. He reports current alcohol use of about 7.0 standard drinks per week. He reports that he does not use drugs.  He has a current medication list which includes the following prescription(s): aspirin, co-enzyme q-10, cyanocobalamin, hydrochlorothiazide, latanoprost, losartan, lubricant (p-glycol-glycerin), multivit-min/ferrous fumarate, nebivolol, ocean nasal,  "omega-3 fatty acids-vitamin e, saw palmetto, timolol maleate 0.5%, zinc gluconate, fluticasone propionate, nitroglycerin, ondansetron, and promethazine-dextromethorphan.  He is allergic to codeine, niacin preparations, statins-hmg-coa reductase inhibitors, fosinopril, pravastatin, simvastatin, and sulfa (sulfonamide antibiotics).    The following portions of the patient's history were reviewed and updated as appropriate: allergies, current medications, past family history, past medical history, past social history, past surgical history and problem list.    Review of Systems   Constitutional:  Negative for fever, chills, weight loss, weight gain, activity change, appetite change, fatigue and night sweats.   HENT:  Negative for postnasal drip, rhinorrhea, sinus pressure, voice change and congestion.    Eyes:  Negative for redness and itching.   Respiratory:  Negative for snoring, cough, sputum production, chest tightness, shortness of breath, wheezing, orthopnea, asthma nighttime symptoms, dyspnea on extertion, use of rescue inhaler and somnolence.    Cardiovascular: Negative.  Negative for chest pain, palpitations and leg swelling.   Genitourinary:  Negative for difficulty urinating and hematuria.   Endocrine:  Negative for cold intolerance and heat intolerance.    Musculoskeletal:  Negative for arthralgias, gait problem, joint swelling and myalgias.   Skin: Negative.    Gastrointestinal:  Negative for nausea, vomiting, abdominal pain and acid reflux.   Neurological:  Negative for dizziness, weakness, light-headedness and headaches.   Hematological:  Negative for adenopathy. No excessive bruising.   All other systems reviewed and are negative.   Objective:   /60   Pulse (!) 55   Ht 5' 11" (1.803 m)   Wt 97 kg (213 lb 11.8 oz)   SpO2 100%   BMI 29.81 kg/m²   Physical Exam  Vitals reviewed.   Constitutional:       General: He is not in acute distress.     Appearance: He is well-developed. He is not " ill-appearing or toxic-appearing.   HENT:      Head: Normocephalic and atraumatic.      Right Ear: External ear normal.      Left Ear: External ear normal.      Nose: Nose normal.      Mouth/Throat:      Pharynx: No oropharyngeal exudate.   Eyes:      Conjunctiva/sclera: Conjunctivae normal.   Cardiovascular:      Rate and Rhythm: Normal rate and regular rhythm.      Heart sounds: Normal heart sounds.   Pulmonary:      Effort: Pulmonary effort is normal.      Breath sounds: Normal breath sounds.   Abdominal:      Palpations: Abdomen is soft.   Musculoskeletal:      Cervical back: Normal range of motion and neck supple.   Skin:     General: Skin is warm and dry.   Neurological:      Mental Status: He is alert and oriented to person, place, and time.   Psychiatric:         Behavior: Behavior normal. Behavior is cooperative.         Thought Content: Thought content normal.         Judgment: Judgment normal.     Personal Diagnostic Review  CPAP download    Narrative & Impression  EXAMINATION:  CTA CHEST ABDOMEN NON CORONARY (XPD), multiplanar reconstructions     CLINICAL HISTORY:  Aortic aneurysm (AAA) suspected;     TECHNIQUE:  Axial images through the chest and abdomen were obtained with the use of IV contrast.  Sagittal and coronal reconstructions are provided for review.  Oral contrast was not utilized.  Delayed postcontrast images were obtained.     COMPARISON:  Chest x-ray performed earlier the same day.  Abdomen and pelvis CT scan from January 29, 2020     FINDINGS:  VASCULATURE: The thoracic and abdominal aorta are intact without evidence for aneurysmal change or dissection.  Negative for significant stenosis.  The celiac axis, SMA, single renal arteries and DAY are patent.     CHEST: There are numerous peripheral nodules throughout the lungs measuring 2 mm in size or less.  There is a 9 mm perifissural nodule along the minor fissure on image 297 of series 3.  Inferior middle lobe and lingular scarring or  atelectasis.  Lungs are otherwise clear.  Negative for pleural or pericardial effusions.  Negative for adenopathy.  Coronary artery calcifications.  Median sternotomy wires and CABG changes.     The airways are patent.  The thyroid gland is normal.  The esophagus is normal.  Small hiatal hernia.     ABDOMEN: 1.4 cm superior hepatic cyst may have increased in size by a mm in the interim.  Calcified granulomas throughout the liver and the spleen again seen.  Numerous gallstones measuring up to 2 cm again seen.  There appear to be some inflammatory changes surrounding the gallbladder and there is a prominent but not abnormal by size criteria lymph node adjacent to the gallbladder.  The liver and spleen otherwise appear normal.  The pancreas is unremarkable.  Kidneys and adrenal glands are normal.  On the precontrast images, there are no renal stones.  On the delayed postcontrast images, there is symmetric excretion of contrast into both normal caliber renal collecting systems.     Negative for adenopathy, ascites or other inflammatory change noted within the abdomen or pelvis.     There is fluid within the otherwise normal caliber normal appearing colon.  There are fluid in distal loops of small bowel.  There are scattered colonic diverticula seen.  I do not see a normal or abnormal appendix.  The rest of the visualized portions of the bowel appear normal.     The very superior portions of the urinary bladder are unremarkable.     No significant osseous abnormality is identified.  Multilevel marginal spondylosis and degenerative facet arthropathy of the lower thoracic and lumbar spine again seen.  Stable Schmorl node formation involving the inferior endplate of L1.  Moderate degenerative changes of the mid thoracic spine with early DISH changes noted.  Convex-right curvature of the thoracic spine.     Negative for groin adenopathy.     Tiny fat filled umbilical hernia.  Abdominal wall is otherwise intact.      Impression:     1.  There are inflammatory changes surrounding the gallbladder, which is nearly completely full of gallstones.  Could the patient's symptoms be related to cholecystitis?     2.  There is fluid throughout nondilated loops of large and small bowel, nonspecific finding.  Gastroenteritis or other diarrheal disease is could cause this appearance.     3.  Negative for acute process otherwise.  Negative for focal infiltrate, effusion or pneumothorax.  Negative for renal stone disease or hydronephrosis.  No other inflammatory changes are seen.  Negative for free air.     4.  There is a 9 mm minor fissure perifissural nodule.  Numerous tiny peripheral bilateral upper lobe nodules measuring maximum of 2 mm in size noted.  For multiple sub solid nodules with any ?6 mm, Fleischner Society 2017 guidelines recommend short term follow up non-contrast chest CT in 3-6 months, as these may be secondary to infectious etiology. If these findings persist at that time, subsequent management should be based on the most suspicious nodule.     5.  Median sternotomy wires with CABG changes.  Coronary artery calcifications.  Hiatal hernia.  1.4 cm superior right hepatic lobe cyst.  Evidence for old granulomatous disease.  Scattered colonic diverticula.  Degenerative changes of the spine as detailed above.  Tiny fat filled umbilical hernia.  Other nonemergent findings as described above.     All CT scans at this facility are performed  using dose modulation techniques as appropriate to performed exam including the following:  automated exposure control; adjustment of mA and/or kV according to the patients size (this includes techniques or standardized protocols for targeted exams where dose is matched to indication/reason for exam: i.e. extremities or head);  iterative reconstruction technique.        Electronically signed by: Ramone Sesay MD  Date:                                            08/10/2022  Time:                                            13:23    CT Chest Without Contrast  Narrative: EXAMINATION:  CT CHEST WITHOUT CONTRAST    CLINICAL HISTORY:  Solitary pulmonary noduleLung nodule, > 8mm;    COMPARISON:  CTA chest August 10, 2022.    TECHNIQUE:  Axial CT images were obtained of the CHEST.  Iterative reconstruction technique was used. The CT exam was performed using one or more of the following dose reduction techniques- Automated exposure control, adjustment of the mA and/or kV according to patient size, and/or use of iterative reconstructed technique.    FINDINGS:  The 9 mm minor fissure perifissural nodule is redemonstrated and is stable (series 4, image 297).  There is a linear grouping sub 4 mm pulmonary nodules which are slightly confluent subpleural to the lateral right 3rd rib together these findings measure 1.9 by 0.4 x 0.4 cm; however each nodule is 4 mm or less.  (Series 4, image 148).  None no pneumothorax or pleural effusion.    No axillary or mediastinal lymphadenopathy.    Heart size is within normal limits.  Coronary artery calcifications are present.    Thyroid is unremarkable.  Atherosclerotic calcifications.  No thoracic aortic aneurysm.    No acute or aggressive osseous lesion.  Multilevel degenerative changes of the spine.  Median sternotomy wires.    No acute finding in the upper abdomen.  Scattered calcified granulomata in both the liver and spleen.  Impression: 1. Linear grouping of sub 4 mm pulmonary nodules right upper lung.  Three-month follow-up is recommended.  2. The 9 mm minor fissure perifissural nodule is stable.    Electronically signed by: Aubrey Preston  Date:    03/22/2023  Time:    09:48        Assessment:     1. Pulmonary nodule    2. CHELO on CPAP    3. Obesity (BMI 30.0-34.9)    4. Atherosclerosis of native coronary artery of native heart with angina pectoris    5. Essential hypertension          Orders Placed This Encounter   Procedures    CPAP/BIPAP SUPPLIES     Benefits and  compliant  90 day supply. 4 refills  HME: [facility]     Order Specific Question:   Length of need (1-99 months):     Answer:   99     Order Specific Question:   Choose ONE mask type and its corresponding cushions and/or pillows:     Answer:    Nasal Mask, 1 per 90 days:  Nasal Cushions, (6 per 90 days):  Nasal Pillows, (6 per 90 days)     Order Specific Question:   Choose EITHER Heated or Non-Heated Tubjing     Answer:    Non-Heated Tubing, 1 per 90 days     Order Specific Question:   Number of Days Needed:     Answer:   99     Order Specific Question:   All other supplies as needed as listed below:     Answer:    Headgear, 1 per 180 days     Order Specific Question:   All other supplies as needed as listed below:     Answer:    Chin Strap, 1 per 180 days     Order Specific Question:   All other supplies as needed as listed below:     Answer:    Disposable Filter, 6 per 90 days     Order Specific Question:   All other supplies as needed as listed below:     Answer:    Humidifier Chamber, 1 per 180 days     Order Specific Question:   All other supplies as needed as listed below:     Answer:    Non-Disposable Filter, 1 per 180 days    CT Chest Without Contrast     Standing Status:   Future     Standing Expiration Date:   3/22/2025     Order Specific Question:   May the Radiologist modify the order per protocol to meet the clinical needs of the patient?     Answer:   Yes       Plan:     Problem List Items Addressed This Visit       CHELO on CPAP (Chronic)     1/13/2009 Sleep study Moderate CHELO AHI: 22.8.  On CPAP 8 cm with AHI 1.2  Nasal pillows mask  HME: [facility]   Continue CPAP 8 cm  Updated supply order  Follow up annually           Relevant Orders    CPAP/BIPAP SUPPLIES    Obesity (BMI 30.0-34.9) (Chronic)     Encouraged calorie reduction and 30 minutes of exercise daily. Discussed impact of obesity on general health.  Wt Readings from Last 9 Encounters:   03/22/23 97 kg  (213 lb 11.8 oz)   03/02/23 97.5 kg (215 lb)   01/30/23 96.8 kg (213 lb 6.5 oz)   12/09/22 97.4 kg (214 lb 11.7 oz)   11/08/22 97 kg (213 lb 13.5 oz)   10/10/22 96.3 kg (212 lb 4.9 oz)   09/22/22 94.9 kg (209 lb 3.5 oz)   08/29/22 93.4 kg (206 lb)   08/25/22 93.5 kg (206 lb 0.3 oz)   Body mass index is 29.81 kg/m².             Pulmonary nodule - Primary     3/22/2023 CT Chest stable 9 mm minor fissure nodule.  Linear grouping of sub 4 mm pulmonary nodules right upper lung.  Three-month follow-up is recommended. The 9 mm minor fissure perifissural nodule is stable.  Patient is not agreeable to a 3 month or 6 month CT follow up, he is agreeable to 1 year CT follow up, March 2024.   Low risk former 10 pack year smoker. Quit 1980.          Relevant Orders    CT Chest Without Contrast    Essential hypertension     Stable and controlled. Continue current treatment plan as previously prescribed with your PCP.              Atherosclerosis of native coronary artery of native heart with angina pectoris     Seen imaging, follow heart healthy diet, managed by cardiology               Follow up in about 52 weeks (around 3/20/2024) for Pulmonary nodule w/review CT chest. CHELO on CPAP .

## 2023-03-23 ENCOUNTER — PATIENT MESSAGE (OUTPATIENT)
Dept: PULMONOLOGY | Facility: CLINIC | Age: 83
End: 2023-03-23
Payer: MEDICARE

## 2023-03-27 ENCOUNTER — HOSPITAL ENCOUNTER (OUTPATIENT)
Dept: RADIOLOGY | Facility: HOSPITAL | Age: 83
Discharge: HOME | End: 2023-03-27
Attending: FAMILY MEDICINE
Payer: MEDICARE

## 2023-03-27 ENCOUNTER — OFFICE VISIT (OUTPATIENT)
Dept: FAMILY MEDICINE | Facility: CLINIC | Age: 83
End: 2023-03-27
Payer: MEDICARE

## 2023-03-27 VITALS
BODY MASS INDEX: 29.54 KG/M2 | HEIGHT: 71 IN | HEART RATE: 64 BPM | TEMPERATURE: 99 F | OXYGEN SATURATION: 98 % | DIASTOLIC BLOOD PRESSURE: 72 MMHG | WEIGHT: 211 LBS | SYSTOLIC BLOOD PRESSURE: 130 MMHG

## 2023-03-27 DIAGNOSIS — E78.00 PURE HYPERCHOLESTEROLEMIA: ICD-10-CM

## 2023-03-27 DIAGNOSIS — G72.0 STATIN MYOPATHY: ICD-10-CM

## 2023-03-27 DIAGNOSIS — I25.10 CORONARY ARTERY DISEASE INVOLVING NATIVE CORONARY ARTERY OF NATIVE HEART WITHOUT ANGINA PECTORIS: ICD-10-CM

## 2023-03-27 DIAGNOSIS — R53.83 FATIGUE, UNSPECIFIED TYPE: Primary | ICD-10-CM

## 2023-03-27 DIAGNOSIS — D69.2 SENILE PURPURA: ICD-10-CM

## 2023-03-27 DIAGNOSIS — I10 ESSENTIAL HYPERTENSION: ICD-10-CM

## 2023-03-27 DIAGNOSIS — I70.0 CALCIFICATION OF AORTA: ICD-10-CM

## 2023-03-27 DIAGNOSIS — T46.6X5A STATIN MYOPATHY: ICD-10-CM

## 2023-03-27 DIAGNOSIS — M79.645 FINGER PAIN, LEFT: ICD-10-CM

## 2023-03-27 DIAGNOSIS — Z12.5 SCREENING FOR MALIGNANT NEOPLASM OF PROSTATE: ICD-10-CM

## 2023-03-27 DIAGNOSIS — K63.5 POLYP OF COLON, UNSPECIFIED PART OF COLON, UNSPECIFIED TYPE: ICD-10-CM

## 2023-03-27 DIAGNOSIS — K80.20 GALLSTONES: ICD-10-CM

## 2023-03-27 DIAGNOSIS — G47.33 OSA ON CPAP: ICD-10-CM

## 2023-03-27 DIAGNOSIS — N18.31 CHRONIC KIDNEY DISEASE, STAGE 3A: ICD-10-CM

## 2023-03-27 PROCEDURE — 99215 OFFICE O/P EST HI 40 MIN: CPT | Mod: S$GLB,,, | Performed by: FAMILY MEDICINE

## 2023-03-27 PROCEDURE — 99499 UNLISTED E&M SERVICE: CPT | Mod: S$GLB,,, | Performed by: FAMILY MEDICINE

## 2023-03-27 PROCEDURE — 3078F DIAST BP <80 MM HG: CPT | Mod: CPTII,S$GLB,, | Performed by: FAMILY MEDICINE

## 2023-03-27 PROCEDURE — 1125F PR PAIN SEVERITY QUANTIFIED, PAIN PRESENT: ICD-10-PCS | Mod: CPTII,S$GLB,, | Performed by: FAMILY MEDICINE

## 2023-03-27 PROCEDURE — 99999 PR PBB SHADOW E&M-EST. PATIENT-LVL V: CPT | Mod: PBBFAC,,, | Performed by: FAMILY MEDICINE

## 2023-03-27 PROCEDURE — 3288F FALL RISK ASSESSMENT DOCD: CPT | Mod: CPTII,S$GLB,, | Performed by: FAMILY MEDICINE

## 2023-03-27 PROCEDURE — 3078F PR MOST RECENT DIASTOLIC BLOOD PRESSURE < 80 MM HG: ICD-10-PCS | Mod: CPTII,S$GLB,, | Performed by: FAMILY MEDICINE

## 2023-03-27 PROCEDURE — 73130 XR HAND COMPLETE 3 VIEW LEFT: ICD-10-PCS | Mod: 26,LT,, | Performed by: RADIOLOGY

## 2023-03-27 PROCEDURE — 3075F PR MOST RECENT SYSTOLIC BLOOD PRESS GE 130-139MM HG: ICD-10-PCS | Mod: CPTII,S$GLB,, | Performed by: FAMILY MEDICINE

## 2023-03-27 PROCEDURE — 3288F PR FALLS RISK ASSESSMENT DOCUMENTED: ICD-10-PCS | Mod: CPTII,S$GLB,, | Performed by: FAMILY MEDICINE

## 2023-03-27 PROCEDURE — 99999 PR PBB SHADOW E&M-EST. PATIENT-LVL V: ICD-10-PCS | Mod: PBBFAC,,, | Performed by: FAMILY MEDICINE

## 2023-03-27 PROCEDURE — 1125F AMNT PAIN NOTED PAIN PRSNT: CPT | Mod: CPTII,S$GLB,, | Performed by: FAMILY MEDICINE

## 2023-03-27 PROCEDURE — 99499 RISK ADDL DX/OHS AUDIT: ICD-10-PCS | Mod: S$GLB,,, | Performed by: FAMILY MEDICINE

## 2023-03-27 PROCEDURE — 73130 X-RAY EXAM OF HAND: CPT | Mod: TC,PO,LT

## 2023-03-27 PROCEDURE — 1159F PR MEDICATION LIST DOCUMENTED IN MEDICAL RECORD: ICD-10-PCS | Mod: CPTII,S$GLB,, | Performed by: FAMILY MEDICINE

## 2023-03-27 PROCEDURE — 99215 PR OFFICE/OUTPT VISIT, EST, LEVL V, 40-54 MIN: ICD-10-PCS | Mod: S$GLB,,, | Performed by: FAMILY MEDICINE

## 2023-03-27 PROCEDURE — 1159F MED LIST DOCD IN RCRD: CPT | Mod: CPTII,S$GLB,, | Performed by: FAMILY MEDICINE

## 2023-03-27 PROCEDURE — 73130 X-RAY EXAM OF HAND: CPT | Mod: 26,LT,, | Performed by: RADIOLOGY

## 2023-03-27 PROCEDURE — 3075F SYST BP GE 130 - 139MM HG: CPT | Mod: CPTII,S$GLB,, | Performed by: FAMILY MEDICINE

## 2023-03-27 PROCEDURE — 1101F PT FALLS ASSESS-DOCD LE1/YR: CPT | Mod: CPTII,S$GLB,, | Performed by: FAMILY MEDICINE

## 2023-03-27 PROCEDURE — 1101F PR PT FALLS ASSESS DOC 0-1 FALLS W/OUT INJ PAST YR: ICD-10-PCS | Mod: CPTII,S$GLB,, | Performed by: FAMILY MEDICINE

## 2023-03-27 RX ORDER — MUPIROCIN 20 MG/G
OINTMENT TOPICAL 3 TIMES DAILY
Qty: 30 G | Refills: 0 | Status: SHIPPED | OUTPATIENT
Start: 2023-03-27

## 2023-03-27 NOTE — PROGRESS NOTES
Subjective:       Patient ID: Wilberto Hayes Jr. is a 83 y.o. male.    Chief Complaint: Annual Exam      HPI Comments:       Current Outpatient Medications:     aspirin 81 mg Tab, Take 1 tablet by mouth Daily., Disp: , Rfl:     co-enzyme Q-10 30 mg capsule, Take 100 mg by mouth once daily., Disp: , Rfl:     cyanocobalamin (VITAMIN B-12) 100 MCG tablet, Take 1 tablet by mouth every other day. , Disp: , Rfl:     fluticasone propionate (FLONASE) 50 mcg/actuation nasal spray, 2 sprays (100 mcg total) by Each Nostril route once daily., Disp: 16 g, Rfl: 1    hydroCHLOROthiazide (HYDRODIURIL) 25 MG tablet, TAKE 1/2 TABLET BY MOUTH ONCE DAILY, Disp: 45 tablet, Rfl: 3    latanoprost 0.005 % ophthalmic solution, Place 1 drop into both eyes every evening., Disp: 7.5 mL, Rfl: 3    losartan (COZAAR) 50 MG tablet, TAKE 1 TABLET BY MOUTH TWICE A DAY, Disp: 180 tablet, Rfl: 3    LUBRICANT EYE DROPS, GLYC-PG, 1-0.3 % Drop, , Disp: , Rfl:     multivit-min/ferrous fumarate (MULTI VITAMIN ORAL), , Disp: , Rfl:     nebivoloL (BYSTOLIC) 5 MG Tab, Take 1 tablet (5 mg total) by mouth once daily., Disp: 30 tablet, Rfl: 11    nitroGLYCERIN (NITROSTAT) 0.4 MG SL tablet, Place 1 tablet (0.4 mg total) under the tongue every 5 (five) minutes as needed for Chest pain (if no relief seek er trmt)., Disp: 25 tablet, Rfl: 3    OCEAN NASAL 0.65 % nasal spray, , Disp: , Rfl:     omega-3 fatty acids-vitamin E 1,000 mg Cap, Take 1 capsule by mouth Daily., Disp: , Rfl:     ondansetron (ZOFRAN) 4 MG tablet, Take 1 tablet (4 mg total) by mouth every 6 (six) hours., Disp: 30 tablet, Rfl: 0    promethazine-dextromethorphan (PROMETHAZINE-DM) 6.25-15 mg/5 mL Syrp, Take 5 mLs by mouth every 6 to 8 hours as needed., Disp: 180 mL, Rfl: 0    saw palmetto 500 MG capsule, Take by mouth 2 (two) times daily. , Disp: , Rfl:     timolol maleate 0.5% (TIMOPTIC) 0.5 % Drop, Place 1 drop into both eyes once daily., Disp: 10 mL, Rfl: 4    zinc gluconate 50 mg tablet,  "Take 50 mg by mouth once daily., Disp: , Rfl:     mupirocin (BACTROBAN) 2 % ointment, Apply topically 3 (three) times daily., Disp: 30 g, Rfl: 0      Eight month follow-up.  Generally doing okay.  Has had a cholecystectomy for gallstone since I last saw him.    Says he is doing much better since reduce his losartan to his current dose of 50 mg b.i.d..  No more low blood pressure.      However he still does feel profoundly fatigued.  Not on a daily basis but about every other day.  Using his CPAP machine religiously.  Occasionally takes ibuprofen p.m. with Tylenol p.m. to help go to sleep.  He has lost about 6 lb since I last saw him.  In the past we talked about possible "long COVID".  Denies depression.  Not physically active.  He would also like to get his testosterone checked.  His urologist is Dr. Arredondo    Complains of a sore on his left index finger.  After hitting the finger with a hammer about a week ago.  The finger is stiff inside and has a shallow ulcer were blister used to be on the proximal phalanx.      Has pulmonary nodules.  Will be getting a 6 month CT scan soon    Review of Systems   Constitutional:  Positive for activity change. Negative for unexpected weight change.   HENT:  Negative for hearing loss, rhinorrhea and trouble swallowing.    Eyes:  Negative for discharge and visual disturbance.   Respiratory:  Negative for chest tightness and wheezing.    Cardiovascular:  Negative for chest pain and palpitations.   Gastrointestinal:  Negative for blood in stool, constipation, diarrhea and vomiting.   Endocrine: Negative for polydipsia and polyuria.   Genitourinary:  Negative for difficulty urinating, hematuria and urgency.   Musculoskeletal:  Positive for arthralgias. Negative for joint swelling and neck pain.   Neurological:  Positive for weakness. Negative for headaches.   Psychiatric/Behavioral:  Negative for confusion and dysphoric mood.      Objective:      Vitals:    03/27/23 0818   BP: 130/72 " "  Pulse: 64   Temp: 99.4 °F (37.4 °C)   SpO2: 98%   Weight: 95.7 kg (210 lb 15.7 oz)   Height: 5' 11" (1.803 m)   PainSc:   2   PainLoc: Finger     Physical Exam  Vitals and nursing note reviewed.   Constitutional:       General: He is not in acute distress.     Appearance: He is well-developed. He is not diaphoretic.   HENT:      Head: Normocephalic.   Neck:      Thyroid: No thyromegaly.   Cardiovascular:      Rate and Rhythm: Normal rate and regular rhythm.      Heart sounds: Normal heart sounds. No murmur heard.  Pulmonary:      Effort: Pulmonary effort is normal.      Breath sounds: Normal breath sounds. No wheezing or rales.   Abdominal:      General: There is no distension.      Palpations: Abdomen is soft.   Musculoskeletal:        Hands:       Cervical back: Neck supple.      Comments: Area of former blister.  No swelling or drainage minimal redness   Lymphadenopathy:      Cervical: No cervical adenopathy.   Skin:     General: Skin is warm and dry.   Neurological:      Mental Status: He is alert and oriented to person, place, and time.   Psychiatric:         Mood and Affect: Mood normal.         Behavior: Behavior normal.         Thought Content: Thought content normal.         Judgment: Judgment normal.       Assessment:       1. Fatigue, unspecified type    2. Gallstones    3. Polyp of colon, unspecified part of colon, unspecified type    4. Essential hypertension    5. CHELO on CPAP    6. Chronic kidney disease, stage 3a    7. Coronary artery disease involving native coronary artery of native heart without angina pectoris    8. Statin myopathy    9. Pure hypercholesterolemia    10. Senile purpura    11. Calcification of aorta    12. Screening for malignant neoplasm of prostate    13. Finger pain, left          Plan:   Fatigue, unspecified type  Comments:  Blood work today.  Add testosterone.  Increase physical activity.  Negative depression screen  Orders:  -     Testosterone; Future; Expected date: " 03/27/2023    Gallstones  Comments:  Status post cholecystectomy    Polyp of colon, unspecified part of colon, unspecified type  Comments:  GI recommendation for no further colonoscopies.  Patient may reconsider    Essential hypertension  Comments:  Controlled.  Follow-up 6 months  Orders:  -     Comprehensive Metabolic Panel; Future; Expected date: 03/27/2023  -     CBC Auto Differential; Future; Expected date: 03/27/2023    CHELO on CPAP  Comments:  Nightly compliance    Chronic kidney disease, stage 3a  Comments:  CMP today    Coronary artery disease involving native coronary artery of native heart without angina pectoris  Comments:  on ASA.MPI OK 2020    Statin myopathy    Pure hypercholesterolemia  Comments:    Orders:  -     Lipid Panel; Future; Expected date: 03/27/2023    Senile purpura  Comments:  followed by derm    Calcification of aorta  Comments:  On aspirin.  Blood pressure controlled    Screening for malignant neoplasm of prostate  Comments:  PSA today.  Urologist is Dr. Arredondo  Orders:  -     PSA, Screening; Future; Expected date: 03/27/2023    Finger pain, left  Comments:  Bactroban for wound.  X-ray a finger today  Orders:  -     X-Ray Hand 2 View Left; Future; Expected date: 03/27/2023    Other orders  -     mupirocin (BACTROBAN) 2 % ointment; Apply topically 3 (three) times daily.  Dispense: 30 g; Refill: 0

## 2023-04-17 NOTE — ADDENDUM NOTE
Addended by: DAWN SHAIKH on: 4/1/2019 01:11 PM     Modules accepted: Orders     Ilumya Counseling: I discussed with the patient the risks of tildrakizumab including but not limited to immunosuppression, malignancy, posterior leukoencephalopathy syndrome, and serious infections.  The patient understands that monitoring is required including a PPD at baseline and must alert us or the primary physician if symptoms of infection or other concerning signs are noted.

## 2023-05-05 ENCOUNTER — PES CALL (OUTPATIENT)
Dept: ADMINISTRATIVE | Facility: CLINIC | Age: 83
End: 2023-05-05
Payer: MEDICARE

## 2023-05-16 ENCOUNTER — OFFICE VISIT (OUTPATIENT)
Dept: OPHTHALMOLOGY | Facility: CLINIC | Age: 83
End: 2023-05-16
Payer: MEDICARE

## 2023-05-16 DIAGNOSIS — H04.123 DRY EYES, BILATERAL: ICD-10-CM

## 2023-05-16 DIAGNOSIS — Z96.1 PSEUDOPHAKIA OF LEFT EYE: ICD-10-CM

## 2023-05-16 DIAGNOSIS — H26.492 PCO (POSTERIOR CAPSULAR OPACIFICATION), LEFT: ICD-10-CM

## 2023-05-16 DIAGNOSIS — H40.1131 PRIMARY OPEN ANGLE GLAUCOMA OF BOTH EYES, MILD STAGE: Primary | ICD-10-CM

## 2023-05-16 DIAGNOSIS — H25.11 NUCLEAR SCLEROSIS OF RIGHT EYE: ICD-10-CM

## 2023-05-16 PROCEDURE — 92133 POSTERIOR SEGMENT OCT OPTIC NERVE(OCULAR COHERENCE TOMOGRAPHY) - OU - BOTH EYES: ICD-10-PCS | Mod: ,,, | Performed by: OPHTHALMOLOGY

## 2023-05-16 PROCEDURE — 99214 OFFICE O/P EST MOD 30 MIN: CPT | Mod: ,,, | Performed by: OPHTHALMOLOGY

## 2023-05-16 PROCEDURE — 92083 EXTENDED VISUAL FIELD XM: CPT | Mod: ,,, | Performed by: OPHTHALMOLOGY

## 2023-05-16 PROCEDURE — 1160F RVW MEDS BY RX/DR IN RCRD: CPT | Mod: CPTII,,, | Performed by: OPHTHALMOLOGY

## 2023-05-16 PROCEDURE — 92083 HUMPHREY VISUAL FIELD - OU - BOTH EYES: ICD-10-PCS | Mod: ,,, | Performed by: OPHTHALMOLOGY

## 2023-05-16 PROCEDURE — 99214 PR OFFICE/OUTPT VISIT, EST, LEVL IV, 30-39 MIN: ICD-10-PCS | Mod: ,,, | Performed by: OPHTHALMOLOGY

## 2023-05-16 PROCEDURE — 1160F PR REVIEW ALL MEDS BY PRESCRIBER/CLIN PHARMACIST DOCUMENTED: ICD-10-PCS | Mod: CPTII,,, | Performed by: OPHTHALMOLOGY

## 2023-05-16 PROCEDURE — 99999 PR PBB SHADOW E&M-EST. PATIENT-LVL II: CPT | Mod: PBBFAC,,, | Performed by: OPHTHALMOLOGY

## 2023-05-16 PROCEDURE — 1159F PR MEDICATION LIST DOCUMENTED IN MEDICAL RECORD: ICD-10-PCS | Mod: CPTII,,, | Performed by: OPHTHALMOLOGY

## 2023-05-16 PROCEDURE — 99999 PR PBB SHADOW E&M-EST. PATIENT-LVL II: ICD-10-PCS | Mod: PBBFAC,,, | Performed by: OPHTHALMOLOGY

## 2023-05-16 PROCEDURE — 1159F MED LIST DOCD IN RCRD: CPT | Mod: CPTII,,, | Performed by: OPHTHALMOLOGY

## 2023-05-16 PROCEDURE — 92133 CPTRZD OPH DX IMG PST SGM ON: CPT | Mod: ,,, | Performed by: OPHTHALMOLOGY

## 2023-05-16 NOTE — PROGRESS NOTES
"SUBJECTIVE  Wilberto Hayes Jr. is 83 y.o. male  Corrected distance visual acuity was 20/25 in the right eye and 20/60 in the left eye.   Chief Complaint   Patient presents with    Glaucoma     HVF GOCT dilation          HPI     Glaucoma     Additional comments: HVF GOCT dilation           Comments    States that his vision in his OS has been blurry and hazy. States that his   glasses have not been helping his OS.     1. Mild COAG OS>OD Goal =17-18 (Init 20/22)  +Fhx Glaucoma -g-dad   SLT OD 11/09 (min response) + 1/8/20 (22.5-14.5)   SLT OS 8/09 (22 to 16-17) + 1/8/20 (24.5-14)   Timolol caused blurry vision  2. Mod NSC OD  PCIOL OS with Omni 5/11/22  3. Dry eyes (no dry mouth)   Gel Plugs OU   Xiidra caused irritation   Tried Restasis (burning and blurry vision)   4. Fuchs' corneal dystrophy  5. Ptosis OD     Latanoprost QHS OU       Homeopathic AT"s (Similasan)   Thera tears, Soothe XP   O3FO            Last edited by Mercedes Britt on 5/16/2023 10:21 AM.         Assessment /Plan :  1. Primary open angle glaucoma of both eyes, mild stage Doing well, intraocular pressure (IOP) within acceptable range relative to target IOP and no evidence of progression. Continue current treatment. Reviewed importance of continued compliance with treatment and follow up.      Patient instructed to continue using the following glaucoma medication as follows:  Latanoprost one drop in each eye nightly    Return to clinic on next procedure day for cornea check and possible YAG OS       2. Nuclear sclerosis of right eye  -- Condition stable, no therapeutic change required. Monitoring routinely.     3. PCO (posterior capsular opacification), left - Symptomatic, but will need to treat dry eye before YAG   4. Pseudophakia of left eye  -- Condition stable, no therapeutic change required. Monitoring routinely.     5. Dry eyes, bilateral Findings and symptoms consistent with severe dry eyes. Dry eye instruction sheet reviewed with " patient recommending:AT's qid/titrate prn and Lubricating Oint qhs and prn

## 2023-05-18 ENCOUNTER — TELEPHONE (OUTPATIENT)
Dept: ADMINISTRATIVE | Facility: HOSPITAL | Age: 83
End: 2023-05-18
Payer: MEDICARE

## 2023-06-06 ENCOUNTER — PROCEDURE VISIT (OUTPATIENT)
Dept: OPHTHALMOLOGY | Facility: CLINIC | Age: 83
End: 2023-06-06
Payer: MEDICARE

## 2023-06-06 DIAGNOSIS — H26.492 PCO (POSTERIOR CAPSULAR OPACIFICATION), LEFT: Primary | ICD-10-CM

## 2023-06-06 DIAGNOSIS — H04.123 DRY EYES, BILATERAL: ICD-10-CM

## 2023-06-06 PROCEDURE — 66821 AFTER CATARACT LASER SURGERY: CPT | Mod: LT,S$GLB,, | Performed by: OPHTHALMOLOGY

## 2023-06-06 PROCEDURE — 92012 INTRM OPH EXAM EST PATIENT: CPT | Mod: 57,S$GLB,, | Performed by: OPHTHALMOLOGY

## 2023-06-06 PROCEDURE — 92012 PR EYE EXAM, EST PATIENT,INTERMED: ICD-10-PCS | Mod: 57,S$GLB,, | Performed by: OPHTHALMOLOGY

## 2023-06-06 PROCEDURE — 66821 PR DISCISSION,2ND CATARACT,LASER: ICD-10-PCS | Mod: LT,S$GLB,, | Performed by: OPHTHALMOLOGY

## 2023-06-06 RX ORDER — PREDNISOLONE ACETATE 10 MG/ML
1 SUSPENSION/ DROPS OPHTHALMIC 4 TIMES DAILY
Qty: 5 ML | Refills: 0 | Status: SHIPPED | OUTPATIENT
Start: 2023-06-06 | End: 2023-06-13

## 2023-06-06 NOTE — PROGRESS NOTES
"SUBJECTIVE  Wilberto Hayes Jr. is 83 y.o. male  Corrected distance visual acuity was 20/25 -2 in the right eye and 20/60 +2 in the left eye.   Chief Complaint   Patient presents with    Blurred Vision     Pt here for YAG OS and dry eye check. No pain or discomfort. Pt states his vision has improved since his last appt. Pt has been compliant with dry eye regiment.           HPI     Blurred Vision     Additional comments: Pt here for YAG OS and dry eye check. No pain or   discomfort. Pt states his vision has improved since his last appt. Pt has   been compliant with dry eye regiment.            Comments    1. Mild COAG OS>OD Goal =17-18 (Init 20/22)  +Fhx Glaucoma -g-dad   SLT OD 11/09 (min response) + 1/8/20 (22.5-14.5)   SLT OS 8/09 (22 to 16-17) + 1/8/20 (24.5-14)   Timolol caused blurry vision  2. Mod NSC OD  PCIOL OS with Omni 5/11/22  3. Dry eyes (no dry mouth)   Gel Plugs OU   Xiidra caused irritation   Tried Restasis (burning and blurry vision)   4. Fuchs' corneal dystrophy  5. Ptosis OD     Latanoprost QHS OU       Homeopathic AT"s (Similasan)   Thera tears, Soothe XP   O3FO            Last edited by Dashawn Kuhn MA on 6/6/2023  1:09 PM.         Assessment /Plan :  1. PCO (posterior capsular opacification), left Yag Capsulotomy Procedure:    83 y.o. year old patient experiencing a symptomatic decrease in vision OS with inability to perform activities of daily living including reading.    SLE: Posterior intraocular lens implant with capsular fibrosis     Risks, benefits and alternatives of Yag Laser Capsulotomy discussed. Including risks of retinal detachment (1-3%), macular edema, dislocated implant, pain, inflammation elevated intraocular pressure and vision loss. Consent signed.    Medications given:  Apraclonidine gtt  Tetracaine gtt    Laser energy settings:  99  Total mJ   45  bursts    Post Laser IOP 18.5 mmHg    IMPRESSION:  Yag Capsulotomy OD well tolerated    PLAN:  1. Prednisolone 1% QID over " 1 week  2. Postoperative precautions discussed  3. RTC 1 month or prn     2. Dry eyes, bilateral - Sympotoms improved, instructed to continue regiment of AT's and gel drops QID

## 2023-06-09 ENCOUNTER — OFFICE VISIT (OUTPATIENT)
Dept: CARDIOLOGY | Facility: CLINIC | Age: 83
End: 2023-06-09
Payer: MEDICARE

## 2023-06-09 VITALS
DIASTOLIC BLOOD PRESSURE: 60 MMHG | WEIGHT: 216.06 LBS | HEART RATE: 60 BPM | SYSTOLIC BLOOD PRESSURE: 119 MMHG | BODY MASS INDEX: 30.25 KG/M2 | HEIGHT: 71 IN

## 2023-06-09 DIAGNOSIS — I10 ESSENTIAL HYPERTENSION: ICD-10-CM

## 2023-06-09 DIAGNOSIS — Z95.1 HX OF CABG: ICD-10-CM

## 2023-06-09 DIAGNOSIS — I25.119 ATHEROSCLEROSIS OF NATIVE CORONARY ARTERY OF NATIVE HEART WITH ANGINA PECTORIS: Primary | ICD-10-CM

## 2023-06-09 DIAGNOSIS — E78.00 PURE HYPERCHOLESTEROLEMIA: ICD-10-CM

## 2023-06-09 DIAGNOSIS — N18.31 CHRONIC KIDNEY DISEASE, STAGE 3A: ICD-10-CM

## 2023-06-09 DIAGNOSIS — I70.211 ATHEROSCLEROSIS OF NATIVE ARTERY OF RIGHT LOWER EXTREMITY WITH INTERMITTENT CLAUDICATION: Chronic | ICD-10-CM

## 2023-06-09 DIAGNOSIS — G47.33 OSA ON CPAP: Chronic | ICD-10-CM

## 2023-06-09 DIAGNOSIS — I70.0 CALCIFICATION OF AORTA: Chronic | ICD-10-CM

## 2023-06-09 PROCEDURE — 3288F FALL RISK ASSESSMENT DOCD: CPT | Mod: CPTII,S$GLB,, | Performed by: INTERNAL MEDICINE

## 2023-06-09 PROCEDURE — 1101F PR PT FALLS ASSESS DOC 0-1 FALLS W/OUT INJ PAST YR: ICD-10-PCS | Mod: CPTII,S$GLB,, | Performed by: INTERNAL MEDICINE

## 2023-06-09 PROCEDURE — 1126F PR PAIN SEVERITY QUANTIFIED, NO PAIN PRESENT: ICD-10-PCS | Mod: CPTII,S$GLB,, | Performed by: INTERNAL MEDICINE

## 2023-06-09 PROCEDURE — 99214 PR OFFICE/OUTPT VISIT, EST, LEVL IV, 30-39 MIN: ICD-10-PCS | Mod: S$GLB,,, | Performed by: INTERNAL MEDICINE

## 2023-06-09 PROCEDURE — 1101F PT FALLS ASSESS-DOCD LE1/YR: CPT | Mod: CPTII,S$GLB,, | Performed by: INTERNAL MEDICINE

## 2023-06-09 PROCEDURE — 99999 PR PBB SHADOW E&M-EST. PATIENT-LVL IV: ICD-10-PCS | Mod: PBBFAC,,, | Performed by: INTERNAL MEDICINE

## 2023-06-09 PROCEDURE — 99214 OFFICE O/P EST MOD 30 MIN: CPT | Mod: S$GLB,,, | Performed by: INTERNAL MEDICINE

## 2023-06-09 PROCEDURE — 3074F SYST BP LT 130 MM HG: CPT | Mod: CPTII,S$GLB,, | Performed by: INTERNAL MEDICINE

## 2023-06-09 PROCEDURE — 1159F PR MEDICATION LIST DOCUMENTED IN MEDICAL RECORD: ICD-10-PCS | Mod: CPTII,S$GLB,, | Performed by: INTERNAL MEDICINE

## 2023-06-09 PROCEDURE — 3288F PR FALLS RISK ASSESSMENT DOCUMENTED: ICD-10-PCS | Mod: CPTII,S$GLB,, | Performed by: INTERNAL MEDICINE

## 2023-06-09 PROCEDURE — 3074F PR MOST RECENT SYSTOLIC BLOOD PRESSURE < 130 MM HG: ICD-10-PCS | Mod: CPTII,S$GLB,, | Performed by: INTERNAL MEDICINE

## 2023-06-09 PROCEDURE — 99999 PR PBB SHADOW E&M-EST. PATIENT-LVL IV: CPT | Mod: PBBFAC,,, | Performed by: INTERNAL MEDICINE

## 2023-06-09 PROCEDURE — 3078F DIAST BP <80 MM HG: CPT | Mod: CPTII,S$GLB,, | Performed by: INTERNAL MEDICINE

## 2023-06-09 PROCEDURE — 1126F AMNT PAIN NOTED NONE PRSNT: CPT | Mod: CPTII,S$GLB,, | Performed by: INTERNAL MEDICINE

## 2023-06-09 PROCEDURE — 3078F PR MOST RECENT DIASTOLIC BLOOD PRESSURE < 80 MM HG: ICD-10-PCS | Mod: CPTII,S$GLB,, | Performed by: INTERNAL MEDICINE

## 2023-06-09 PROCEDURE — 1159F MED LIST DOCD IN RCRD: CPT | Mod: CPTII,S$GLB,, | Performed by: INTERNAL MEDICINE

## 2023-06-09 NOTE — PROGRESS NOTES
Subjective:   Patient ID:  Wilberto Hayes Jr. is a 83 y.o. male who presents for follow-up of No chief complaint on file.  Last NMT/stress (-) 2020  2v CABG 2002    Patient denies CP, angina or anginal equivalent.   Pt somewhat active , yardwork  Hypertension  This is a chronic problem. The current episode started more than 1 year ago. The problem has been gradually improving since onset. The problem is controlled. Pertinent negatives include no chest pain, palpitations or shortness of breath. Past treatments include beta blockers, angiotensin blockers and diuretics. The current treatment provides moderate improvement. There are no compliance problems.    Coronary Artery Disease  Presents for follow-up visit. Pertinent negatives include no chest pain, chest pressure, chest tightness, dizziness, leg swelling, muscle weakness, palpitations, shortness of breath or weight gain. Risk factors include hyperlipidemia. The symptoms have been stable. Compliance with diet is variable. Compliance with exercise is variable. Compliance with medications is good.   Hyperlipidemia  This is a chronic problem. The current episode started more than 1 year ago. Recent lipid tests were reviewed and are variable. Factors aggravating his hyperlipidemia include beta blockers. Pertinent negatives include no chest pain or shortness of breath. Treatments tried: fish oil. The current treatment provides mild improvement of lipids. There are no compliance problems.      Review of Systems   Constitutional: Negative. Negative for weight gain.   HENT: Negative.     Eyes: Negative.    Cardiovascular: Negative.  Negative for chest pain, leg swelling and palpitations.   Respiratory: Negative.  Negative for chest tightness and shortness of breath.    Endocrine: Negative.    Hematologic/Lymphatic: Negative.    Skin: Negative.    Musculoskeletal:  Negative for muscle weakness.   Gastrointestinal: Negative.    Genitourinary: Negative.    Neurological:  Negative.  Negative for dizziness.   Psychiatric/Behavioral: Negative.     Allergic/Immunologic: Negative.    All other systems reviewed and are negative.  Family History   Problem Relation Age of Onset    Cancer Mother         brain    COPD Mother     Alcohol abuse Mother     Hypertension Mother     Cancer Father         lung, breast with left mastectomy    COPD Father     Alcohol abuse Father     Hypertension Father     Kidney disease Maternal Grandmother     Alcohol abuse Maternal Grandfather     Kidney disease Maternal Grandfather     Cancer Paternal Grandmother         Gyn & colon    Kidney disease Paternal Grandmother     Blindness Paternal Grandfather     Glaucoma Paternal Grandfather     Alcohol abuse Paternal Grandfather     Kidney disease Paternal Grandfather     Retinal detachment Neg Hx     Macular degeneration Neg Hx     Diabetes Neg Hx     Heart disease Neg Hx     Stroke Neg Hx     Mental illness Neg Hx     Mental retardation Neg Hx      Past Medical History:   Diagnosis Date    ACE-inhibitor cough     Acute coronary syndrome     Angina pectoris     Cataract     Coronary artery disease     Degenerative disc disease, lumbar     Degenerative disc disease, lumbar     Glaucoma     Hyperlipidemia     cholesterol    Hypertension     Kidney stone     last in     Myocardial infarction     Obesity     Peripheral vascular disease     Polyneuropathy     Sleep apnea     Tobacco dependence     resolved quit     Trouble in sleeping      Social History     Socioeconomic History    Marital status:    Tobacco Use    Smoking status: Former     Packs/day: 1.00     Years: 10.00     Pack years: 10.00     Types: Cigarettes     Quit date: 1980     Years since quittin.2    Smokeless tobacco: Never   Substance and Sexual Activity    Alcohol use: Yes     Alcohol/week: 7.0 standard drinks     Types: 7 Glasses of wine per week     Comment: nightly wine    Drug use: No    Sexual activity: Yes     Partners:  Female     Birth control/protection: Partner-Vasectomy   Social History Narrative    . Lives with wife. They had 4 children. Retired  at [employer] and then [employer] work. Still drives. Does have a Living Will, signed at time of CABG (2002).      Social Determinants of Health     Financial Resource Strain: Low Risk     Difficulty of Paying Living Expenses: Not hard at all   Food Insecurity: No Food Insecurity    Worried About Running Out of Food in the Last Year: Never true    Ran Out of Food in the Last Year: Never true   Transportation Needs: No Transportation Needs    Lack of Transportation (Medical): No    Lack of Transportation (Non-Medical): No   Physical Activity: Insufficiently Active    Days of Exercise per Week: 2 days    Minutes of Exercise per Session: 20 min   Stress: Stress Concern Present    Feeling of Stress : To some extent   Social Connections: Socially Integrated    Frequency of Communication with Friends and Family: More than three times a week    Frequency of Social Gatherings with Friends and Family: Once a week    Attends Christianity Services: More than 4 times per year    Active Member of Clubs or Organizations: Yes    Attends Club or Organization Meetings: More than 4 times per year    Marital Status:    Housing Stability: Low Risk     Unable to Pay for Housing in the Last Year: No    Number of Places Lived in the Last Year: 1    Unstable Housing in the Last Year: No     Current Outpatient Medications on File Prior to Visit   Medication Sig Dispense Refill    aspirin 81 mg Tab Take 1 tablet by mouth Daily.      clobetasoL (TEMOVATE) 0.05 % cream Apply to the left ear BID until sensitivity improves. 30 g 2    co-enzyme Q-10 30 mg capsule Take 100 mg by mouth once daily.      cyanocobalamin (VITAMIN B-12) 100 MCG tablet Take 1 tablet by mouth every other day.       losartan (COZAAR) 50 MG tablet TAKE 1 TABLET BY MOUTH TWICE A  tablet 3    LUBRICANT EYE DROPS,  GLYC-PG, 1-0.3 % Drop       multivit-min/ferrous fumarate (MULTI VITAMIN ORAL)       mupirocin (BACTROBAN) 2 % ointment Apply topically 3 (three) times daily. 30 g 0    nebivoloL (BYSTOLIC) 5 MG Tab Take 1 tablet (5 mg total) by mouth once daily. 30 tablet 11    nitroGLYCERIN (NITROSTAT) 0.4 MG SL tablet Place 1 tablet (0.4 mg total) under the tongue every 5 (five) minutes as needed for Chest pain (if no relief seek er trmt). 25 tablet 3    OCEAN NASAL 0.65 % nasal spray       omega-3 fatty acids-vitamin E 1,000 mg Cap Take 1 capsule by mouth Daily.      ondansetron (ZOFRAN) 4 MG tablet Take 1 tablet (4 mg total) by mouth every 6 (six) hours. 30 tablet 0    prednisoLONE acetate (PRED FORTE) 1 % DrpS Place 1 drop into the left eye 4 (four) times daily. for 7 days 5 mL 0    saw palmetto 500 MG capsule Take by mouth 2 (two) times daily.       zinc gluconate 50 mg tablet Take 50 mg by mouth once daily.       No current facility-administered medications on file prior to visit.     Review of patient's allergies indicates:   Allergen Reactions    Codeine Swelling and Other (See Comments)     unknown    Niacin preparations Other (See Comments)     myalgia    Statins-hmg-coa reductase inhibitors      Muscle weakness    Fosinopril Other (See Comments)     cough    Pravastatin Other (See Comments)      Muscle pain    Simvastatin Other (See Comments)     Muscle pain    Sulfa (sulfonamide antibiotics) Rash and Hives       Objective:     Physical Exam  Vitals and nursing note reviewed.   Constitutional:       Appearance: He is well-developed.   HENT:      Head: Normocephalic and atraumatic.   Eyes:      Conjunctiva/sclera: Conjunctivae normal.      Pupils: Pupils are equal, round, and reactive to light.   Cardiovascular:      Rate and Rhythm: Normal rate and regular rhythm.      Pulses: Intact distal pulses.      Heart sounds: Normal heart sounds.   Pulmonary:      Effort: Pulmonary effort is normal.      Breath sounds: Normal  breath sounds.   Abdominal:      General: Bowel sounds are normal.      Palpations: Abdomen is soft.   Musculoskeletal:      Cervical back: Normal range of motion and neck supple.   Skin:     General: Skin is warm and dry.   Neurological:      Mental Status: He is alert and oriented to person, place, and time.       Assessment:     1. Atherosclerosis of native coronary artery of native heart with angina pectoris    2. Essential hypertension    3. Pure hypercholesterolemia    4. 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 Calcification of aorta    6. Chronic kidney disease, stage 3a    7. CHELO on CPAP        Plan:     Atherosclerosis of native coronary artery of native heart with angina pectoris    Essential hypertension    Pure hypercholesterolemia    Atherosclerosis of native artery of right lower extremity with intermittent claudication    Calcification of aorta    Chronic kidney disease, stage 3a    CHELO on CPAP      continue  losartan, hctz ,bystolic  -htn  Continue asa, NTG ( prn)- cad  Increase fish oil-HLP

## 2023-06-21 ENCOUNTER — PATIENT MESSAGE (OUTPATIENT)
Dept: CARDIOLOGY | Facility: CLINIC | Age: 83
End: 2023-06-21
Payer: MEDICARE

## 2023-06-28 ENCOUNTER — TELEPHONE (OUTPATIENT)
Dept: ADMINISTRATIVE | Facility: HOSPITAL | Age: 83
End: 2023-06-28
Payer: MEDICARE

## 2023-07-11 ENCOUNTER — OFFICE VISIT (OUTPATIENT)
Dept: OPHTHALMOLOGY | Facility: CLINIC | Age: 83
End: 2023-07-11
Payer: MEDICARE

## 2023-07-11 DIAGNOSIS — Z98.890 POST-OPERATIVE STATE: Primary | ICD-10-CM

## 2023-07-11 PROCEDURE — 99024 PR POST-OP FOLLOW-UP VISIT: ICD-10-PCS | Mod: S$GLB,,, | Performed by: OPHTHALMOLOGY

## 2023-07-11 PROCEDURE — 99999 PR PBB SHADOW E&M-EST. PATIENT-LVL II: CPT | Mod: PBBFAC,,, | Performed by: OPHTHALMOLOGY

## 2023-07-11 PROCEDURE — 1160F PR REVIEW ALL MEDS BY PRESCRIBER/CLIN PHARMACIST DOCUMENTED: ICD-10-PCS | Mod: CPTII,S$GLB,, | Performed by: OPHTHALMOLOGY

## 2023-07-11 PROCEDURE — 1159F PR MEDICATION LIST DOCUMENTED IN MEDICAL RECORD: ICD-10-PCS | Mod: CPTII,S$GLB,, | Performed by: OPHTHALMOLOGY

## 2023-07-11 PROCEDURE — 1159F MED LIST DOCD IN RCRD: CPT | Mod: CPTII,S$GLB,, | Performed by: OPHTHALMOLOGY

## 2023-07-11 PROCEDURE — 1160F RVW MEDS BY RX/DR IN RCRD: CPT | Mod: CPTII,S$GLB,, | Performed by: OPHTHALMOLOGY

## 2023-07-11 PROCEDURE — 99024 POSTOP FOLLOW-UP VISIT: CPT | Mod: S$GLB,,, | Performed by: OPHTHALMOLOGY

## 2023-07-11 PROCEDURE — 99999 PR PBB SHADOW E&M-EST. PATIENT-LVL II: ICD-10-PCS | Mod: PBBFAC,,, | Performed by: OPHTHALMOLOGY

## 2023-07-11 NOTE — PROGRESS NOTES
"SUBJECTIVE  Wilberto Hayes Jr. is 83 y.o. male  Corrected distance visual acuity was 20/30- in the right eye and 20/60- in the left eye.   Chief Complaint   Patient presents with    Post-op Evaluation     1 month YAG OS           HPI     Post-op Evaluation     Additional comments: 1 month YAG OS            Comments    States that his vision is much better and that he has been compliant with   gtts as directed.     1. Mild COAG OS>OD Goal =17-18 (Init 20/22)  +Fhx Glaucoma -g-dad   SLT OD 11/09 (min response) + 1/8/20 (22.5-14.5)   SLT OS 8/09 (22 to 16-17) + 1/8/20 (24.5-14)   Timolol caused blurry vision  2. Mod NSC OD  PCIOL OS with Omni 5/11/22  3. Dry eyes (no dry mouth)   Gel Plugs OU   Xiidra caused irritation   Tried Restasis (burning and blurry vision)   4. Fuchs' corneal dystrophy  5. Ptosis OD     Latanoprost QHS OU       Homeopathic AT"s (Similasan)   Thera tears, Soothe XP   O3FO            Last edited by Mercedes Britt on 7/11/2023 10:49 AM.         Assessment /Plan :  1. Post-operative state Exam:  SLE:  L/L- normal  S/C- white  K- stable  AC- no cells  LENS- PCIOL with clear capsulotomy    Assessment:    S/P Yag Capsulotomy OS . Discussed options for spectacle correction and pt elects to use Trifocal Rx. Recommend follow with Dr. Molina in 3-4 months for IOP check and GOCT, or sooner if needed.  Increased IOP OS today, monitor for now, continue Latanoprost one drop in each eye every night  If IOP is still increased at patient's next visit, may consider SLT  Recommend Refresh pm at bedtime in both eyes every night  Continue artificial tears one drop in each eye as needed               "

## 2023-09-21 ENCOUNTER — OFFICE VISIT (OUTPATIENT)
Dept: PULMONOLOGY | Facility: CLINIC | Age: 83
End: 2023-09-21
Payer: MEDICARE

## 2023-09-21 ENCOUNTER — HOSPITAL ENCOUNTER (OUTPATIENT)
Dept: RADIOLOGY | Facility: HOSPITAL | Age: 83
Discharge: HOME | End: 2023-09-21
Attending: NURSE PRACTITIONER
Payer: MEDICARE

## 2023-09-21 VITALS
BODY MASS INDEX: 30.21 KG/M2 | SYSTOLIC BLOOD PRESSURE: 145 MMHG | HEART RATE: 54 BPM | HEIGHT: 71 IN | RESPIRATION RATE: 17 BRPM | WEIGHT: 215.81 LBS | DIASTOLIC BLOOD PRESSURE: 76 MMHG | OXYGEN SATURATION: 98 %

## 2023-09-21 DIAGNOSIS — R91.1 PULMONARY NODULE: ICD-10-CM

## 2023-09-21 DIAGNOSIS — E66.9 OBESITY (BMI 30.0-34.9): Chronic | ICD-10-CM

## 2023-09-21 DIAGNOSIS — G47.33 OSA ON CPAP: Primary | Chronic | ICD-10-CM

## 2023-09-21 PROCEDURE — 1159F PR MEDICATION LIST DOCUMENTED IN MEDICAL RECORD: ICD-10-PCS | Mod: CPTII,S$GLB,, | Performed by: NURSE PRACTITIONER

## 2023-09-21 PROCEDURE — 99214 OFFICE O/P EST MOD 30 MIN: CPT | Mod: S$GLB,,, | Performed by: NURSE PRACTITIONER

## 2023-09-21 PROCEDURE — 1101F PT FALLS ASSESS-DOCD LE1/YR: CPT | Mod: CPTII,S$GLB,, | Performed by: NURSE PRACTITIONER

## 2023-09-21 PROCEDURE — 3288F PR FALLS RISK ASSESSMENT DOCUMENTED: ICD-10-PCS | Mod: CPTII,S$GLB,, | Performed by: NURSE PRACTITIONER

## 2023-09-21 PROCEDURE — 1160F RVW MEDS BY RX/DR IN RCRD: CPT | Mod: CPTII,S$GLB,, | Performed by: NURSE PRACTITIONER

## 2023-09-21 PROCEDURE — 3288F FALL RISK ASSESSMENT DOCD: CPT | Mod: CPTII,S$GLB,, | Performed by: NURSE PRACTITIONER

## 2023-09-21 PROCEDURE — 3078F DIAST BP <80 MM HG: CPT | Mod: CPTII,S$GLB,, | Performed by: NURSE PRACTITIONER

## 2023-09-21 PROCEDURE — 71250 CT CHEST WITHOUT CONTRAST: ICD-10-PCS | Mod: 26,,, | Performed by: RADIOLOGY

## 2023-09-21 PROCEDURE — 3077F SYST BP >= 140 MM HG: CPT | Mod: CPTII,S$GLB,, | Performed by: NURSE PRACTITIONER

## 2023-09-21 PROCEDURE — 71250 CT THORAX DX C-: CPT | Mod: TC

## 2023-09-21 PROCEDURE — 71250 CT THORAX DX C-: CPT | Mod: 26,,, | Performed by: RADIOLOGY

## 2023-09-21 PROCEDURE — 99999 PR PBB SHADOW E&M-EST. PATIENT-LVL IV: ICD-10-PCS | Mod: PBBFAC,,, | Performed by: NURSE PRACTITIONER

## 2023-09-21 PROCEDURE — 99214 PR OFFICE/OUTPT VISIT, EST, LEVL IV, 30-39 MIN: ICD-10-PCS | Mod: S$GLB,,, | Performed by: NURSE PRACTITIONER

## 2023-09-21 PROCEDURE — 3077F PR MOST RECENT SYSTOLIC BLOOD PRESSURE >= 140 MM HG: ICD-10-PCS | Mod: CPTII,S$GLB,, | Performed by: NURSE PRACTITIONER

## 2023-09-21 PROCEDURE — 1160F PR REVIEW ALL MEDS BY PRESCRIBER/CLIN PHARMACIST DOCUMENTED: ICD-10-PCS | Mod: CPTII,S$GLB,, | Performed by: NURSE PRACTITIONER

## 2023-09-21 PROCEDURE — 1159F MED LIST DOCD IN RCRD: CPT | Mod: CPTII,S$GLB,, | Performed by: NURSE PRACTITIONER

## 2023-09-21 PROCEDURE — 99999 PR PBB SHADOW E&M-EST. PATIENT-LVL IV: CPT | Mod: PBBFAC,,, | Performed by: NURSE PRACTITIONER

## 2023-09-21 PROCEDURE — 3078F PR MOST RECENT DIASTOLIC BLOOD PRESSURE < 80 MM HG: ICD-10-PCS | Mod: CPTII,S$GLB,, | Performed by: NURSE PRACTITIONER

## 2023-09-21 PROCEDURE — 1101F PR PT FALLS ASSESS DOC 0-1 FALLS W/OUT INJ PAST YR: ICD-10-PCS | Mod: CPTII,S$GLB,, | Performed by: NURSE PRACTITIONER

## 2023-09-21 NOTE — ASSESSMENT & PLAN NOTE
9/21/2023 CT chest 6 month follow up remains stable. Lungs: Clear lungs.  Stable 9 mm minor fissure perifissural nodule is redemonstrated and is stable (series 4, image 314).  Stable linear grouping sub 4 mm pulmonary nodules which are slightly confluent subpleural to the lateral right 3rd rib together these findings measure 1.9 by 0.4  cm; however each nodule is 4 mm or less.  (Series 4, image 165).  None no pneumothorax or pleural effusion. Stable findings above.  Recommend one year follow up CT, September 2024

## 2023-09-21 NOTE — ASSESSMENT & PLAN NOTE
Encouraged calorie reduction and 30 minutes of exercise daily. Discussed impact of obesity on general health.  Wt Readings from Last 9 Encounters:   09/21/23 97.9 kg (215 lb 13.3 oz)   09/01/23 97.5 kg (215 lb)   06/09/23 98 kg (216 lb 0.8 oz)   06/08/23 97.5 kg (215 lb)   03/27/23 95.7 kg (210 lb 15.7 oz)   03/22/23 97 kg (213 lb 11.8 oz)   03/02/23 97.5 kg (215 lb)   01/30/23 96.8 kg (213 lb 6.5 oz)   12/09/22 97.4 kg (214 lb 11.7 oz)   Body mass index is 30.1 kg/m².

## 2023-09-21 NOTE — ASSESSMENT & PLAN NOTE
1/13/2009 Sleep study Moderate CHELO AHI: 22.8.  On Auto CPAP 4-20 cm with AHI 1.7  Nasal pillows mask  HME: [facility]   Patient pleased with default from [facility] on dream station 2 machine, continue Auto CPAP 4-20 cm   Prior on CPAP 8 cm  Updated supply order  Follow up annually, September 2024

## 2023-09-21 NOTE — PROGRESS NOTES
Subjective:      Patient ID: Wilberto Hayes Jr. is a 83 y.o. male.    Chief Complaint: Pulmonary Nodules and Sleep Apnea      HPI: Wilberto Hayes Jr. is here for follow up for CHELO with yearly CPAP complaince assessment.   He is on Auto CPAP of 4-20 cmH2O pressure which is optimally controlling sleep apnea with apneic index (AHI) 1.7 events an hour.   He is compliant with CPAP use. Complaince download today reveals 100% of days with greater than 4 hours of device use.    Patient reports benefit from CPAP use.  Patient reports no complaints. Nasal pillow mask is tolerated and preferred.     Obtained replacement CPAP machine from [facility], has [employer] station 2.   Never obtained replacement with [employer], decided on jeanne replacement.     Compliance Summary  Min Pressure 4 cmH2O  Max Pressure 20 cmH2O  7/13/2023 - 9/13/2023 (63 days)  Days with Device Usage 63 days  Days without Device Usage 0 days  Percent Days with Device Usage 100.0%  Cumulative Usage 21 days 15 hrs. 14 mins. 14 secs.  Maximum Usage (1 Day) 9 hrs. 29 mins. 6 secs.  Average Usage (All Days) 8 hrs. 14 mins. 30 secs.  Average Usage (Days Used) 8 hrs. 14 mins. 30 secs.  Minimum Usage (1 Day) 6 hrs. 42 mins. 46 secs.  Percent of Days with Usage >= 4 Hours 100.0%  Percent of Days with Usage < 4 Hours 0.0%  Date Range  Total Blower Time 21 days 15 hrs. 16 mins. 8 secs.  Average AHI 1.7  Auto-CPAP Summary  Auto-CPAP Mean Pressure 6.7 cmH2O  Auto-CPAP Peak Average Pressure 10.2 cmH2O  Device Pressure <= 90% of Time 9.1 cmH2O  Average Time in Large Leak Per Day 0 secs.    9 mm pul nodule seen on 8/10/2022 CTA. Follow up CT chest in 6 months, March 2023. Low risk 10 pack year former smoker. Quit 1980.  3/22/2023 CT Chest stable 9 mm minor fissure nodule.  Linear grouping of sub 4 mm pulmonary nodules right upper lung.  Three-month follow-up is recommended. The 9 mm minor fissure perifissural nodule is stable.  Patient is not agreeable to a 3  month or 6 month CT follow up, he is agreeable to 1 year CT follow up, March 2024. He understands risk and feels at his age and no pulmonary symptoms he is only willing for repeat CT scan in 1 year. He will let us know if he changes his mind.   Low risk former 10 pack year smoker. Quit 1980.     There is no cough, no wheezing, no shortness of breath, no hemoptysis, no sputum production, no weight loss, no chest pain.     7/2021 + covid, patient reports diagnosed by primary care provider with long covid since has felt fatigued at times since having Covid in July 2021. Other days does fine and cuts yard and all weed eating. Fatigue is not daily.     Previous Report Reviewed: lab reports and office notes     Past Medical History: The following portions of the patient's history were reviewed and updated as appropriate:   He  has a past surgical history that includes Coronary artery bypass graft (2002); Argon Trabeculoplasty - OU - Both Eyes (2009); Eye surgery; Vasectomy (1975); lthotripsy (1985); cystoscopy and stone removal via scope (2003); Abscess drainage (Left, 2003); Tonsillectomy; Extraction of tooth (07/2021); Robot-assisted cholecystectomy using da Sean Xi (N/A, 8/24/2022); Umbilical hernia repair (N/A, 8/24/2022); and Colonoscopy (N/A, 3/2/2023).  His family history includes Alcohol abuse in his father, maternal grandfather, mother, and paternal grandfather; Blindness in his paternal grandfather; COPD in his father and mother; Cancer in his father, mother, and paternal grandmother; Glaucoma in his paternal grandfather; Hypertension in his father and mother; Kidney disease in his maternal grandfather, maternal grandmother, paternal grandfather, and paternal grandmother.  He  reports that he quit smoking about 43 years ago. His smoking use included cigarettes. He started smoking about 53 years ago. He has a 10.0 pack-year smoking history. He has never used smokeless tobacco. He reports current alcohol use of  about 7.0 standard drinks of alcohol per week. He reports that he does not use drugs.  He has a current medication list which includes the following prescription(s): aspirin, clobetasol, clobetasol 0.05%, co-enzyme q-10, cyanocobalamin, hydrochlorothiazide, latanoprost, losartan, lubricant (p-glycol-glycerin), multivit-min/ferrous fumarate, mupirocin, nebivolol, nitroglycerin, ocean nasal, omega-3 fatty acids-vitamin e, ondansetron, saw palmetto, and zinc gluconate.  He is allergic to codeine, niacin preparations, statins-hmg-coa reductase inhibitors, fosinopril, pravastatin, simvastatin, and sulfa (sulfonamide antibiotics).    The following portions of the patient's history were reviewed and updated as appropriate: allergies, current medications, past family history, past medical history, past social history, past surgical history and problem list.    Review of Systems   Constitutional:  Negative for fever, chills, weight loss, weight gain, activity change, appetite change, fatigue and night sweats.   HENT:  Negative for postnasal drip, rhinorrhea, sinus pressure, voice change and congestion.    Eyes:  Negative for redness and itching.   Respiratory:  Negative for snoring, cough, sputum production, chest tightness, shortness of breath, wheezing, orthopnea, asthma nighttime symptoms, dyspnea on extertion, use of rescue inhaler and somnolence.    Cardiovascular: Negative.  Negative for chest pain, palpitations and leg swelling.   Genitourinary:  Negative for difficulty urinating and hematuria.   Endocrine:  Negative for cold intolerance and heat intolerance.    Musculoskeletal:  Negative for arthralgias, gait problem, joint swelling and myalgias.   Skin: Negative.    Gastrointestinal:  Negative for nausea, vomiting, abdominal pain and acid reflux.   Neurological:  Negative for dizziness, weakness, light-headedness and headaches.   Hematological:  Negative for adenopathy. No excessive bruising.   All other systems  "reviewed and are negative.     Objective:   BP (!) 145/76   Pulse (!) 54   Resp 17   Ht 5' 11" (1.803 m)   Wt 97.9 kg (215 lb 13.3 oz)   SpO2 98%   BMI 30.10 kg/m²   Physical Exam  Vitals reviewed.   Constitutional:       General: He is not in acute distress.     Appearance: He is well-developed. He is not ill-appearing or toxic-appearing.   HENT:      Head: Normocephalic and atraumatic.      Right Ear: External ear normal.      Left Ear: External ear normal.      Nose: Nose normal.      Mouth/Throat:      Pharynx: No oropharyngeal exudate.   Eyes:      Conjunctiva/sclera: Conjunctivae normal.   Cardiovascular:      Rate and Rhythm: Normal rate and regular rhythm.      Heart sounds: Normal heart sounds.   Pulmonary:      Effort: Pulmonary effort is normal.      Breath sounds: Normal breath sounds.   Abdominal:      Palpations: Abdomen is soft.   Musculoskeletal:      Cervical back: Normal range of motion and neck supple.   Skin:     General: Skin is warm and dry.   Neurological:      Mental Status: He is alert and oriented to person, place, and time.   Psychiatric:         Behavior: Behavior normal. Behavior is cooperative.         Thought Content: Thought content normal.         Judgment: Judgment normal.       Personal Diagnostic Review  CPAP download    CT Chest Without Contrast  Narrative: EXAMINATION:  CT CHEST WITHOUT CONTRAST    CLINICAL HISTORY:  Lung nodule, > 8mm; Solitary pulmonary nodule    TECHNIQUE:  The chest was surveyed from the apices through the posterior costophrenic angles .  Data was reconstructed for multiplanar images in axial, sagittal and coronal planes in for maximal intensity projection images in the axial plane.    COMPARISON:  3/22/23    FINDINGS:  Base of Neck: No significant abnormality.    Airways: Patent.    Lungs: Clear lungs.  Stable 9 mm minor fissure perifissural nodule is redemonstrated and is stable (series 4, image 314).  Stable linear grouping sub 4 mm pulmonary " nodules which are slightly confluent subpleural to the lateral right 3rd rib together these findings measure 1.9 by 0.4  cm; however each nodule is 4 mm or less.  (Series 4, image 165).  None no pneumothorax or pleural effusion.    Pleura: No pleural fluid.No pleural calcification.    Mary/Mediastinum: No pathologic son enlargement.    Esophagus: Normal.    Heart/pericardium: Normal size.  No pericardial effusion or calcification.    Pulmonary vasculature: Pulmonary arteries distribute normally.  There are four pulmonary veins.    Aorta: Left-sided aortic arch with 3 arterial branches.  The aorta maintains normal caliber, contour and course. There is mild calcification of the thoracic aorta.  There is  mild to moderate coronary artery calcification.    Thoracic soft tissues: Normal. Both breasts are present.    Bones: No acute fracture.  Moderate DJD.   No suspicious lytic or sclerotic lesion.  Sternotomy wire.    Upper Abdomen: No abnormality of the partially imaged upper abdomen.  Mild cortical thinning and mild nonspecific perinephric stranding 14 mm hypodensity within segment 4 a of the liver likely reflects a cyst and is stable.  Scattered granuloma within the liver and spleen.  Diverticulosis seen within the visualized large bowel.  Impression: Stable findings above.  Recommend one year follow up CT.    All CT scans at this facility use dose modulation, iterative reconstruction and/or weight based dosing when appropriate to reduce radiation dose to as low as reasonably achievable.    Electronically signed by: Link Hardin MD  Date:    09/21/2023  Time:    09:35       Narrative & Impression  EXAMINATION:  CTA CHEST ABDOMEN NON CORONARY (XPD), multiplanar reconstructions     CLINICAL HISTORY:  Aortic aneurysm (AAA) suspected;     TECHNIQUE:  Axial images through the chest and abdomen were obtained with the use of IV contrast.  Sagittal and coronal reconstructions are provided for review.  Oral contrast was  not utilized.  Delayed postcontrast images were obtained.     COMPARISON:  Chest x-ray performed earlier the same day.  Abdomen and pelvis CT scan from January 29, 2020     FINDINGS:  VASCULATURE: The thoracic and abdominal aorta are intact without evidence for aneurysmal change or dissection.  Negative for significant stenosis.  The celiac axis, SMA, single renal arteries and DAY are patent.     CHEST: There are numerous peripheral nodules throughout the lungs measuring 2 mm in size or less.  There is a 9 mm perifissural nodule along the minor fissure on image 297 of series 3.  Inferior middle lobe and lingular scarring or atelectasis.  Lungs are otherwise clear.  Negative for pleural or pericardial effusions.  Negative for adenopathy.  Coronary artery calcifications.  Median sternotomy wires and CABG changes.     The airways are patent.  The thyroid gland is normal.  The esophagus is normal.  Small hiatal hernia.     ABDOMEN: 1.4 cm superior hepatic cyst may have increased in size by a mm in the interim.  Calcified granulomas throughout the liver and the spleen again seen.  Numerous gallstones measuring up to 2 cm again seen.  There appear to be some inflammatory changes surrounding the gallbladder and there is a prominent but not abnormal by size criteria lymph node adjacent to the gallbladder.  The liver and spleen otherwise appear normal.  The pancreas is unremarkable.  Kidneys and adrenal glands are normal.  On the precontrast images, there are no renal stones.  On the delayed postcontrast images, there is symmetric excretion of contrast into both normal caliber renal collecting systems.     Negative for adenopathy, ascites or other inflammatory change noted within the abdomen or pelvis.     There is fluid within the otherwise normal caliber normal appearing colon.  There are fluid in distal loops of small bowel.  There are scattered colonic diverticula seen.  I do not see a normal or abnormal appendix.  The  rest of the visualized portions of the bowel appear normal.     The very superior portions of the urinary bladder are unremarkable.     No significant osseous abnormality is identified.  Multilevel marginal spondylosis and degenerative facet arthropathy of the lower thoracic and lumbar spine again seen.  Stable Schmorl node formation involving the inferior endplate of L1.  Moderate degenerative changes of the mid thoracic spine with early DISH changes noted.  Convex-right curvature of the thoracic spine.     Negative for groin adenopathy.     Tiny fat filled umbilical hernia.  Abdominal wall is otherwise intact.     Impression:     1.  There are inflammatory changes surrounding the gallbladder, which is nearly completely full of gallstones.  Could the patient's symptoms be related to cholecystitis?     2.  There is fluid throughout nondilated loops of large and small bowel, nonspecific finding.  Gastroenteritis or other diarrheal disease is could cause this appearance.     3.  Negative for acute process otherwise.  Negative for focal infiltrate, effusion or pneumothorax.  Negative for renal stone disease or hydronephrosis.  No other inflammatory changes are seen.  Negative for free air.     4.  There is a 9 mm minor fissure perifissural nodule.  Numerous tiny peripheral bilateral upper lobe nodules measuring maximum of 2 mm in size noted.  For multiple sub solid nodules with any ?6 mm, Fleischner Society 2017 guidelines recommend short term follow up non-contrast chest CT in 3-6 months, as these may be secondary to infectious etiology. If these findings persist at that time, subsequent management should be based on the most suspicious nodule.     5.  Median sternotomy wires with CABG changes.  Coronary artery calcifications.  Hiatal hernia.  1.4 cm superior right hepatic lobe cyst.  Evidence for old granulomatous disease.  Scattered colonic diverticula.  Degenerative changes of the spine as detailed above.  Tiny fat  filled umbilical hernia.  Other nonemergent findings as described above.     All CT scans at this facility are performed  using dose modulation techniques as appropriate to performed exam including the following:  automated exposure control; adjustment of mA and/or kV according to the patients size (this includes techniques or standardized protocols for targeted exams where dose is matched to indication/reason for exam: i.e. extremities or head);  iterative reconstruction technique.        Electronically signed by: Ramone Sesay MD  Date:                                            08/10/2022  Time:                                           13:23      Assessment:     1. CHELO on CPAP    2. Pulmonary nodule    3. Obesity (BMI 30.0-34.9)        Orders Placed This Encounter   Procedures    CPAP/BIPAP SUPPLIES     Benefits and compliant  90 day supply. 4 refills  HME: [facility]     Order Specific Question:   Length of need (1-99 months):     Answer:   99     Order Specific Question:   Choose ONE mask type and its corresponding cushions and/or pillows:     Answer:    Nasal Mask, 1 per 90 days:  Nasal Cushions, (6 per 90 days):  Nasal Pillows, (6 per 90 days)     Order Specific Question:   Choose EITHER Heated or Non-Heated Tubjing     Answer:    Non-Heated Tubing, 1 per 90 days     Order Specific Question:   Number of Days Needed:     Answer:   99     Order Specific Question:   All other supplies as needed as listed below:     Answer:    Headgear, 1 per 180 days     Order Specific Question:   All other supplies as needed as listed below:     Answer:    Chin Strap, 1 per 180 days     Order Specific Question:   All other supplies as needed as listed below:     Answer:    Disposable Filter, 6 per 90 days     Order Specific Question:   All other supplies as needed as listed below:     Answer:    Humidifier Chamber, 1 per 180 days     Order Specific Question:   All other supplies as needed  as listed below:     Answer:    Non-Disposable Filter, 1 per 180 days    CT Chest Without Contrast     Standing Status:   Future     Standing Expiration Date:   9/21/2025     Order Specific Question:   May the Radiologist modify the order per protocol to meet the clinical needs of the patient?     Answer:   Yes       Plan:     Problem List Items Addressed This Visit       CHELO on CPAP - Primary (Chronic)     1/13/2009 Sleep study Moderate CHELO AHI: 22.8.  On Auto CPAP 4-20 cm with AHI 1.7  Nasal pillows mask  HME: [facility]   Patient pleased with default from [facility] on dream station 2 machine, continue Auto CPAP 4-20 cm   Prior on CPAP 8 cm  Updated supply order  Follow up annually, September 2024           Relevant Orders    CPAP/BIPAP SUPPLIES    Obesity (BMI 30.0-34.9) (Chronic)     Encouraged calorie reduction and 30 minutes of exercise daily. Discussed impact of obesity on general health.  Wt Readings from Last 9 Encounters:   09/21/23 97.9 kg (215 lb 13.3 oz)   09/01/23 97.5 kg (215 lb)   06/09/23 98 kg (216 lb 0.8 oz)   06/08/23 97.5 kg (215 lb)   03/27/23 95.7 kg (210 lb 15.7 oz)   03/22/23 97 kg (213 lb 11.8 oz)   03/02/23 97.5 kg (215 lb)   01/30/23 96.8 kg (213 lb 6.5 oz)   12/09/22 97.4 kg (214 lb 11.7 oz)   Body mass index is 30.1 kg/m².             Pulmonary nodule     9/21/2023 CT chest 6 month follow up remains stable. Lungs: Clear lungs.  Stable 9 mm minor fissure perifissural nodule is redemonstrated and is stable (series 4, image 314).  Stable linear grouping sub 4 mm pulmonary nodules which are slightly confluent subpleural to the lateral right 3rd rib together these findings measure 1.9 by 0.4  cm; however each nodule is 4 mm or less.  (Series 4, image 165).  None no pneumothorax or pleural effusion. Stable findings above.  Recommend one year follow up CT, September 2024           Relevant Orders    CT Chest Without Contrast        Follow up in about 1 year (around 9/21/2024) for CPAP  compliance dnld. Pulmonary nodule w/review CT chest.

## 2023-11-07 ENCOUNTER — PATIENT OUTREACH (OUTPATIENT)
Dept: ADMINISTRATIVE | Facility: HOSPITAL | Age: 83
End: 2023-11-07
Payer: MEDICARE

## 2023-11-08 ENCOUNTER — TELEPHONE (OUTPATIENT)
Dept: FAMILY MEDICINE | Facility: CLINIC | Age: 83
End: 2023-11-08
Payer: MEDICARE

## 2023-11-08 VITALS — DIASTOLIC BLOOD PRESSURE: 66 MMHG | SYSTOLIC BLOOD PRESSURE: 129 MMHG

## 2023-11-14 ENCOUNTER — OFFICE VISIT (OUTPATIENT)
Dept: OPHTHALMOLOGY | Facility: CLINIC | Age: 83
End: 2023-11-14
Payer: MEDICARE

## 2023-11-14 DIAGNOSIS — H04.123 DRY EYES, BILATERAL: ICD-10-CM

## 2023-11-14 DIAGNOSIS — H40.1131 PRIMARY OPEN ANGLE GLAUCOMA OF BOTH EYES, MILD STAGE: Primary | ICD-10-CM

## 2023-11-14 PROCEDURE — 99999 PR PBB SHADOW E&M-EST. PATIENT-LVL III: ICD-10-PCS | Mod: PBBFAC,,, | Performed by: OPHTHALMOLOGY

## 2023-11-14 PROCEDURE — 1126F AMNT PAIN NOTED NONE PRSNT: CPT | Mod: CPTII,S$GLB,, | Performed by: OPHTHALMOLOGY

## 2023-11-14 PROCEDURE — 1160F RVW MEDS BY RX/DR IN RCRD: CPT | Mod: CPTII,S$GLB,, | Performed by: OPHTHALMOLOGY

## 2023-11-14 PROCEDURE — 1160F PR REVIEW ALL MEDS BY PRESCRIBER/CLIN PHARMACIST DOCUMENTED: ICD-10-PCS | Mod: CPTII,S$GLB,, | Performed by: OPHTHALMOLOGY

## 2023-11-14 PROCEDURE — 99999 PR PBB SHADOW E&M-EST. PATIENT-LVL III: CPT | Mod: PBBFAC,,, | Performed by: OPHTHALMOLOGY

## 2023-11-14 PROCEDURE — 99214 OFFICE O/P EST MOD 30 MIN: CPT | Mod: S$GLB,,, | Performed by: OPHTHALMOLOGY

## 2023-11-14 PROCEDURE — 92133 POSTERIOR SEGMENT OCT OPTIC NERVE(OCULAR COHERENCE TOMOGRAPHY) - OU - BOTH EYES: ICD-10-PCS | Mod: S$GLB,,, | Performed by: OPHTHALMOLOGY

## 2023-11-14 PROCEDURE — 1159F PR MEDICATION LIST DOCUMENTED IN MEDICAL RECORD: ICD-10-PCS | Mod: CPTII,S$GLB,, | Performed by: OPHTHALMOLOGY

## 2023-11-14 PROCEDURE — 99214 PR OFFICE/OUTPT VISIT, EST, LEVL IV, 30-39 MIN: ICD-10-PCS | Mod: S$GLB,,, | Performed by: OPHTHALMOLOGY

## 2023-11-14 PROCEDURE — 92133 CPTRZD OPH DX IMG PST SGM ON: CPT | Mod: S$GLB,,, | Performed by: OPHTHALMOLOGY

## 2023-11-14 PROCEDURE — 1126F PR PAIN SEVERITY QUANTIFIED, NO PAIN PRESENT: ICD-10-PCS | Mod: CPTII,S$GLB,, | Performed by: OPHTHALMOLOGY

## 2023-11-14 PROCEDURE — 1159F MED LIST DOCD IN RCRD: CPT | Mod: CPTII,S$GLB,, | Performed by: OPHTHALMOLOGY

## 2023-11-14 RX ORDER — KETOROLAC TROMETHAMINE 5 MG/ML
SOLUTION OPHTHALMIC
Qty: 5 ML | Refills: 0 | Status: SHIPPED | OUTPATIENT
Start: 2023-11-14

## 2023-11-14 NOTE — PROGRESS NOTES
"SUBJECTIVE  Wilberto Hayes Jr. is 83 y.o. male  Corrected distance visual acuity was 20/30 -1 in the right eye and 20/40 in the left eye.   Chief Complaint   Patient presents with    Glaucoma     4 month follow up IOP check with [facility]. VA is doing well, no changes. No pain or irritation. Compliant with gtt.          HPI     Glaucoma     Additional comments: 4 month follow up IOP check with [facility]. VA is doing   well, no changes. No pain or irritation. Compliant with gtt.           Comments    Likes [employer]  Wife Camelia- Glaucoma pt    1. Mild COAG OS>OD Goal =17-18 (Init 20/22)  +Fhx Glaucoma -g-dad   SLT OD 11/09 (min response) + 1/8/20 (22.5-14.5)   SLT OS 8/09 (22 to 16-17) + 1/8/20 (24.5-14)   Timolol caused blurry vision  2. Mod NSC OD  PCIOL OS with Omni 5/11/22  3. Dry eyes (no dry mouth)   Gel Plugs OU   Xiidra caused irritation   Tried Restasis (burning and blurry vision)   4. Fuchs' corneal dystrophy  5. Ptosis OD     Latanoprost QHS OU       Homeopathic AT"s (Similasan)   Thera tears, Soothe XP   O3FO             Last edited by Leander Watson on 11/14/2023  9:14 AM.         Assessment /Plan :  1. Primary open angle glaucoma of both eyes, mild stage Intraocular pressure (IOP) not within acceptable range relative to target IOP with risk of irreversible visual loss. Better IOP control is recommended. Treatment options may include change or additional medications, Selective Laser Trabeculoplasty (SLT laser), and/or incisional glaucoma surgery. Reviewed importance of continued compliance with treatment and follow up.     Patient chooses schedule SLT  OU       2. Dry eyes, bilateral Findings and symptoms consistent with severe dry eyes. Dry eye instruction sheet reviewed with patient recommending:Genteal Gel prn                 "

## 2023-11-30 RX ORDER — LATANOPROST 50 UG/ML
1 SOLUTION/ DROPS OPHTHALMIC
Qty: 7.5 ML | Refills: 3 | Status: SHIPPED | OUTPATIENT
Start: 2023-11-30

## 2023-12-08 ENCOUNTER — OFFICE VISIT (OUTPATIENT)
Dept: URGENT CARE | Facility: CLINIC | Age: 83
End: 2023-12-08
Payer: MEDICARE

## 2023-12-08 ENCOUNTER — PATIENT MESSAGE (OUTPATIENT)
Dept: FAMILY MEDICINE | Facility: CLINIC | Age: 83
End: 2023-12-08
Payer: MEDICARE

## 2023-12-08 VITALS
TEMPERATURE: 98 F | WEIGHT: 222.13 LBS | BODY MASS INDEX: 31.1 KG/M2 | RESPIRATION RATE: 18 BRPM | SYSTOLIC BLOOD PRESSURE: 158 MMHG | DIASTOLIC BLOOD PRESSURE: 88 MMHG | HEIGHT: 71 IN | OXYGEN SATURATION: 96 % | HEART RATE: 61 BPM

## 2023-12-08 DIAGNOSIS — R30.0 DYSURIA: Primary | ICD-10-CM

## 2023-12-08 DIAGNOSIS — R35.0 URINE FREQUENCY: ICD-10-CM

## 2023-12-08 LAB
BILIRUB UR QL STRIP: NEGATIVE
GLUCOSE UR QL STRIP: NEGATIVE
KETONES UR QL STRIP: NEGATIVE
LEUKOCYTE ESTERASE UR QL STRIP: NEGATIVE
PH, POC UA: 6.5
POC BLOOD, URINE: NEGATIVE
POC NITRATES, URINE: NEGATIVE
PROT UR QL STRIP: NEGATIVE
SP GR UR STRIP: 1.01 (ref 1–1.03)
UROBILINOGEN UR STRIP-ACNC: NORMAL (ref 0.3–2.2)

## 2023-12-08 PROCEDURE — 99203 PR OFFICE/OUTPT VISIT, NEW, LEVL III, 30-44 MIN: ICD-10-PCS | Mod: S$GLB,,, | Performed by: FAMILY MEDICINE

## 2023-12-08 PROCEDURE — 81003 POCT URINALYSIS, DIPSTICK, AUTOMATED, W/O SCOPE: ICD-10-PCS | Mod: QW,S$GLB,, | Performed by: FAMILY MEDICINE

## 2023-12-08 PROCEDURE — 99203 OFFICE O/P NEW LOW 30 MIN: CPT | Mod: S$GLB,,, | Performed by: FAMILY MEDICINE

## 2023-12-08 PROCEDURE — 81003 URINALYSIS AUTO W/O SCOPE: CPT | Mod: QW,S$GLB,, | Performed by: FAMILY MEDICINE

## 2023-12-08 PROCEDURE — 87086 URINE CULTURE/COLONY COUNT: CPT | Performed by: FAMILY MEDICINE

## 2023-12-08 RX ORDER — CIPROFLOXACIN 500 MG/1
500 TABLET ORAL 2 TIMES DAILY
Qty: 14 TABLET | Refills: 0 | Status: SHIPPED | OUTPATIENT
Start: 2023-12-08 | End: 2023-12-15

## 2023-12-08 NOTE — PATIENT INSTRUCTIONS
Thank you for allowing our team to take care of you today.  Your diagnoses are discomfort with urination (dysuria) and urine frequency.  A culture of the urine specimen you provided is being sent to the lab. Our office will contact you once these results return.   In the interim, Ciprofloxacin antibiotic is being started. One pill twice a day. You can also take the Pyridium for two more days.   The handouts attached are for urination discomfort as well as acute cystitis (urinary tract infection).   Stay hydrated.  Monitor for new symptoms.  Followup with your primary care provider.  Immediate medical provider/ER evaluation if worsens.  Followup here as needed.

## 2023-12-08 NOTE — PROGRESS NOTES
"Subjective:      Patient ID: Wilberto Hayes Jr. is a 83 y.o. male.    Vitals:  height is 5' 11" (1.803 m) and weight is 100.8 kg (222 lb 1.8 oz). His oral temperature is 98.2 °F (36.8 °C). His blood pressure is 158/88 (abnormal) and his pulse is 61. His respiration is 18 and oxygen saturation is 96%.     Chief Complaint: Dysuria    84 y/o male presents with discomfort with urination, pelvic pressure with urination, frequency and urgency since for about three days. Started using azo which helped but came back with symptoms once stopped. No fever or chills. No gi symptoms. No back pain. No blood or discharge. Staying hydrated.     Dysuria   This is a new problem. The current episode started in the past 7 days. Associated symptoms include frequency and urgency. Pertinent negatives include no flank pain or rash.       Constitution: Negative.   HENT: Negative.     Cardiovascular: Negative.    Eyes: Negative.    Respiratory: Negative.     Gastrointestinal: Negative.    Genitourinary:  Positive for dysuria, frequency and urgency. Negative for flank pain.   Musculoskeletal:  Negative for trauma and muscle ache.   Skin:  Negative for rash.   Neurological: Negative.    Psychiatric/Behavioral: Negative.        Objective:     Physical Exam   Constitutional: He is oriented to person, place, and time.   HENT:   Head: Normocephalic.   Ears:   Right Ear: External ear normal.   Left Ear: External ear normal.   Nose: Nose normal.   Mouth/Throat: Oropharynx is clear.   Eyes: Conjunctivae are normal. Pupils are equal, round, and reactive to light.   Neck: Neck supple.   Cardiovascular: Normal rate, regular rhythm, normal heart sounds and normal pulses.   Pulmonary/Chest: Effort normal and breath sounds normal.   Abdominal: Normal appearance and bowel sounds are normal. He exhibits no distension. Soft. There is no abdominal tenderness. There is no left CVA tenderness and no right CVA tenderness.   Musculoskeletal:      Right lower " leg: No edema.      Left lower leg: No edema.      Comments: Age appropriate   Neurological: no focal deficit. He is alert and oriented to person, place, and time.   Skin: Skin is warm.         Comments: Age appropriate turgor   Psychiatric: His behavior is normal. Mood, judgment and thought content normal.   Nursing note and vitals reviewed.      Assessment:     1. Dysuria    2. Urine frequency        Plan:       Dysuria  -     POCT Urinalysis, Dipstick, Automated, W/O Scope  -     CULTURE, URINE    Urine frequency  -     CULTURE, URINE    Other orders  -     ciprofloxacin HCl (CIPRO) 500 MG tablet; Take 1 tablet (500 mg total) by mouth 2 (two) times daily. for 7 days  Dispense: 14 tablet; Refill: 0      Results for orders placed or performed in visit on 12/08/23   CULTURE, URINE    Specimen: Urine, Clean Catch   Result Value Ref Range    Urine Culture, Routine No growth    POCT Urinalysis, Dipstick, Automated, W/O Scope   Result Value Ref Range    POC Blood, Urine Negative Negative    POC Bilirubin, Urine Negative Negative    POC Urobilinogen, Urine normal 0.3 - 2.2    POC Ketones, Urine Negative Negative    POC Protein, Urine Negative Negative    POC Nitrates, Urine Negative Negative    POC Glucose, Urine Negative Negative    pH, UA 6.5     POC Specific Gravity, Urine 1.010 1.003 - 1.029    POC Leukocytes, Urine Negative Negative       SUMMARY: See hpi. UA clear but symptoms correlate with possible urinary infection. Will add Cipro and send off culture. Pyridium for two days. Followup with primary/urology. Further testing may be needed. Followup here as needed.      Patient Instructions   Thank you for allowing our team to take care of you today.  Your diagnoses are discomfort with urination (dysuria) and urine frequency.  A culture of the urine specimen you provided is being sent to the lab. Our office will contact you once these results return.   In the interim, Ciprofloxacin antibiotic is being started. One  pill twice a day. You can also take the Pyridium for two more days.   The handouts attached are for urination discomfort as well as acute cystitis (urinary tract infection).   Stay hydrated.  Monitor for new symptoms.  Followup with your primary care provider.  Immediate medical provider/ER evaluation if worsens.  Followup here as needed.

## 2023-12-10 LAB — BACTERIA UR CULT: NO GROWTH

## 2023-12-11 ENCOUNTER — OFFICE VISIT (OUTPATIENT)
Dept: CARDIOLOGY | Facility: CLINIC | Age: 83
End: 2023-12-11
Payer: MEDICARE

## 2023-12-11 VITALS
OXYGEN SATURATION: 98 % | HEIGHT: 71 IN | SYSTOLIC BLOOD PRESSURE: 128 MMHG | DIASTOLIC BLOOD PRESSURE: 66 MMHG | RESPIRATION RATE: 16 BRPM | WEIGHT: 219.56 LBS | HEART RATE: 61 BPM | BODY MASS INDEX: 30.74 KG/M2

## 2023-12-11 DIAGNOSIS — E78.00 PURE HYPERCHOLESTEROLEMIA: ICD-10-CM

## 2023-12-11 DIAGNOSIS — I10 ESSENTIAL HYPERTENSION: ICD-10-CM

## 2023-12-11 DIAGNOSIS — Z95.1 HX OF CABG: ICD-10-CM

## 2023-12-11 DIAGNOSIS — G47.33 OSA ON CPAP: Chronic | ICD-10-CM

## 2023-12-11 DIAGNOSIS — I70.0 CALCIFICATION OF AORTA: Chronic | ICD-10-CM

## 2023-12-11 DIAGNOSIS — I25.119 ATHEROSCLEROSIS OF NATIVE CORONARY ARTERY OF NATIVE HEART WITH ANGINA PECTORIS: Primary | ICD-10-CM

## 2023-12-11 DIAGNOSIS — I70.211 ATHEROSCLEROSIS OF NATIVE ARTERY OF RIGHT LOWER EXTREMITY WITH INTERMITTENT CLAUDICATION: Chronic | ICD-10-CM

## 2023-12-11 DIAGNOSIS — N18.31 CHRONIC KIDNEY DISEASE, STAGE 3A: ICD-10-CM

## 2023-12-11 PROCEDURE — 99214 OFFICE O/P EST MOD 30 MIN: CPT | Mod: S$GLB,,, | Performed by: INTERNAL MEDICINE

## 2023-12-11 PROCEDURE — 3078F PR MOST RECENT DIASTOLIC BLOOD PRESSURE < 80 MM HG: ICD-10-PCS | Mod: CPTII,S$GLB,, | Performed by: INTERNAL MEDICINE

## 2023-12-11 PROCEDURE — 99214 PR OFFICE/OUTPT VISIT, EST, LEVL IV, 30-39 MIN: ICD-10-PCS | Mod: S$GLB,,, | Performed by: INTERNAL MEDICINE

## 2023-12-11 PROCEDURE — 99999 PR PBB SHADOW E&M-EST. PATIENT-LVL V: ICD-10-PCS | Mod: PBBFAC,,, | Performed by: INTERNAL MEDICINE

## 2023-12-11 PROCEDURE — 1159F PR MEDICATION LIST DOCUMENTED IN MEDICAL RECORD: ICD-10-PCS | Mod: CPTII,S$GLB,, | Performed by: INTERNAL MEDICINE

## 2023-12-11 PROCEDURE — 3074F PR MOST RECENT SYSTOLIC BLOOD PRESSURE < 130 MM HG: ICD-10-PCS | Mod: CPTII,S$GLB,, | Performed by: INTERNAL MEDICINE

## 2023-12-11 PROCEDURE — 1101F PR PT FALLS ASSESS DOC 0-1 FALLS W/OUT INJ PAST YR: ICD-10-PCS | Mod: CPTII,S$GLB,, | Performed by: INTERNAL MEDICINE

## 2023-12-11 PROCEDURE — 1160F RVW MEDS BY RX/DR IN RCRD: CPT | Mod: CPTII,S$GLB,, | Performed by: INTERNAL MEDICINE

## 2023-12-11 PROCEDURE — 1159F MED LIST DOCD IN RCRD: CPT | Mod: CPTII,S$GLB,, | Performed by: INTERNAL MEDICINE

## 2023-12-11 PROCEDURE — 3288F FALL RISK ASSESSMENT DOCD: CPT | Mod: CPTII,S$GLB,, | Performed by: INTERNAL MEDICINE

## 2023-12-11 PROCEDURE — 3078F DIAST BP <80 MM HG: CPT | Mod: CPTII,S$GLB,, | Performed by: INTERNAL MEDICINE

## 2023-12-11 PROCEDURE — 3074F SYST BP LT 130 MM HG: CPT | Mod: CPTII,S$GLB,, | Performed by: INTERNAL MEDICINE

## 2023-12-11 PROCEDURE — 99999 PR PBB SHADOW E&M-EST. PATIENT-LVL V: CPT | Mod: PBBFAC,,, | Performed by: INTERNAL MEDICINE

## 2023-12-11 PROCEDURE — 1101F PT FALLS ASSESS-DOCD LE1/YR: CPT | Mod: CPTII,S$GLB,, | Performed by: INTERNAL MEDICINE

## 2023-12-11 PROCEDURE — 1160F PR REVIEW ALL MEDS BY PRESCRIBER/CLIN PHARMACIST DOCUMENTED: ICD-10-PCS | Mod: CPTII,S$GLB,, | Performed by: INTERNAL MEDICINE

## 2023-12-11 PROCEDURE — 3288F PR FALLS RISK ASSESSMENT DOCUMENTED: ICD-10-PCS | Mod: CPTII,S$GLB,, | Performed by: INTERNAL MEDICINE

## 2023-12-11 NOTE — PROGRESS NOTES
Subjective:   Patient ID:  Wilberto Hayes Jr. is a 83 y.o. male who presents for follow-up of Follow-up  Patient denies CP, angina or anginal equivalent.  Last NMT/stress (-) 2020  2v CABG 2002  Follow-up  Pertinent negatives include no chest pain.   Hypertension  This is a chronic problem. The current episode started more than 1 year ago. The problem has been gradually improving since onset. The problem is controlled. Pertinent negatives include no chest pain, palpitations or shortness of breath. Past treatments include beta blockers, angiotensin blockers and diuretics. The current treatment provides moderate improvement. There are no compliance problems.    Coronary Artery Disease  Presents for follow-up visit. Pertinent negatives include no chest pain, chest pressure, chest tightness, dizziness, leg swelling, muscle weakness, palpitations, shortness of breath or weight gain. The symptoms have been stable. Compliance with diet is variable. Compliance with exercise is variable. Compliance with medications is good.   Hyperlipidemia  This is a chronic problem. The current episode started more than 1 year ago. Recent lipid tests were reviewed and are variable. Factors aggravating his hyperlipidemia include beta blockers. Pertinent negatives include no chest pain or shortness of breath. Treatments tried: fish oil. The current treatment provides mild improvement of lipids. There are no compliance problems.        Review of Systems   Constitutional: Negative. Negative for weight gain.   HENT: Negative.     Eyes: Negative.    Cardiovascular: Negative.  Negative for chest pain, leg swelling and palpitations.   Respiratory: Negative.  Negative for chest tightness and shortness of breath.    Endocrine: Negative.    Hematologic/Lymphatic: Negative.    Skin: Negative.    Musculoskeletal:  Negative for muscle weakness.   Gastrointestinal: Negative.    Genitourinary: Negative.    Neurological: Negative.  Negative for  dizziness.   Psychiatric/Behavioral: Negative.     Allergic/Immunologic: Negative.    All other systems reviewed and are negative.    Family History   Problem Relation Age of Onset    Cancer Mother         brain    COPD Mother     Alcohol abuse Mother     Hypertension Mother     Cancer Father         lung, breast with left mastectomy    COPD Father     Alcohol abuse Father     Hypertension Father     Kidney disease Maternal Grandmother     Alcohol abuse Maternal Grandfather     Kidney disease Maternal Grandfather     Cancer Paternal Grandmother         Gyn & colon    Kidney disease Paternal Grandmother     Blindness Paternal Grandfather     Glaucoma Paternal Grandfather     Alcohol abuse Paternal Grandfather     Kidney disease Paternal Grandfather     Retinal detachment Neg Hx     Macular degeneration Neg Hx     Diabetes Neg Hx     Heart disease Neg Hx     Stroke Neg Hx     Mental illness Neg Hx     Intellectual disability Neg Hx      Past Medical History:   Diagnosis Date    ACE-inhibitor cough     Acute coronary syndrome     Angina pectoris     Cataract     Coronary artery disease     Degenerative disc disease, lumbar     Degenerative disc disease, lumbar     Glaucoma     Hyperlipidemia     cholesterol    Hypertension     Kidney stone     last in     Myocardial infarction     Obesity     Peripheral vascular disease     Polyneuropathy     Sleep apnea     Tobacco dependence     resolved quit     Trouble in sleeping      Social History     Socioeconomic History    Marital status:    Tobacco Use    Smoking status: Former     Current packs/day: 0.00     Average packs/day: 1 pack/day for 10.0 years (10.0 total pack years)     Types: Cigarettes     Start date: 1970     Quit date: 1980     Years since quittin.7    Smokeless tobacco: Never   Substance and Sexual Activity    Alcohol use: Yes     Alcohol/week: 7.0 standard drinks of alcohol     Types: 7 Glasses of wine per week     Comment:  nightly wine    Drug use: No    Sexual activity: Yes     Partners: Female     Birth control/protection: Partner-Vasectomy   Social History Narrative    . Lives with wife. They had 4 children. Retired  at [employer] and then [employer] work. Still drives. Does have a Living Will, signed at time of CABG (2002).      Social Determinants of Health     Financial Resource Strain: Low Risk  (10/2/2023)    Overall Financial Resource Strain (CARDIA)     Difficulty of Paying Living Expenses: Not hard at all   Food Insecurity: No Food Insecurity (10/2/2023)    Hunger Vital Sign     Worried About Running Out of Food in the Last Year: Never true     Ran Out of Food in the Last Year: Never true   Transportation Needs: No Transportation Needs (10/2/2023)    PRAPARE - Transportation     Lack of Transportation (Medical): No     Lack of Transportation (Non-Medical): No   Physical Activity: Unknown (12/8/2023)    Exercise Vital Sign     Days of Exercise per Week: Patient refused     Minutes of Exercise per Session: 60 min   Recent Concern: Physical Activity - Insufficiently Active (10/2/2023)    Exercise Vital Sign     Days of Exercise per Week: 1 day     Minutes of Exercise per Session: 60 min   Stress: No Stress Concern Present (10/2/2023)    [employer] [employer] - Occupational Stress Questionnaire     Feeling of Stress : Not at all   Social Connections: Unknown (12/8/2023)    Social Connection and Isolation Panel [NHANES]     Frequency of Communication with Friends and Family: Three times a week     Frequency of Social Gatherings with Friends and Family: Once a week     Active Member of Clubs or Organizations: Patient refused     Attends Club or Organization Meetings: 1 to 4 times per year     Marital Status: Patient refused   Housing Stability: Unknown (12/8/2023)    Housing Stability Vital Sign     Unable to Pay for Housing in the Last Year: Patient refused     Number of Places Lived in  the Last Year: 1     Unstable Housing in the Last Year: Patient refused     Current Outpatient Medications on File Prior to Visit   Medication Sig Dispense Refill    aspirin 81 mg Tab Take 1 tablet by mouth Daily.      ciprofloxacin HCl (CIPRO) 500 MG tablet Take 1 tablet (500 mg total) by mouth 2 (two) times daily. for 7 days 14 tablet 0    clobetasoL (TEMOVATE) 0.05 % cream Apply to the left ear BID until sensitivity improves. 30 g 2    co-enzyme Q-10 30 mg capsule Take 100 mg by mouth once daily.      cyanocobalamin (VITAMIN B-12) 100 MCG tablet Take 1 tablet by mouth every other day.       hydroCHLOROthiazide (HYDRODIURIL) 12.5 MG Tab Take 1 tablet (12.5 mg total) by mouth once daily. 30 tablet 11    ketorolac 0.5% (ACULAR) 0.5 % Drop Put one eyedrop in both eye four times a day. Start first drop morning of laser and stop after five days. 5 mL 0    latanoprost 0.005 % ophthalmic solution INSTILL 1 DROP INTO BOTH EYES EVERY EVENING 7.5 mL 3    losartan (COZAAR) 50 MG tablet TAKE 1 TABLET BY MOUTH TWICE A DAY (Patient taking differently: Take 50mg once daily) 180 tablet 3    LUBRICANT EYE DROPS, GLYC-PG, 1-0.3 % Drop       multivit-min/ferrous fumarate (MULTI VITAMIN ORAL)       mupirocin (BACTROBAN) 2 % ointment Apply topically 3 (three) times daily. 30 g 0    nebivoloL (BYSTOLIC) 5 MG Tab Take 1 tablet (5 mg total) by mouth once daily. 30 tablet 11    nitroGLYCERIN (NITROSTAT) 0.4 MG SL tablet Place 1 tablet (0.4 mg total) under the tongue every 5 (five) minutes as needed for Chest pain (if no relief seek er trmt). 25 tablet 3    OCEAN NASAL 0.65 % nasal spray       omega-3 fatty acids-vitamin E 1,000 mg Cap Take 2 capsules by mouth Daily.      ondansetron (ZOFRAN) 4 MG tablet Take 1 tablet (4 mg total) by mouth every 6 (six) hours. 30 tablet 0    saw palmetto 500 MG capsule Take by mouth 2 (two) times daily.       zinc gluconate 50 mg tablet Take 50 mg by mouth once daily.       No current facility-administered  medications on file prior to visit.     Review of patient's allergies indicates:   Allergen Reactions    Codeine Swelling and Other (See Comments)     unknown    Niacin preparations Other (See Comments)     myalgia    Statins-hmg-coa reductase inhibitors      Muscle weakness    Fosinopril Other (See Comments)     cough    Pravastatin Other (See Comments)      Muscle pain    Simvastatin Other (See Comments)     Muscle pain    Sulfa (sulfonamide antibiotics) Rash and Hives       Objective:     Physical Exam  Vitals and nursing note reviewed.   Constitutional:       Appearance: He is well-developed.   HENT:      Head: Normocephalic and atraumatic.   Eyes:      Conjunctiva/sclera: Conjunctivae normal.      Pupils: Pupils are equal, round, and reactive to light.   Cardiovascular:      Rate and Rhythm: Normal rate and regular rhythm.      Pulses: Intact distal pulses.      Heart sounds: Normal heart sounds.   Pulmonary:      Effort: Pulmonary effort is normal.      Breath sounds: Normal breath sounds.   Abdominal:      General: Bowel sounds are normal.      Palpations: Abdomen is soft.   Musculoskeletal:      Cervical back: Normal range of motion and neck supple.   Skin:     General: Skin is warm and dry.   Neurological:      Mental Status: He is alert and oriented to person, place, and time.         Assessment:     1. Atherosclerosis of native coronary artery of native heart with angina pectoris    2. Essential hypertension    3. Pure hypercholesterolemia    4. Hx of CABG    5. Calcification of aorta    6. Chronic kidney disease, stage 3a    7. CHELO on CPAP    8. 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

## 2023-12-12 ENCOUNTER — TELEPHONE (OUTPATIENT)
Dept: URGENT CARE | Facility: CLINIC | Age: 83
End: 2023-12-12
Payer: MEDICARE

## 2023-12-12 NOTE — TELEPHONE ENCOUNTER
Patient notified of negative urine cultures results.  Advised that he can stop Cipro at this time. Patient states symptoms are improving and he plans to finish the last couple of doses.  All questions answered.    Price Rolon PA-C

## 2023-12-13 DIAGNOSIS — I25.10 CORONARY ARTERY DISEASE INVOLVING NATIVE CORONARY ARTERY OF NATIVE HEART WITHOUT ANGINA PECTORIS: ICD-10-CM

## 2023-12-14 RX ORDER — NITROGLYCERIN 0.4 MG/1
0.4 TABLET SUBLINGUAL EVERY 5 MIN PRN
Qty: 25 TABLET | Refills: 3 | Status: SHIPPED | OUTPATIENT
Start: 2023-12-14

## 2023-12-15 ENCOUNTER — PATIENT MESSAGE (OUTPATIENT)
Dept: CARDIOLOGY | Facility: CLINIC | Age: 83
End: 2023-12-15
Payer: MEDICARE

## 2023-12-15 ENCOUNTER — TELEPHONE (OUTPATIENT)
Dept: CARDIOLOGY | Facility: CLINIC | Age: 83
End: 2023-12-15
Payer: MEDICARE

## 2023-12-15 RX ORDER — LOSARTAN POTASSIUM 50 MG/1
50 TABLET ORAL DAILY
COMMUNITY

## 2023-12-15 NOTE — TELEPHONE ENCOUNTER
Updating patients medication:     Dr. Valle staff:  When I saw Dr. Valle on 12/11/23, I told the tech who took my prescription information that I no longer take 50mg Losartan twice daily.  As my BP was too low at times, Dr. Steward changed the dosage to 50mg once daily.  This has worked much better for me.  Please make the change on my chart.  Thanks.    Wilberto Hayes

## 2023-12-20 ENCOUNTER — OFFICE VISIT (OUTPATIENT)
Dept: FAMILY MEDICINE | Facility: CLINIC | Age: 83
End: 2023-12-20
Payer: MEDICARE

## 2023-12-20 ENCOUNTER — LAB VISIT (OUTPATIENT)
Dept: LAB | Facility: HOSPITAL | Age: 83
End: 2023-12-20
Attending: FAMILY MEDICINE
Payer: MEDICARE

## 2023-12-20 VITALS
TEMPERATURE: 98 F | HEIGHT: 71 IN | DIASTOLIC BLOOD PRESSURE: 70 MMHG | SYSTOLIC BLOOD PRESSURE: 136 MMHG | OXYGEN SATURATION: 97 % | HEART RATE: 65 BPM | BODY MASS INDEX: 30.74 KG/M2 | WEIGHT: 219.56 LBS

## 2023-12-20 DIAGNOSIS — E78.00 PURE HYPERCHOLESTEROLEMIA: ICD-10-CM

## 2023-12-20 DIAGNOSIS — N18.31 CHRONIC KIDNEY DISEASE, STAGE 3A: ICD-10-CM

## 2023-12-20 DIAGNOSIS — N20.0 KIDNEY STONES: ICD-10-CM

## 2023-12-20 DIAGNOSIS — I25.10 CORONARY ARTERY DISEASE INVOLVING NATIVE CORONARY ARTERY OF NATIVE HEART WITHOUT ANGINA PECTORIS: ICD-10-CM

## 2023-12-20 DIAGNOSIS — I10 ESSENTIAL HYPERTENSION: ICD-10-CM

## 2023-12-20 DIAGNOSIS — I10 ESSENTIAL HYPERTENSION: Primary | ICD-10-CM

## 2023-12-20 DIAGNOSIS — G47.33 OSA ON CPAP: ICD-10-CM

## 2023-12-20 DIAGNOSIS — N40.0 BENIGN PROSTATIC HYPERPLASIA, UNSPECIFIED WHETHER LOWER URINARY TRACT SYMPTOMS PRESENT: ICD-10-CM

## 2023-12-20 LAB
ANION GAP SERPL CALC-SCNC: 7 MMOL/L (ref 8–16)
BUN SERPL-MCNC: 17 MG/DL (ref 8–23)
CALCIUM SERPL-MCNC: 10 MG/DL (ref 8.7–10.5)
CHLORIDE SERPL-SCNC: 107 MMOL/L (ref 95–110)
CHOLEST SERPL-MCNC: 171 MG/DL (ref 120–199)
CHOLEST/HDLC SERPL: 4.1 {RATIO} (ref 2–5)
CO2 SERPL-SCNC: 26 MMOL/L (ref 23–29)
CREAT SERPL-MCNC: 1.2 MG/DL (ref 0.5–1.4)
EST. GFR  (NO RACE VARIABLE): >60 ML/MIN/1.73 M^2
GLUCOSE SERPL-MCNC: 96 MG/DL (ref 70–110)
HDLC SERPL-MCNC: 42 MG/DL (ref 40–75)
HDLC SERPL: 24.6 % (ref 20–50)
LDLC SERPL CALC-MCNC: 102.8 MG/DL (ref 63–159)
NONHDLC SERPL-MCNC: 129 MG/DL
POTASSIUM SERPL-SCNC: 5.1 MMOL/L (ref 3.5–5.1)
SODIUM SERPL-SCNC: 140 MMOL/L (ref 136–145)
TRIGL SERPL-MCNC: 131 MG/DL (ref 30–150)

## 2023-12-20 PROCEDURE — 3288F FALL RISK ASSESSMENT DOCD: CPT | Mod: CPTII,S$GLB,, | Performed by: FAMILY MEDICINE

## 2023-12-20 PROCEDURE — 3075F SYST BP GE 130 - 139MM HG: CPT | Mod: CPTII,S$GLB,, | Performed by: FAMILY MEDICINE

## 2023-12-20 PROCEDURE — 80061 LIPID PANEL: CPT | Performed by: FAMILY MEDICINE

## 2023-12-20 PROCEDURE — 36415 COLL VENOUS BLD VENIPUNCTURE: CPT | Mod: PO | Performed by: FAMILY MEDICINE

## 2023-12-20 PROCEDURE — 3288F PR FALLS RISK ASSESSMENT DOCUMENTED: ICD-10-PCS | Mod: CPTII,S$GLB,, | Performed by: FAMILY MEDICINE

## 2023-12-20 PROCEDURE — 3075F PR MOST RECENT SYSTOLIC BLOOD PRESS GE 130-139MM HG: ICD-10-PCS | Mod: CPTII,S$GLB,, | Performed by: FAMILY MEDICINE

## 2023-12-20 PROCEDURE — 99214 OFFICE O/P EST MOD 30 MIN: CPT | Mod: S$GLB,,, | Performed by: FAMILY MEDICINE

## 2023-12-20 PROCEDURE — 1125F PR PAIN SEVERITY QUANTIFIED, PAIN PRESENT: ICD-10-PCS | Mod: CPTII,S$GLB,, | Performed by: FAMILY MEDICINE

## 2023-12-20 PROCEDURE — 3078F PR MOST RECENT DIASTOLIC BLOOD PRESSURE < 80 MM HG: ICD-10-PCS | Mod: CPTII,S$GLB,, | Performed by: FAMILY MEDICINE

## 2023-12-20 PROCEDURE — 99214 PR OFFICE/OUTPT VISIT, EST, LEVL IV, 30-39 MIN: ICD-10-PCS | Mod: S$GLB,,, | Performed by: FAMILY MEDICINE

## 2023-12-20 PROCEDURE — 1101F PT FALLS ASSESS-DOCD LE1/YR: CPT | Mod: CPTII,S$GLB,, | Performed by: FAMILY MEDICINE

## 2023-12-20 PROCEDURE — 80048 BASIC METABOLIC PNL TOTAL CA: CPT | Performed by: FAMILY MEDICINE

## 2023-12-20 PROCEDURE — 1159F PR MEDICATION LIST DOCUMENTED IN MEDICAL RECORD: ICD-10-PCS | Mod: CPTII,S$GLB,, | Performed by: FAMILY MEDICINE

## 2023-12-20 PROCEDURE — 99999 PR PBB SHADOW E&M-EST. PATIENT-LVL V: ICD-10-PCS | Mod: PBBFAC,,, | Performed by: FAMILY MEDICINE

## 2023-12-20 PROCEDURE — 1101F PR PT FALLS ASSESS DOC 0-1 FALLS W/OUT INJ PAST YR: ICD-10-PCS | Mod: CPTII,S$GLB,, | Performed by: FAMILY MEDICINE

## 2023-12-20 PROCEDURE — 1159F MED LIST DOCD IN RCRD: CPT | Mod: CPTII,S$GLB,, | Performed by: FAMILY MEDICINE

## 2023-12-20 PROCEDURE — 1125F AMNT PAIN NOTED PAIN PRSNT: CPT | Mod: CPTII,S$GLB,, | Performed by: FAMILY MEDICINE

## 2023-12-20 PROCEDURE — 3078F DIAST BP <80 MM HG: CPT | Mod: CPTII,S$GLB,, | Performed by: FAMILY MEDICINE

## 2023-12-20 PROCEDURE — 99999 PR PBB SHADOW E&M-EST. PATIENT-LVL V: CPT | Mod: PBBFAC,,, | Performed by: FAMILY MEDICINE

## 2023-12-20 RX ORDER — TAMSULOSIN HYDROCHLORIDE 0.4 MG/1
0.4 CAPSULE ORAL DAILY
Qty: 30 CAPSULE | Refills: 1 | Status: SHIPPED | OUTPATIENT
Start: 2023-12-20 | End: 2024-02-16

## 2023-12-20 NOTE — PROGRESS NOTES
Subjective:       Patient ID: Wilberto Hayes Jr. is a 83 y.o. male.    Chief Complaint: Follow-up      HPI Comments:       Current Outpatient Medications:     aspirin 81 mg Tab, Take 1 tablet by mouth Daily., Disp: , Rfl:     clobetasoL (TEMOVATE) 0.05 % cream, Apply to the left ear BID until sensitivity improves., Disp: 30 g, Rfl: 2    co-enzyme Q-10 30 mg capsule, Take 100 mg by mouth once daily., Disp: , Rfl:     cyanocobalamin (VITAMIN B-12) 100 MCG tablet, Take 1 tablet by mouth every other day. , Disp: , Rfl:     hydroCHLOROthiazide (HYDRODIURIL) 12.5 MG Tab, Take 1 tablet (12.5 mg total) by mouth once daily., Disp: 30 tablet, Rfl: 11    ketorolac 0.5% (ACULAR) 0.5 % Drop, Put one eyedrop in both eye four times a day. Start first drop morning of laser and stop after five days., Disp: 5 mL, Rfl: 0    latanoprost 0.005 % ophthalmic solution, INSTILL 1 DROP INTO BOTH EYES EVERY EVENING, Disp: 7.5 mL, Rfl: 3    losartan (COZAAR) 50 MG tablet, Take 50 mg by mouth once daily., Disp: , Rfl:     LUBRICANT EYE DROPS, GLYC-PG, 1-0.3 % Drop, , Disp: , Rfl:     multivit-min/ferrous fumarate (MULTI VITAMIN ORAL), , Disp: , Rfl:     mupirocin (BACTROBAN) 2 % ointment, Apply topically 3 (three) times daily., Disp: 30 g, Rfl: 0    nebivoloL (BYSTOLIC) 5 MG Tab, Take 1 tablet (5 mg total) by mouth once daily., Disp: 30 tablet, Rfl: 11    nitroGLYCERIN (NITROSTAT) 0.4 MG SL tablet, PLACE 1 TABLET (0.4 MG TOTAL) UNDER THE TONGUE EVERY 5 (FIVE) MINUTES AS NEEDED FOR CHEST PAIN (IF NO RELIEF SEEK ER TRMT)., Disp: 25 tablet, Rfl: 3    OCEAN NASAL 0.65 % nasal spray, , Disp: , Rfl:     omega-3 fatty acids-vitamin E 1,000 mg Cap, Take 2 capsules by mouth Daily., Disp: , Rfl:     ondansetron (ZOFRAN) 4 MG tablet, Take 1 tablet (4 mg total) by mouth every 6 (six) hours., Disp: 30 tablet, Rfl: 0    saw palmetto 500 MG capsule, Take by mouth 2 (two) times daily. , Disp: , Rfl:     zinc gluconate 50 mg tablet, Take 50 mg by mouth once  "daily., Disp: , Rfl:     tamsulosin (FLOMAX) 0.4 mg Cap, Take 1 capsule (0.4 mg total) by mouth once daily., Disp: 30 capsule, Rfl: 1      Nine month follow-up.  Generally doing well.  Says that his concerns about "long COVID" have ended as his symptoms have resolved.      Recently was seen in urgent care and had some urinary symptoms.  Urinalysis was clear but he was given Cipro and his symptoms resolved except for some residual "irritation" when he tries to go.  Also some delay in going.    Years ago he was put on Flomax, in part due to kidney stones, but his flow also improved after he was on it, but he stopped it on its own.  Has not been back.  PSA done by me last time was normal.  Willing to see urology and go back on Flomax in the meantime.    Blood pressure readings at home consistently 130s/70s.      CPAP compliance is good      Review of Systems   Constitutional:  Negative for activity change, appetite change, fever and unexpected weight change.   HENT:  Negative for hearing loss, rhinorrhea, sore throat and trouble swallowing.    Eyes:  Negative for discharge and visual disturbance.   Respiratory:  Negative for cough, chest tightness, shortness of breath and wheezing.    Cardiovascular:  Negative for chest pain and palpitations.   Gastrointestinal:  Negative for abdominal pain, blood in stool, constipation, diarrhea, nausea and vomiting.   Endocrine: Negative for polydipsia and polyuria.   Genitourinary:  Positive for urgency. Negative for difficulty urinating and hematuria.   Musculoskeletal:  Negative for arthralgias, joint swelling, myalgias and neck pain.   Neurological:  Negative for dizziness, weakness and headaches.   Psychiatric/Behavioral:  Negative for confusion and dysphoric mood.        Objective:      Vitals:    12/20/23 0948 12/20/23 1010   BP: (!) 160/70 136/70   Pulse: 65    Temp: 98.1 °F (36.7 °C)    SpO2: 97%    Weight: 99.6 kg (219 lb 9.3 oz)    Height: 5' 11" (1.803 m)    PainSc:   4  "     Physical Exam  Vitals and nursing note reviewed.   Constitutional:       General: He is not in acute distress.     Appearance: He is well-developed. He is not diaphoretic.   HENT:      Head: Normocephalic.   Neck:      Thyroid: No thyromegaly.   Cardiovascular:      Rate and Rhythm: Normal rate and regular rhythm.      Heart sounds: Normal heart sounds. No murmur heard.  Pulmonary:      Effort: Pulmonary effort is normal.      Breath sounds: Normal breath sounds. No wheezing or rales.   Abdominal:      General: There is no distension.      Palpations: Abdomen is soft.   Musculoskeletal:      Cervical back: Neck supple.   Lymphadenopathy:      Cervical: No cervical adenopathy.   Skin:     General: Skin is warm and dry.   Neurological:      Mental Status: He is alert and oriented to person, place, and time.   Psychiatric:         Mood and Affect: Mood normal.         Behavior: Behavior normal.         Thought Content: Thought content normal.         Judgment: Judgment normal.         Assessment:       1. Essential hypertension    2. CHELO on CPAP    3. Coronary artery disease involving native coronary artery of native heart without angina pectoris    4. Chronic kidney disease, stage 3a    5. Pure hypercholesterolemia    6. Benign prostatic hyperplasia, unspecified whether lower urinary tract symptoms present    7. Kidney stones        Plan:   Essential hypertension  Comments:  Controlled.  Follow-up 6 months  Orders:  -     Basic Metabolic Panel; Future; Expected date: 12/20/2023    CHELO on CPAP  Comments:  Nightly compliance    Coronary artery disease involving native coronary artery of native heart without angina pectoris  Comments:  Followed by Cardiology.  Lipid profile today    Chronic kidney disease, stage 3a  Comments:  BMP today    Pure hypercholesterolemia  Comments:  Fasting lipid profile  Orders:  -     Lipid Panel; Future; Expected date: 12/20/2023    Benign prostatic hyperplasia, unspecified whether  lower urinary tract symptoms present  Comments:  PSA normal.  Restart Flomax.  Urology consult  Orders:  -     Ambulatory referral/consult to Urology; Future; Expected date: 12/27/2023    Kidney stones  Comments:  None currently.  Urology consult  Orders:  -     Ambulatory referral/consult to Urology; Future; Expected date: 12/27/2023    Other orders  -     tamsulosin (FLOMAX) 0.4 mg Cap; Take 1 capsule (0.4 mg total) by mouth once daily.  Dispense: 30 capsule; Refill: 1

## 2024-01-11 DIAGNOSIS — Z00.00 ENCOUNTER FOR MEDICARE ANNUAL WELLNESS EXAM: ICD-10-CM

## 2024-01-11 NOTE — TELEPHONE ENCOUNTER
No care due was identified.  [facility] Embedded Care Due Messages. Reference number: [id number].   1/11/2024 1:31:30 PM CST

## 2024-01-12 RX ORDER — TAMSULOSIN HYDROCHLORIDE 0.4 MG/1
1 CAPSULE ORAL
Qty: 90 CAPSULE | Refills: 3 | OUTPATIENT
Start: 2024-01-12

## 2024-01-12 NOTE — TELEPHONE ENCOUNTER
Refill Routing Note   Medication(s) are not appropriate for processing by [facility] for the following reason(s):        New or recently adjusted medication    [facility] action(s):  Defer               Appointments  past 12m or future 3m with PCP    Date Provider   Last Visit   12/20/2023 Dayne Steward MD   Next Visit   Visit date not found Dayne Steward MD   ED visits in past 90 days: 0        Note composed:5:27 AM 01/12/2024

## 2024-01-31 DIAGNOSIS — I10 ESSENTIAL HYPERTENSION: ICD-10-CM

## 2024-01-31 RX ORDER — NEBIVOLOL 5 MG/1
5 TABLET ORAL DAILY
Qty: 30 TABLET | Refills: 11 | Status: SHIPPED | OUTPATIENT
Start: 2024-01-31 | End: 2025-01-30

## 2024-02-10 ENCOUNTER — OFFICE VISIT (OUTPATIENT)
Dept: URGENT CARE | Facility: CLINIC | Age: 84
End: 2024-02-10
Payer: MEDICARE

## 2024-02-10 VITALS
WEIGHT: 216.63 LBS | DIASTOLIC BLOOD PRESSURE: 69 MMHG | SYSTOLIC BLOOD PRESSURE: 138 MMHG | TEMPERATURE: 100 F | RESPIRATION RATE: 20 BRPM | HEART RATE: 69 BPM | HEIGHT: 71 IN | OXYGEN SATURATION: 97 % | BODY MASS INDEX: 30.33 KG/M2

## 2024-02-10 DIAGNOSIS — U07.1 COVID: Primary | ICD-10-CM

## 2024-02-10 DIAGNOSIS — U07.1 COVID-19 VIRUS DETECTED: ICD-10-CM

## 2024-02-10 DIAGNOSIS — R53.83 FATIGUE, UNSPECIFIED TYPE: ICD-10-CM

## 2024-02-10 DIAGNOSIS — R50.9 FEVER, UNSPECIFIED FEVER CAUSE: ICD-10-CM

## 2024-02-10 DIAGNOSIS — R05.9 COUGH, UNSPECIFIED TYPE: ICD-10-CM

## 2024-02-10 DIAGNOSIS — R09.81 NASAL CONGESTION: ICD-10-CM

## 2024-02-10 LAB
CTP QC/QA: YES
CTP QC/QA: YES
POC MOLECULAR INFLUENZA A AGN: NEGATIVE
POC MOLECULAR INFLUENZA B AGN: NEGATIVE
SARS-COV-2 AG RESP QL IA.RAPID: POSITIVE

## 2024-02-10 PROCEDURE — 99214 OFFICE O/P EST MOD 30 MIN: CPT | Mod: S$GLB,,, | Performed by: NURSE PRACTITIONER

## 2024-02-10 PROCEDURE — 87811 SARS-COV-2 COVID19 W/OPTIC: CPT | Mod: QW,S$GLB,, | Performed by: NURSE PRACTITIONER

## 2024-02-10 PROCEDURE — 87502 INFLUENZA DNA AMP PROBE: CPT | Mod: QW,S$GLB,, | Performed by: NURSE PRACTITIONER

## 2024-02-10 RX ORDER — BENZONATATE 200 MG/1
200 CAPSULE ORAL 3 TIMES DAILY PRN
Qty: 25 CAPSULE | Refills: 0 | Status: SHIPPED | OUTPATIENT
Start: 2024-02-10 | End: 2024-02-20

## 2024-02-10 RX ORDER — DEXTROMETHORPHAN HYDROBROMIDE AND GUAIFENESIN 10; 200 MG/1; MG/1
2 CAPSULE, GELATIN COATED ORAL
Qty: 40 EACH | Refills: 0 | Status: SHIPPED | OUTPATIENT
Start: 2024-02-10 | End: 2024-02-20

## 2024-02-10 RX ORDER — NIRMATRELVIR AND RITONAVIR 300-100 MG
KIT ORAL
Qty: 30 TABLET | Refills: 0 | Status: SHIPPED | OUTPATIENT
Start: 2024-02-10 | End: 2024-06-07

## 2024-02-10 NOTE — PROGRESS NOTES
"Subjective:      Patient ID: Wilberto Hayes Jr. is a 84 y.o. male.    Vitals:  height is 5' 11" (1.803 m) and weight is 98.2 kg (216 lb 9.6 oz). His oral temperature is 99.7 °F (37.6 °C). His blood pressure is 138/69 and his pulse is 69. His respiration is 20 and oxygen saturation is 97%.     Chief Complaint: Fever, Generalized Body Aches, Cough, and Fatigue    Patient was an 84-year-old male who presents for evaluation of fever with associated cough, fatigue and nasal congestion.  Onset 2 days ago.  Treating fever with Tylenol.  T-max reported 101.  Denies ear pain, sore throat, chest pain, dyspnea, vomiting, diarrhea or rash.  No recent travel or known sick contacts reported.  No other concerns voiced.    Fever   This is a new problem. The current episode started in the past 7 days. The problem occurs constantly. The problem has been gradually worsening. The maximum temperature noted was 101 to 101.9 F. Associated symptoms include congestion and coughing. Pertinent negatives include no diarrhea, ear pain, rash, sore throat, vomiting or wheezing.   Cough  Associated symptoms include a fever and postnasal drip. Pertinent negatives include no ear pain, rash, sore throat, shortness of breath or wheezing.   Fatigue  Associated symptoms include congestion, coughing, fatigue and a fever. Pertinent negatives include no rash, sore throat or vomiting.       Constitution: Positive for fatigue and fever. Negative for activity change and appetite change.   HENT:  Positive for congestion and postnasal drip. Negative for ear pain and sore throat.    Respiratory:  Positive for cough. Negative for sputum production, shortness of breath, stridor, wheezing and asthma.    Gastrointestinal:  Negative for vomiting and diarrhea.   Skin:  Negative for rash.   Allergic/Immunologic: Negative for asthma.      Objective:     Physical Exam   Constitutional: He is oriented to person, place, and time. He appears well-developed. He is " cooperative.  Non-toxic appearance. He does not appear ill. No distress.   HENT:   Head: Normocephalic and atraumatic.   Ears:   Right Ear: Hearing and external ear normal.   Left Ear: Hearing and external ear normal.   Nose: Nose normal. No mucosal edema, rhinorrhea or nasal deformity. No epistaxis. Right sinus exhibits no maxillary sinus tenderness and no frontal sinus tenderness. Left sinus exhibits no maxillary sinus tenderness and no frontal sinus tenderness.   Mouth/Throat: Uvula is midline, oropharynx is clear and moist and mucous membranes are normal. No trismus in the jaw. Normal dentition. No uvula swelling. No oropharyngeal exudate, posterior oropharyngeal edema or posterior oropharyngeal erythema.   Eyes: Conjunctivae and lids are normal. No scleral icterus.   Neck: Trachea normal and phonation normal. Neck supple. No edema present. No erythema present. No neck rigidity present.   Cardiovascular: Normal rate, regular rhythm, normal heart sounds and normal pulses.   Pulmonary/Chest: Effort normal and breath sounds normal. No stridor. No respiratory distress. He has no decreased breath sounds. He has no wheezes. He has no rhonchi. He has no rales.   Abdominal: Normal appearance.   Musculoskeletal: Normal range of motion.         General: No deformity. Normal range of motion.   Neurological: He is alert and oriented to person, place, and time. He exhibits normal muscle tone. Coordination normal.   Skin: Skin is warm, dry, intact, not diaphoretic and not pale.   Psychiatric: His speech is normal and behavior is normal. Judgment and thought content normal.   Nursing note and vitals reviewed.      Assessment:     1. COVID    2. Fever, unspecified fever cause    3. Cough, unspecified type    4. Fatigue, unspecified type    5. Nasal congestion        Plan:       COVID    Fever, unspecified fever cause  -     POCT Influenza A/B MOLECULAR  -     SARS Coronavirus 2 Antigen, POCT Manual Read    Cough, unspecified  type  -     POCT Influenza A/B MOLECULAR  -     SARS Coronavirus 2 Antigen, POCT Manual Read    Fatigue, unspecified type    Nasal congestion    Other orders  -     benzonatate (TESSALON) 200 MG capsule; Take 1 capsule (200 mg total) by mouth 3 (three) times daily as needed for Cough.  Dispense: 25 capsule; Refill: 0  -     dextromethorphan-guaiFENesin (CORICIDIN HBP CHEST SUMEET-COUGH)  mg Cap; Take 2 capsules by mouth every 6 (six) hours while awake. for 10 days  Dispense: 40 each; Refill: 0  -     nirmatrelvir-ritonavir (PAXLOVID) 300 mg (150 mg x 2)-100 mg copackaged tablets (EUA); Take 3 tablets by mouth 2 (two) times daily. Each dose contains 2 nirmatrelvir (pink tablets) and 1 ritonavir (white tablet). Take all 3 tablets together  Dispense: 30 tablet; Refill: 0          Medical Decision Making:   Initial Assessment:   Nontoxic appearing 83 yo male c/o fever and congestion.  After complete evaluation, including thorough history and physical exam, the patient's symptoms are most likely due to viral upper respiratory infection. There are no concerning features on physical exam to suggest bacterial otitis media/externa, sinusitis, strep pharyngitis, or peritonsillar abscess. Vital signs do not suggest sepsis. Lung sounds are clear and not consistent with pneumonia. There is no neck pain or limited ROM to suggest retropharyngeal abscess or meningitis. In clinic testing for covid is positive.The patient will be treated with supportive care. Will provide RX for paxlovid and tessalon upon D/C. Follow up instructions and ED precautions provided.     Clinical Tests:   Lab Tests: Reviewed       <> Summary of Lab: Covid positive  Flu negative  Patient has history of respiratory disease (copd/asthma/CHELO/CPaP use) noted to have increase risk of pneumonia (bacterial infection) development with acute respiratory illness. Patient was advised close monitoring and is to continue current medications including inhalers.  Patient to follow up with PCP and given strict ER precautions.    Patient prescribed Paxlovid and treatment of COVID.  No history of renal or hepatic insufficiency.  Medication list reviewed and no contraindications noted.           Results for orders placed or performed in visit on 02/10/24   POCT Influenza A/B MOLECULAR   Result Value Ref Range    POC Molecular Influenza A Ag Negative Negative, Not Reported    POC Molecular Influenza B Ag Negative Negative, Not Reported     Acceptable Yes    SARS Coronavirus 2 Antigen, POCT Manual Read   Result Value Ref Range    SARS Coronavirus 2 Antigen Positive (A) Negative     Acceptable Yes      Patient Instructions   POSITIVE COVID TEST    You have tested positive for COVID-19 today.  Please note that patients who test positive for COVID-19 are required by the CDC to undergo isolation for 5 days after their symptoms first began.     This isolation starts from the day you first developed symptoms, not the day of your positive test. For example, if your symptoms began on a Monday but tested positive on the following Wednesday, your 5-day isolation begins from that Monday, not the Wednesday you tested positive.     However, if you are asymptomatic (a person who does not have any symptoms) and COVID-19 positive, your 5-day isolation begins on the day you tested positive, regardless of exposure date.     Also, per the CDC guidelines, once your 5 days have passed, and you have not had fever greater than 100.4F in the last 24 hours without taking any fever reducers such as Tylenol (Acetaminophen) or Motrin (Ibuprofen), you may return to your normal activities including social distancing, wearing masks continually for 5 more days, and frequent handwashing -     YOU DO NOT NEED ANOTHER TEST IN ORDER TO END YOUR QUARANTINE.  Treatment: There is no cure for the COVID there is only symptomatic care.     Try these tips to keep yourself comfortable:                    -Get plenty of rest.                   -Drink plenty of fluids, at least 8 large glasses of fluid a day. Good fluid choices are water, fruit juices high in Vitamin C, tea, gelatin, or broths and soups. These help to keep mucus thin and ease congestion.                  -Use salt water gargle, cough drops or throat sprays to relieve throat pain. Mi ¼ to ½ teaspoon of salt in 1 cup of warm water for a salt water gargle  solution.                  -Use petroleum jelly or lip balm around lips and nose to prevent chapping.                  -Use saline nose drops or spray to help ease congestion.    Over the Counter (OTC) Medicines:  Take over the counter medicines as needed to ease your signs.  Read labels carefully.  Use a product that treats only the signs that you have. Ask your pharmacist  for recommendations. Be sure to ask about possible interactions with other  medicines you are taking.  Common medicines used to treat signs of covid include:     -Flonase daily.  -Claritin or Zyrtec daily.  -Mucinex every 12 hours -- Drink plenty of water while taking this medication.    - Cough suppressant, also called antitussive, such as dextromethorphan.  This medicine decreases your reflex and sensitivity to cough. This  medicine may be kept behind the pharmacy counter for purchase.    Cold and cough medicines often contain more than one type of medicine.  Ask the pharmacist for help to confirm that you are not using more than one  product with the same or similar ingredient. For example, some cold and  cough medicines have acetaminophen or ibuprofen in them to help lower a  fever or ease muscle aches. Do not take extra acetaminophen (Tylenol) or  ibuprofen (Advil, Motrin) if the cold or cough medicine has it as an  ingredient. Too much medicine could be harmful.    Take the correct dose as listed on the package. Do not take more than  recommended.    Use a Humidifier:  A cool mist humidifier can make breathing easier  by thinning mucus. Do not use  a steam humidifier as hot water can cause burns if spilled.  Place the humidifier a few feet from the bed. Drain and clean each day with  soap and water to prevent bacteria and mold from growing.  Indoor humidity should not be above 50%. Stop using the humidifier if you  notice moisture on windows, walls or pictures.  You do not need to add any medicine to the humidifier.  If you cannot get a humidifier, place a pan of water next to heating vents and  refill the water level daily. The water will evaporate and add moisture to the  Room.    How to prevent the spread of COVID  -Wash your hands with soap and water or use alcohol based hand   often. Dry hands wet from washing with soap on a paper towel instead of cloth towel.  -Cough or sneeze into your elbow to avoid spreading germs.  -Wipe down common surfaces, such as door knobs and faucet handles, with a disinfectant spray.  -Do not share cups or utensils.        Please follow up with your Primary care provider within 2-5 days if your signs and symptoms have not resolved or worsen.      If your condition worsens or fails to improve we recommend that you receive another evaluation at the emergency room immediately or contact your primary medical clinic to discuss your concerns.   You must understand that you have received an Urgent Care treatment only and that you may be released before all of your medical problems are known or treated. You, the patient, will arrange for follow up care as instructed.     We do have a drug called Paxlovid that has an Emergency Authorization Use that [facility] is currently advising and using for Covid-19. The main thing with it is that it could cause some liver or kidney injuries. If you were on any type of cholesterol medicine like a statin we would want to stop that as well. There are some other drug interactions as well. Since it is new we do not know everything about it yet but this is the  current recommendation to help prevent severe disease in patients with a mild-moderate symptoms within 5 days of symptom-onset who are not in the hospital being treated for covid.

## 2024-02-10 NOTE — PATIENT INSTRUCTIONS
POSITIVE COVID TEST    You have tested positive for COVID-19 today.  Please note that patients who test positive for COVID-19 are required by the CDC to undergo isolation for 5 days after their symptoms first began.     This isolation starts from the day you first developed symptoms, not the day of your positive test. For example, if your symptoms began on a Monday but tested positive on the following Wednesday, your 5-day isolation begins from that Monday, not the Wednesday you tested positive.     However, if you are asymptomatic (a person who does not have any symptoms) and COVID-19 positive, your 5-day isolation begins on the day you tested positive, regardless of exposure date.     Also, per the CDC guidelines, once your 5 days have passed, and you have not had fever greater than 100.4F in the last 24 hours without taking any fever reducers such as Tylenol (Acetaminophen) or Motrin (Ibuprofen), you may return to your normal activities including social distancing, wearing masks continually for 5 more days, and frequent handwashing -     YOU DO NOT NEED ANOTHER TEST IN ORDER TO END YOUR QUARANTINE.  Treatment: There is no cure for the COVID there is only symptomatic care.     Try these tips to keep yourself comfortable:                   -Get plenty of rest.                   -Drink plenty of fluids, at least 8 large glasses of fluid a day. Good fluid choices are water, fruit juices high in Vitamin C, tea, gelatin, or broths and soups. These help to keep mucus thin and ease congestion.                  -Use salt water gargle, cough drops or throat sprays to relieve throat pain. Mi ¼ to ½ teaspoon of salt in 1 cup of warm water for a salt water gargle  solution.                  -Use petroleum jelly or lip balm around lips and nose to prevent chapping.                  -Use saline nose drops or spray to help ease congestion.    Over the Counter (OTC) Medicines:  Take over the counter medicines as needed to ease  your signs.  Read labels carefully.  Use a product that treats only the signs that you have. Ask your pharmacist  for recommendations. Be sure to ask about possible interactions with other  medicines you are taking.  Common medicines used to treat signs of covid include:     -Flonase daily.  -Claritin or Zyrtec daily.  -Mucinex every 12 hours -- Drink plenty of water while taking this medication.    - Cough suppressant, also called antitussive, such as dextromethorphan.  This medicine decreases your reflex and sensitivity to cough. This  medicine may be kept behind the pharmacy counter for purchase.    Cold and cough medicines often contain more than one type of medicine.  Ask the pharmacist for help to confirm that you are not using more than one  product with the same or similar ingredient. For example, some cold and  cough medicines have acetaminophen or ibuprofen in them to help lower a  fever or ease muscle aches. Do not take extra acetaminophen (Tylenol) or  ibuprofen (Advil, Motrin) if the cold or cough medicine has it as an  ingredient. Too much medicine could be harmful.    Take the correct dose as listed on the package. Do not take more than  recommended.    Use a Humidifier:  A cool mist humidifier can make breathing easier by thinning mucus. Do not use  a steam humidifier as hot water can cause burns if spilled.  Place the humidifier a few feet from the bed. Drain and clean each day with  soap and water to prevent bacteria and mold from growing.  Indoor humidity should not be above 50%. Stop using the humidifier if you  notice moisture on windows, walls or pictures.  You do not need to add any medicine to the humidifier.  If you cannot get a humidifier, place a pan of water next to heating vents and  refill the water level daily. The water will evaporate and add moisture to the  Room.    How to prevent the spread of COVID  -Wash your hands with soap and water or use alcohol based hand   often.  Dry hands wet from washing with soap on a paper towel instead of cloth towel.  -Cough or sneeze into your elbow to avoid spreading germs.  -Wipe down common surfaces, such as door knobs and faucet handles, with a disinfectant spray.  -Do not share cups or utensils.        Please follow up with your Primary care provider within 2-5 days if your signs and symptoms have not resolved or worsen.      If your condition worsens or fails to improve we recommend that you receive another evaluation at the emergency room immediately or contact your primary medical clinic to discuss your concerns.   You must understand that you have received an Urgent Care treatment only and that you may be released before all of your medical problems are known or treated. You, the patient, will arrange for follow up care as instructed.     We do have a drug called Paxlovid that has an Emergency Authorization Use that [facility] is currently advising and using for Covid-19. The main thing with it is that it could cause some liver or kidney injuries. If you were on any type of cholesterol medicine like a statin we would want to stop that as well. There are some other drug interactions as well. Since it is new we do not know everything about it yet but this is the current recommendation to help prevent severe disease in patients with a mild-moderate symptoms within 5 days of symptom-onset who are not in the hospital being treated for covid.

## 2024-02-11 ENCOUNTER — PATIENT MESSAGE (OUTPATIENT)
Dept: OPHTHALMOLOGY | Facility: CLINIC | Age: 84
End: 2024-02-11
Payer: MEDICARE

## 2024-02-15 NOTE — TELEPHONE ENCOUNTER
No care due was identified.  [facility] Embedded Care Due Messages. Reference number: [id number].   2/15/2024 12:09:49 AM CST

## 2024-02-16 RX ORDER — TAMSULOSIN HYDROCHLORIDE 0.4 MG/1
1 CAPSULE ORAL
Qty: 90 CAPSULE | Refills: 1 | Status: SHIPPED | OUTPATIENT
Start: 2024-02-16 | End: 2024-02-22 | Stop reason: SDUPTHER

## 2024-02-16 NOTE — TELEPHONE ENCOUNTER
Refill Routing Note   Medication(s) are not appropriate for processing by [facility] for the following reason(s):        New or recently adjusted medication    [facility] action(s):  Defer               Appointments  past 12m or future 3m with PCP    Date Provider   Last Visit   12/20/2023 Dayne Steward MD   Next Visit   Visit date not found Dayne Steward MD   ED visits in past 90 days: 0        Note composed:8:31 PM 02/15/2024

## 2024-02-22 ENCOUNTER — PATIENT MESSAGE (OUTPATIENT)
Dept: FAMILY MEDICINE | Facility: CLINIC | Age: 84
End: 2024-02-22
Payer: MEDICARE

## 2024-02-22 RX ORDER — TAMSULOSIN HYDROCHLORIDE 0.4 MG/1
1 CAPSULE ORAL DAILY
Qty: 90 CAPSULE | Refills: 1 | OUTPATIENT
Start: 2024-02-22

## 2024-02-22 RX ORDER — TAMSULOSIN HYDROCHLORIDE 0.4 MG/1
1 CAPSULE ORAL DAILY
Qty: 90 CAPSULE | Refills: 1 | Status: SHIPPED | OUTPATIENT
Start: 2024-02-22

## 2024-02-22 NOTE — TELEPHONE ENCOUNTER
No care due was identified.  [facility] Embedded Care Due Messages. Reference number: [id number].   2/22/2024 3:31:55 PM CST

## 2024-02-22 NOTE — TELEPHONE ENCOUNTER
No care due was identified.  [facility] Embedded Care Due Messages. Reference number: [id number].   2/22/2024 2:54:20 PM CST

## 2024-03-13 ENCOUNTER — OUTSIDE PLACE OF SERVICE (OUTPATIENT)
Dept: OPHTHALMOLOGY | Facility: CLINIC | Age: 84
End: 2024-03-13
Payer: MEDICARE

## 2024-03-13 PROCEDURE — 65855 TRABECULOPLASTY LASER SURG: CPT | Mod: 50,,, | Performed by: OPHTHALMOLOGY

## 2024-04-03 ENCOUNTER — PATIENT MESSAGE (OUTPATIENT)
Dept: PULMONOLOGY | Facility: CLINIC | Age: 84
End: 2024-04-03
Payer: MEDICARE

## 2024-04-23 ENCOUNTER — OFFICE VISIT (OUTPATIENT)
Dept: OPHTHALMOLOGY | Facility: CLINIC | Age: 84
End: 2024-04-23
Payer: MEDICARE

## 2024-04-23 DIAGNOSIS — H52.7 REFRACTIVE ERROR: ICD-10-CM

## 2024-04-23 DIAGNOSIS — H25.11 NUCLEAR SCLEROSIS OF RIGHT EYE: ICD-10-CM

## 2024-04-23 DIAGNOSIS — H40.1131 PRIMARY OPEN ANGLE GLAUCOMA OF BOTH EYES, MILD STAGE: Primary | ICD-10-CM

## 2024-04-23 DIAGNOSIS — Z96.1 PSEUDOPHAKIA OF LEFT EYE: ICD-10-CM

## 2024-04-23 PROCEDURE — 99214 OFFICE O/P EST MOD 30 MIN: CPT | Mod: S$GLB,,, | Performed by: OPHTHALMOLOGY

## 2024-04-23 PROCEDURE — 92015 DETERMINE REFRACTIVE STATE: CPT | Mod: S$GLB,,, | Performed by: OPHTHALMOLOGY

## 2024-04-23 PROCEDURE — 1160F RVW MEDS BY RX/DR IN RCRD: CPT | Mod: CPTII,S$GLB,, | Performed by: OPHTHALMOLOGY

## 2024-04-23 PROCEDURE — 1126F AMNT PAIN NOTED NONE PRSNT: CPT | Mod: CPTII,S$GLB,, | Performed by: OPHTHALMOLOGY

## 2024-04-23 PROCEDURE — 99999 PR PBB SHADOW E&M-EST. PATIENT-LVL III: CPT | Mod: PBBFAC,,, | Performed by: OPHTHALMOLOGY

## 2024-04-23 PROCEDURE — 1159F MED LIST DOCD IN RCRD: CPT | Mod: CPTII,S$GLB,, | Performed by: OPHTHALMOLOGY

## 2024-04-23 NOTE — PROGRESS NOTES
"SUBJECTIVE  Wilberto Hayes Jr. is 84 y.o. male  Corrected distance visual acuity was 20/25(fr) in the right eye and 20/25(fr) in the left eye.   Chief Complaint   Patient presents with    Glaucoma     SLT OU 3/13/24. VA is stable. No pain or irritation. Compliant with gtt.          HPI     Glaucoma     Additional comments: SLT OU 3/13/24. VA is stable. No pain or irritation.   Compliant with gtt.           Comments    Likes [employer]  Wife Camelia- Glaucoma pt    1. Mild COAG OS>OD Goal =17-18 (Init 20/22)  +Fhx Glaucoma -g-dad   SLT OD 11/09 (min response) + 1/8/20 (22.5-14.5)   SLT OS 8/09 (22 to 16-17) + 1/8/20 (24.5-14)   Timolol caused blurry vision  2. Mod NSC OD  PCIOL OS with Omni 5/11/22  3. Dry eyes (no dry mouth)   Gel Plugs OU   Xiidra caused irritation   Tried Restasis (burning and blurry vision)   4. Fuchs' corneal dystrophy  5. Ptosis OD     Latanoprost QHS OU       Homeopathic AT"s (Similasan)   Thera tears, Soothe XP   O3FO             Last edited by Leander Watson on 4/23/2024 10:45 AM.         Assessment /Plan :  1. Primary open angle glaucoma of both eyes, mild stage - nice response to the SLT OU    Doing well, intraocular pressure (IOP) OD within acceptable range relative to target IOP and no evidence of progression. Continue current treatment. Reviewed importance of continued compliance with treatment and follow up.      Patient instructed to continue using the following glaucoma medication as follows:  Latanoprost one drop in each eye nightly    Intraocular pressure (IOP) OS not within acceptable range relative to target IOP with risk of irreversible visual loss. Better IOP control is recommended. Treatment options may include change or additional medications, Selective Laser Trabeculoplasty (SLT laser), and/or incisional glaucoma surgery. Reviewed importance of continued compliance with treatment and follow up.     Patient chooses defer and recheck IOP next visit       2. Nuclear sclerosis of right " eye - monitor for now   3. Pseudophakia of left eye  -- Condition stable, no therapeutic change required. Monitoring routinely.     4. Refractive error - trifocal rx   Return to clinic in 3 months  or as needed.  With GOCT, Dilation, and HVF 24-2

## 2024-05-06 NOTE — TRANSFER OF CARE
"Anesthesia Transfer of Care Note    Patient: Wilberto Hayes Jr.    Procedure(s) Performed: Procedure(s) (LRB):  XI ROBOTIC CHOLECYSTECTOMY (N/A)  REPAIR, HERNIA, UMBILICAL, AGE 5 YEARS OR OLDER (N/A)    Patient location: PACU    Anesthesia Type: general    Transport from OR: Transported from OR on room air with adequate spontaneous ventilation    Post pain: adequate analgesia    Post assessment: no apparent anesthetic complications    Post vital signs: stable    Level of consciousness: sedated and responds to stimulation    Nausea/Vomiting: no nausea/vomiting    Complications: none    Transfer of care protocol was followed      Last vitals:   Visit Vitals  /63   Pulse 72   Temp 36.7 °C (98.1 °F) (Temporal)   Resp 18   Ht 5' 11" (1.803 m)   Wt 93.5 kg (206 lb 0.3 oz)   SpO2 98%   BMI 28.73 kg/m²     " -Check out meditation by Dee Sun and anxiety reduction by Dr. Bernadette Gleason  -Think about trying to accept and breathe with the pain and relax when it comes-what you resist, persists!   -Follow up with orthopedic

## 2024-05-18 ENCOUNTER — OFFICE VISIT (OUTPATIENT)
Dept: URGENT CARE | Facility: CLINIC | Age: 84
End: 2024-05-18
Payer: MEDICARE

## 2024-05-18 VITALS
RESPIRATION RATE: 20 BRPM | OXYGEN SATURATION: 91 % | HEART RATE: 93 BPM | BODY MASS INDEX: 17.64 KG/M2 | WEIGHT: 126 LBS | SYSTOLIC BLOOD PRESSURE: 152 MMHG | TEMPERATURE: 100 F | DIASTOLIC BLOOD PRESSURE: 76 MMHG | HEIGHT: 71 IN

## 2024-05-18 DIAGNOSIS — R09.89 ABNORMAL LUNG SOUNDS: Primary | ICD-10-CM

## 2024-05-18 DIAGNOSIS — R50.9 LOW GRADE FEVER: ICD-10-CM

## 2024-05-18 DIAGNOSIS — R05.1 ACUTE COUGH: ICD-10-CM

## 2024-05-18 DIAGNOSIS — R79.81 ABNORMAL PULSE OXIMETRY: ICD-10-CM

## 2024-05-18 LAB
CTP QC/QA: YES
SARS-COV-2 AG RESP QL IA.RAPID: NEGATIVE

## 2024-05-18 PROCEDURE — 99214 OFFICE O/P EST MOD 30 MIN: CPT | Mod: S$GLB,,,

## 2024-05-18 PROCEDURE — 87811 SARS-COV-2 COVID19 W/OPTIC: CPT | Mod: QW,S$GLB,,

## 2024-05-18 PROCEDURE — 71046 X-RAY EXAM CHEST 2 VIEWS: CPT | Mod: S$GLB,,, | Performed by: RADIOLOGY

## 2024-05-18 RX ORDER — AMOXICILLIN AND CLAVULANATE POTASSIUM 875; 125 MG/1; MG/1
1 TABLET, FILM COATED ORAL 2 TIMES DAILY
Qty: 14 TABLET | Refills: 0 | Status: SHIPPED | OUTPATIENT
Start: 2024-05-18 | End: 2024-05-25

## 2024-05-18 RX ORDER — AZITHROMYCIN 250 MG/1
250 TABLET, FILM COATED ORAL DAILY
Qty: 6 TABLET | Refills: 0 | Status: SHIPPED | OUTPATIENT
Start: 2024-05-18 | End: 2024-05-23

## 2024-05-18 NOTE — PATIENT INSTRUCTIONS
Discharge Instructions    Rest and fluids are important. Dont let yourself get overly tired when you go back to your activities. Drink 6 to 8 glasses of fluids every day to make sure you are getting enough fluids. (Your appetite may be poor, so a light diet is fine.)    Take antibiotic medicine prescribed until it is all gone, even if you are feeling better after a few days.    You may use acetaminophen or ibuprofen to control fever or pain     Continue to use nasal spray for nasal congestion, post nasal drip, runny nose    Stop using Robitussin after 1 week, be sure to check your blood pressure while taking this medication as decongestants can increase your blood pressure     Please follow up with your primary care doctor or specialist in the next 48-72hrs as needed and if no improvement.    If you smoke, please stop smoking. Stay away from cigarette smoke - yours or other peoples.    - You must understand that you have received an Urgent Care treatment only and that you may be released before all of your medical problems are known or treated.   - You, the patient, will arrange for follow up care as instructed with your primary care provider or recommended specialist.   - If your condition worsens or fails to improve we recommend that you receive another evaluation at the ER immediately or contact your PCP to discuss your concerns, or return here.   - Please do not drive or make any important decisions for 24 hours if you have received any pain medications, sedatives or mood altering drugs during your visit.    Disclaimer: This document was drafted with the use of a voice recognition device and is likely to have sound alike errors.

## 2024-05-18 NOTE — PROGRESS NOTES
"Subjective:      Patient ID: Wilberto Hayes Jr. is a 84 y.o. male.    Vitals:  height is 5' 11" (1.803 m) and weight is 57.2 kg (126 lb). His oral temperature is 99.9 °F (37.7 °C). His blood pressure is 152/76 (abnormal) and his pulse is 93. His respiration is 20 and oxygen saturation is 91% (abnormal).     Chief Complaint: Sinus Problem and Cough    83 yo male Patient is presenting a cough, congestion, and intermittent fevers (Tmax of 100.4), starting last Monday. He has been taking Coricidin cold and flu "finished a whole box of that" then starting yesterday tried taking Robitussin DM which he states helped with the coughing. He also reports taking tylenol and Asprin. Denies chest pain, shortness of breath, sore throat, wheezing, ear pain or drainage, chest congestion. Allergic to sulfa abx, codeine, statins drugs.     Sinus Problem  This is a new problem. The current episode started in the past 7 days. The problem is unchanged. There has been no fever. His pain is at a severity of 0/10. The pain is mild. Associated symptoms include chills, congestion and coughing. Pertinent negatives include no diaphoresis, ear pain, hoarse voice, neck pain, shortness of breath, sneezing, sore throat or swollen glands. Past treatments include nasal decongestants. The treatment provided no relief.   Cough  This is a new problem. The current episode started in the past 7 days. The problem has been unchanged. The problem occurs constantly. Associated symptoms include chills, a fever, heartburn and nasal congestion. Pertinent negatives include no chest pain, ear congestion, ear pain, eye redness, hemoptysis, myalgias, postnasal drip, rash, rhinorrhea, sore throat, shortness of breath, sweats, weight loss or wheezing. Nothing aggravates the symptoms. He has tried nothing for the symptoms. The treatment provided no relief. There is no history of asthma, bronchiectasis, bronchitis, COPD, emphysema, environmental allergies or " pneumonia.       Constitution: Positive for chills and fever. Negative for sweating.   HENT:  Positive for congestion. Negative for ear pain, ear discharge, postnasal drip, sore throat, trouble swallowing and voice change.    Neck: Negative for neck pain and neck stiffness.   Cardiovascular:  Negative for chest pain, palpitations and sob on exertion.   Eyes:  Negative for eye discharge and eye redness.   Respiratory:  Positive for cough. Negative for bloody sputum, COPD, shortness of breath and wheezing.    Gastrointestinal:  Positive for heartburn.   Musculoskeletal:  Negative for muscle ache.   Skin:  Negative for rash.   Allergic/Immunologic: Negative for environmental allergies and sneezing.      Objective:     Vitals:    05/18/24 1346   BP: (!) 152/76   Pulse: 93   Resp: 20   Temp: 99.9 °F (37.7 °C)       Physical Exam   Constitutional: He is oriented to person, place, and time. He appears well-developed. He is cooperative.  Non-toxic appearance. He does not appear ill. No distress.   HENT:   Head: Normocephalic and atraumatic.   Ears:   Right Ear: Hearing, tympanic membrane, external ear and ear canal normal. No no drainage, swelling or tenderness. Tympanic membrane is not erythematous and not bulging. no impacted cerumen  Left Ear: Hearing, tympanic membrane, external ear and ear canal normal. No no drainage, swelling or tenderness. Tympanic membrane is not erythematous and not bulging. no impacted cerumen  Nose: Nose normal. No mucosal edema, rhinorrhea or nasal deformity. No epistaxis. Right sinus exhibits no maxillary sinus tenderness and no frontal sinus tenderness. Left sinus exhibits no maxillary sinus tenderness and no frontal sinus tenderness.   Mouth/Throat: Uvula is midline, oropharynx is clear and moist and mucous membranes are normal. Mucous membranes are moist. No trismus in the jaw. Normal dentition. No uvula swelling. No oropharyngeal exudate, posterior oropharyngeal edema, posterior  oropharyngeal erythema or cobblestoning.   Eyes: Conjunctivae and lids are normal. Right eye exhibits no discharge. Left eye exhibits no discharge. No scleral icterus.   Neck: Trachea normal and phonation normal. Neck supple. No edema present. No erythema present. No neck rigidity present.   Cardiovascular: Normal rate, regular rhythm, normal heart sounds and normal pulses.   Pulmonary/Chest: Effort normal. No accessory muscle usage or stridor. No respiratory distress. He has no decreased breath sounds. He has wheezes (diffuse expiratory wheezing). He has no rhonchi. He has rales (diffuse).   Abdominal: Normal appearance.   Musculoskeletal: Normal range of motion.         General: No deformity. Normal range of motion.   Neurological: He is alert and oriented to person, place, and time. He displays no weakness. He exhibits normal muscle tone. Coordination and gait normal.   Skin: Skin is warm, dry, intact, not diaphoretic, not pale and no rash.   Psychiatric: His speech is normal and behavior is normal. Judgment and thought content normal.   Nursing note and vitals reviewed.      Assessment:     1. Abnormal lung sounds    2. Low grade fever    3. Acute cough    4. Abnormal pulse oximetry      Results for orders placed or performed in visit on 05/18/24   SARS Coronavirus 2 Antigen, POCT Manual Read   Result Value Ref Range    SARS Coronavirus 2 Antigen Negative Negative     Acceptable Yes      XR CHEST PA AND LATERAL    Result Date: 5/18/2024  EXAM:  XR CHEST PA AND LATERAL CLINICAL HISTORY: [R50.9]-Fever, unspecified./[R05.1]-Acute cough./[R79.81]-Abnormal blood-gas level./[R09.89]-Other specified symptoms and signs involving the circulatory and respiratory systems. COMPARISON: None FINDINGS: There are surgical changes associated with a prior sternotomy.  The size and contour of the heart are normal. The lungs are clear. There is no pneumothorax or pleural effusion.  There are mild degenerative changes  in the thoracic spine.     1.  The lungs are clear. 2.  There are mild degenerative changes in the thoracic spine. Finalized on: 5/18/2024 2:15 PM By:  Humberto Bell MD [facility]# [id number]      2024-05-18 14:[id number]    [facility]     Plan:       Abnormal lung sounds  -     XR CHEST PA AND LATERAL; Future; Expected date: 05/18/2024    Low grade fever  -     SARS Coronavirus 2 Antigen, POCT Manual Read  -     XR CHEST PA AND LATERAL; Future; Expected date: 05/18/2024    Acute cough  -     SARS Coronavirus 2 Antigen, POCT Manual Read  -     XR CHEST PA AND LATERAL; Future; Expected date: 05/18/2024    Abnormal pulse oximetry  -     XR CHEST PA AND LATERAL; Future; Expected date: 05/18/2024    Other orders  -     amoxicillin-clavulanate 875-125mg (AUGMENTIN) 875-125 mg per tablet; Take 1 tablet by mouth 2 (two) times a day. for 7 days  Dispense: 14 tablet; Refill: 0  -     azithromycin (Z-JENNIFER) 250 MG tablet; Take 1 tablet (250 mg total) by mouth once daily. Take 2 tablets by mouth on day 1, then one tablet daily on days 2-5. for 5 days  Dispense: 6 tablet; Refill: 0        Patient Instructions   Discharge Instructions    Rest and fluids are important. Dont let yourself get overly tired when you go back to your activities. Drink 6 to 8 glasses of fluids every day to make sure you are getting enough fluids. (Your appetite may be poor, so a light diet is fine.)    Take antibiotic medicine prescribed until it is all gone, even if you are feeling better after a few days.    You may use acetaminophen or ibuprofen to control fever or pain     Continue to use nasal spray for nasal congestion, post nasal drip, runny nose    Stop using Robitussin after 1 week, be sure to check your blood pressure while taking this medication as decongestants can increase your blood pressure     Please follow up with your primary care doctor or specialist in the next 48-72hrs as needed and if no improvement.    If you smoke, please stop smoking. Stay away  from cigarette smoke - yours or other peoples.    - You must understand that you have received an Urgent Care treatment only and that you may be released before all of your medical problems are known or treated.   - You, the patient, will arrange for follow up care as instructed with your primary care provider or recommended specialist.   - If your condition worsens or fails to improve we recommend that you receive another evaluation at the ER immediately or contact your PCP to discuss your concerns, or return here.   - Please do not drive or make any important decisions for 24 hours if you have received any pain medications, sedatives or mood altering drugs during your visit.    Disclaimer: This document was drafted with the use of a voice recognition device and is likely to have sound alike errors.       Medical Decision Making:   History:   I obtained history from: someone other than patient.       <> Summary of History: Wife states they checked his pulse ox at home and it was 98% on room air. Rechecked in clinic with different pulse ox meter and still at low 90s.   Old Medical Records: I decided to obtain old medical records.  Independently Interpreted Test(s):   I have ordered and independently interpreted X-rays - see summary below.       <> Summary of X-Ray Reading(s): No signs of PTX or PE, clear costophrenic angles. Will confirm with radiologist.  Clinical Tests:   Lab Tests: Ordered and Reviewed       <> Summary of Lab: COVID negative  Radiological Study: Ordered and Reviewed  Urgent Care Management:  Discussed lab results with patient. Patient outside window to get antiviral treatment for flu and treatment would not alter based on test results. Discussed physical exam findings with patient along with XR results. OTC medications were suggested for symptomatic treatment. Antibiotics were prescribed given patient's immunocompromised state and duration of symptoms with systemic signs. Strict ER precautions  were given for worsening symptoms or new chest pain, shortness of breath, palpitations, dizziness, severe pain. PCP follow up recommended for persistent symptoms. Patient and wife agreeable to plan and leave clinic.     Additional MDM:     Heart Failure Score:   COPD = No

## 2024-06-07 ENCOUNTER — OFFICE VISIT (OUTPATIENT)
Dept: FAMILY MEDICINE | Facility: CLINIC | Age: 84
End: 2024-06-07
Payer: MEDICARE

## 2024-06-07 ENCOUNTER — LAB VISIT (OUTPATIENT)
Dept: LAB | Facility: HOSPITAL | Age: 84
End: 2024-06-07
Attending: FAMILY MEDICINE
Payer: MEDICARE

## 2024-06-07 VITALS
SYSTOLIC BLOOD PRESSURE: 130 MMHG | HEART RATE: 61 BPM | BODY MASS INDEX: 28.98 KG/M2 | WEIGHT: 207 LBS | HEIGHT: 71 IN | DIASTOLIC BLOOD PRESSURE: 82 MMHG | TEMPERATURE: 97 F | OXYGEN SATURATION: 98 %

## 2024-06-07 DIAGNOSIS — I70.0 CALCIFICATION OF AORTA: ICD-10-CM

## 2024-06-07 DIAGNOSIS — N18.31 CHRONIC KIDNEY DISEASE, STAGE 3A: ICD-10-CM

## 2024-06-07 DIAGNOSIS — Z12.5 ENCOUNTER FOR SCREENING FOR MALIGNANT NEOPLASM OF PROSTATE: ICD-10-CM

## 2024-06-07 DIAGNOSIS — G47.33 OSA ON CPAP: ICD-10-CM

## 2024-06-07 DIAGNOSIS — I25.119 ATHEROSCLEROSIS OF NATIVE CORONARY ARTERY OF NATIVE HEART WITH ANGINA PECTORIS: ICD-10-CM

## 2024-06-07 DIAGNOSIS — I10 ESSENTIAL HYPERTENSION: ICD-10-CM

## 2024-06-07 DIAGNOSIS — N40.0 BENIGN PROSTATIC HYPERPLASIA, UNSPECIFIED WHETHER LOWER URINARY TRACT SYMPTOMS PRESENT: ICD-10-CM

## 2024-06-07 DIAGNOSIS — R53.1 WEAKNESS: ICD-10-CM

## 2024-06-07 DIAGNOSIS — G72.0 STATIN MYOPATHY: ICD-10-CM

## 2024-06-07 DIAGNOSIS — T46.6X5A STATIN MYOPATHY: ICD-10-CM

## 2024-06-07 DIAGNOSIS — D69.2 SENILE PURPURA: ICD-10-CM

## 2024-06-07 DIAGNOSIS — R53.1 WEAKNESS: Primary | ICD-10-CM

## 2024-06-07 LAB
ALBUMIN SERPL BCP-MCNC: 4.1 G/DL (ref 3.5–5.2)
ALP SERPL-CCNC: 77 U/L (ref 55–135)
ALT SERPL W/O P-5'-P-CCNC: 6 U/L (ref 10–44)
ANION GAP SERPL CALC-SCNC: 9 MMOL/L (ref 8–16)
AST SERPL-CCNC: 19 U/L (ref 10–40)
BASOPHILS # BLD AUTO: 0.05 K/UL (ref 0–0.2)
BASOPHILS NFR BLD: 1.1 % (ref 0–1.9)
BILIRUB SERPL-MCNC: 0.7 MG/DL (ref 0.1–1)
BUN SERPL-MCNC: 15 MG/DL (ref 8–23)
CALCIUM SERPL-MCNC: 10.5 MG/DL (ref 8.7–10.5)
CHLORIDE SERPL-SCNC: 104 MMOL/L (ref 95–110)
CO2 SERPL-SCNC: 27 MMOL/L (ref 23–29)
COMPLEXED PSA SERPL-MCNC: 1.4 NG/ML (ref 0–4)
CREAT SERPL-MCNC: 1.3 MG/DL (ref 0.5–1.4)
DIFFERENTIAL METHOD BLD: ABNORMAL
EOSINOPHIL # BLD AUTO: 0.2 K/UL (ref 0–0.5)
EOSINOPHIL NFR BLD: 3.5 % (ref 0–8)
ERYTHROCYTE [DISTWIDTH] IN BLOOD BY AUTOMATED COUNT: 13.7 % (ref 11.5–14.5)
EST. GFR  (NO RACE VARIABLE): 54.8 ML/MIN/1.73 M^2
GLUCOSE SERPL-MCNC: 89 MG/DL (ref 70–110)
HCT VFR BLD AUTO: 43.2 % (ref 40–54)
HGB BLD-MCNC: 14.3 G/DL (ref 14–18)
IMM GRANULOCYTES # BLD AUTO: 0.01 K/UL (ref 0–0.04)
IMM GRANULOCYTES NFR BLD AUTO: 0.2 % (ref 0–0.5)
LYMPHOCYTES # BLD AUTO: 1.1 K/UL (ref 1–4.8)
LYMPHOCYTES NFR BLD: 24.6 % (ref 18–48)
MCH RBC QN AUTO: 31.4 PG (ref 27–31)
MCHC RBC AUTO-ENTMCNC: 33.1 G/DL (ref 32–36)
MCV RBC AUTO: 95 FL (ref 82–98)
MONOCYTES # BLD AUTO: 0.5 K/UL (ref 0.3–1)
MONOCYTES NFR BLD: 10.1 % (ref 4–15)
NEUTROPHILS # BLD AUTO: 2.8 K/UL (ref 1.8–7.7)
NEUTROPHILS NFR BLD: 60.5 % (ref 38–73)
NRBC BLD-RTO: 0 /100 WBC
PLATELET # BLD AUTO: 223 K/UL (ref 150–450)
PMV BLD AUTO: 11.2 FL (ref 9.2–12.9)
POTASSIUM SERPL-SCNC: 5.3 MMOL/L (ref 3.5–5.1)
PROT SERPL-MCNC: 7.5 G/DL (ref 6–8.4)
RBC # BLD AUTO: 4.55 M/UL (ref 4.6–6.2)
SODIUM SERPL-SCNC: 140 MMOL/L (ref 136–145)
TESTOST SERPL-MCNC: 646 NG/DL (ref 304–1227)
TSH SERPL DL<=0.005 MIU/L-ACNC: 2.66 UIU/ML (ref 0.4–4)
WBC # BLD AUTO: 4.55 K/UL (ref 3.9–12.7)

## 2024-06-07 PROCEDURE — 1101F PT FALLS ASSESS-DOCD LE1/YR: CPT | Mod: CPTII,S$GLB,, | Performed by: FAMILY MEDICINE

## 2024-06-07 PROCEDURE — 99214 OFFICE O/P EST MOD 30 MIN: CPT | Mod: S$GLB,,, | Performed by: FAMILY MEDICINE

## 2024-06-07 PROCEDURE — 84153 ASSAY OF PSA TOTAL: CPT | Performed by: FAMILY MEDICINE

## 2024-06-07 PROCEDURE — 1126F AMNT PAIN NOTED NONE PRSNT: CPT | Mod: CPTII,S$GLB,, | Performed by: FAMILY MEDICINE

## 2024-06-07 PROCEDURE — 3288F FALL RISK ASSESSMENT DOCD: CPT | Mod: CPTII,S$GLB,, | Performed by: FAMILY MEDICINE

## 2024-06-07 PROCEDURE — 85025 COMPLETE CBC W/AUTO DIFF WBC: CPT | Performed by: FAMILY MEDICINE

## 2024-06-07 PROCEDURE — 80053 COMPREHEN METABOLIC PANEL: CPT | Performed by: FAMILY MEDICINE

## 2024-06-07 PROCEDURE — 3079F DIAST BP 80-89 MM HG: CPT | Mod: CPTII,S$GLB,, | Performed by: FAMILY MEDICINE

## 2024-06-07 PROCEDURE — 3075F SYST BP GE 130 - 139MM HG: CPT | Mod: CPTII,S$GLB,, | Performed by: FAMILY MEDICINE

## 2024-06-07 PROCEDURE — 84403 ASSAY OF TOTAL TESTOSTERONE: CPT | Performed by: FAMILY MEDICINE

## 2024-06-07 PROCEDURE — 99999 PR PBB SHADOW E&M-EST. PATIENT-LVL III: CPT | Mod: PBBFAC,,, | Performed by: FAMILY MEDICINE

## 2024-06-07 PROCEDURE — 36415 COLL VENOUS BLD VENIPUNCTURE: CPT | Mod: PO | Performed by: FAMILY MEDICINE

## 2024-06-07 PROCEDURE — 84443 ASSAY THYROID STIM HORMONE: CPT | Performed by: FAMILY MEDICINE

## 2024-06-07 NOTE — PROGRESS NOTES
"Subjective:       Patient ID: Wilberto Hayes Jr. is a 84 y.o. male.    Chief Complaint: Follow-up      HPI Comments:       Current Outpatient Medications:     aspirin 81 mg Tab, Take 1 tablet by mouth Daily., Disp: , Rfl:     hydroCHLOROthiazide (HYDRODIURIL) 12.5 MG Tab, Take 1 tablet (12.5 mg total) by mouth once daily., Disp: 30 tablet, Rfl: 11    latanoprost 0.005 % ophthalmic solution, INSTILL 1 DROP INTO BOTH EYES EVERY EVENING, Disp: 7.5 mL, Rfl: 3    losartan (COZAAR) 50 MG tablet, Take 50 mg by mouth once daily., Disp: , Rfl:     LUBRICANT EYE DROPS, GLYC-PG, 1-0.3 % Drop, , Disp: , Rfl:     multivit-min/ferrous fumarate (MULTI VITAMIN ORAL), , Disp: , Rfl:     nebivoloL (BYSTOLIC) 5 MG Tab, Take 1 tablet (5 mg total) by mouth once daily. (Patient taking differently: Take 2.5 mg by mouth once daily.), Disp: 30 tablet, Rfl: 11    nitroGLYCERIN (NITROSTAT) 0.4 MG SL tablet, PLACE 1 TABLET (0.4 MG TOTAL) UNDER THE TONGUE EVERY 5 (FIVE) MINUTES AS NEEDED FOR CHEST PAIN (IF NO RELIEF SEEK ER TRMT)., Disp: 25 tablet, Rfl: 3    OCEAN NASAL 0.65 % nasal spray, , Disp: , Rfl:     omega-3 fatty acids-vitamin E 1,000 mg Cap, Take 2 capsules by mouth Daily., Disp: , Rfl:     saw palmetto 500 MG capsule, Take by mouth 2 (two) times daily. , Disp: , Rfl:     tamsulosin (FLOMAX) 0.4 mg Cap, Take 1 capsule (0.4 mg total) by mouth once daily., Disp: 90 capsule, Rfl: 1       Six-month follow-up.      Had COVID again in February.  Felt that he had finally recovered from his 1st "long COVID" picture from the 1st infection, now he has the symptoms again: Fatigue, dizziness, weakness.  No cough or shortness a breath.  Has decreased endurance due for yd work.  Lost 12 lb since our last visit.  Weight loss started when he had all his teeth pulled.  He has been drinking boost and taking vitamins now lately.  Some of his weight loss he says it is due to  eating better than he had previously.      Regular CPAP user.    Overdue to " "see Dr. Prather for follow-up on his BPH.  Doing well on Flomax.    Follow-up  Associated symptoms include arthralgias, fatigue and weakness. Pertinent negatives include no chest pain, headaches, joint swelling, neck pain or vomiting.     Review of Systems   Constitutional:  Positive for activity change, fatigue and unexpected weight change.   HENT:  Negative for hearing loss, rhinorrhea and trouble swallowing.    Eyes:  Negative for discharge and visual disturbance.   Respiratory:  Negative for chest tightness and wheezing.    Cardiovascular:  Negative for chest pain and palpitations.   Gastrointestinal:  Negative for blood in stool, constipation, diarrhea and vomiting.   Endocrine: Negative for polydipsia and polyuria.   Genitourinary:  Negative for difficulty urinating, hematuria and urgency.   Musculoskeletal:  Positive for arthralgias. Negative for joint swelling and neck pain.   Neurological:  Positive for weakness. Negative for headaches.   Psychiatric/Behavioral:  Negative for confusion and dysphoric mood.        Objective:      Vitals:    06/07/24 0849   BP: 130/82   Pulse: 61   Temp: 96.7 °F (35.9 °C)   TempSrc: Tympanic   SpO2: 98%   Weight: 93.9 kg (207 lb 0.2 oz)   Height: 5' 11" (1.803 m)   PainSc: 0-No pain     Physical Exam  Vitals and nursing note reviewed.   Constitutional:       General: He is not in acute distress.     Appearance: He is well-developed. He is not ill-appearing or diaphoretic.   HENT:      Head: Normocephalic.   Neck:      Thyroid: No thyromegaly.   Cardiovascular:      Rate and Rhythm: Normal rate and regular rhythm.      Heart sounds: Normal heart sounds. No murmur heard.  Pulmonary:      Effort: Pulmonary effort is normal.      Breath sounds: Normal breath sounds. No wheezing or rales.   Abdominal:      General: There is no distension.      Palpations: Abdomen is soft.   Musculoskeletal:      Cervical back: Neck supple.   Lymphadenopathy:      Cervical: No cervical adenopathy. "   Skin:     General: Skin is warm and dry.   Neurological:      Mental Status: He is alert and oriented to person, place, and time.   Psychiatric:         Mood and Affect: Mood normal.         Behavior: Behavior normal.         Thought Content: Thought content normal.         Judgment: Judgment normal.         Assessment:       1. Weakness    2. Chronic kidney disease, stage 3a    3. Senile purpura    4. Atherosclerosis of native coronary artery of native heart with angina pectoris    5. Calcification of aorta    6. Essential hypertension    7. CHELO on CPAP    8. Benign prostatic hyperplasia, unspecified whether lower urinary tract symptoms present    9. Encounter for screening for malignant neoplasm of prostate    10. Statin myopathy        Plan:   Weakness  Comments:  Since COVID infection in February.  Blood work today.  Could be a nutritional component given his weight loss  Orders:  -     TSH; Future; Expected date: 06/07/2024  -     Testosterone; Future; Expected date: 06/07/2024    Chronic kidney disease, stage 3a  Comments:  CMP today  Orders:  -     Comprehensive Metabolic Panel; Future; Expected date: 06/07/2024    Senile purpura  Comments:  no recent increase in bleeding or bruising    Atherosclerosis of native coronary artery of native heart with angina pectoris  Comments:  on aspirin.  Does not tolerate statins    Calcification of aorta  Comments:  on aspirin    Essential hypertension  Comments:  controlled  Orders:  -     CBC Auto Differential; Future; Expected date: 06/07/2024    CHELO on CPAP  Comments:  nightly compliance    Benign prostatic hyperplasia, unspecified whether lower urinary tract symptoms present  Comments:  on Flomax.  Follow-up appointment to be made with Dr. Prather  Orders:  -     PSA, Screening; Future; Expected date: 06/07/2024    Encounter for screening for malignant neoplasm of prostate  Comments:  PSA  Orders:  -     PSA, Screening; Future; Expected date: 06/07/2024    Statin  myopathy

## 2024-06-10 ENCOUNTER — PATIENT MESSAGE (OUTPATIENT)
Dept: PULMONOLOGY | Facility: CLINIC | Age: 84
End: 2024-06-10
Payer: MEDICARE

## 2024-06-13 ENCOUNTER — PATIENT MESSAGE (OUTPATIENT)
Dept: FAMILY MEDICINE | Facility: CLINIC | Age: 84
End: 2024-06-13
Payer: MEDICARE

## 2024-06-27 ENCOUNTER — OFFICE VISIT (OUTPATIENT)
Dept: UROLOGY | Facility: CLINIC | Age: 84
End: 2024-06-27
Payer: MEDICARE

## 2024-06-27 VITALS
WEIGHT: 208.31 LBS | DIASTOLIC BLOOD PRESSURE: 69 MMHG | HEIGHT: 71 IN | BODY MASS INDEX: 29.16 KG/M2 | RESPIRATION RATE: 16 BRPM | HEART RATE: 56 BPM | SYSTOLIC BLOOD PRESSURE: 149 MMHG

## 2024-06-27 DIAGNOSIS — N40.1 BENIGN PROSTATIC HYPERPLASIA WITH WEAK URINARY STREAM: ICD-10-CM

## 2024-06-27 DIAGNOSIS — N52.9 ERECTILE DYSFUNCTION, UNSPECIFIED ERECTILE DYSFUNCTION TYPE: Primary | ICD-10-CM

## 2024-06-27 DIAGNOSIS — R39.12 BENIGN PROSTATIC HYPERPLASIA WITH WEAK URINARY STREAM: ICD-10-CM

## 2024-06-27 PROCEDURE — 1101F PT FALLS ASSESS-DOCD LE1/YR: CPT | Mod: CPTII,S$GLB,, | Performed by: UROLOGY

## 2024-06-27 PROCEDURE — 1126F AMNT PAIN NOTED NONE PRSNT: CPT | Mod: CPTII,S$GLB,, | Performed by: UROLOGY

## 2024-06-27 PROCEDURE — 3077F SYST BP >= 140 MM HG: CPT | Mod: CPTII,S$GLB,, | Performed by: UROLOGY

## 2024-06-27 PROCEDURE — 3288F FALL RISK ASSESSMENT DOCD: CPT | Mod: CPTII,S$GLB,, | Performed by: UROLOGY

## 2024-06-27 PROCEDURE — 3078F DIAST BP <80 MM HG: CPT | Mod: CPTII,S$GLB,, | Performed by: UROLOGY

## 2024-06-27 PROCEDURE — 99999 PR PBB SHADOW E&M-EST. PATIENT-LVL IV: CPT | Mod: PBBFAC,,, | Performed by: UROLOGY

## 2024-06-27 PROCEDURE — 1159F MED LIST DOCD IN RCRD: CPT | Mod: CPTII,S$GLB,, | Performed by: UROLOGY

## 2024-06-27 PROCEDURE — 99204 OFFICE O/P NEW MOD 45 MIN: CPT | Mod: S$GLB,,, | Performed by: UROLOGY

## 2024-06-27 PROCEDURE — 1160F RVW MEDS BY RX/DR IN RCRD: CPT | Mod: CPTII,S$GLB,, | Performed by: UROLOGY

## 2024-06-27 RX ORDER — SILDENAFIL 100 MG/1
100 TABLET, FILM COATED ORAL DAILY PRN
Qty: 20 TABLET | Refills: 5 | Status: SHIPPED | OUTPATIENT
Start: 2024-06-27 | End: 2025-06-27

## 2024-06-27 NOTE — PROGRESS NOTES
Chief Complaint:  Kidney stones    HPI:   Wilberto Hayes Jr. is a 84 y.o. male that presents today for evaluation for the management of kidney stones.  Patient notes a long history of kidney stones starting when he was 23 years old.  He notes that he has had 20 stones in his lifetime.  Has had ESWL, ureteroscopy, stents for his stones in the past.  The last stone episode he had was about four years ago, saw Dr. Arredondo at [facility] but has not seen anyone since that time.  Notes that he has calcium based stones but has not undergone a metabolic workup.  Usually takes about two weeks for him to pass his stones, has passed some quite large stones previously.  Also notes that he has episodic chronic prostatitis, we will get dysuria and when this happens he goes to urgent Care, notes that Cipro helps his symptoms.  Was started on Flomax by Dr. Steward and feels like it helps his symptoms.  Also notes some ED which is improved with Viagra, notes about 75% erection with this.  Does have a family history of prostate cancer in his father, PSA earlier this month 1.4.    PMH:  Past Medical History:   Diagnosis Date    ACE-inhibitor cough     Acute coronary syndrome     Angina pectoris     Cataract     Coronary artery disease     Degenerative disc disease, lumbar     Degenerative disc disease, lumbar     Glaucoma     Hyperlipidemia     cholesterol    Hypertension     Kidney stone     last in 2001    Myocardial infarction     Obesity     Peripheral vascular disease     Polyneuropathy     Sleep apnea     Tobacco dependence     resolved quit 1980    Trouble in sleeping        PSH:  Past Surgical History:   Procedure Laterality Date    ABCESS DRAINAGE Left 2003    inguinal abcess/cellulitis - MRSA- 60 days of IVAB and repacking w/ Home health     COLONOSCOPY N/A 03/02/2023    Procedure: COLONOSCOPY;  Surgeon: Bambi Bailey MD;  Location: [facility];  Service: Endoscopy;  Laterality: N/A;    CORONARY ARTERY BYPASS GRAFT  2002     Two-vessel    cystoscopy and stone removal via scope      several between age 23 and 53    EXTRACTION OF TOOTH  2021    EYE SURGERY      lthotripsy  1985    MULTIPLE TOOTH EXTRACTIONS  2024    ROBOT-ASSISTED CHOLECYSTECTOMY USING DA ERI XI N/A 2022    Procedure: XI ROBOTIC CHOLECYSTECTOMY;  Surgeon: Meet Alatorre MD;  Location: [facility] OR;  Service: General;  Laterality: N/A;  Repair of hole in duodenum prior to cholecystectomy    SLT - OU - BOTH EYES  2009    TONSILLECTOMY      at age 4    UMBILICAL HERNIA REPAIR N/A 2022    Procedure: REPAIR, HERNIA, UMBILICAL, AGE 5 YEARS OR OLDER;  Surgeon: Meet Alatorre MD;  Location: [facility] OR;  Service: General;  Laterality: N/A;    VASECTOMY         Family History:  Family History   Problem Relation Name Age of Onset    Cancer Mother          brain    COPD Mother      Alcohol abuse Mother      Hypertension Mother      Cancer Father          lung, breast with left mastectomy    COPD Father      Alcohol abuse Father      Hypertension Father      Kidney disease Maternal Grandmother      Alcohol abuse Maternal Grandfather      Kidney disease Maternal Grandfather      Cancer Paternal Grandmother          Gyn & colon    Kidney disease Paternal Grandmother      Blindness Paternal Grandfather      Glaucoma Paternal Grandfather      Alcohol abuse Paternal Grandfather      Kidney disease Paternal Grandfather      Retinal detachment Neg Hx      Macular degeneration Neg Hx      Diabetes Neg Hx      Heart disease Neg Hx      Stroke Neg Hx      Mental illness Neg Hx      Intellectual disability Neg Hx         Social History:  Social History     Tobacco Use    Smoking status: Former     Current packs/day: 0.00     Average packs/day: 1 pack/day for 10.0 years (10.0 ttl pk-yrs)     Types: Cigarettes     Start date: 1970     Quit date: 1980     Years since quittin.2    Smokeless tobacco: Never   Substance Use Topics    Alcohol use: Yes      Alcohol/week: 7.0 standard drinks of alcohol     Types: 7 Glasses of wine per week     Comment: nightly wine    Drug use: No        Review of Systems:  General: No fever, chills  Skin: No rashes  Chest:  Denies cough and sputum production  Heart: Denies chest pain  Resp: Denies dyspnea  Abdomen: Denies diarrhea, abdominal pain, hematemesis, or blood in stool.  Musculoskeletal: No joint stiffness or swelling. Denies back pain.  : see HPI  Neuro: no dizziness or weakness    Allergies:  Codeine, Niacin preparations, Statins-hmg-coa reductase inhibitors, Fosinopril, Pravastatin, Simvastatin, and Sulfa (sulfonamide antibiotics)    Medications:    Current Outpatient Medications:     aspirin 81 mg Tab, Take 1 tablet by mouth Daily., Disp: , Rfl:     latanoprost 0.005 % ophthalmic solution, INSTILL 1 DROP INTO BOTH EYES EVERY EVENING, Disp: 7.5 mL, Rfl: 3    losartan (COZAAR) 50 MG tablet, Take 50 mg by mouth once daily., Disp: , Rfl:     LUBRICANT EYE DROPS, GLYC-PG, 1-0.3 % Drop, , Disp: , Rfl:     multivit-min/ferrous fumarate (MULTI VITAMIN ORAL), , Disp: , Rfl:     nebivoloL (BYSTOLIC) 5 MG Tab, Take 1 tablet (5 mg total) by mouth once daily. (Patient taking differently: Take 2.5 mg by mouth once daily.), Disp: 30 tablet, Rfl: 11    nitroGLYCERIN (NITROSTAT) 0.4 MG SL tablet, PLACE 1 TABLET (0.4 MG TOTAL) UNDER THE TONGUE EVERY 5 (FIVE) MINUTES AS NEEDED FOR CHEST PAIN (IF NO RELIEF SEEK ER TRMT)., Disp: 25 tablet, Rfl: 3    OCEAN NASAL 0.65 % nasal spray, , Disp: , Rfl:     omega-3 fatty acids-vitamin E 1,000 mg Cap, Take 2 capsules by mouth Daily., Disp: , Rfl:     saw palmetto 500 MG capsule, Take by mouth 2 (two) times daily. , Disp: , Rfl:     tamsulosin (FLOMAX) 0.4 mg Cap, Take 1 capsule (0.4 mg total) by mouth once daily., Disp: 90 capsule, Rfl: 1    hydroCHLOROthiazide (HYDRODIURIL) 12.5 MG Tab, Take 1 tablet (12.5 mg total) by mouth once daily., Disp: 30 tablet, Rfl: 11    sildenafiL (VIAGRA) 100 MG tablet,  Take 1 tablet (100 mg total) by mouth daily as needed for Erectile Dysfunction., Disp: 20 tablet, Rfl: 5    Physical Exam:  Vitals:    06/27/24 1025   BP: (!) 149/69   Pulse: (!) 56   Resp: 16     Body mass index is 29.06 kg/m².  General: awake, alert, cooperative  Head: NC/AT  Ears: external ears normal  Eyes: sclera normal  Lungs: normal inspiration, NAD  Heart: well-perfused  Skin: The skin is warm and dry  Ext: No c/c/e.  Neuro: grossly intact, AOx3    RADIOLOGY:  No recent relevant imaging available for review.    LABS:  I personally reviewed the following lab values:  Lab Results   Component Value Date    WBC 4.55 06/07/2024    HGB 14.3 06/07/2024    HCT 43.2 06/07/2024     06/07/2024     06/07/2024    K 5.3 (H) 06/07/2024     06/07/2024    CREATININE 1.3 06/07/2024    BUN 15 06/07/2024    CO2 27 06/07/2024    TSH 2.664 06/07/2024    PSA 1.4 06/07/2024    INR 1.0 09/07/2012    HGBA1C 4.7 05/22/2019    CHOL 171 12/20/2023    TRIG 131 12/20/2023    HDL 42 12/20/2023    ALT 6 (L) 06/07/2024    AST 19 06/07/2024       URINALYSIS:  Urinalysis obtained in clinic today specific gravity 1.020 pH seven, negative for all parameters      Assessment/Plan:   Wilberto Hayes  is a 84 y.o. male with:    Kidney stones - obtain renal ultrasound and KUB, follow-up six months    BPH - continue Flomax    ED - continue Viagra    Prostate cancer screening - PSA normal, continue annual screening    Thank you for allowing me the opportunity to participate in this patient's care.     Anthony Prather MD  Urology

## 2024-06-28 ENCOUNTER — PATIENT MESSAGE (OUTPATIENT)
Dept: FAMILY MEDICINE | Facility: CLINIC | Age: 84
End: 2024-06-28
Payer: MEDICARE

## 2024-07-05 ENCOUNTER — HOSPITAL ENCOUNTER (OUTPATIENT)
Dept: RADIOLOGY | Facility: HOSPITAL | Age: 84
Discharge: HOME | End: 2024-07-05
Attending: UROLOGY
Payer: MEDICARE

## 2024-07-05 DIAGNOSIS — N52.9 ERECTILE DYSFUNCTION, UNSPECIFIED ERECTILE DYSFUNCTION TYPE: ICD-10-CM

## 2024-07-05 PROCEDURE — 74018 RADEX ABDOMEN 1 VIEW: CPT | Mod: 26,,, | Performed by: RADIOLOGY

## 2024-07-05 PROCEDURE — 76770 US EXAM ABDO BACK WALL COMP: CPT | Mod: TC

## 2024-07-05 PROCEDURE — 76770 US EXAM ABDO BACK WALL COMP: CPT | Mod: 26,,, | Performed by: STUDENT IN AN ORGANIZED HEALTH CARE EDUCATION/TRAINING PROGRAM

## 2024-07-05 PROCEDURE — 74018 RADEX ABDOMEN 1 VIEW: CPT | Mod: TC

## 2024-07-09 RX ORDER — HYDROCHLOROTHIAZIDE 12.5 MG/1
12.5 TABLET ORAL
Qty: 90 TABLET | Refills: 3 | Status: SHIPPED | OUTPATIENT
Start: 2024-07-09

## 2024-07-15 ENCOUNTER — PATIENT MESSAGE (OUTPATIENT)
Dept: UROLOGY | Facility: CLINIC | Age: 84
End: 2024-07-15
Payer: MEDICARE

## 2024-07-23 ENCOUNTER — OFFICE VISIT (OUTPATIENT)
Dept: OPHTHALMOLOGY | Facility: CLINIC | Age: 84
End: 2024-07-23
Payer: MEDICARE

## 2024-07-23 DIAGNOSIS — Z96.1 PSEUDOPHAKIA OF LEFT EYE: ICD-10-CM

## 2024-07-23 DIAGNOSIS — H40.1131 PRIMARY OPEN ANGLE GLAUCOMA OF BOTH EYES, MILD STAGE: Primary | ICD-10-CM

## 2024-07-23 DIAGNOSIS — H25.11 NUCLEAR SCLEROSIS OF RIGHT EYE: ICD-10-CM

## 2024-07-23 DIAGNOSIS — H04.123 DRY EYES, BILATERAL: ICD-10-CM

## 2024-07-23 PROCEDURE — 92083 EXTENDED VISUAL FIELD XM: CPT | Mod: S$GLB,,, | Performed by: OPHTHALMOLOGY

## 2024-07-23 PROCEDURE — 92133 CPTRZD OPH DX IMG PST SGM ON: CPT | Mod: S$GLB,,, | Performed by: OPHTHALMOLOGY

## 2024-07-23 PROCEDURE — 99999 PR PBB SHADOW E&M-EST. PATIENT-LVL III: CPT | Mod: PBBFAC,,, | Performed by: OPHTHALMOLOGY

## 2024-07-23 PROCEDURE — 99214 OFFICE O/P EST MOD 30 MIN: CPT | Mod: S$GLB,,, | Performed by: OPHTHALMOLOGY

## 2024-07-23 PROCEDURE — 1160F RVW MEDS BY RX/DR IN RCRD: CPT | Mod: CPTII,S$GLB,, | Performed by: OPHTHALMOLOGY

## 2024-07-23 PROCEDURE — 1159F MED LIST DOCD IN RCRD: CPT | Mod: CPTII,S$GLB,, | Performed by: OPHTHALMOLOGY

## 2024-07-23 NOTE — PROGRESS NOTES
"SUBJECTIVE  Wilberto Hayes Jr. is 84 y.o. male  Corrected distance visual acuity was 20/25 -1 in the right eye and 20/20 -2 in the left eye.   Chief Complaint   Patient presents with    Glaucoma     Pt here for 3m HVF GOCT dilation. No pain or discomfort. VA stable. 100% compliant with gtts.           HPI     Glaucoma     Additional comments: Pt here for 3m HVF GOCT dilation. No pain or   discomfort. VA stable. 100% compliant with gtts.            Comments    1. Mild COAG OS>OD Goal =17-18 (Init 20/22)  +Fhx Glaucoma -g-dad   SLT OD 11/09 (min response) + 1/8/20 (22.5-14.5) + 3/13/24 (17-13.5)  SLT OS 8/09 (22 to 16-17) + 1/8/20 (24.5-14) + 3/13/24 (23-19.5)  Timolol caused blurry vision  2. Mod NSC OD  PCIOL OS with Omni 5/11/22  3. Dry eyes (no dry mouth)   Gel Plugs OU   Xiidra caused irritation   Tried Restasis (burning and blurry vision)   4. Fuchs' corneal dystrophy  5. Ptosis OD     Latanoprost QHS OU       Homeopathic AT"s (Similasan)   Thera tears, Soothe XP   O3FO             Last edited by Dashawn Kuhn MA on 7/23/2024 10:14 AM.         Assessment /Plan :  1. Primary open angle glaucoma of both eyes, mild stage Doing well, intraocular pressure (IOP) within acceptable range relative to target IOP and no evidence of progression. Continue current treatment. Reviewed importance of continued compliance with treatment and follow up.      Patient instructed to continue using the following glaucoma medication as follows:  Latanoprost one drop in each eye nightly    Return to clinic in 4 months  or as needed.  With IOP Check     2. Nuclear sclerosis of right eye - Not visually significant. Will continue to monitor.   3. Dry eyes, bilateral  -- Condition stable, no therapeutic change required. Monitoring routinely.     4. Pseudophakia of left eye  -- Condition stable, no therapeutic change required. Monitoring routinely.                 "

## 2024-07-29 ENCOUNTER — PATIENT MESSAGE (OUTPATIENT)
Dept: CARDIOLOGY | Facility: CLINIC | Age: 84
End: 2024-07-29
Payer: MEDICARE

## 2024-09-06 ENCOUNTER — TELEPHONE (OUTPATIENT)
Dept: PULMONOLOGY | Facility: CLINIC | Age: 84
End: 2024-09-06
Payer: MEDICARE

## 2024-09-06 NOTE — TELEPHONE ENCOUNTER
Called pt regarding rescheduling pulmonary appointment with Dr Mendoza on 9.20.24 due to provider being out of office that day. No answer, left message, mailed appointment reminder out.

## 2024-09-20 ENCOUNTER — HOSPITAL ENCOUNTER (OUTPATIENT)
Dept: RADIOLOGY | Facility: HOSPITAL | Age: 84
Discharge: HOME | End: 2024-09-20
Attending: NURSE PRACTITIONER
Payer: MEDICARE

## 2024-09-20 DIAGNOSIS — R91.1 PULMONARY NODULE: ICD-10-CM

## 2024-09-20 PROCEDURE — 71250 CT THORAX DX C-: CPT | Mod: 26,,, | Performed by: RADIOLOGY

## 2024-09-20 PROCEDURE — 71250 CT THORAX DX C-: CPT | Mod: TC

## 2024-10-17 ENCOUNTER — PATIENT MESSAGE (OUTPATIENT)
Dept: OPHTHALMOLOGY | Facility: CLINIC | Age: 84
End: 2024-10-17
Payer: MEDICARE

## 2024-11-13 RX ORDER — LOSARTAN POTASSIUM 50 MG/1
50 TABLET ORAL 2 TIMES DAILY
Qty: 180 TABLET | Refills: 3 | Status: SHIPPED | OUTPATIENT
Start: 2024-11-13

## 2024-11-15 ENCOUNTER — HOSPITAL ENCOUNTER (OUTPATIENT)
Dept: CARDIOLOGY | Facility: HOSPITAL | Age: 84
Discharge: HOME | End: 2024-11-15
Attending: INTERNAL MEDICINE
Payer: MEDICARE

## 2024-11-15 ENCOUNTER — OFFICE VISIT (OUTPATIENT)
Dept: CARDIOLOGY | Facility: CLINIC | Age: 84
End: 2024-11-15
Payer: MEDICARE

## 2024-11-15 VITALS
OXYGEN SATURATION: 99 % | HEART RATE: 70 BPM | HEIGHT: 71 IN | DIASTOLIC BLOOD PRESSURE: 78 MMHG | RESPIRATION RATE: 16 BRPM | SYSTOLIC BLOOD PRESSURE: 130 MMHG | BODY MASS INDEX: 29.69 KG/M2 | WEIGHT: 212.06 LBS

## 2024-11-15 DIAGNOSIS — I10 ESSENTIAL HYPERTENSION: ICD-10-CM

## 2024-11-15 DIAGNOSIS — I25.119 ATHEROSCLEROSIS OF NATIVE CORONARY ARTERY OF NATIVE HEART WITH ANGINA PECTORIS: ICD-10-CM

## 2024-11-15 DIAGNOSIS — N18.31 CHRONIC KIDNEY DISEASE, STAGE 3A: ICD-10-CM

## 2024-11-15 DIAGNOSIS — I70.211 ATHEROSCLEROSIS OF NATIVE ARTERY OF RIGHT LOWER EXTREMITY WITH INTERMITTENT CLAUDICATION: Chronic | ICD-10-CM

## 2024-11-15 DIAGNOSIS — I70.0 CALCIFICATION OF AORTA: Chronic | ICD-10-CM

## 2024-11-15 DIAGNOSIS — E78.00 PURE HYPERCHOLESTEROLEMIA: Primary | ICD-10-CM

## 2024-11-15 DIAGNOSIS — Z95.1 HX OF CABG: ICD-10-CM

## 2024-11-15 DIAGNOSIS — G47.33 OSA ON CPAP: Chronic | ICD-10-CM

## 2024-11-15 LAB
OHS QRS DURATION: 118 MS
OHS QTC CALCULATION: 414 MS

## 2024-11-15 PROCEDURE — 93010 ELECTROCARDIOGRAM REPORT: CPT | Mod: ,,, | Performed by: INTERNAL MEDICINE

## 2024-11-15 PROCEDURE — 93005 ELECTROCARDIOGRAM TRACING: CPT

## 2024-11-15 PROCEDURE — 99999 PR PBB SHADOW E&M-EST. PATIENT-LVL IV: CPT | Mod: PBBFAC,,, | Performed by: INTERNAL MEDICINE

## 2024-11-15 NOTE — PROGRESS NOTES
Subjective:   Patient ID:  Wilberto Hayes Jr. is a 84 y.o. male who presents for follow-up of No chief complaint on file.  Patient denies CP, angina or anginal equivalent.  NMT/stress 2020 nml  Follow-up  Pertinent negatives include no chest pain.   Hypertension  This is a chronic problem. The current episode started more than 1 year ago. The problem has been gradually improving since onset. The problem is controlled. Pertinent negatives include no chest pain, palpitations or shortness of breath. Past treatments include beta blockers, angiotensin blockers and diuretics. The current treatment provides moderate improvement. There are no compliance problems.    Coronary Artery Disease  Presents for follow-up visit. Pertinent negatives include no chest pain, chest pressure, chest tightness, dizziness, leg swelling, muscle weakness, palpitations, shortness of breath or weight gain. The symptoms have been stable. Compliance with diet is variable. Compliance with exercise is variable. Compliance with medications is good.   Hyperlipidemia  This is a chronic problem. The current episode started more than 1 year ago. Recent lipid tests were reviewed and are variable. Factors aggravating his hyperlipidemia include beta blockers. Pertinent negatives include no chest pain or shortness of breath. Treatments tried: fish oil. The current treatment provides mild improvement of lipids. There are no compliance problems.        Review of Systems   Constitutional: Negative. Negative for weight gain.   HENT: Negative.     Eyes: Negative.    Cardiovascular: Negative.  Negative for chest pain, leg swelling and palpitations.   Respiratory: Negative.  Negative for chest tightness and shortness of breath.    Endocrine: Negative.    Hematologic/Lymphatic: Negative.    Skin: Negative.    Musculoskeletal:  Negative for muscle weakness.   Gastrointestinal: Negative.    Genitourinary: Negative.    Neurological: Negative.  Negative for  dizziness.   Psychiatric/Behavioral: Negative.     Allergic/Immunologic: Negative.    All other systems reviewed and are negative.    Family History   Problem Relation Name Age of Onset    Cancer Mother          brain    COPD Mother      Alcohol abuse Mother      Hypertension Mother      Cancer Father          lung, breast with left mastectomy    COPD Father      Alcohol abuse Father      Hypertension Father      Kidney disease Maternal Grandmother      Alcohol abuse Maternal Grandfather      Kidney disease Maternal Grandfather      Cancer Paternal Grandmother          Gyn & colon    Kidney disease Paternal Grandmother      Blindness Paternal Grandfather      Glaucoma Paternal Grandfather      Alcohol abuse Paternal Grandfather      Kidney disease Paternal Grandfather      Retinal detachment Neg Hx      Macular degeneration Neg Hx      Diabetes Neg Hx      Heart disease Neg Hx      Stroke Neg Hx      Mental illness Neg Hx      Intellectual disability Neg Hx       Past Medical History:   Diagnosis Date    ACE-inhibitor cough     Acute coronary syndrome     Angina pectoris     Cataract     Coronary artery disease     Degenerative disc disease, lumbar     Degenerative disc disease, lumbar     Glaucoma     Hyperlipidemia     cholesterol    Hypertension     Kidney stone     last in     Myocardial infarction     Obesity     Peripheral vascular disease     Polyneuropathy     Sleep apnea     Tobacco dependence     resolved quit     Trouble in sleeping      Social History     Socioeconomic History    Marital status:    Tobacco Use    Smoking status: Former     Current packs/day: 0.00     Average packs/day: 1 pack/day for 10.0 years (10.0 ttl pk-yrs)     Types: Cigarettes     Start date: 1970     Quit date: 1980     Years since quittin.6    Smokeless tobacco: Never   Substance and Sexual Activity    Alcohol use: Yes     Alcohol/week: 7.0 standard drinks of alcohol     Types: 7 Glasses of wine per  week     Comment: nightly wine    Drug use: No    Sexual activity: Yes     Partners: Female     Birth control/protection: Partner-Vasectomy   Social History Narrative    . Lives with wife. They had 4 children. Retired  at [employer] and then [employer] work. Still drives. Does have a Living Will, signed at time of CABG (2002).      Social Drivers of Health     Financial Resource Strain: Low Risk  (10/2/2023)    Overall Financial Resource Strain (CARDIA)     Difficulty of Paying Living Expenses: Not hard at all   Food Insecurity: No Food Insecurity (10/2/2023)    Hunger Vital Sign     Worried About Running Out of Food in the Last Year: Never true     Ran Out of Food in the Last Year: Never true   Transportation Needs: No Transportation Needs (10/2/2023)    PRAPARE - Transportation     Lack of Transportation (Medical): No     Lack of Transportation (Non-Medical): No   Physical Activity: Unknown (12/8/2023)    Exercise Vital Sign     Days of Exercise per Week: Patient declined   Recent Concern: Physical Activity - Insufficiently Active (10/2/2023)    Exercise Vital Sign     Days of Exercise per Week: 1 day     Minutes of Exercise per Session: 60 min   Stress: No Stress Concern Present (10/2/2023)    [place] [facility] [employer] - Occupational Stress Questionnaire     Feeling of Stress : Not at all   Housing Stability: Unknown (12/8/2023)    Housing Stability Vital Sign     Unable to Pay for Housing in the Last Year: Patient refused     Number of Places Lived in the Last Year: 1     Unstable Housing in the Last Year: Patient refused     Current Outpatient Medications on File Prior to Visit   Medication Sig Dispense Refill    aspirin 81 mg Tab Take 1 tablet by mouth Daily.      hydroCHLOROthiazide (HYDRODIURIL) 12.5 MG Tab TAKE 1 TABLET BY MOUTH EVERY DAY 90 tablet 3    latanoprost 0.005 % ophthalmic solution Place 1 drop into both eyes once daily. 7.5 mL 3    losartan (COZAAR) 50 MG  tablet TAKE 1 TABLET BY MOUTH TWICE A  tablet 3    LUBRICANT EYE DROPS, GLYC-PG, 1-0.3 % Drop       multivit-min/ferrous fumarate (MULTI VITAMIN ORAL)       nebivoloL (BYSTOLIC) 5 MG Tab Take 1 tablet (5 mg total) by mouth once daily. (Patient taking differently: Take 2.5 mg by mouth once daily.) 30 tablet 11    nitroGLYCERIN (NITROSTAT) 0.4 MG SL tablet PLACE 1 TABLET (0.4 MG TOTAL) UNDER THE TONGUE EVERY 5 (FIVE) MINUTES AS NEEDED FOR CHEST PAIN (IF NO RELIEF SEEK ER TRMT). 25 tablet 3    OCEAN NASAL 0.65 % nasal spray       omega-3 fatty acids-vitamin E 1,000 mg Cap Take 2 capsules by mouth Daily.      saw palmetto 500 MG capsule Take by mouth 2 (two) times daily.       sildenafiL (VIAGRA) 100 MG tablet Take 1 tablet (100 mg total) by mouth daily as needed for Erectile Dysfunction. 20 tablet 5    tamsulosin (FLOMAX) 0.4 mg Cap Take 1 capsule (0.4 mg total) by mouth once daily. 90 capsule 1    chlorhexidine (PERIDEX) 0.12 % solution 15 mLs 2 (two) times daily.       No current facility-administered medications on file prior to visit.     Review of patient's allergies indicates:   Allergen Reactions    Codeine Swelling and Other (See Comments)     unknown    Niacin preparations Other (See Comments)     myalgia    Statins-hmg-coa reductase inhibitors      Muscle weakness    Fosinopril Other (See Comments)     cough    Pravastatin Other (See Comments)      Muscle pain    Simvastatin Other (See Comments)     Muscle pain    Sulfa (sulfonamide antibiotics) Rash and Hives       Objective:     Physical Exam  Vitals and nursing note reviewed.   Constitutional:       Appearance: He is well-developed.   HENT:      Head: Normocephalic and atraumatic.   Eyes:      Conjunctiva/sclera: Conjunctivae normal.      Pupils: Pupils are equal, round, and reactive to light.   Cardiovascular:      Rate and Rhythm: Normal rate and regular rhythm.      Pulses: Intact distal pulses.      Heart sounds: Normal heart sounds.    Pulmonary:      Effort: Pulmonary effort is normal.      Breath sounds: Normal breath sounds.   Abdominal:      General: Bowel sounds are normal.      Palpations: Abdomen is soft.   Musculoskeletal:      Cervical back: Normal range of motion and neck supple.   Skin:     General: Skin is warm and dry.   Neurological:      Mental Status: He is alert and oriented to person, place, and time.         Assessment:     1. Pure hypercholesterolemia    2. Hx of CABG    3. Essential hypertension    4. Calcification of aorta    5. Atherosclerosis of native coronary artery of native heart with angina pectoris    6. Atherosclerosis of native artery of right lower extremity with intermittent claudication    7. Chronic kidney disease, stage 3a    8. CHELO on CPAP        Plan:     Pure hypercholesterolemia    Hx of CABG    Essential hypertension    Calcification of aorta    Atherosclerosis of native coronary artery of native heart with angina pectoris    Atherosclerosis of native artery of right lower extremity with intermittent claudication    Chronic kidney disease, stage 3a    CHELO on CPAP      continue  losartan, hctz ,bystolic  -htn  Continue asa, NTG ( prn)- cad  continue fish oil-HLP     Check lipids

## 2024-11-19 ENCOUNTER — OFFICE VISIT (OUTPATIENT)
Dept: OPHTHALMOLOGY | Facility: CLINIC | Age: 84
End: 2024-11-19
Payer: MEDICARE

## 2024-11-19 DIAGNOSIS — H04.123 DRY EYES, BILATERAL: ICD-10-CM

## 2024-11-19 DIAGNOSIS — H40.1131 PRIMARY OPEN ANGLE GLAUCOMA OF BOTH EYES, MILD STAGE: Primary | ICD-10-CM

## 2024-11-19 DIAGNOSIS — H25.11 NUCLEAR SCLEROSIS OF RIGHT EYE: ICD-10-CM

## 2024-11-19 PROCEDURE — 1160F RVW MEDS BY RX/DR IN RCRD: CPT | Mod: CPTII,S$GLB,, | Performed by: OPHTHALMOLOGY

## 2024-11-19 PROCEDURE — 1159F MED LIST DOCD IN RCRD: CPT | Mod: CPTII,S$GLB,, | Performed by: OPHTHALMOLOGY

## 2024-11-19 PROCEDURE — 99999 PR PBB SHADOW E&M-EST. PATIENT-LVL III: CPT | Mod: PBBFAC,,, | Performed by: OPHTHALMOLOGY

## 2024-11-19 PROCEDURE — 99214 OFFICE O/P EST MOD 30 MIN: CPT | Mod: S$GLB,,, | Performed by: OPHTHALMOLOGY

## 2024-11-19 RX ORDER — BRIMONIDINE TARTRATE 2 MG/ML
1 SOLUTION/ DROPS OPHTHALMIC EVERY 12 HOURS
Qty: 5 ML | Refills: 12 | Status: SHIPPED | OUTPATIENT
Start: 2024-11-19 | End: 2025-11-19

## 2024-11-19 NOTE — PROGRESS NOTES
"SUBJECTIVE  Wilberto Hayes Jr. is 84 y.o. male  Corrected distance visual acuity was 20/20 -2 in the right eye and 20/30 +1 in the left eye.   Chief Complaint   Patient presents with    Glaucoma     Pt reports for 4m IOP check. Denies any pain or irritation. Eyes dry OU. Va stable. 100% compliant with gtts.           HPI     Glaucoma     Additional comments: Pt reports for 4m IOP check. Denies any pain or   irritation. Eyes dry OU. Va stable. 100% compliant with gtts.            Comments    1. Mild COAG OS>OD Goal =17-18 (Init 20/22)  +Fhx Glaucoma -g-dad   SLT OD 11/09 (min response) + 1/8/20 (22.5-14.5) + 3/13/24 (17-13.5)  SLT OS 8/09 (22 to 16-17) + 1/8/20 (24.5-14) + 3/13/24 (23-19.5)  Timolol caused blurry vision  2. Mod NSC OD  PCIOL OS with Omni 5/11/22  3. Dry eyes (no dry mouth)   Gel Plugs OU   Xiidra caused irritation   Tried Restasis (burning and blurry vision)   4. Fuchs' corneal dystrophy  5. Ptosis OD     Latanoprost QHS OU       Homeopathic AT"s (Similasan)   Thera tears, Soothe XP   O3FO             Last edited by Rick Kelly on 11/19/2024  9:39 AM.         Assessment /Plan :  1. Primary open angle glaucoma of both eyes, mild stage   Doing well, intraocular pressure (IOP) OD within acceptable range relative to target IOP and no evidence of progression. Continue current treatment. Reviewed importance of continued compliance with treatment and follow up.    Intraocular pressure (IOP) OS not within acceptable range relative to target IOP with risk of irreversible visual loss. Better IOP control is recommended. Treatment options may include change or additional medications, Selective Laser Trabeculoplasty (SLT laser), and/or incisional glaucoma surgery. Reviewed importance of continued compliance with treatment and follow up.     Patient chooses  to add Brimonidine one drop in the left eye 2 times a day. Continue Latanoprost one drop in each eye every night.    Return to clinic in 1 month  or " as needed.  With IOP Check       2. Nuclear sclerosis of right eye - monitor for now   3. Dry eyes, bilateral - continue the use of the artificial tears one drop in each eye as needed

## 2024-12-10 ENCOUNTER — OFFICE VISIT (OUTPATIENT)
Dept: OPHTHALMOLOGY | Facility: CLINIC | Age: 84
End: 2024-12-10
Payer: MEDICARE

## 2024-12-10 DIAGNOSIS — Z96.1 PSEUDOPHAKIA OF LEFT EYE: ICD-10-CM

## 2024-12-10 DIAGNOSIS — H04.123 DRY EYES, BILATERAL: ICD-10-CM

## 2024-12-10 DIAGNOSIS — H40.1131 PRIMARY OPEN ANGLE GLAUCOMA OF BOTH EYES, MILD STAGE: Primary | ICD-10-CM

## 2024-12-10 DIAGNOSIS — H25.11 NUCLEAR SCLEROSIS OF RIGHT EYE: ICD-10-CM

## 2024-12-10 PROCEDURE — 1160F RVW MEDS BY RX/DR IN RCRD: CPT | Mod: CPTII,S$GLB,, | Performed by: OPHTHALMOLOGY

## 2024-12-10 PROCEDURE — 99214 OFFICE O/P EST MOD 30 MIN: CPT | Mod: S$GLB,,, | Performed by: OPHTHALMOLOGY

## 2024-12-10 PROCEDURE — 99999 PR PBB SHADOW E&M-EST. PATIENT-LVL III: CPT | Mod: PBBFAC,,, | Performed by: OPHTHALMOLOGY

## 2024-12-10 PROCEDURE — 1159F MED LIST DOCD IN RCRD: CPT | Mod: CPTII,S$GLB,, | Performed by: OPHTHALMOLOGY

## 2024-12-10 NOTE — PROGRESS NOTES
"SUBJECTIVE  Wilberto Hayes Jr. is 84 y.o. male  Uncorrected distance visual acuity was 20/20 -1 in the right eye and 20/25 in the left eye.   Chief Complaint   Patient presents with    Glaucoma     Pt reports for 1 month IOP check. Denies any pain or irritation. Va stable. Using drops as directed.           HPI     Glaucoma     Additional comments: Pt reports for 1 month IOP check. Denies any pain or   irritation. Va stable. Using drops as directed.            Comments    1. Mild COAG OS>OD Goal =17-18 (Init 20/22)  +Fhx Glaucoma -g-dad   SLT OD 11/09 (min response) + 1/8/20 (22.5-14.5) + 3/13/24 (17-13.5)  SLT OS 8/09 (22 to 16-17) + 1/8/20 (24.5-14) + 3/13/24 (23-19.5)  Timolol caused blurry vision  2. Mod NSC OD  PCIOL OS with Omni 5/11/22  3. Dry eyes (no dry mouth)   Gel Plugs OU   Xiidra caused irritation   Tried Restasis (burning and blurry vision)   4. Fuchs' corneal dystrophy  5. Ptosis OD     Latanoprost QHS OU   Brimonidine BID OS    Homeopathic AT"s (Similasan)   Thera tears, Soothe XP   O3FO             Last edited by Rick Kelly on 12/10/2024 10:15 AM.         Assessment /Plan :  1. Primary open angle glaucoma of both eyes, mild stage Doing well, intraocular pressure (IOP) within acceptable range relative to target IOP and no evidence of progression. Continue current treatment. Reviewed importance of continued compliance with treatment and follow up.      Patient instructed to continue using the following glaucoma medication as follows:  Latanoprost one drop in each eye nightly and Brimoninide one drop in the left eye every 12 hours    Return to clinic in 4 months  or as needed.  With IOP Check and GOCT     2. Nuclear sclerosis of right eye - not visually significant, monitor for now   3. Dry eyes, bilateral  -- Condition stable, no therapeutic change required. Monitoring routinely.     4. Pseudophakia of left eye  -- Condition stable, no therapeutic change required. Monitoring routinely.   "

## 2024-12-16 ENCOUNTER — OFFICE VISIT (OUTPATIENT)
Dept: PULMONOLOGY | Facility: CLINIC | Age: 84
End: 2024-12-16
Payer: MEDICARE

## 2024-12-16 VITALS
OXYGEN SATURATION: 99 % | DIASTOLIC BLOOD PRESSURE: 58 MMHG | WEIGHT: 217.25 LBS | BODY MASS INDEX: 30.41 KG/M2 | HEART RATE: 59 BPM | HEIGHT: 71 IN | SYSTOLIC BLOOD PRESSURE: 118 MMHG | RESPIRATION RATE: 16 BRPM

## 2024-12-16 DIAGNOSIS — G47.33 OSA ON CPAP: Primary | ICD-10-CM

## 2024-12-16 DIAGNOSIS — Z23 INFLUENZA VACCINATION ADMINISTERED AT CURRENT VISIT: ICD-10-CM

## 2024-12-16 DIAGNOSIS — R91.1 PULMONARY NODULE: ICD-10-CM

## 2024-12-16 DIAGNOSIS — G47.33 CONTROLLED NONFAMILIAL OBSTRUCTIVE SLEEP APNEA: ICD-10-CM

## 2024-12-16 DIAGNOSIS — Z71.89 CPAP USE COUNSELING: ICD-10-CM

## 2024-12-16 PROCEDURE — 3078F DIAST BP <80 MM HG: CPT | Mod: CPTII,S$GLB,, | Performed by: INTERNAL MEDICINE

## 2024-12-16 PROCEDURE — 1160F RVW MEDS BY RX/DR IN RCRD: CPT | Mod: CPTII,S$GLB,, | Performed by: INTERNAL MEDICINE

## 2024-12-16 PROCEDURE — 99999 PR PBB SHADOW E&M-EST. PATIENT-LVL V: CPT | Mod: PBBFAC,,, | Performed by: INTERNAL MEDICINE

## 2024-12-16 PROCEDURE — 99214 OFFICE O/P EST MOD 30 MIN: CPT | Mod: S$GLB,,, | Performed by: INTERNAL MEDICINE

## 2024-12-16 PROCEDURE — 1126F AMNT PAIN NOTED NONE PRSNT: CPT | Mod: CPTII,S$GLB,, | Performed by: INTERNAL MEDICINE

## 2024-12-16 PROCEDURE — 3288F FALL RISK ASSESSMENT DOCD: CPT | Mod: CPTII,S$GLB,, | Performed by: INTERNAL MEDICINE

## 2024-12-16 PROCEDURE — 1159F MED LIST DOCD IN RCRD: CPT | Mod: CPTII,S$GLB,, | Performed by: INTERNAL MEDICINE

## 2024-12-16 PROCEDURE — 1101F PT FALLS ASSESS-DOCD LE1/YR: CPT | Mod: CPTII,S$GLB,, | Performed by: INTERNAL MEDICINE

## 2024-12-16 PROCEDURE — 3074F SYST BP LT 130 MM HG: CPT | Mod: CPTII,S$GLB,, | Performed by: INTERNAL MEDICINE

## 2024-12-16 NOTE — PROGRESS NOTES
Pulmonary Outpatient Follow Up Visit     Subjective:   This patient is new to me.  Previous records with colleagues including office visits, testing were personally reviewed.  Outside records under care everywhere if available that includes office visits and testing were reviewed personally as well.     Patient ID: Wilberto Hayes Jr. is a 84 y.o. male.    Chief Complaint: Pulmonary Nodules      HPI      84-year-old male patient with past medical history of right lung nodule stable since 2022 on CT scan of the chest incidentally found on preop for abdominal surgery imaging presents for follow-up.        Known with CHELO on auto PAP has a replaced Ramos machine requesting a replacement.  Uses nasal pillows.  Adherent using and benefitting from CPAP therapy.      Machine is more than 5 years old.      Denies coughing sputum production wheezing or SOB.  Review of Systems   Respiratory:  Positive for apnea and snoring.    Psychiatric/Behavioral:  Positive for sleep disturbance.        Outpatient Encounter Medications as of 12/16/2024   Medication Sig Dispense Refill    aspirin 81 mg Tab Take 1 tablet by mouth Daily.      brimonidine 0.2% (ALPHAGAN) 0.2 % Drop Place 1 drop into the left eye every 12 (twelve) hours. 5 mL 12    hydroCHLOROthiazide (HYDRODIURIL) 12.5 MG Tab TAKE 1 TABLET BY MOUTH EVERY DAY 90 tablet 3    latanoprost 0.005 % ophthalmic solution Place 1 drop into both eyes once daily. 7.5 mL 3    losartan (COZAAR) 50 MG tablet TAKE 1 TABLET BY MOUTH TWICE A  tablet 3    LUBRICANT EYE DROPS, GLYC-PG, 1-0.3 % Drop       multivit-min/ferrous fumarate (MULTI VITAMIN ORAL)       nebivoloL (BYSTOLIC) 5 MG Tab Take 1 tablet (5 mg total) by mouth once daily. 30 tablet 11    nitroGLYCERIN (NITROSTAT) 0.4 MG SL tablet PLACE 1 TABLET (0.4 MG TOTAL) UNDER THE TONGUE EVERY 5 (FIVE) MINUTES AS NEEDED FOR CHEST PAIN (IF NO RELIEF SEEK ER TRMT). 25 tablet 3    OCEAN NASAL  "0.65 % nasal spray       saw palmetto 500 MG capsule Take by mouth 2 (two) times daily.       sildenafiL (VIAGRA) 100 MG tablet Take 1 tablet (100 mg total) by mouth daily as needed for Erectile Dysfunction. 20 tablet 5    tamsulosin (FLOMAX) 0.4 mg Cap Take 1 capsule (0.4 mg total) by mouth once daily. 90 capsule 1    chlorhexidine (PERIDEX) 0.12 % solution 15 mLs 2 (two) times daily.      omega-3 fatty acids-vitamin E 1,000 mg Cap Take 2 capsules by mouth Daily. (Patient not taking: Reported on 12/16/2024)       No facility-administered encounter medications on file as of 12/16/2024.       Objective:     Vital Signs (Most Recent)  Vital Signs  Pulse: (!) 59  Resp: 16  SpO2: 99 %  BP: (!) 118/58  Patient Position: Sitting  Pain Score: 0-No pain  Height and Weight  Height: 5' 11" (180.3 cm)  Weight: 98.5 kg (217 lb 4.2 oz)  BSA (Calculated - sq m): 2.22 sq meters  BMI (Calculated): 30.3  Weight in (lb) to have BMI = 25: 178.9]  Wt Readings from Last 2 Encounters:   12/16/24 98.5 kg (217 lb 4.2 oz)   11/15/24 96.2 kg (212 lb 1.3 oz)       Physical Exam   Constitutional: He is oriented to person, place, and time. He appears well-developed and well-nourished.   Pulmonary/Chest: Normal expansion and effort normal. No respiratory distress. He has no decreased breath sounds. He has no wheezes. He has no rhonchi.   Neurological: He is alert and oriented to person, place, and time.       Laboratory  Lab Results   Component Value Date    WBC 4.55 06/07/2024    RBC 4.55 (L) 06/07/2024    HGB 14.3 06/07/2024    HCT 43.2 06/07/2024    MCV 95 06/07/2024    MCH 31.4 (H) 06/07/2024    MCHC 33.1 06/07/2024    RDW 13.7 06/07/2024     06/07/2024    MPV 11.2 06/07/2024    GRAN 2.8 06/07/2024    GRAN 60.5 06/07/2024    LYMPH 1.1 06/07/2024    LYMPH 24.6 06/07/2024    MONO 0.5 06/07/2024    MONO 10.1 06/07/2024    EOS 0.2 06/07/2024    BASO 0.05 06/07/2024    EOSINOPHIL 3.5 06/07/2024    BASOPHIL 1.1 06/07/2024       BMP  Lab " "Results   Component Value Date     06/07/2024    K 5.3 (H) 06/07/2024     06/07/2024    CO2 27 06/07/2024    BUN 15 06/07/2024    CREATININE 1.3 06/07/2024    CALCIUM 10.5 06/07/2024    ANIONGAP 9 06/07/2024    ESTGFRAFRICA 54.5 (A) 07/13/2022    EGFRNONAA 47.1 (A) 07/13/2022    AST 19 06/07/2024    ALT 6 (L) 06/07/2024    PROT 7.5 06/07/2024       Lab Results   Component Value Date     (H) 08/10/2022       Lab Results   Component Value Date    TSH 2.664 06/07/2024       Lab Results   Component Value Date    SEDRATE <2 07/13/2022       Lab Results   Component Value Date    CRP 1.3 07/08/2016     No results found for: "IGE"     No results found for: "ASPERGILLUS"  No results found for: "AFUMIGATUSCL"     No results found for: "ACE"     Diagnostic Results:  I have personally reviewed today the following studies:    CT chest September 20, 2024 stable      Assessment/Plan:   CHELO on CPAP  -     CPAP FOR HOME USE    Controlled nonfamilial obstructive sleep apnea    Influenza vaccination administered at current visit    CPAP use counseling    Pulmonary nodule        Nasal pillows     Ordered auto PAP.  If not eligible for new machine due to initial study [id number] will re-establish diagnosis with a new sleep study.  Follow up in about 3 months (around 3/16/2025).    This note was prepared using voice recognition system and is likely to have sound alike errors that may have been overlooked even after proof reading.  Please call me with any questions    Discussed diagnosis, its evaluation, treatment and usual course. All questions answered.      Ilana Mendoza MD    "

## 2025-01-07 ENCOUNTER — PATIENT MESSAGE (OUTPATIENT)
Dept: PULMONOLOGY | Facility: CLINIC | Age: 85
End: 2025-01-07
Payer: MEDICARE

## 2025-01-08 ENCOUNTER — OFFICE VISIT (OUTPATIENT)
Dept: FAMILY MEDICINE | Facility: CLINIC | Age: 85
End: 2025-01-08
Payer: MEDICARE

## 2025-01-08 ENCOUNTER — LAB VISIT (OUTPATIENT)
Dept: LAB | Facility: HOSPITAL | Age: 85
End: 2025-01-08
Attending: FAMILY MEDICINE
Payer: MEDICARE

## 2025-01-08 VITALS
SYSTOLIC BLOOD PRESSURE: 130 MMHG | HEART RATE: 86 BPM | WEIGHT: 217.38 LBS | TEMPERATURE: 96 F | HEIGHT: 71 IN | BODY MASS INDEX: 30.43 KG/M2 | DIASTOLIC BLOOD PRESSURE: 60 MMHG | OXYGEN SATURATION: 96 %

## 2025-01-08 DIAGNOSIS — I10 ESSENTIAL HYPERTENSION: ICD-10-CM

## 2025-01-08 DIAGNOSIS — Z85.46 PERSONAL HISTORY OF MALIGNANT NEOPLASM OF PROSTATE: ICD-10-CM

## 2025-01-08 DIAGNOSIS — G72.0 STATIN MYOPATHY: ICD-10-CM

## 2025-01-08 DIAGNOSIS — N18.31 CHRONIC KIDNEY DISEASE, STAGE 3A: ICD-10-CM

## 2025-01-08 DIAGNOSIS — Z78.9 STATIN INTOLERANCE: ICD-10-CM

## 2025-01-08 DIAGNOSIS — T46.6X5A STATIN MYOPATHY: ICD-10-CM

## 2025-01-08 DIAGNOSIS — H93.19 TINNITUS, UNSPECIFIED LATERALITY: Primary | ICD-10-CM

## 2025-01-08 DIAGNOSIS — G47.33 OSA ON CPAP: ICD-10-CM

## 2025-01-08 DIAGNOSIS — I25.10 CORONARY ARTERY DISEASE INVOLVING NATIVE CORONARY ARTERY OF NATIVE HEART WITHOUT ANGINA PECTORIS: ICD-10-CM

## 2025-01-08 DIAGNOSIS — E78.00 PURE HYPERCHOLESTEROLEMIA: ICD-10-CM

## 2025-01-08 LAB
ALBUMIN SERPL BCP-MCNC: 4.2 G/DL (ref 3.5–5.2)
ALP SERPL-CCNC: 74 U/L (ref 40–150)
ALT SERPL W/O P-5'-P-CCNC: 8 U/L (ref 10–44)
ANION GAP SERPL CALC-SCNC: 6 MMOL/L (ref 8–16)
AST SERPL-CCNC: 17 U/L (ref 10–40)
BILIRUB DIRECT SERPL-MCNC: 0.2 MG/DL (ref 0.1–0.3)
BILIRUB SERPL-MCNC: 0.7 MG/DL (ref 0.1–1)
BUN SERPL-MCNC: 14 MG/DL (ref 8–23)
CALCIUM SERPL-MCNC: 9.7 MG/DL (ref 8.7–10.5)
CHLORIDE SERPL-SCNC: 105 MMOL/L (ref 95–110)
CHOLEST SERPL-MCNC: 171 MG/DL (ref 120–199)
CHOLEST/HDLC SERPL: 3.7 {RATIO} (ref 2–5)
CO2 SERPL-SCNC: 29 MMOL/L (ref 23–29)
COMPLEXED PSA SERPL-MCNC: 1 NG/ML (ref 0–4)
CREAT SERPL-MCNC: 1.2 MG/DL (ref 0.5–1.4)
EST. GFR  (NO RACE VARIABLE): >60 ML/MIN/1.73 M^2
GLUCOSE SERPL-MCNC: 102 MG/DL (ref 70–110)
HDLC SERPL-MCNC: 46 MG/DL (ref 40–75)
HDLC SERPL: 26.9 % (ref 20–50)
LDLC SERPL CALC-MCNC: 105.2 MG/DL (ref 63–159)
NONHDLC SERPL-MCNC: 125 MG/DL
POTASSIUM SERPL-SCNC: 5.1 MMOL/L (ref 3.5–5.1)
PROT SERPL-MCNC: 7.9 G/DL (ref 6–8.4)
SODIUM SERPL-SCNC: 140 MMOL/L (ref 136–145)
TRIGL SERPL-MCNC: 99 MG/DL (ref 30–150)

## 2025-01-08 PROCEDURE — 3288F FALL RISK ASSESSMENT DOCD: CPT | Mod: CPTII,S$GLB,, | Performed by: FAMILY MEDICINE

## 2025-01-08 PROCEDURE — 3075F SYST BP GE 130 - 139MM HG: CPT | Mod: CPTII,S$GLB,, | Performed by: FAMILY MEDICINE

## 2025-01-08 PROCEDURE — 99999 PR PBB SHADOW E&M-EST. PATIENT-LVL IV: CPT | Mod: PBBFAC,,, | Performed by: FAMILY MEDICINE

## 2025-01-08 PROCEDURE — 80076 HEPATIC FUNCTION PANEL: CPT | Performed by: INTERNAL MEDICINE

## 2025-01-08 PROCEDURE — 1159F MED LIST DOCD IN RCRD: CPT | Mod: CPTII,S$GLB,, | Performed by: FAMILY MEDICINE

## 2025-01-08 PROCEDURE — 80061 LIPID PANEL: CPT | Performed by: INTERNAL MEDICINE

## 2025-01-08 PROCEDURE — 84153 ASSAY OF PSA TOTAL: CPT | Performed by: FAMILY MEDICINE

## 2025-01-08 PROCEDURE — 3078F DIAST BP <80 MM HG: CPT | Mod: CPTII,S$GLB,, | Performed by: FAMILY MEDICINE

## 2025-01-08 PROCEDURE — 99214 OFFICE O/P EST MOD 30 MIN: CPT | Mod: S$GLB,,, | Performed by: FAMILY MEDICINE

## 2025-01-08 PROCEDURE — G2211 COMPLEX E/M VISIT ADD ON: HCPCS | Mod: S$GLB,,, | Performed by: FAMILY MEDICINE

## 2025-01-08 PROCEDURE — 36415 COLL VENOUS BLD VENIPUNCTURE: CPT | Mod: PO | Performed by: FAMILY MEDICINE

## 2025-01-08 PROCEDURE — 80048 BASIC METABOLIC PNL TOTAL CA: CPT | Performed by: FAMILY MEDICINE

## 2025-01-08 PROCEDURE — 1101F PT FALLS ASSESS-DOCD LE1/YR: CPT | Mod: CPTII,S$GLB,, | Performed by: FAMILY MEDICINE

## 2025-01-08 NOTE — PROGRESS NOTES
"Subjective:       Patient ID: Wilberto Hayes Jr. is a 84 y.o. male.    Chief Complaint: Follow-up      HPI Comments:       Current Outpatient Medications:     aspirin 81 mg Tab, Take 1 tablet by mouth Daily., Disp: , Rfl:     brimonidine 0.2% (ALPHAGAN) 0.2 % Drop, Place 1 drop into the left eye every 12 (twelve) hours., Disp: 5 mL, Rfl: 12    hydroCHLOROthiazide (HYDRODIURIL) 12.5 MG Tab, TAKE 1 TABLET BY MOUTH EVERY DAY, Disp: 90 tablet, Rfl: 3    latanoprost 0.005 % ophthalmic solution, Place 1 drop into both eyes once daily., Disp: 7.5 mL, Rfl: 3    losartan (COZAAR) 50 MG tablet, TAKE 1 TABLET BY MOUTH TWICE A DAY, Disp: 180 tablet, Rfl: 3    LUBRICANT EYE DROPS, GLYC-PG, 1-0.3 % Drop, , Disp: , Rfl:     multivit-min/ferrous fumarate (MULTI VITAMIN ORAL), , Disp: , Rfl:     nebivoloL (BYSTOLIC) 5 MG Tab, Take 1 tablet (5 mg total) by mouth once daily., Disp: 30 tablet, Rfl: 11    nitroGLYCERIN (NITROSTAT) 0.4 MG SL tablet, PLACE 1 TABLET (0.4 MG TOTAL) UNDER THE TONGUE EVERY 5 (FIVE) MINUTES AS NEEDED FOR CHEST PAIN (IF NO RELIEF SEEK ER TRMT)., Disp: 25 tablet, Rfl: 3    OCEAN NASAL 0.65 % nasal spray, , Disp: , Rfl:     saw palmetto 500 MG capsule, Take by mouth 2 (two) times daily. , Disp: , Rfl:     sildenafiL (VIAGRA) 100 MG tablet, Take 1 tablet (100 mg total) by mouth daily as needed for Erectile Dysfunction., Disp: 20 tablet, Rfl: 5    tamsulosin (FLOMAX) 0.4 mg Cap, Take 1 capsule (0.4 mg total) by mouth once daily., Disp: 90 capsule, Rfl: 1    chlorhexidine (PERIDEX) 0.12 % solution, 15 mLs 2 (two) times daily., Disp: , Rfl:     omega-3 fatty acids-vitamin E 1,000 mg Cap, Take 2 capsules by mouth Daily. (Patient not taking: Reported on 1/8/2025), Disp: , Rfl:       Last seen by me 7 months ago.  Generally doing well.      Complains of a 6 week history of bilateral buzzing in his ears.  Constant.  Some hearing loss as well.  Slight dizziness    Feels like his "long COVID" symptoms are slightly " "better.  Less fatigued, slowly improving    Labs last time: Testosterone 646, potassium 5.3, PSA 1.4    Patient interested in rechecking his PSA which was slightly higher last time in his previous baseline.  No change in symptoms.  On Flomax    Follow-up  Associated symptoms include arthralgias and weakness. Pertinent negatives include no chest pain, headaches, joint swelling, neck pain or vomiting.     Review of Systems   Constitutional:  Positive for activity change. Negative for unexpected weight change.   HENT:  Positive for hearing loss. Negative for rhinorrhea and trouble swallowing.    Eyes:  Positive for visual disturbance. Negative for discharge.   Respiratory:  Negative for chest tightness and wheezing.    Cardiovascular:  Negative for chest pain and palpitations.   Gastrointestinal:  Negative for blood in stool, constipation, diarrhea and vomiting.   Endocrine: Negative for polydipsia and polyuria.   Genitourinary:  Negative for difficulty urinating, hematuria and urgency.   Musculoskeletal:  Positive for arthralgias. Negative for joint swelling and neck pain.   Neurological:  Positive for weakness. Negative for headaches.   Psychiatric/Behavioral:  Negative for confusion and dysphoric mood.        Objective:      Vitals:    01/08/25 0852   BP: 130/60   Pulse: 86   Temp: 96 °F (35.6 °C)   TempSrc: Tympanic   SpO2: 96%   Weight: 98.6 kg (217 lb 6 oz)   Height: 5' 11" (1.803 m)     Physical Exam  Vitals and nursing note reviewed.   Constitutional:       General: He is not in acute distress.     Appearance: He is well-developed. He is not diaphoretic.   HENT:      Head: Normocephalic.      Right Ear: Tympanic membrane, ear canal and external ear normal.      Left Ear: Tympanic membrane, ear canal and external ear normal.   Neck:      Thyroid: No thyromegaly.   Cardiovascular:      Rate and Rhythm: Normal rate and regular rhythm.      Heart sounds: Normal heart sounds. No murmur heard.  Pulmonary:      " Effort: Pulmonary effort is normal.      Breath sounds: Normal breath sounds. No wheezing or rales.   Abdominal:      General: There is no distension.      Palpations: Abdomen is soft.   Musculoskeletal:      Cervical back: Neck supple.   Lymphadenopathy:      Cervical: No cervical adenopathy.   Skin:     General: Skin is warm and dry.   Neurological:      Mental Status: He is alert and oriented to person, place, and time.   Psychiatric:         Behavior: Behavior normal.         Thought Content: Thought content normal.         Judgment: Judgment normal.         Assessment:       1. Tinnitus, unspecified laterality    2. Chronic kidney disease, stage 3a    3. Essential hypertension    4. CHELO on CPAP    5. Coronary artery disease involving native coronary artery of native heart without angina pectoris    6. Personal history of malignant neoplasm of prostate    7. Statin intolerance    8. Statin myopathy        Plan:   Tinnitus, unspecified laterality  Comments:  Audiology consult  Orders:  -     Ambulatory referral/consult to Audiology; Future; Expected date: 01/15/2025    Chronic kidney disease, stage 3a  Comments:  Stable  Orders:  -     PROSTATE SPECIFIC ANTIGEN, DIAGNOSTIC; Future; Expected date: 01/08/2025    Essential hypertension  Comments:  Controlled.  Follow-up 6 months  Orders:  -     Basic Metabolic Panel; Future; Expected date: 01/08/2025    CHELO on CPAP  Comments:  Recently got new machine    Coronary artery disease involving native coronary artery of native heart without angina pectoris  Comments:  On aspirin    Personal history of malignant neoplasm of prostate  Comments:  PSA  Orders:  -     PROSTATE SPECIFIC ANTIGEN, DIAGNOSTIC; Future; Expected date: 01/08/2025    Statin intolerance  Comments:  Taking fish oil    Statin myopathy

## 2025-01-09 ENCOUNTER — TELEPHONE (OUTPATIENT)
Dept: CARDIOTHORACIC SURGERY | Facility: CLINIC | Age: 85
End: 2025-01-09
Payer: MEDICARE

## 2025-01-09 NOTE — TELEPHONE ENCOUNTER
LVM to review results and recommendations-Lipids reviewed, continue current meds       ----- Message from Jonel Valle MD sent at 1/9/2025  6:34 AM CST -----  Lipids reviewed, continue current meds

## 2025-01-14 ENCOUNTER — PATIENT MESSAGE (OUTPATIENT)
Dept: UROLOGY | Facility: CLINIC | Age: 85
End: 2025-01-14
Payer: MEDICARE

## 2025-01-16 ENCOUNTER — OFFICE VISIT (OUTPATIENT)
Dept: UROLOGY | Facility: CLINIC | Age: 85
End: 2025-01-16
Payer: MEDICARE

## 2025-01-16 VITALS
HEART RATE: 57 BPM | RESPIRATION RATE: 14 BRPM | SYSTOLIC BLOOD PRESSURE: 157 MMHG | BODY MASS INDEX: 30.82 KG/M2 | HEIGHT: 71 IN | WEIGHT: 220.13 LBS | DIASTOLIC BLOOD PRESSURE: 75 MMHG

## 2025-01-16 DIAGNOSIS — N40.1 BENIGN PROSTATIC HYPERPLASIA WITH WEAK URINARY STREAM: Primary | ICD-10-CM

## 2025-01-16 DIAGNOSIS — R39.12 BENIGN PROSTATIC HYPERPLASIA WITH WEAK URINARY STREAM: Primary | ICD-10-CM

## 2025-01-16 DIAGNOSIS — N52.9 ERECTILE DYSFUNCTION, UNSPECIFIED ERECTILE DYSFUNCTION TYPE: ICD-10-CM

## 2025-01-16 LAB
BILIRUB UR QL STRIP: NEGATIVE
GLUCOSE UR QL STRIP: NEGATIVE
KETONES UR QL STRIP: NEGATIVE
LEUKOCYTE ESTERASE UR QL STRIP: NEGATIVE
PH, POC UA: 7
POC BLOOD, URINE: NEGATIVE
POC NITRATES, URINE: NEGATIVE
PROT UR QL STRIP: NEGATIVE
SP GR UR STRIP: 1.01 (ref 1–1.03)
UROBILINOGEN UR STRIP-ACNC: 0.2 (ref 0.3–2.2)

## 2025-01-16 PROCEDURE — 1159F MED LIST DOCD IN RCRD: CPT | Mod: CPTII,S$GLB,, | Performed by: UROLOGY

## 2025-01-16 PROCEDURE — 1160F RVW MEDS BY RX/DR IN RCRD: CPT | Mod: CPTII,S$GLB,, | Performed by: UROLOGY

## 2025-01-16 PROCEDURE — 99214 OFFICE O/P EST MOD 30 MIN: CPT | Mod: S$GLB,,, | Performed by: UROLOGY

## 2025-01-16 PROCEDURE — 3288F FALL RISK ASSESSMENT DOCD: CPT | Mod: CPTII,S$GLB,, | Performed by: UROLOGY

## 2025-01-16 PROCEDURE — 1101F PT FALLS ASSESS-DOCD LE1/YR: CPT | Mod: CPTII,S$GLB,, | Performed by: UROLOGY

## 2025-01-16 PROCEDURE — 81003 URINALYSIS AUTO W/O SCOPE: CPT | Mod: QW,S$GLB,, | Performed by: UROLOGY

## 2025-01-16 PROCEDURE — 3077F SYST BP >= 140 MM HG: CPT | Mod: CPTII,S$GLB,, | Performed by: UROLOGY

## 2025-01-16 PROCEDURE — 3078F DIAST BP <80 MM HG: CPT | Mod: CPTII,S$GLB,, | Performed by: UROLOGY

## 2025-01-16 PROCEDURE — 99999 PR PBB SHADOW E&M-EST. PATIENT-LVL IV: CPT | Mod: PBBFAC,,, | Performed by: UROLOGY

## 2025-01-16 PROCEDURE — 1126F AMNT PAIN NOTED NONE PRSNT: CPT | Mod: CPTII,S$GLB,, | Performed by: UROLOGY

## 2025-01-16 NOTE — PROGRESS NOTES
Chief Complaint:  Kidney stones    HPI:   01/16/2025 - returns today for follow-up, no new issues in the interim, voiding well, no stone episodes, still taking the Flomax and feels like it helps with his urination and chronic prostatitis, no gross hematuria or dysuria, PSA last week 1.0    06/27/2024 - 85 yo male that presents for evaluation for the management of kidney stones.  Patient notes a long history of kidney stones starting when he was 23 years old.  He notes that he has had 20 stones in his lifetime.  Has had ESWL, ureteroscopy, stents for his stones in the past.  The last stone episode he had was about four years ago, saw Dr. Arredondo at [facility] but has not seen anyone since that time.  Notes that he has calcium based stones but has not undergone a metabolic workup.  Usually takes about two weeks for him to pass his stones, has passed some quite large stones previously.  Also notes that he has episodic chronic prostatitis, we will get dysuria and when this happens he goes to urgent Care, notes that Cipro helps his symptoms.  Was started on Flomax by Dr. Steward and feels like it helps his symptoms.  Also notes some ED which is improved with Viagra, notes about 75% erection with this.  Does have a family history of prostate cancer in his father, PSA earlier this month 1.4.    PMH:  Past Medical History:   Diagnosis Date    ACE-inhibitor cough     Acute coronary syndrome     Angina pectoris     Cataract     Coronary artery disease     Degenerative disc disease, lumbar     Degenerative disc disease, lumbar     Glaucoma     Hyperlipidemia     cholesterol    Hypertension     Kidney stone     last in 2001    Myocardial infarction     Obesity     Peripheral vascular disease     Polyneuropathy     Sleep apnea     Tobacco dependence     resolved quit 1980    Trouble in sleeping        PSH:  Past Surgical History:   Procedure Laterality Date    ABCESS DRAINAGE Left 2003    inguinal abcess/cellulitis - MRSA- 60  days of IVAB and repacking w/ Home health     COLONOSCOPY N/A 03/02/2023    Procedure: COLONOSCOPY;  Surgeon: Bambi Bailey MD;  Location: [facility] ENDO;  Service: Endoscopy;  Laterality: N/A;    CORONARY ARTERY BYPASS GRAFT  2002    Two-vessel    cystoscopy and stone removal via scope  2003    several between age 23 and 53    EXTRACTION OF TOOTH  07/2021    EYE SURGERY      lthotripsy  1985    MULTIPLE TOOTH EXTRACTIONS  02/2024    ROBOT-ASSISTED CHOLECYSTECTOMY USING DA ERI XI N/A 08/24/2022    Procedure: XI ROBOTIC CHOLECYSTECTOMY;  Surgeon: Meet Alatorre MD;  Location: [facility] OR;  Service: General;  Laterality: N/A;  Repair of hole in duodenum prior to cholecystectomy    SLT - OU - BOTH EYES  2009    TONSILLECTOMY      at age 4    UMBILICAL HERNIA REPAIR N/A 08/24/2022    Procedure: REPAIR, HERNIA, UMBILICAL, AGE 5 YEARS OR OLDER;  Surgeon: Meet Alatorre MD;  Location: [facility] OR;  Service: General;  Laterality: N/A;    VASECTOMY  1975       Family History:  Family History   Problem Relation Name Age of Onset    Cancer Mother          brain    COPD Mother      Alcohol abuse Mother      Hypertension Mother      Cancer Father          lung, breast with left mastectomy    COPD Father      Alcohol abuse Father      Hypertension Father      Kidney disease Maternal Grandmother      Alcohol abuse Maternal Grandfather      Kidney disease Maternal Grandfather      Cancer Paternal Grandmother          Gyn & colon    Kidney disease Paternal Grandmother      Blindness Paternal Grandfather      Glaucoma Paternal Grandfather      Alcohol abuse Paternal Grandfather      Kidney disease Paternal Grandfather      Retinal detachment Neg Hx      Macular degeneration Neg Hx      Diabetes Neg Hx      Heart disease Neg Hx      Stroke Neg Hx      Mental illness Neg Hx      Intellectual disability Neg Hx         Social History:  Social History     Tobacco Use    Smoking status: Former     Current packs/day: 0.00     Average  packs/day: 1 pack/day for 10.0 years (10.0 ttl pk-yrs)     Types: Cigarettes     Start date: 1970     Quit date: 1980     Years since quittin.8     Passive exposure: Never    Smokeless tobacco: Never   Substance Use Topics    Alcohol use: Yes     Alcohol/week: 7.0 standard drinks of alcohol     Types: 7 Glasses of wine per week     Comment: nightly wine    Drug use: No        Review of Systems:  General: No fever, chills  Skin: No rashes  Chest:  Denies cough and sputum production  Heart: Denies chest pain  Resp: Denies dyspnea  Abdomen: Denies diarrhea, abdominal pain, hematemesis, or blood in stool.  Musculoskeletal: No joint stiffness or swelling. Denies back pain.  : see HPI  Neuro: no dizziness or weakness    Allergies:  Codeine, Niacin preparations, Statins-hmg-coa reductase inhibitors, Fosinopril, Pravastatin, Simvastatin, and Sulfa (sulfonamide antibiotics)    Medications:    Current Outpatient Medications:     aspirin 81 mg Tab, Take 1 tablet by mouth Daily., Disp: , Rfl:     brimonidine 0.2% (ALPHAGAN) 0.2 % Drop, Place 1 drop into the left eye every 12 (twelve) hours., Disp: 5 mL, Rfl: 12    hydroCHLOROthiazide (HYDRODIURIL) 12.5 MG Tab, TAKE 1 TABLET BY MOUTH EVERY DAY, Disp: 90 tablet, Rfl: 3    latanoprost 0.005 % ophthalmic solution, Place 1 drop into both eyes once daily., Disp: 7.5 mL, Rfl: 3    losartan (COZAAR) 50 MG tablet, TAKE 1 TABLET BY MOUTH TWICE A DAY, Disp: 180 tablet, Rfl: 3    LUBRICANT EYE DROPS, GLYC-PG, 1-0.3 % Drop, , Disp: , Rfl:     multivit-min/ferrous fumarate (MULTI VITAMIN ORAL), , Disp: , Rfl:     nebivoloL (BYSTOLIC) 5 MG Tab, Take 1 tablet (5 mg total) by mouth once daily., Disp: 30 tablet, Rfl: 11    nitroGLYCERIN (NITROSTAT) 0.4 MG SL tablet, PLACE 1 TABLET (0.4 MG TOTAL) UNDER THE TONGUE EVERY 5 (FIVE) MINUTES AS NEEDED FOR CHEST PAIN (IF NO RELIEF SEEK ER TRMT)., Disp: 25 tablet, Rfl: 3    OCEAN NASAL 0.65 % nasal spray, , Disp: , Rfl:     omega-3 fatty  acids-vitamin E 1,000 mg Cap, Take 2 capsules by mouth Daily., Disp: , Rfl:     saw palmetto 500 MG capsule, Take by mouth 2 (two) times daily. , Disp: , Rfl:     sildenafiL (VIAGRA) 100 MG tablet, Take 1 tablet (100 mg total) by mouth daily as needed for Erectile Dysfunction., Disp: 20 tablet, Rfl: 5    tamsulosin (FLOMAX) 0.4 mg Cap, Take 1 capsule (0.4 mg total) by mouth once daily., Disp: 90 capsule, Rfl: 1    chlorhexidine (PERIDEX) 0.12 % solution, 15 mLs 2 (two) times daily., Disp: , Rfl:     Physical Exam:  Vitals:    01/16/25 1346   BP: (!) 157/75   Pulse: (!) 57   Resp: 14     Body mass index is 30.7 kg/m².  General: awake, alert, cooperative  Head: NC/AT  Ears: external ears normal  Eyes: sclera normal  Lungs: normal inspiration, NAD  Heart: well-perfused  Skin: The skin is warm and dry  Ext: No c/c/e.  Neuro: grossly intact, AOx3    RADIOLOGY:  US RETROPERITONEAL COMPLETE 07/05/2024     CLINICAL HISTORY:  Male erectile dysfunction, unspecified     TECHNIQUE:  Ultrasound of the kidneys and urinary bladder was performed including color flow and Doppler evaluation of the kidneys.     COMPARISON:  Limited abdominal ultrasound 08/10/2022, CT chest without contrast 09/21/2023, CTA chest abdomen 08/10/2022     FINDINGS:  Right kidney: The right kidney measures 10.6 cm. There is cortical thinning. There is reduced corticomedullary distinction. Resistive index measures 0.79.  No mass. No renal stone. No hydronephrosis.     Left kidney: The left kidney measures 10.5 cm. There is cortical thinning. No loss of corticomedullary distinction. Resistive index measures 0.73.  No mass. No renal stone. No hydronephrosis.     The bladder is not distended at the time of scanning, limiting evaluation.     Splenic resistive index measures 0.65.     Impression:     Findings compatible with medical renal disease.  No hydronephrosis.    LABS:  I personally reviewed the following lab values:  Lab Results   Component Value Date     WBC 4.55 06/07/2024    HGB 14.3 06/07/2024    HCT 43.2 06/07/2024     06/07/2024     01/08/2025    K 5.1 01/08/2025     01/08/2025    CREATININE 1.2 01/08/2025    BUN 14 01/08/2025    CO2 29 01/08/2025    TSH 2.664 06/07/2024    PSA 1.4 06/07/2024    INR 1.0 09/07/2012    HGBA1C 4.7 05/22/2019    CHOL 171 01/08/2025    TRIG 99 01/08/2025    HDL 46 01/08/2025    ALT 8 (L) 01/08/2025    AST 17 01/08/2025       URINALYSIS:  Urinalysis obtained in clinic today specific gravity 1.020 pH seven, negative for all parameters      Assessment/Plan:   Wilberto Hayes Jr. is a 84 y.o. male with:    Kidney stones - no stones on renal ultrasound 7/2024    BPH - continue Flomax    ED - continue Viagra    Prostate cancer screening - PSA normal, continue annual screening    - follow up one year     Anthony Prather MD  Urology

## 2025-01-22 ENCOUNTER — PATIENT MESSAGE (OUTPATIENT)
Dept: AUDIOLOGY | Facility: CLINIC | Age: 85
End: 2025-01-22
Payer: MEDICARE

## 2025-02-17 ENCOUNTER — PATIENT MESSAGE (OUTPATIENT)
Dept: PULMONOLOGY | Facility: CLINIC | Age: 85
End: 2025-02-17
Payer: MEDICARE

## 2025-02-24 ENCOUNTER — CLINICAL SUPPORT (OUTPATIENT)
Dept: AUDIOLOGY | Facility: CLINIC | Age: 85
End: 2025-02-24
Payer: MEDICARE

## 2025-02-24 DIAGNOSIS — H93.19 TINNITUS, UNSPECIFIED LATERALITY: ICD-10-CM

## 2025-02-24 DIAGNOSIS — H90.3 SENSORINEURAL HEARING LOSS, ASYMMETRICAL: Primary | ICD-10-CM

## 2025-02-24 DIAGNOSIS — H93.13 TINNITUS, BILATERAL: ICD-10-CM

## 2025-02-24 PROCEDURE — 92557 COMPREHENSIVE HEARING TEST: CPT | Mod: S$GLB,,, | Performed by: AUDIOLOGIST-HEARING AID FITTER

## 2025-02-24 PROCEDURE — 99999 PR PBB SHADOW E&M-EST. PATIENT-LVL I: CPT | Mod: PBBFAC,,, | Performed by: AUDIOLOGIST-HEARING AID FITTER

## 2025-02-24 PROCEDURE — 92567 TYMPANOMETRY: CPT | Mod: S$GLB,,, | Performed by: AUDIOLOGIST-HEARING AID FITTER

## 2025-02-24 NOTE — PROGRESS NOTES
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  Patient complains of tinnitus and hearing loss.  He reports tinnitus that first started many years ago.  He describes the tinnitus as crickets and is nonpulsatile. The tinnitus is present in both ears, but more in the left than in the right. Tinnitus will fluctuate in volume. No aural pressure or fullness. He has not noted any discharge and otalgia.  He has hearing loss.  He reports left poorer than right hearing. He has a history of noise exposure, did not wear ear protection. He reports many years history of lightheadedness, provoked upon standing. No room spinning. His last audiogram was July 2021.    Results reveal a mild-to-severe sensorineural hearing loss 250-8000 Hz for the right ear, and a borderline normal-to-severe sensorineural hearing loss 250-8000 Hz for the left ear.   Speech Reception Thresholds were 35 dBHL for the right ear and 30 dBHL for the left ear.   Word recognition scores were excellent for the right ear and excellent for the left ear.   Tympanograms were Type A for the right ear and Type A for the left ear.    Patient was counseled on the above findings.    Recommendations:  ENT due to asymmetric SNHL AD.  Hearing aids, binaural. Patient is provided a copy of his audiogram and will check his [employer] plan.

## 2025-03-21 ENCOUNTER — OFFICE VISIT (OUTPATIENT)
Dept: PULMONOLOGY | Facility: CLINIC | Age: 85
End: 2025-03-21
Payer: MEDICARE

## 2025-03-21 VITALS
DIASTOLIC BLOOD PRESSURE: 68 MMHG | OXYGEN SATURATION: 98 % | HEIGHT: 71 IN | RESPIRATION RATE: 18 BRPM | BODY MASS INDEX: 30.54 KG/M2 | SYSTOLIC BLOOD PRESSURE: 124 MMHG | HEART RATE: 57 BPM | WEIGHT: 218.13 LBS

## 2025-03-21 DIAGNOSIS — Z71.89 CPAP USE COUNSELING: ICD-10-CM

## 2025-03-21 DIAGNOSIS — G47.33 OSA ON CPAP: ICD-10-CM

## 2025-03-21 DIAGNOSIS — R91.1 PULMONARY NODULE: ICD-10-CM

## 2025-03-21 DIAGNOSIS — G47.33 CONTROLLED NONFAMILIAL OBSTRUCTIVE SLEEP APNEA: Primary | ICD-10-CM

## 2025-03-21 PROCEDURE — 99999 PR PBB SHADOW E&M-EST. PATIENT-LVL IV: CPT | Mod: PBBFAC,,, | Performed by: INTERNAL MEDICINE

## 2025-03-21 NOTE — PROGRESS NOTES
Pulmonary Outpatient Follow Up Visit     Subjective:        Patient ID: Wilberto Hayes Jr. is a 85 y.o. male.    Chief Complaint: Pulmonary Nodules      HPI      85-year-old male patient presenting for follow-up.      03/21/2025 obtained a new [employer] machine.  Has nasal pillows.  Complaining of high pressure delivered when 1st applied.  Using and benefitting from CPAP therapy.    He is known  with past medical history of right lung nodule stable since 2022 on CT scan of the chest incidentally found on preop for abdominal surgery imaging presents for follow-up.        Known with CHELO on auto PAP has a replaced Ramos machine requesting a replacement.  Uses nasal pillows.  Adherent using and benefitting from CPAP therapy.      Machine is more than 5 years old.      Denies coughing sputum production wheezing or SOB.  Review of Systems   Respiratory:  Positive for apnea and snoring.    Psychiatric/Behavioral:  Positive for sleep disturbance.        Outpatient Encounter Medications as of 3/21/2025   Medication Sig Dispense Refill    aspirin 81 mg Tab Take 1 tablet by mouth Daily.      brimonidine 0.2% (ALPHAGAN) 0.2 % Drop Place 1 drop into the left eye every 12 (twelve) hours. 5 mL 12    clobetasol 0.05% (TEMOVATE) 0.05 % Oint Apply topically 2 (two) times daily. 30 g 2    hydroCHLOROthiazide (HYDRODIURIL) 12.5 MG Tab TAKE 1 TABLET BY MOUTH EVERY DAY 90 tablet 3    latanoprost 0.005 % ophthalmic solution Place 1 drop into both eyes once daily. 7.5 mL 3    losartan (COZAAR) 50 MG tablet TAKE 1 TABLET BY MOUTH TWICE A  tablet 3    LUBRICANT EYE DROPS, GLYC-PG, 1-0.3 % Drop       multivit-min/ferrous fumarate (MULTI VITAMIN ORAL)       nitroGLYCERIN (NITROSTAT) 0.4 MG SL tablet PLACE 1 TABLET (0.4 MG TOTAL) UNDER THE TONGUE EVERY 5 (FIVE) MINUTES AS NEEDED FOR CHEST PAIN (IF NO RELIEF SEEK ER TRMT). 25 tablet 3    OCEAN NASAL 0.65 % nasal spray       omega-3 fatty  "acids-vitamin E 1,000 mg Cap Take 2 capsules by mouth Daily.      saw palmetto 500 MG capsule Take by mouth 2 (two) times daily.       sildenafiL (VIAGRA) 100 MG tablet Take 1 tablet (100 mg total) by mouth daily as needed for Erectile Dysfunction. 20 tablet 5    tamsulosin (FLOMAX) 0.4 mg Cap Take 1 capsule (0.4 mg total) by mouth once daily. 90 capsule 1    nebivoloL (BYSTOLIC) 5 MG Tab Take 1 tablet (5 mg total) by mouth once daily. 30 tablet 11     No facility-administered encounter medications on file as of 3/21/2025.       Objective:     Vital Signs (Most Recent)  Vital Signs  Pulse: (!) 57  Resp: 18  SpO2: 98 %  BP: 124/68  Patient Position: Sitting  Pain Score: 0-No pain  Height and Weight  Height: 5' 11" (180.3 cm)  Weight: 98.9 kg (218 lb 2.3 oz)  BSA (Calculated - sq m): 2.23 sq meters  BMI (Calculated): 30.4  Weight in (lb) to have BMI = 25: 178.9]  Wt Readings from Last 2 Encounters:   03/21/25 98.9 kg (218 lb 2.3 oz)   02/10/25 99.9 kg (220 lb 3.8 oz)       Physical Exam   Constitutional: He is oriented to person, place, and time. He appears well-developed and well-nourished.   Pulmonary/Chest: Normal expansion and effort normal. No respiratory distress. He has no decreased breath sounds. He has no wheezes. He has no rhonchi.   Neurological: He is alert and oriented to person, place, and time.       Laboratory  Lab Results   Component Value Date    WBC 4.55 06/07/2024    RBC 4.55 (L) 06/07/2024    HGB 14.3 06/07/2024    HCT 43.2 06/07/2024    MCV 95 06/07/2024    MCH 31.4 (H) 06/07/2024    MCHC 33.1 06/07/2024    RDW 13.7 06/07/2024     06/07/2024    MPV 11.2 06/07/2024    GRAN 2.8 06/07/2024    GRAN 60.5 06/07/2024    LYMPH 1.1 06/07/2024    LYMPH 24.6 06/07/2024    MONO 0.5 06/07/2024    MONO 10.1 06/07/2024    EOS 0.2 06/07/2024    BASO 0.05 06/07/2024    EOSINOPHIL 3.5 06/07/2024    BASOPHIL 1.1 06/07/2024       BMP  Lab Results   Component Value Date     01/08/2025    K 5.1 01/08/2025 " "    01/08/2025    CO2 29 01/08/2025    BUN 14 01/08/2025    CREATININE 1.2 01/08/2025    CALCIUM 9.7 01/08/2025    ANIONGAP 6 (L) 01/08/2025    ESTGFRAFRICA 54.5 (A) 07/13/2022    EGFRNONAA 47.1 (A) 07/13/2022    AST 17 01/08/2025    ALT 8 (L) 01/08/2025    PROT 7.9 01/08/2025       Lab Results   Component Value Date     (H) 08/10/2022       Lab Results   Component Value Date    TSH 2.664 06/07/2024       Lab Results   Component Value Date    SEDRATE <2 07/13/2022       Lab Results   Component Value Date    CRP 1.3 07/08/2016     No results found for: "IGE"     No results found for: "ASPERGILLUS"  No results found for: "AFUMIGATUSCL"     No results found for: "ACE"     Diagnostic Results:  I have personally reviewed today the following studies:    CT chest September 20, 2024 stable      1/13/2009 Sleep study Moderate CHELO AHI: 22.8.   Assessment/Plan:   Controlled nonfamilial obstructive sleep apnea    CHELO on CPAP    CPAP use counseling    Pulmonary nodule          Nasal pillows     Change settings to 5-10 cm of water.  Ramp 10 minutes.  Auto set for her.    EPR 3 full-time      Stable lung nodule  Follow up in about 1 year (around 3/21/2026).    This note was prepared using voice recognition system and is likely to have sound alike errors that may have been overlooked even after proof reading.  Please call me with any questions    Discussed diagnosis, its evaluation, treatment and usual course. All questions answered.      Ilana Mendoza MD    "

## 2025-03-24 DIAGNOSIS — Z00.00 ENCOUNTER FOR MEDICARE ANNUAL WELLNESS EXAM: ICD-10-CM

## 2025-04-03 ENCOUNTER — OFFICE VISIT (OUTPATIENT)
Dept: OPHTHALMOLOGY | Facility: CLINIC | Age: 85
End: 2025-04-03
Payer: MEDICARE

## 2025-04-03 DIAGNOSIS — Z96.1 PSEUDOPHAKIA OF LEFT EYE: ICD-10-CM

## 2025-04-03 DIAGNOSIS — H40.1131 PRIMARY OPEN ANGLE GLAUCOMA OF BOTH EYES, MILD STAGE: Primary | ICD-10-CM

## 2025-04-03 DIAGNOSIS — H25.11 NUCLEAR SCLEROSIS OF RIGHT EYE: ICD-10-CM

## 2025-04-03 DIAGNOSIS — H04.123 DRY EYES, BILATERAL: ICD-10-CM

## 2025-04-03 PROCEDURE — 92133 CPTRZD OPH DX IMG PST SGM ON: CPT | Mod: S$GLB,,, | Performed by: OPHTHALMOLOGY

## 2025-04-03 PROCEDURE — 1160F RVW MEDS BY RX/DR IN RCRD: CPT | Mod: CPTII,S$GLB,, | Performed by: OPHTHALMOLOGY

## 2025-04-03 PROCEDURE — 1159F MED LIST DOCD IN RCRD: CPT | Mod: CPTII,S$GLB,, | Performed by: OPHTHALMOLOGY

## 2025-04-03 PROCEDURE — 99999 PR PBB SHADOW E&M-EST. PATIENT-LVL III: CPT | Mod: PBBFAC,,, | Performed by: OPHTHALMOLOGY

## 2025-04-03 PROCEDURE — 99214 OFFICE O/P EST MOD 30 MIN: CPT | Mod: S$GLB,,, | Performed by: OPHTHALMOLOGY

## 2025-04-03 RX ORDER — PERFLUOROHEXYLOCTANE 1 MG/MG
1 SOLUTION OPHTHALMIC 4 TIMES DAILY
Qty: 3 ML | Refills: 3 | Status: SHIPPED | OUTPATIENT
Start: 2025-04-03

## 2025-04-03 NOTE — PROGRESS NOTES
"HPI     Glaucoma     Additional comments: 4 months IOP check and gOCT    Patient states no visual complaints but still having trouble with dry   eyes.            Comments    1. Mild COAG OS>OD Goal =17-18 (Init 20/22)  +Fhx Glaucoma -g-dad   SLT OD 11/09 (min response) + 1/8/20 (22.5-14.5) + 3/13/24 (17-13.5)  SLT OS 8/09 (22 to 16-17) + 1/8/20 (24.5-14) + 3/13/24 (23-19.5)  Timolol caused blurry vision  2. Mod NSC OD  PCIOL OS with Omni 5/11/22  3. Dry eyes (no dry mouth)   Gel Plugs OU   Xiidra caused irritation   Tried Restasis (burning and blurry vision)   4. Fuchs' corneal dystrophy  5. Ptosis OD     Latanoprost QHS OU   Brimonidine BID OS     Homeopathic AT"s (Similasan)   Thera tears, Soothe XP   O3FO           Last edited by Jazmine Roth MA on 4/3/2025 10:23 AM.            Assessment /Plan     For exam results, see Encounter Report.    Primary open angle glaucoma of both eyes, mild stage  Doing well, intraocular pressure (IOP) within acceptable range relative to target IOP with no evidence of progression. Continue current treatment. Reviewed importance of continued compliance with treatment and follow up.      Continue current gtts:  Latanoprost one drop in each eye nightly and Brimoninide one drop in the left eye every 12 hours    RTC in 4 months with HVF 24-2 and DFE.      Nuclear sclerosis of right eye  Cataracts are present but not visually significant. Will continue to monitor.     Pseudophakia of left eye  Condition stable, no therapeutic intervention necessary at this time. Will continue to monitor.    Dry eyes, bilateral  Instructed to try Miebo QID OU                   "

## 2025-05-17 DIAGNOSIS — I10 ESSENTIAL HYPERTENSION: ICD-10-CM

## 2025-05-19 RX ORDER — NEBIVOLOL 5 MG/1
5 TABLET ORAL
Qty: 30 TABLET | Refills: 11 | Status: SHIPPED | OUTPATIENT
Start: 2025-05-19

## 2025-06-17 RX ORDER — TAMSULOSIN HYDROCHLORIDE 0.4 MG/1
1 CAPSULE ORAL
Qty: 90 CAPSULE | Refills: 1 | Status: SHIPPED | OUTPATIENT
Start: 2025-06-17

## 2025-06-17 NOTE — TELEPHONE ENCOUNTER
No care due was identified.  [facility] Embedded Care Due Messages. Reference number: [id number].   6/17/2025 9:56:53 AM CDT

## 2025-06-17 NOTE — TELEPHONE ENCOUNTER
Refill Decision Note   Wilberto Freddy  is requesting a refill authorization.  Brief Assessment and Rationale for Refill:  Approve     Medication Therapy Plan:        Comments:     Note composed:10:03 AM 06/17/2025

## 2025-07-22 ENCOUNTER — LAB VISIT (OUTPATIENT)
Dept: LAB | Facility: HOSPITAL | Age: 85
End: 2025-07-22
Attending: FAMILY MEDICINE
Payer: MEDICARE

## 2025-07-22 ENCOUNTER — OFFICE VISIT (OUTPATIENT)
Dept: FAMILY MEDICINE | Facility: CLINIC | Age: 85
End: 2025-07-22
Payer: MEDICARE

## 2025-07-22 VITALS
TEMPERATURE: 97 F | RESPIRATION RATE: 16 BRPM | BODY MASS INDEX: 29.87 KG/M2 | DIASTOLIC BLOOD PRESSURE: 68 MMHG | SYSTOLIC BLOOD PRESSURE: 118 MMHG | OXYGEN SATURATION: 95 % | HEART RATE: 58 BPM | WEIGHT: 213.38 LBS | HEIGHT: 71 IN

## 2025-07-22 DIAGNOSIS — N18.31 CHRONIC KIDNEY DISEASE, STAGE 3A: ICD-10-CM

## 2025-07-22 DIAGNOSIS — R79.9 ABNORMAL FINDING OF BLOOD CHEMISTRY, UNSPECIFIED: ICD-10-CM

## 2025-07-22 DIAGNOSIS — Z00.00 ANNUAL PHYSICAL EXAM: Primary | ICD-10-CM

## 2025-07-22 DIAGNOSIS — I25.10 CORONARY ARTERY DISEASE INVOLVING NATIVE CORONARY ARTERY OF NATIVE HEART WITHOUT ANGINA PECTORIS: ICD-10-CM

## 2025-07-22 DIAGNOSIS — G47.33 OSA ON CPAP: ICD-10-CM

## 2025-07-22 DIAGNOSIS — I10 ESSENTIAL HYPERTENSION: ICD-10-CM

## 2025-07-22 LAB
ALBUMIN SERPL BCP-MCNC: 4.4 G/DL (ref 3.5–5.2)
ALP SERPL-CCNC: 74 UNIT/L (ref 40–150)
ALT SERPL W/O P-5'-P-CCNC: 8 UNIT/L (ref 10–44)
ANION GAP (OHS): 7 MMOL/L (ref 8–16)
AST SERPL-CCNC: 17 UNIT/L (ref 11–45)
BILIRUB SERPL-MCNC: 0.8 MG/DL (ref 0.1–1)
BUN SERPL-MCNC: 19 MG/DL (ref 8–23)
CALCIUM SERPL-MCNC: 9.6 MG/DL (ref 8.7–10.5)
CHLORIDE SERPL-SCNC: 105 MMOL/L (ref 95–110)
CO2 SERPL-SCNC: 27 MMOL/L (ref 23–29)
CREAT SERPL-MCNC: 1.3 MG/DL (ref 0.5–1.4)
EAG (OHS): 88 MG/DL (ref 68–131)
GFR SERPLBLD CREATININE-BSD FMLA CKD-EPI: 55 ML/MIN/1.73/M2
GLUCOSE SERPL-MCNC: 88 MG/DL (ref 70–110)
HBA1C MFR BLD: 4.7 % (ref 4–5.6)
POTASSIUM SERPL-SCNC: 4.4 MMOL/L (ref 3.5–5.1)
PROT SERPL-MCNC: 7.5 GM/DL (ref 6–8.4)
SODIUM SERPL-SCNC: 139 MMOL/L (ref 136–145)

## 2025-07-22 PROCEDURE — 99999 PR PBB SHADOW E&M-EST. PATIENT-LVL IV: CPT | Mod: PBBFAC,,, | Performed by: FAMILY MEDICINE

## 2025-07-22 PROCEDURE — 3078F DIAST BP <80 MM HG: CPT | Mod: CPTII,S$GLB,, | Performed by: FAMILY MEDICINE

## 2025-07-22 PROCEDURE — 36415 COLL VENOUS BLD VENIPUNCTURE: CPT | Mod: PO

## 2025-07-22 PROCEDURE — 83036 HEMOGLOBIN GLYCOSYLATED A1C: CPT

## 2025-07-22 PROCEDURE — 1101F PT FALLS ASSESS-DOCD LE1/YR: CPT | Mod: CPTII,S$GLB,, | Performed by: FAMILY MEDICINE

## 2025-07-22 PROCEDURE — 3074F SYST BP LT 130 MM HG: CPT | Mod: CPTII,S$GLB,, | Performed by: FAMILY MEDICINE

## 2025-07-22 PROCEDURE — 1125F AMNT PAIN NOTED PAIN PRSNT: CPT | Mod: CPTII,S$GLB,, | Performed by: FAMILY MEDICINE

## 2025-07-22 PROCEDURE — 82040 ASSAY OF SERUM ALBUMIN: CPT

## 2025-07-22 PROCEDURE — 1159F MED LIST DOCD IN RCRD: CPT | Mod: CPTII,S$GLB,, | Performed by: FAMILY MEDICINE

## 2025-07-22 PROCEDURE — 99397 PER PM REEVAL EST PAT 65+ YR: CPT | Mod: S$GLB,,, | Performed by: FAMILY MEDICINE

## 2025-07-22 PROCEDURE — 3288F FALL RISK ASSESSMENT DOCD: CPT | Mod: CPTII,S$GLB,, | Performed by: FAMILY MEDICINE

## 2025-07-22 RX ORDER — LOSARTAN POTASSIUM 25 MG/1
25 TABLET ORAL 2 TIMES DAILY
COMMUNITY

## 2025-07-22 NOTE — PROGRESS NOTES
"Subjective:       Patient ID: Wilberto Hayes Jr. is a 85 y.o. male.    Chief Complaint: Annual Exam      HPI Comments:     Current Medications[1]      Last seen by me 6 months ago here for annual physical    Continues to have fatigue and decreased endurance.  Some days or good, such as yesterday when he worked in the garden a day.  Other days he has trouble getting out of bed.  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    Interested in a "spike protein" antidote advertised on TV.    Viagra works "so-so.  Flomax doing well for him     Uses his CPAP every day    Still has tinnitus.  [facility] it [facility] but understands in his tinnitus will not likely go away        Labs last time:  PSA 1.0      Review of Systems   Constitutional:  Positive for fatigue. Negative for activity change, appetite change and fever.   HENT:  Positive for tinnitus. Negative for sore throat.    Respiratory:  Negative for cough and shortness of breath.    Cardiovascular:  Negative for chest pain.   Gastrointestinal:  Negative for abdominal pain, diarrhea and nausea.   Genitourinary:  Negative for difficulty urinating.   Musculoskeletal:  Negative for arthralgias and myalgias.   Neurological:  Negative for dizziness and headaches.       Objective:      Vitals:    07/22/25 0815   BP: 118/68   Pulse: (!) 58   Resp: 16   Temp: 96.6 °F (35.9 °C)   TempSrc: Tympanic   SpO2: 95%   Weight: 96.8 kg (213 lb 6.5 oz)   Height: 5' 11" (1.803 m)   PainSc:   3   PainLoc: Generalized     Physical Exam  Vitals and nursing note reviewed.   Constitutional:       General: He is not in acute distress.     Appearance: He is well-developed. He is not diaphoretic.   HENT:      Head: Normocephalic.   Neck:      Thyroid: No thyromegaly.   Cardiovascular:      Rate and Rhythm: Normal rate and regular rhythm.      Heart sounds: Normal heart sounds. No murmur heard.  Pulmonary:      Effort: Pulmonary effort is normal.      Breath sounds: Normal " breath sounds. No wheezing or rales.   Abdominal:      General: There is no distension.      Palpations: Abdomen is soft.   Musculoskeletal:      Cervical back: Neck supple.      Right lower leg: No edema.      Left lower leg: No edema.   Lymphadenopathy:      Cervical: No cervical adenopathy.   Skin:     General: Skin is warm and dry.   Neurological:      Mental Status: He is alert and oriented to person, place, and time.   Psychiatric:         Mood and Affect: Mood normal.         Behavior: Behavior normal.         Thought Content: Thought content normal.         Judgment: Judgment normal.         Assessment:       1. Annual physical exam    2. Chronic kidney disease, stage 3a    3. Essential hypertension    4. CHELO on CPAP    5. Coronary artery disease involving native coronary artery of native heart without angina pectoris    6. Abnormal finding of blood chemistry, unspecified        Plan:   Annual physical exam  Comments:  Stays physically active.  Doing well on Flomax, Saw Palmetto    Chronic kidney disease, stage 3a  Comments:  CMP today  Orders:  -     Comprehensive Metabolic Panel; Future; Expected date: 07/22/2025  -     Hemoglobin A1C; Future; Expected date: 07/22/2025    Essential hypertension  Comments:  Controlled.  Follow-up 6 months    CHELO on CPAP  Comments:  Nightly user    Coronary artery disease involving native coronary artery of native heart without angina pectoris  Comments:  On aspirin    Abnormal finding of blood chemistry, unspecified  -     Hemoglobin A1C; Future; Expected date: 07/22/2025                 [1]   Current Outpatient Medications:     aspirin 81 mg Tab, Take 1 tablet by mouth Daily., Disp: , Rfl:     brimonidine 0.2% (ALPHAGAN) 0.2 % Drop, Place 1 drop into the left eye every 12 (twelve) hours., Disp: 5 mL, Rfl: 12    clobetasol 0.05% (TEMOVATE) 0.05 % Oint, Apply topically 2 (two) times daily., Disp: 30 g, Rfl: 2    hydroCHLOROthiazide (HYDRODIURIL) 12.5 MG Tab, TAKE 1 TABLET BY  MOUTH EVERY DAY, Disp: 90 tablet, Rfl: 3    latanoprost 0.005 % ophthalmic solution, Place 1 drop into both eyes once daily., Disp: 7.5 mL, Rfl: 3    LUBRICANT EYE DROPS, GLYC-PG, 1-0.3 % Drop, , Disp: , Rfl:     multivit-min/ferrous fumarate (MULTI VITAMIN ORAL), , Disp: , Rfl:     nebivoloL (BYSTOLIC) 5 MG Tab, TAKE 1 TABLET BY MOUTH EVERY DAY, Disp: 30 tablet, Rfl: 11    nitroGLYCERIN (NITROSTAT) 0.4 MG SL tablet, PLACE 1 TABLET (0.4 MG TOTAL) UNDER THE TONGUE EVERY 5 (FIVE) MINUTES AS NEEDED FOR CHEST PAIN (IF NO RELIEF SEEK ER TRMT)., Disp: 25 tablet, Rfl: 3    OCEAN NASAL 0.65 % nasal spray, , Disp: , Rfl:     omega-3 fatty acids-vitamin E 1,000 mg Cap, Take 2 capsules by mouth Daily., Disp: , Rfl:     saw palmetto 500 MG capsule, Take by mouth 2 (two) times daily. , Disp: , Rfl:     tamsulosin (FLOMAX) 0.4 mg Cap, TAKE 1 CAPSULE BY MOUTH EVERY DAY, Disp: 90 capsule, Rfl: 1    losartan (COZAAR) 25 MG tablet, Take 25 mg by mouth 2 (two) times a day., Disp: , Rfl:     losartan (COZAAR) 50 MG tablet, TAKE 1 TABLET BY MOUTH TWICE A DAY (Patient not taking: Reported on 7/22/2025), Disp: 180 tablet, Rfl: 3    perfluorohexyloctane, PF, (MIEBO, PF,) 100 % Drop, Place 1 drop into both eyes 4 (four) times daily., Disp: 3 mL, Rfl: 3    sildenafiL (VIAGRA) 100 MG tablet, Take 1 tablet (100 mg total) by mouth daily as needed for Erectile Dysfunction., Disp: 20 tablet, Rfl: 5

## 2025-07-23 ENCOUNTER — PATIENT MESSAGE (OUTPATIENT)
Dept: FAMILY MEDICINE | Facility: CLINIC | Age: 85
End: 2025-07-23
Payer: MEDICARE

## 2025-08-28 ENCOUNTER — OFFICE VISIT (OUTPATIENT)
Dept: OTOLARYNGOLOGY | Facility: CLINIC | Age: 85
End: 2025-08-28
Payer: MEDICARE

## 2025-08-28 VITALS — BODY MASS INDEX: 29.42 KG/M2 | WEIGHT: 210.13 LBS | HEIGHT: 71 IN

## 2025-08-28 DIAGNOSIS — R42 DIZZINESS: ICD-10-CM

## 2025-08-28 DIAGNOSIS — H90.3 SENSORINEURAL HEARING LOSS (SNHL) OF BOTH EARS: ICD-10-CM

## 2025-08-28 DIAGNOSIS — H93.13 TINNITUS AURIUM, BILATERAL: Primary | ICD-10-CM

## 2025-08-28 PROCEDURE — 99999 PR PBB SHADOW E&M-EST. PATIENT-LVL III: CPT | Mod: PBBFAC,,, | Performed by: OTOLARYNGOLOGY

## 2025-08-28 PROCEDURE — 1159F MED LIST DOCD IN RCRD: CPT | Mod: CPTII,S$GLB,, | Performed by: OTOLARYNGOLOGY

## 2025-08-28 PROCEDURE — 3288F FALL RISK ASSESSMENT DOCD: CPT | Mod: CPTII,S$GLB,, | Performed by: OTOLARYNGOLOGY

## 2025-08-28 PROCEDURE — 1160F RVW MEDS BY RX/DR IN RCRD: CPT | Mod: CPTII,S$GLB,, | Performed by: OTOLARYNGOLOGY

## 2025-08-28 PROCEDURE — 1101F PT FALLS ASSESS-DOCD LE1/YR: CPT | Mod: CPTII,S$GLB,, | Performed by: OTOLARYNGOLOGY

## 2025-08-28 PROCEDURE — 99204 OFFICE O/P NEW MOD 45 MIN: CPT | Mod: S$GLB,,, | Performed by: OTOLARYNGOLOGY

## 2025-08-28 PROCEDURE — 1126F AMNT PAIN NOTED NONE PRSNT: CPT | Mod: CPTII,S$GLB,, | Performed by: OTOLARYNGOLOGY

## (undated) DEVICE — COVER LIGHT HANDLE 80/CA

## (undated) DEVICE — SUT NUR BR0 CLSR 8-18IN MO6

## (undated) DEVICE — BAG TISSUE RETRIEVAL 5MM

## (undated) DEVICE — SOL 9P NACL IRR PIC IL

## (undated) DEVICE — IRRIGATOR ENDOWRIST XI SUCTION

## (undated) DEVICE — MANIFOLD 4 PORT

## (undated) DEVICE — KIT ANTIFOG W/SPONG & FLUID

## (undated) DEVICE — DEVICE CLOSURE DISP 14G

## (undated) DEVICE — DRAPE THREE-QTR REINF 53X77IN

## (undated) DEVICE — GRASPER TIP RENEW LAP FENEST

## (undated) DEVICE — SUT VICRYL 3-0 27 SH

## (undated) DEVICE — Device

## (undated) DEVICE — SPONGE IV DRAIN 4X4 STERILE

## (undated) DEVICE — GLOVE SURG BIOGEL LATEX SZ 7.5

## (undated) DEVICE — GAUZE SPONGE 4X4 12PLY

## (undated) DEVICE — SOL NS 1000CC

## (undated) DEVICE — APPLICATOR CHLORAPREP ORN 26ML

## (undated) DEVICE — SUT MONOCRYL 4-0 PS-1 UND

## (undated) DEVICE — DYE ICG

## (undated) DEVICE — SUT 2/0 30IN SILK BLK BRAI

## (undated) DEVICE — HEADREST ROUND DISP FOAM 9IN

## (undated) DEVICE — TOWEL OR DISP STRL BLUE 4/PK

## (undated) DEVICE — SEAL UNIVERSAL 5MM-8MM XI

## (undated) DEVICE — DRAPE ABDOMINAL TIBURON 14X11

## (undated) DEVICE — SYR 10CC LUER LOCK

## (undated) DEVICE — PACK BASIC SETUP SC BR

## (undated) DEVICE — SUT BLU GS-22 0 3.5M 23CM 9IN

## (undated) DEVICE — SUT MONOCRYL 4.0 PS2 CP496G

## (undated) DEVICE — GOWN POLY REINF BRTH SLV XL

## (undated) DEVICE — NDL PNEUMO INSUFFLATI 120MM

## (undated) DEVICE — COVER TIP CURVED SCISSORS XI

## (undated) DEVICE — DRESSING TRANS 4X4 TEGADERM

## (undated) DEVICE — SUT VICRYL+ 27 UR-6 VIOL

## (undated) DEVICE — NDL SAFETY 25G X 1.5 ECLIPSE

## (undated) DEVICE — OBTURATOR BLADELESS 8MM XI CLR

## (undated) DEVICE — SUT ETHILON 3-0 PS2 18 BLK

## (undated) DEVICE — EVACUATOR WOUND BULB 100CC

## (undated) DEVICE — ADHESIVE DERMABOND ADVANCED

## (undated) DEVICE — SUPPORT ULNA NERVE PROTECTOR

## (undated) DEVICE — ELECTRODE REM PLYHSV RETURN 9

## (undated) DEVICE — DRAPE ARM DAVINCI XI

## (undated) DEVICE — NDL HYPODERMIC BLUNT 18G 1.5IN

## (undated) DEVICE — SUT V-LOC 180 V-20 3-0 12IN

## (undated) DEVICE — DRAPE T TRNSVRS LAP 102X78X121

## (undated) DEVICE — SET PNEUMOCLEAR HEAT HUM SE HF

## (undated) DEVICE — CLIP HEMO-LOK ML

## (undated) DEVICE — SUT CTD VICRYL 0 UND BR SUT

## (undated) DEVICE — SOL STRL WATER INJ 1000ML BG

## (undated) DEVICE — HEMOSTAT SURGICEL 2X3IN

## (undated) DEVICE — DRAPE COLUMN DAVINCI XI

## (undated) DEVICE — SUT V-LOC ABSRB WOUND CLSR